# Patient Record
Sex: FEMALE | Race: WHITE | NOT HISPANIC OR LATINO | Employment: OTHER | URBAN - METROPOLITAN AREA
[De-identification: names, ages, dates, MRNs, and addresses within clinical notes are randomized per-mention and may not be internally consistent; named-entity substitution may affect disease eponyms.]

---

## 2017-10-23 ENCOUNTER — OFFICE VISIT (OUTPATIENT)
Dept: INTERNAL MEDICINE | Facility: CLINIC | Age: 72
End: 2017-10-23

## 2017-10-23 ENCOUNTER — APPOINTMENT (OUTPATIENT)
Dept: CARDIOLOGY | Facility: HOSPITAL | Age: 72
End: 2017-10-23

## 2017-10-23 VITALS
BODY MASS INDEX: 29.66 KG/M2 | DIASTOLIC BLOOD PRESSURE: 72 MMHG | SYSTOLIC BLOOD PRESSURE: 118 MMHG | RESPIRATION RATE: 16 BRPM | TEMPERATURE: 97.8 F | WEIGHT: 178 LBS | OXYGEN SATURATION: 99 % | HEART RATE: 68 BPM | HEIGHT: 65 IN

## 2017-10-23 DIAGNOSIS — K21.9 GASTROESOPHAGEAL REFLUX DISEASE WITHOUT ESOPHAGITIS: ICD-10-CM

## 2017-10-23 DIAGNOSIS — E78.49 OTHER HYPERLIPIDEMIA: ICD-10-CM

## 2017-10-23 DIAGNOSIS — M15.9 PRIMARY OSTEOARTHRITIS INVOLVING MULTIPLE JOINTS: ICD-10-CM

## 2017-10-23 DIAGNOSIS — I38 HEART VALVE DISORDER: ICD-10-CM

## 2017-10-23 DIAGNOSIS — M79.89 LEFT LEG SWELLING: Primary | ICD-10-CM

## 2017-10-23 DIAGNOSIS — Z23 FLU VACCINE NEED: ICD-10-CM

## 2017-10-23 DIAGNOSIS — J30.9 ALLERGIC RHINITIS, UNSPECIFIED CHRONICITY, UNSPECIFIED SEASONALITY, UNSPECIFIED TRIGGER: ICD-10-CM

## 2017-10-23 PROBLEM — M15.0 PRIMARY OSTEOARTHRITIS INVOLVING MULTIPLE JOINTS: Status: ACTIVE | Noted: 2017-10-23

## 2017-10-23 PROCEDURE — 99203 OFFICE O/P NEW LOW 30 MIN: CPT | Performed by: NURSE PRACTITIONER

## 2017-10-23 PROCEDURE — G0008 ADMIN INFLUENZA VIRUS VAC: HCPCS | Performed by: NURSE PRACTITIONER

## 2017-10-23 PROCEDURE — 90662 IIV NO PRSV INCREASED AG IM: CPT | Performed by: NURSE PRACTITIONER

## 2017-10-23 RX ORDER — PRAVASTATIN SODIUM 40 MG
40 TABLET ORAL DAILY
COMMUNITY
Start: 2017-10-18 | End: 2018-04-05 | Stop reason: SDUPTHER

## 2017-10-23 RX ORDER — FLUTICASONE PROPIONATE 50 MCG
SPRAY, SUSPENSION (ML) NASAL
COMMUNITY
Start: 2017-10-11 | End: 2018-03-20

## 2017-10-23 RX ORDER — ESOMEPRAZOLE MAGNESIUM 40 MG/1
40 CAPSULE, DELAYED RELEASE ORAL AS NEEDED
COMMUNITY
End: 2018-01-29 | Stop reason: SDUPTHER

## 2017-10-23 RX ORDER — LOSARTAN POTASSIUM 50 MG/1
50 TABLET ORAL DAILY
COMMUNITY
Start: 2017-10-18 | End: 2018-01-29 | Stop reason: SDUPTHER

## 2017-10-23 NOTE — PROGRESS NOTES
Subjective   Joyce Jones is a 72 y.o. female    Chief Complaint   Patient presents with   • Establish Care   • swelling in left ankle     Had 2nd left knee replacement in Aug.      History of Present Illness     New pt here to establish care.    C/O LLE swelling.  Joyce had her 3rd surgery on her left knee in Aug 2017.  Prior to this, she had a scope, a TKR and this was a revision in August.  States that prior to the surgery she did have swelling.  Following the surgery, she has had continued swelling that does not seem to be improving/decreasing. She has a h/o DVT in her right leg in the early 2000's following a right TKR.  She has been on ASA since.  She was anticoagulated for 6 months to a year following that DVT.  She denies calf pain or redness.  Has been wearing a compression stocking without relief of sx's.  No CP or SOA.      HTN - chronic/well controlled; states that BP has always been fine, but her cardiologist prefers to keep her lower due to her MR, TR, and AVR.  She tolerates med w/o SE.    HL - chronic/stable on Pravastatin 40mg.  States that she had labs done recently at her previous PCP at [facility].    Allergic rhinnitis - currently using nasal sprays and has allergy testing scheduled with Dr. Pro on 11/3/2017    Heart Murmur - has been told that she has tricuspid, mitral and aortic valve regurgitation.  Last echo was > 1 years.  Followed by Cardiology - Dr. Toussaint.  Sees him Q6 months at [facility].     OA - chronic/stable.  Sees rheumatology at [facility] Q6 months.  Was originally referred to them for suspect of RA, but this was ruled out    Specialist:  GI - Dr. Mallory/Maya  Cardiologist - [facility]  ENT - [facility] - Dr. 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 T11-sacrum   • CERVICAL SPINE SURGERY  2000    cage   • CHOLECYSTECTOMY  2014   • COLONOSCOPY  2016   • FOOT SURGERY  2008   • HERNIA REPAIR Bilateral 1978    Inguinal hernia   • HYSTERECTOMY  1992?    1 ovary left   • KNEE SURGERY      2005 and x2 in 8/2017(left)   • NASAL SEPTUM SURGERY  1999     Allergies   Allergen Reactions   • Overton-2 Inhibitors Anaphylaxis   • Augmentin [Amoxicillin-Pot Clavulanate] Nausea Only     Family History   Problem Relation Age of Onset   • Colon cancer Mother    • Uterine cancer Mother    • Hypertension Mother    • Kidney disease Mother    • Tuberculosis Mother    • Cancer Father      Bladder   • Heart failure Father    • Stroke Father    • Hypertension Father    • Heart attack Father    • Hypertension Brother    • Kidney disease Brother    • Kidney disease Brother    • Hypertension Brother    • Other Daughter      CVID   • Diabetes Daughter      Social History     Social History   • Marital status:      Spouse name: N/A   • Number of children: N/A   • Years of education: N/A     Occupational History   • Not on file.     Social History Main Topics   • Smoking status: Never Smoker   • Smokeless tobacco: Never Used   • Alcohol use No   • Drug use: No   • Sexual activity: Not on file      Comment:      Other Topics Concern   • Not on file     Social History Narrative   • No narrative on file         The following portions of the patient's history were reviewed and updated as appropriate: allergies, current medications, past family history, past medical history, past social history, past surgical history and problem list.    Current  Outpatient Prescriptions:   •  esomeprazole (nexIUM) 40 MG capsule, Take 40 mg by mouth As Needed., Disp: , Rfl:   •  fluticasone (FLONASE) 50 MCG/ACT nasal spray, 2 sprays to each nostril daily, Disp: , Rfl:   •  losartan (COZAAR) 50 MG tablet, Take 50 mg by mouth Daily., Disp: , Rfl:   •  pravastatin (PRAVACHOL) 40 MG tablet, Take 40 mg by mouth Daily., Disp: , Rfl:   •  traMADol-acetaminophen (ULTRACET) 37.5-325 MG per tablet, Take 2 tablets by mouth Every 6 (Six) Hours As Needed., Disp: , Rfl:      Review of Systems   Constitutional: Negative for chills, fatigue and fever.   Respiratory: Negative for cough, chest tightness and shortness of breath.    Cardiovascular: Positive for leg swelling. Negative for chest pain.   Gastrointestinal: Negative for abdominal pain, diarrhea, nausea and vomiting.   Endocrine: Negative for cold intolerance and heat intolerance.   Musculoskeletal: Negative for arthralgias.   Neurological: Negative for dizziness.       Objective   Physical Exam   Constitutional: She is oriented to person, place, and time. She appears well-developed and well-nourished.   HENT:   Head: Normocephalic and atraumatic.   Eyes: Conjunctivae and EOM are normal. Pupils are equal, round, and reactive to light.   Neck: Normal range of motion.   Cardiovascular: Normal rate, regular rhythm and normal heart sounds.    Pulmonary/Chest: Effort normal and breath sounds normal.   Abdominal: Soft. Bowel sounds are normal.   Musculoskeletal: Normal range of motion. She exhibits edema (LLE, 2+ pitting; neg дмитрий's.  No redness).   Neurological: She is alert and oriented to person, place, and time. She has normal reflexes.   Skin: Skin is warm and dry.   Psychiatric: She has a normal mood and affect. Her behavior is normal. Judgment and thought content normal.     Vitals:    10/23/17 0958   BP: 118/72   Pulse: 68   Resp: 16   Temp: 97.8 °F (36.6 °C)   TempSrc: Temporal Artery    SpO2: 99%   Weight: 178 lb (80.7 kg)  "  Height: 64.5\" (163.8 cm)         Assessment/Plan   Joyce was seen today for establish care and swelling in left ankle.    Diagnoses and all orders for this visit:    Left leg swelling  -     Duplex Venous Lower Extremity - Left CAR; Future    Flu vaccine need  -     Flu Vaccine High Dose PF 65YR+ ([id number])    Primary osteoarthritis involving multiple joints    Other hyperlipidemia    Gastroesophageal reflux disease without esophagitis    Heart valve disorder    Allergic rhinitis, unspecified chronicity, unspecified seasonality, unspecified trigger      Sent for LLE venous duplex to r/o DVT  Wear compression stockings  She has f/u with Ortho on 11/3/17; if duplex is normal, she will discuss sx's with him  Flu shot updated  No refills needed today  Pt reports that MCW exam was done in March 2017  She will f/u with me in 6 months for MCW, or RTC sooner with any problems             "

## 2017-10-25 ENCOUNTER — TELEPHONE (OUTPATIENT)
Dept: INTERNAL MEDICINE | Facility: CLINIC | Age: 72
End: 2017-10-25

## 2017-10-25 NOTE — TELEPHONE ENCOUNTER
----- Message from LOVE Manzano sent at 10/23/2017 10:35 AM EDT -----  Can you request last labs/OV from her previous PCP at [facility]

## 2017-10-26 ENCOUNTER — HOSPITAL ENCOUNTER (OUTPATIENT)
Dept: CARDIOLOGY | Facility: HOSPITAL | Age: 72
Discharge: HOME | End: 2017-10-26
Admitting: NURSE PRACTITIONER

## 2017-10-26 DIAGNOSIS — M79.89 LEFT LEG SWELLING: ICD-10-CM

## 2017-10-26 LAB
BH CV LOWER VASCULAR LEFT COMMON FEMORAL AUGMENT: NORMAL
BH CV LOWER VASCULAR LEFT COMMON FEMORAL COMPRESS: NORMAL
BH CV LOWER VASCULAR LEFT COMMON FEMORAL PHASIC: NORMAL
BH CV LOWER VASCULAR LEFT COMMON FEMORAL SPONT: NORMAL
BH CV LOWER VASCULAR LEFT DISTAL FEMORAL COMPRESS: NORMAL
BH CV LOWER VASCULAR LEFT GASTRONEMIUS COMPRESS: NORMAL
BH CV LOWER VASCULAR LEFT GREATER SAPH AK COMPRESS: NORMAL
BH CV LOWER VASCULAR LEFT GREATER SAPH BK COMPRESS: NORMAL
BH CV LOWER VASCULAR LEFT LESSER SAPH COMPRESS: NORMAL
BH CV LOWER VASCULAR LEFT MID FEMORAL AUGMENT: NORMAL
BH CV LOWER VASCULAR LEFT MID FEMORAL COMPRESS: NORMAL
BH CV LOWER VASCULAR LEFT MID FEMORAL PHASIC: NORMAL
BH CV LOWER VASCULAR LEFT MID FEMORAL SPONT: NORMAL
BH CV LOWER VASCULAR LEFT PERONEAL COMPRESS: NORMAL
BH CV LOWER VASCULAR LEFT POPLITEAL AUGMENT: NORMAL
BH CV LOWER VASCULAR LEFT POPLITEAL COMPRESS: NORMAL
BH CV LOWER VASCULAR LEFT POPLITEAL PHASIC: NORMAL
BH CV LOWER VASCULAR LEFT POPLITEAL SPONT: NORMAL
BH CV LOWER VASCULAR LEFT POSTERIOR TIBIAL COMPRESS: NORMAL
BH CV LOWER VASCULAR LEFT PROXIMAL FEMORAL COMPRESS: NORMAL
BH CV LOWER VASCULAR LEFT SAPHENOFEMORAL JUNCTION AUGMENT: NORMAL
BH CV LOWER VASCULAR LEFT SAPHENOFEMORAL JUNCTION COMPRESS: NORMAL
BH CV LOWER VASCULAR LEFT SAPHENOFEMORAL JUNCTION PHASIC: NORMAL
BH CV LOWER VASCULAR LEFT SAPHENOFEMORAL JUNCTION SPONT: NORMAL
BH CV LOWER VASCULAR RIGHT COMMON FEMORAL AUGMENT: NORMAL
BH CV LOWER VASCULAR RIGHT COMMON FEMORAL COMPRESS: NORMAL
BH CV LOWER VASCULAR RIGHT COMMON FEMORAL PHASIC: NORMAL
BH CV LOWER VASCULAR RIGHT COMMON FEMORAL SPONT: NORMAL

## 2017-10-26 PROCEDURE — 93971 EXTREMITY STUDY: CPT

## 2017-10-26 PROCEDURE — 93971 EXTREMITY STUDY: CPT | Performed by: INTERNAL MEDICINE

## 2017-10-30 ENCOUNTER — TELEPHONE (OUTPATIENT)
Dept: INTERNAL MEDICINE | Facility: CLINIC | Age: 72
End: 2017-10-30

## 2017-10-30 NOTE — TELEPHONE ENCOUNTER
----- Message from LOVE Manzano sent at 10/30/2017 11:28 AM EDT -----  Venous duplex of the lower extremities was normal

## 2017-11-01 ENCOUNTER — APPOINTMENT (OUTPATIENT)
Dept: CARDIOLOGY | Facility: HOSPITAL | Age: 72
End: 2017-11-01

## 2017-11-14 ENCOUNTER — OFFICE VISIT (OUTPATIENT)
Dept: INTERNAL MEDICINE | Facility: CLINIC | Age: 72
End: 2017-11-14

## 2017-11-14 VITALS
HEART RATE: 77 BPM | RESPIRATION RATE: 16 BRPM | BODY MASS INDEX: 30.09 KG/M2 | WEIGHT: 180.6 LBS | HEIGHT: 65 IN | OXYGEN SATURATION: 98 % | TEMPERATURE: 98.3 F | SYSTOLIC BLOOD PRESSURE: 102 MMHG | DIASTOLIC BLOOD PRESSURE: 72 MMHG

## 2017-11-14 DIAGNOSIS — E78.49 OTHER HYPERLIPIDEMIA: Primary | ICD-10-CM

## 2017-11-14 DIAGNOSIS — J30.9 ALLERGIC RHINITIS, UNSPECIFIED CHRONICITY, UNSPECIFIED SEASONALITY, UNSPECIFIED TRIGGER: ICD-10-CM

## 2017-11-14 DIAGNOSIS — I38 HEART VALVE DISORDER: ICD-10-CM

## 2017-11-14 DIAGNOSIS — Z23 NEED FOR PNEUMOCOCCAL VACCINATION: ICD-10-CM

## 2017-11-14 PROCEDURE — 90670 PCV13 VACCINE IM: CPT | Performed by: NURSE PRACTITIONER

## 2017-11-14 PROCEDURE — 99214 OFFICE O/P EST MOD 30 MIN: CPT | Performed by: NURSE PRACTITIONER

## 2017-11-14 PROCEDURE — G0009 ADMIN PNEUMOCOCCAL VACCINE: HCPCS | Performed by: NURSE PRACTITIONER

## 2017-11-14 RX ORDER — FUROSEMIDE 20 MG/1
TABLET ORAL
COMMUNITY
Start: 2017-10-27 | End: 2018-07-26

## 2017-11-14 NOTE — PROGRESS NOTES
Subjective   Joyce Jones is a 72 y.o. female    Chief Complaint   Patient presents with   • Dizziness   • Wants the Hep C lab done   • Pneumonia vaccine     History of Present Illness     Joyce presents today with c/o dizziness.  States that she recently had allergy testing and will start allergy injections tomorrow.  She has been off of all allergy meds for several weeks now.  Ears feel full.  She has a PND and scratchy throat.  No fever or chills.  Denies any other associated sx's or complaints.      Requests labs for Hep C screening - she is considered low risk, but has never been screened.     Also requests Prevnar 13 vaccine    The following portions of the patient's history were reviewed and updated as appropriate: allergies, current medications, past family history, past medical history, past social history, past surgical history and problem list.    Current Outpatient Prescriptions:   •  esomeprazole (nexIUM) 40 MG capsule, Take 40 mg by mouth As Needed., Disp: , Rfl:   •  fluticasone (FLONASE) 50 MCG/ACT nasal spray, 2 sprays to each nostril daily, Disp: , Rfl:   •  furosemide (LASIX) 20 MG tablet, prn, Disp: , Rfl:   •  losartan (COZAAR) 50 MG tablet, Take 50 mg by mouth Daily., Disp: , Rfl:   •  pravastatin (PRAVACHOL) 40 MG tablet, Take 40 mg by mouth Daily., Disp: , Rfl:   •  traMADol-acetaminophen (ULTRACET) 37.5-325 MG per tablet, Take 2 tablets by mouth Every 6 (Six) Hours As Needed., Disp: , Rfl:      Review of Systems   Constitutional: Negative for chills, fatigue and fever.   HENT: Positive for congestion, postnasal drip and rhinorrhea.    Respiratory: Negative for cough, chest tightness and shortness of breath.    Cardiovascular: Negative for chest pain.   Gastrointestinal: Negative for abdominal pain, diarrhea, nausea and vomiting.   Endocrine: Negative for cold intolerance and heat intolerance.   Musculoskeletal: Negative for arthralgias.   Neurological: Positive for dizziness.  "      Objective   Physical Exam   Constitutional: She is oriented to person, place, and time. She appears well-developed and well-nourished.   HENT:   Head: Normocephalic and atraumatic.   Right Ear: A middle ear effusion is present.   Left Ear: A middle ear effusion is present.   Eyes: Conjunctivae and EOM are normal. Pupils are equal, round, and reactive to light.   Neck: Normal range of motion.   Cardiovascular: Normal rate, regular rhythm and normal heart sounds.    Pulmonary/Chest: Effort normal and breath sounds normal.   Abdominal: Soft. Bowel sounds are normal.   Musculoskeletal: Normal range of motion.   Neurological: She is alert and oriented to person, place, and time. She has normal strength and normal reflexes. No cranial nerve deficit or sensory deficit. GCS eye subscore is 4. GCS verbal subscore is 5. GCS motor subscore is 6.   Skin: Skin is warm and dry.   Psychiatric: She has a normal mood and affect. Her behavior is normal. Judgment and thought content normal.     Vitals:    11/14/17 1407   BP: 102/72   Pulse: 77   Resp: 16   Temp: 98.3 °F (36.8 °C)   TempSrc: Temporal Artery    SpO2: 98%   Weight: 180 lb 9.6 oz (81.9 kg)   Height: 64.5\" (163.8 cm)         Assessment/Plan   Joyce was seen today for dizziness, wants the hep c lab done and pneumonia vaccine.    Diagnoses and all orders for this visit:    Other hyperlipidemia  -     CBC & Differential; Future  -     Comprehensive Metabolic Panel; Future  -     Lipid Panel; Future  -     TSH; Future  -     Hepatitis C Antibody; Future    Heart valve disorder  -     CBC & Differential; Future  -     Comprehensive Metabolic Panel; Future  -     Lipid Panel; Future  -     TSH; Future  -     Hepatitis C Antibody; Future    Allergic rhinitis, unspecified chronicity, unspecified seasonality, unspecified trigger  -     CBC & Differential; Future  -     Comprehensive Metabolic Panel; Future  -     Lipid Panel; Future  -     TSH; Future  -     Hepatitis C " Antibody; Future    Need for pneumococcal vaccination  -     Pneumococcal Conjugate Vaccine 13-Valent All    Labs sent   Prevnar updated  Has nasal steroid at home that she will start using being as though this will not interfere with her allergy injections.  RTC in March for MCW exam, or sooner with any problems

## 2017-11-20 ENCOUNTER — LAB (OUTPATIENT)
Dept: INTERNAL MEDICINE | Facility: CLINIC | Age: 72
End: 2017-11-20

## 2017-11-20 DIAGNOSIS — I38 HEART VALVE DISORDER: ICD-10-CM

## 2017-11-20 DIAGNOSIS — E78.49 OTHER HYPERLIPIDEMIA: ICD-10-CM

## 2017-11-20 DIAGNOSIS — J30.9 ALLERGIC RHINITIS, UNSPECIFIED CHRONICITY, UNSPECIFIED SEASONALITY, UNSPECIFIED TRIGGER: ICD-10-CM

## 2017-12-11 ENCOUNTER — TELEPHONE (OUTPATIENT)
Dept: INTERNAL MEDICINE | Facility: CLINIC | Age: 72
End: 2017-12-11

## 2018-01-02 LAB
ALBUMIN SERPL-MCNC: 4.5 G/DL (ref 3.2–4.8)
ALBUMIN/GLOB SERPL: 1.9 G/DL (ref 1.5–2.5)
ALP SERPL-CCNC: 101 U/L (ref 25–100)
ALT SERPL W P-5'-P-CCNC: 29 U/L (ref 7–40)
ANION GAP SERPL CALCULATED.3IONS-SCNC: 11 MMOL/L (ref 3–11)
ARTICHOKE IGE QN: 108 MG/DL (ref 0–130)
AST SERPL-CCNC: 27 U/L (ref 0–33)
BASOPHILS # BLD AUTO: 0.04 10*3/MM3 (ref 0–0.2)
BASOPHILS NFR BLD AUTO: 0.6 % (ref 0–1)
BILIRUB SERPL-MCNC: 0.4 MG/DL (ref 0.3–1.2)
BUN BLD-MCNC: 24 MG/DL (ref 9–23)
BUN/CREAT SERPL: 24 (ref 7–25)
CALCIUM SPEC-SCNC: 9.8 MG/DL (ref 8.7–10.4)
CHLORIDE SERPL-SCNC: 104 MMOL/L (ref 99–109)
CHOLEST SERPL-MCNC: 188 MG/DL (ref 0–200)
CO2 SERPL-SCNC: 26 MMOL/L (ref 20–31)
CREAT BLD-MCNC: 1 MG/DL (ref 0.6–1.3)
DEPRECATED RDW RBC AUTO: 55.3 FL (ref 37–54)
EOSINOPHIL # BLD AUTO: 0.08 10*3/MM3 (ref 0–0.3)
EOSINOPHIL NFR BLD AUTO: 1.3 % (ref 0–3)
ERYTHROCYTE [DISTWIDTH] IN BLOOD BY AUTOMATED COUNT: 14.9 % (ref 11.3–14.5)
GFR SERPL CREATININE-BSD FRML MDRD: 55 ML/MIN/1.73
GLOBULIN UR ELPH-MCNC: 2.4 GM/DL
GLUCOSE BLD-MCNC: 80 MG/DL (ref 70–100)
HCT VFR BLD AUTO: 40.8 % (ref 34.5–44)
HCV AB SER DONR QL: NORMAL
HDLC SERPL-MCNC: 61 MG/DL (ref 40–60)
HGB BLD-MCNC: 12.9 G/DL (ref 11.5–15.5)
IMM GRANULOCYTES # BLD: 0.02 10*3/MM3 (ref 0–0.03)
IMM GRANULOCYTES NFR BLD: 0.3 % (ref 0–0.6)
LYMPHOCYTES # BLD AUTO: 1.19 10*3/MM3 (ref 0.6–4.8)
LYMPHOCYTES NFR BLD AUTO: 18.9 % (ref 24–44)
MCH RBC QN AUTO: 31.9 PG (ref 27–31)
MCHC RBC AUTO-ENTMCNC: 31.6 G/DL (ref 32–36)
MCV RBC AUTO: 101 FL (ref 80–99)
MONOCYTES # BLD AUTO: 0.57 10*3/MM3 (ref 0–1)
MONOCYTES NFR BLD AUTO: 9.1 % (ref 0–12)
NEUTROPHILS # BLD AUTO: 4.38 10*3/MM3 (ref 1.5–8.3)
NEUTROPHILS NFR BLD AUTO: 69.8 % (ref 41–71)
PLATELET # BLD AUTO: 318 10*3/MM3 (ref 150–450)
PMV BLD AUTO: 9.6 FL (ref 6–12)
POTASSIUM BLD-SCNC: 4.6 MMOL/L (ref 3.5–5.5)
PROT SERPL-MCNC: 6.9 G/DL (ref 5.7–8.2)
RBC # BLD AUTO: 4.04 10*6/MM3 (ref 3.89–5.14)
SODIUM BLD-SCNC: 141 MMOL/L (ref 132–146)
TRIGL SERPL-MCNC: 207 MG/DL (ref 0–150)
TSH SERPL DL<=0.05 MIU/L-ACNC: 1.51 MIU/ML (ref 0.35–5.35)
WBC NRBC COR # BLD: 6.28 10*3/MM3 (ref 3.5–10.8)

## 2018-01-02 PROCEDURE — 80053 COMPREHEN METABOLIC PANEL: CPT | Performed by: NURSE PRACTITIONER

## 2018-01-02 PROCEDURE — 84443 ASSAY THYROID STIM HORMONE: CPT | Performed by: NURSE PRACTITIONER

## 2018-01-02 PROCEDURE — 85025 COMPLETE CBC W/AUTO DIFF WBC: CPT | Performed by: NURSE PRACTITIONER

## 2018-01-02 PROCEDURE — 80061 LIPID PANEL: CPT | Performed by: NURSE PRACTITIONER

## 2018-01-02 PROCEDURE — 86803 HEPATITIS C AB TEST: CPT | Performed by: NURSE PRACTITIONER

## 2018-01-29 ENCOUNTER — OFFICE VISIT (OUTPATIENT)
Dept: INTERNAL MEDICINE | Facility: CLINIC | Age: 73
End: 2018-01-29

## 2018-01-29 VITALS
HEIGHT: 64 IN | SYSTOLIC BLOOD PRESSURE: 98 MMHG | HEART RATE: 87 BPM | RESPIRATION RATE: 16 BRPM | TEMPERATURE: 97.6 F | DIASTOLIC BLOOD PRESSURE: 62 MMHG | WEIGHT: 177.2 LBS | BODY MASS INDEX: 30.25 KG/M2 | OXYGEN SATURATION: 98 %

## 2018-01-29 DIAGNOSIS — Z78.0 POST-MENOPAUSAL: ICD-10-CM

## 2018-01-29 DIAGNOSIS — K21.9 GASTROESOPHAGEAL REFLUX DISEASE WITHOUT ESOPHAGITIS: ICD-10-CM

## 2018-01-29 DIAGNOSIS — E78.49 OTHER HYPERLIPIDEMIA: ICD-10-CM

## 2018-01-29 DIAGNOSIS — J30.9 ALLERGIC RHINITIS, UNSPECIFIED CHRONICITY, UNSPECIFIED SEASONALITY, UNSPECIFIED TRIGGER: ICD-10-CM

## 2018-01-29 DIAGNOSIS — M15.9 PRIMARY OSTEOARTHRITIS INVOLVING MULTIPLE JOINTS: ICD-10-CM

## 2018-01-29 DIAGNOSIS — M25.512 LEFT SHOULDER PAIN, UNSPECIFIED CHRONICITY: Primary | ICD-10-CM

## 2018-01-29 DIAGNOSIS — I38 HEART VALVE DISORDER: ICD-10-CM

## 2018-01-29 DIAGNOSIS — M54.2 NECK PAIN: ICD-10-CM

## 2018-01-29 PROCEDURE — 99214 OFFICE O/P EST MOD 30 MIN: CPT | Performed by: NURSE PRACTITIONER

## 2018-01-29 RX ORDER — METHYLPREDNISOLONE 4 MG/1
TABLET ORAL
Qty: 1 EACH | Refills: 0 | Status: SHIPPED | OUTPATIENT
Start: 2018-01-29 | End: 2018-01-30 | Stop reason: SDUPTHER

## 2018-01-29 RX ORDER — ESOMEPRAZOLE MAGNESIUM 40 MG/1
40 CAPSULE, DELAYED RELEASE ORAL
Qty: 90 CAPSULE | Refills: 1 | Status: SHIPPED | OUTPATIENT
Start: 2018-01-29 | End: 2019-07-02

## 2018-01-29 RX ORDER — LOSARTAN POTASSIUM 50 MG/1
50 TABLET ORAL DAILY
Qty: 90 TABLET | Refills: 1 | Status: SHIPPED | OUTPATIENT
Start: 2018-01-29 | End: 2022-12-05 | Stop reason: SDUPTHER

## 2018-01-29 NOTE — PROGRESS NOTES
Subjective   Joyce Jones is a 72 y.o. female    Chief Complaint   Patient presents with   • Follow-up     Labs done prior to appt.    • Left shoulder pain     Hurt this shoulder early in 2017 and it has been a chronic problem. It seems to have gotten worse.      History of Present Illness     Here for f/u and to go over labs    C/O left shoulder pain.  States that in early 2017, she was lifting a very heavy window and injured this shoulder.  She had to go to the ER.  Had Xrays of the neck and it showed what sounds like disc space narrowing.  Was told to see a neurologist, and she did so.  They did some more imaging and it appeared that everything was coming from her neck.  She was not seen after this.  She did not do any PT.  Was given a muscle relaxer, which she has taken a few times without much relief of sx's.  States that the pain reappeared again about 3 months ago.  Sx's seem to be getting worse.  Pain is intermittent, but has increased in frequency.  Left arm is weak, feels like this arm is weaker as she is dropping things more often.      HTN - chronic/well controlled; states that BP has always been fine, but her cardiologist prefers to keep her lower due to her MR, TR, and AVR.  She tolerates med w/o SE.      HL - chronic/stable on Pravastatin 40mg.  LDL is 108 and HDL is 61!  TG's are up, but she feels this is r/t her diet over the holidays     Allergic rhinnitis - was doing allergy injections, but they made her feel terrible.  She has changed out the carpet in her house and got an air purifier and all of this has really helped     Heart Murmur - has been told that she has tricuspid, mitral and aortic valve regurgitation.  Last echo was > 1 years.  Followed by Cardiology - Dr. Toussaint.  Sees him Q6 months at [facility].       OA - chronic/stable.  Sees rheumatology at [facility] Q6 months.  Was originally referred to them for suspect of RA, but this was ruled out    Specialist:  GI -   Mohamud/Maya  [facility]  [place] - Dr. SPANN  Nephrologist - [facility] (yearly due to decreased renal function)  Rheumatologist - [facility] (writes for tramadol)  NS - none    Tdap ?  Flu shot - 11/2017  Pneumovax - 11/14/2017  Prevnar - ?  Zostavax - 2015  Colon - 1/1/2016 (due in 2019)  Mamm - 3/2017  Pap - s/p hysterectomy  DEXA - due    The following portions of the patient's history were reviewed and updated as appropriate: allergies, current medications, past family history, past medical history, past social history, past surgical history and problem list.    Current Outpatient Prescriptions:   •  esomeprazole (nexIUM) 40 MG capsule, Take 1 capsule by mouth Every Morning Before Breakfast., Disp: 90 capsule, Rfl: 1  •  fluticasone (FLONASE) 50 MCG/ACT nasal spray, 2 sprays to each nostril daily, Disp: , Rfl:   •  furosemide (LASIX) 20 MG tablet, prn, Disp: , Rfl:   •  losartan (COZAAR) 50 MG tablet, Take 1 tablet by mouth Daily., Disp: 90 tablet, Rfl: 1  •  MethylPREDNISolone (MEDROL, JOVON,) 4 MG tablet, Take as directed on package instructions., Disp: 1 each, Rfl: 0  •  pravastatin (PRAVACHOL) 40 MG tablet, Take 40 mg by mouth Daily., Disp: , Rfl:   •  traMADol-acetaminophen (ULTRACET) 37.5-325 MG per tablet, Take 2 tablets by mouth Every 6 (Six) Hours As Needed., Disp: , Rfl:      Review of Systems   Constitutional: Negative for chills, fatigue and fever.   Respiratory: Negative for cough, chest tightness and shortness of breath.    Cardiovascular: Negative for chest pain.   Gastrointestinal: Negative for abdominal pain, diarrhea, nausea and vomiting.   Endocrine: Negative for cold intolerance and heat intolerance.   Musculoskeletal: Positive for arthralgias.        Left shoulder pain   Neurological: Negative for dizziness.       Objective   Physical Exam   Constitutional: She is oriented to person, place, and time. She appears well-developed and well-nourished.   HENT:   Head:  "Normocephalic and atraumatic.   Eyes: Conjunctivae and EOM are normal. Pupils are equal, round, and reactive to light.   Neck: Normal range of motion.   Cardiovascular: Normal rate, regular rhythm and normal heart sounds.    Pulmonary/Chest: Effort normal and breath sounds normal.   Abdominal: Soft. Bowel sounds are normal.   Musculoskeletal:        Left shoulder: She exhibits decreased range of motion, tenderness (along the deltoid), pain and decreased strength (positive supraspinatous test). She exhibits no bony tenderness, no swelling, no effusion, no crepitus, no deformity, no laceration, no spasm and normal pulse.   Neurological: She is alert and oriented to person, place, and time. She has normal reflexes.   Skin: Skin is warm and dry.   Psychiatric: She has a normal mood and affect. Her behavior is normal. Judgment and thought content normal.     Vitals:    01/29/18 1058   BP: 98/62   Pulse: 87   Resp: 16   Temp: 97.6 °F (36.4 °C)   TempSrc: Temporal Artery    SpO2: 98%   Weight: 80.4 kg (177 lb 3.2 oz)   Height: 163.8 cm (64.49\")         Assessment/Plan   Joyce was seen today for follow-up and left shoulder pain.    Diagnoses and all orders for this visit:    Left shoulder pain, unspecified chronicity  -     MethylPREDNISolone (MEDROL, JOVON,) 4 MG tablet; Take as directed on package instructions.  -     XR Shoulder 2+ View Left; Future  -     XR Spine Cervical Complete 4 or 5 View; Future    Neck pain  -     XR Spine Cervical Complete 4 or 5 View; Future    Other hyperlipidemia    Heart valve disorder  -     losartan (COZAAR) 50 MG tablet; Take 1 tablet by mouth Daily.    Allergic rhinitis, unspecified chronicity, unspecified seasonality, unspecified trigger    Gastroesophageal reflux disease without esophagitis  -     esomeprazole (nexIUM) 40 MG capsule; Take 1 capsule by mouth Every Morning Before Breakfast.    Primary osteoarthritis involving multiple joints    Post-menopausal  -     DEXA Bone Density " Axial    Labs reviewed  Sent for xray of the c-spine and left shoulder  Medrol as directed  Has muscle relaxer at home that she may also use PRN  States that her niece is a PT and will skype with her to do PT exercises  I will call with results of imaging  Meds refilled  DEXA ordered  F/U in 4 weeks on left shoulder, RTC sooner with any problems

## 2018-01-30 DIAGNOSIS — M25.512 LEFT SHOULDER PAIN, UNSPECIFIED CHRONICITY: ICD-10-CM

## 2018-01-30 RX ORDER — METHYLPREDNISOLONE 4 MG/1
TABLET ORAL
Qty: 1 EACH | Refills: 0 | Status: SHIPPED | OUTPATIENT
Start: 2018-01-30 | End: 2018-03-08

## 2018-02-01 ENCOUNTER — HOSPITAL ENCOUNTER (OUTPATIENT)
Dept: GENERAL RADIOLOGY | Facility: HOSPITAL | Age: 73
Discharge: HOME | End: 2018-02-01
Admitting: NURSE PRACTITIONER

## 2018-02-01 DIAGNOSIS — M54.2 NECK PAIN: ICD-10-CM

## 2018-02-01 DIAGNOSIS — M25.512 LEFT SHOULDER PAIN, UNSPECIFIED CHRONICITY: ICD-10-CM

## 2018-02-01 PROCEDURE — 73030 X-RAY EXAM OF SHOULDER: CPT

## 2018-02-01 PROCEDURE — 72050 X-RAY EXAM NECK SPINE 4/5VWS: CPT

## 2018-02-07 ENCOUNTER — TELEPHONE (OUTPATIENT)
Dept: INTERNAL MEDICINE | Facility: CLINIC | Age: 73
End: 2018-02-07

## 2018-02-07 NOTE — TELEPHONE ENCOUNTER
----- Message from LOVE Manzano sent at 2/4/2018 11:18 AM EST -----  Shoulder xray is normal.  C-spine xray shows DDD/arthritis.  I would recommend an MRI to further evaluate her sx's.

## 2018-02-08 ENCOUNTER — TELEPHONE (OUTPATIENT)
Dept: INTERNAL MEDICINE | Facility: CLINIC | Age: 73
End: 2018-02-08

## 2018-02-08 DIAGNOSIS — M54.12 CERVICAL RADICULOPATHY: Primary | ICD-10-CM

## 2018-02-14 ENCOUNTER — APPOINTMENT (OUTPATIENT)
Dept: BONE DENSITY | Facility: HOSPITAL | Age: 73
End: 2018-02-14

## 2018-02-14 ENCOUNTER — HOSPITAL ENCOUNTER (OUTPATIENT)
Dept: MRI IMAGING | Facility: HOSPITAL | Age: 73
End: 2018-02-14

## 2018-02-19 ENCOUNTER — HOSPITAL ENCOUNTER (OUTPATIENT)
Dept: MRI IMAGING | Facility: HOSPITAL | Age: 73
Discharge: HOME | End: 2018-02-19
Admitting: NURSE PRACTITIONER

## 2018-02-19 DIAGNOSIS — M54.12 CERVICAL RADICULOPATHY: ICD-10-CM

## 2018-02-19 PROCEDURE — 72141 MRI NECK SPINE W/O DYE: CPT

## 2018-02-27 ENCOUNTER — TELEPHONE (OUTPATIENT)
Dept: INTERNAL MEDICINE | Facility: CLINIC | Age: 73
End: 2018-02-27

## 2018-02-27 NOTE — TELEPHONE ENCOUNTER
Patient notified of results. She said she was still going to keep her appt on Thursday 3/1/2018 for a Follow up on results and then have a referral placed then.

## 2018-02-27 NOTE — TELEPHONE ENCOUNTER
----- Message from LOVE Manzano sent at 2/25/2018  8:31 PM EST -----  MRI of the Cspine shows mild bulging disc just above and below the site of her previous fusion.  I would recommend PT, and seeing NS if not improvement.

## 2018-03-02 ENCOUNTER — TELEPHONE (OUTPATIENT)
Dept: INTERNAL MEDICINE | Facility: CLINIC | Age: 73
End: 2018-03-02

## 2018-03-05 DIAGNOSIS — R93.7 ABNORMAL MRI, CERVICAL SPINE: ICD-10-CM

## 2018-03-05 DIAGNOSIS — M54.12 CERVICAL RADICULOPATHY: Primary | ICD-10-CM

## 2018-03-08 ENCOUNTER — OFFICE VISIT (OUTPATIENT)
Dept: INTERNAL MEDICINE | Facility: CLINIC | Age: 73
End: 2018-03-08

## 2018-03-08 VITALS
WEIGHT: 176.4 LBS | SYSTOLIC BLOOD PRESSURE: 112 MMHG | BODY MASS INDEX: 30.11 KG/M2 | HEART RATE: 67 BPM | DIASTOLIC BLOOD PRESSURE: 74 MMHG | OXYGEN SATURATION: 98 % | HEIGHT: 64 IN | RESPIRATION RATE: 16 BRPM | TEMPERATURE: 97.6 F

## 2018-03-08 DIAGNOSIS — M54.12 CERVICAL RADICULOPATHY: Primary | ICD-10-CM

## 2018-03-08 PROCEDURE — 99213 OFFICE O/P EST LOW 20 MIN: CPT | Performed by: NURSE PRACTITIONER

## 2018-03-08 RX ORDER — METHYLPREDNISOLONE 4 MG/1
TABLET ORAL
Qty: 1 EACH | Refills: 0 | Status: SHIPPED | OUTPATIENT
Start: 2018-03-08 | End: 2018-03-20

## 2018-03-08 RX ORDER — HYDROCODONE BITARTRATE AND ACETAMINOPHEN 5; 325 MG/1; MG/1
1 TABLET ORAL EVERY 8 HOURS PRN
Qty: 10 TABLET | Refills: 0 | Status: SHIPPED | OUTPATIENT
Start: 2018-03-08 | End: 2018-03-20

## 2018-03-08 NOTE — PROGRESS NOTES
Subjective   Joyce Jones is a 72 y.o. female    Chief Complaint   Patient presents with   • Still having the shoulder and arm pain     If she can't get in soon with Neuro then she is wanting pain medication. The Tramadol is not working. She is concerned with nerve damage.    • Needs a script for amoxicillin for dental procedure     History of Present Illness     Pt presents with c/o worsening neck and left arm pain.  Joyce was seen here on 1/29/18 with the same complaints.  States that in early 2017, she was lifting a very heavy window and injured this shoulder.  She had to go to the ER.  Had Xrays of the neck and it showed what sounds like disc space narrowing.  Was told to see a neurologist, and she did so.  They did some more imaging and it appeared that everything was coming from her neck.  She was not seen after this.  She did not do any PT.  Was given a muscle relaxer, which she has taken a few times without much relief of sx's.  States that the pain reappeared again about 3 months ago.  Sx's seem to be getting worse.  Pain is intermittent, but has increased in frequency.  Left arm is weak, feels like this arm is weaker as she is dropping things more often.      I sent her for xrays and then an MRI of the C-spine that showed:      1.  There is mild dural sac impingement and minimal cord effacement  without cord displacement, deformity or evidence of lateralizing  impingement at the C4-C5 level, the level immediately above the anterior  fusion.  2.  At the level below the anterior fusion, C7-T1 level, there is mild  soft disc protrusion and bossing on the dural sac without cord  impingement.  3.  Otherwise, the fusion from C5 through C7 is well aligned. There is  no intramedullary pathologic cord lesion, syrinx or high grade  lateralizing impingement. The C1-C2 alignment and the cervicomedullary  junction are within normal limits.    I have referred her to Dr. Colin RUTHERFORD per her request, but she does not  have an appt yet.  She called the office herself this AM and was told that his  is out of the office.      States that her pain is getting worse.  She has been taking Tramadol that is prescribed by another provider without any relief.  I did give her a steroid pack at her last visit and she did get relief from this for a short period of time.      The following portions of the patient's history were reviewed and updated as appropriate: allergies, current medications, past family history, past medical history, past social history, past surgical history and problem list.    Current Outpatient Prescriptions:   •  esomeprazole (nexIUM) 40 MG capsule, Take 1 capsule by mouth Every Morning Before Breakfast., Disp: 90 capsule, Rfl: 1  •  fluticasone (FLONASE) 50 MCG/ACT nasal spray, 2 sprays to each nostril daily, Disp: , Rfl:   •  furosemide (LASIX) 20 MG tablet, prn, Disp: , Rfl:   •  losartan (COZAAR) 50 MG tablet, Take 1 tablet by mouth Daily., Disp: 90 tablet, Rfl: 1  •  pravastatin (PRAVACHOL) 40 MG tablet, Take 40 mg by mouth Daily., Disp: , Rfl:   •  traMADol-acetaminophen (ULTRACET) 37.5-325 MG per tablet, Take 2 tablets by mouth Every 6 (Six) Hours As Needed., Disp: , Rfl:      Review of Systems   Constitutional: Negative for chills, fatigue and fever.   Respiratory: Negative for cough, chest tightness and shortness of breath.    Cardiovascular: Negative for chest pain.   Gastrointestinal: Negative for abdominal pain, diarrhea, nausea and vomiting.   Endocrine: Negative for cold intolerance and heat intolerance.   Musculoskeletal: Positive for arthralgias and neck pain.        Left arm pain, numbness, tingling and weakness   Neurological: Negative for dizziness.       Objective   Physical Exam   Constitutional: She is oriented to person, place, and time. She appears well-developed and well-nourished.   HENT:   Head: Normocephalic and atraumatic.   Eyes: Conjunctivae and EOM are normal. Pupils are equal,  "round, and reactive to light.   Neck: Normal range of motion.   Cardiovascular: Normal rate, regular rhythm and normal heart sounds.    Pulmonary/Chest: Effort normal and breath sounds normal.   Abdominal: Soft. Bowel sounds are normal.   Musculoskeletal:        Left shoulder: She exhibits decreased range of motion, pain and decreased strength. She exhibits no tenderness, no bony tenderness, no swelling, no effusion, no crepitus, no deformity, no laceration, no spasm and normal pulse.        Cervical back: She exhibits decreased range of motion, tenderness and pain. She exhibits no bony tenderness, no swelling, no edema, no deformity, no laceration, no spasm and normal pulse.   Neurological: She is alert and oriented to person, place, and time. She has normal reflexes.   Skin: Skin is warm and dry.   Psychiatric: She has a normal mood and affect. Her behavior is normal. Judgment and thought content normal.     Vitals:    03/08/18 1340   BP: 112/74   Pulse: 67   Resp: 16   Temp: 97.6 °F (36.4 °C)   TempSrc: Temporal Artery    SpO2: 98%   Weight: 80 kg (176 lb 6.4 oz)   Height: 163.8 cm (64.49\")         Assessment/Plan   Joyce was seen today for still having the shoulder and arm pain and needs a script for amoxicillin for dental procedure.    Diagnoses and all orders for this visit:    Cervical radiculopathy  -     MethylPREDNISolone (MEDROL, JOVON,) 4 MG tablet; Take as directed on package instructions.  -     HYDROcodone-acetaminophen (NORCO) 5-325 MG per tablet; Take 1 tablet by mouth Every 8 (Eight) Hours As Needed (pain).      Medrol dose pack as directed  Short course of Norco to take sparingly PRN until she sees KRISH Padilla compliant  Referral coordinator is working on referral  Please call the office if no appt scheduled by Monday, 3/12/18           "

## 2018-03-16 ENCOUNTER — TELEPHONE (OUTPATIENT)
Dept: INTERNAL MEDICINE | Facility: CLINIC | Age: 73
End: 2018-03-16

## 2018-03-16 NOTE — TELEPHONE ENCOUNTER
----- Message from LOVE Manzano sent at 3/12/2018 12:31 PM EDT -----  Please let pt know that I received and reviewed her last DEXA.  No need to repeat until 3/2022

## 2018-03-20 ENCOUNTER — OFFICE VISIT (OUTPATIENT)
Dept: NEUROSURGERY | Facility: CLINIC | Age: 73
End: 2018-03-20

## 2018-03-20 VITALS — DIASTOLIC BLOOD PRESSURE: 82 MMHG | SYSTOLIC BLOOD PRESSURE: 112 MMHG | TEMPERATURE: 97.7 F

## 2018-03-20 DIAGNOSIS — M48.02 SPINAL STENOSIS OF CERVICAL REGION: ICD-10-CM

## 2018-03-20 DIAGNOSIS — M50.30 BULGING OF CERVICAL INTERVERTEBRAL DISC: ICD-10-CM

## 2018-03-20 DIAGNOSIS — M50.30 DEGENERATIVE DISC DISEASE, CERVICAL: Primary | ICD-10-CM

## 2018-03-20 PROCEDURE — 99203 OFFICE O/P NEW LOW 30 MIN: CPT | Performed by: NEUROLOGICAL SURGERY

## 2018-03-20 RX ORDER — NABUMETONE 750 MG/1
750 TABLET, FILM COATED ORAL 2 TIMES DAILY
Qty: 60 TABLET | Refills: 0 | Status: SHIPPED | OUTPATIENT
Start: 2018-03-20 | End: 2018-04-03

## 2018-03-20 RX ORDER — METHOCARBAMOL 750 MG/1
750 TABLET, FILM COATED ORAL NIGHTLY
Qty: 30 TABLET | Refills: 0 | Status: SHIPPED | OUTPATIENT
Start: 2018-03-20 | End: 2018-04-19

## 2018-03-20 NOTE — PROGRESS NOTES
Joyce Jones  1945  [id number]      Chief Complaint   Patient presents with   • Neck Pain   • Arm Pain     bilateral        HISTORY OF PRESENT ILLNESS:  [This is a 72-year-old female who presents with a chief complaint of pain in the cervical area associated weakness in her upper extremities more so the left than the right for the past 8 months.  Any movement of her arms exacerbate her symptoms.  The pain that she has in her arms is somewhat ill-defined and not that usually associated with an isolated dermatomal pattern.  She has had previous anterior cervical decompression and fusion in the cervical area from C5 through C7.  She also has scoliosis which has been treated conservatively.  She has had suffered a recent retinal detachment on the right and is in the process of intervention at this time.  Cervical MRIs been performed and she is referred for surgical consultation. 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 T11-sacrum   • CERVICAL SPINE SURGERY  2000    cage   • CHOLECYSTECTOMY  2014   • COLONOSCOPY  2016   • FOOT SURGERY  2008   • HERNIA REPAIR Bilateral 1978    Inguinal hernia   • HYSTERECTOMY  1992?    1 ovary left   • KNEE SURGERY      2005 and x2 in 8/2017(left)   • NASAL SEPTUM SURGERY  1999       Family History   Problem Relation Age of Onset   • Colon cancer Mother    • Uterine cancer Mother    • Hypertension Mother    • Kidney disease Mother    • Tuberculosis Mother    • Cancer Father      Bladder   • Heart  failure Father    • Stroke Father    • Hypertension Father    • Heart attack Father    • Hypertension Brother    • Kidney disease Brother    • Kidney disease Brother    • Hypertension Brother    • Other Daughter      CVID   • Diabetes Daughter        Social History     Social History   • Marital status:      Spouse name: N/A   • Number of children: N/A   • Years of education: N/A     Occupational History   • Not on file.     Social History Main Topics   • Smoking status: Never Smoker   • Smokeless tobacco: Never Used   • Alcohol use No   • Drug use: No   • Sexual activity: Not on file      Comment:      Other Topics Concern   • Not on file     Social History Narrative   • No narrative on file       Allergies   Allergen Reactions   • Overton-2 Inhibitors Anaphylaxis   • Augmentin [Amoxicillin-Pot Clavulanate] Nausea Only         Current Outpatient Prescriptions:   •  esomeprazole (nexIUM) 40 MG capsule, Take 1 capsule by mouth Every Morning Before Breakfast., Disp: 90 capsule, Rfl: 1  •  furosemide (LASIX) 20 MG tablet, prn, Disp: , Rfl:   •  losartan (COZAAR) 50 MG tablet, Take 1 tablet by mouth Daily., Disp: 90 tablet, Rfl: 1  •  MethylPREDNISolone (MEDROL, JOVON,) 4 MG tablet, Take as directed on package instructions., Disp: 1 each, Rfl: 0  •  pravastatin (PRAVACHOL) 40 MG tablet, Take 40 mg by mouth Daily., Disp: , Rfl:   •  traMADol-acetaminophen (ULTRACET) 37.5-325 MG per tablet, Take 2 tablets by mouth Every 6 (Six) Hours As Needed., Disp: , Rfl:     Review of Systems   Constitutional: Positive for activity change. Negative for appetite change, chills, diaphoresis, fatigue, fever and unexpected weight change.   HENT: Negative for congestion, dental problem, drooling, ear discharge, ear pain, facial swelling, hearing loss, mouth sores, nosebleeds, postnasal drip, rhinorrhea, sinus pressure, sneezing, sore throat, tinnitus, trouble swallowing and voice change.    Eyes: Negative for photophobia, pain,  discharge, redness, itching and visual disturbance.   Respiratory: Negative for apnea, cough, choking, chest tightness, shortness of breath, wheezing and stridor.    Cardiovascular: Negative for chest pain, palpitations and leg swelling.   Gastrointestinal: Negative for abdominal distention, abdominal pain, anal bleeding, blood in stool, constipation, diarrhea, nausea, rectal pain and vomiting.   Endocrine: Negative for cold intolerance, heat intolerance, polydipsia, polyphagia and polyuria.   Genitourinary: Negative for decreased urine volume, difficulty urinating, dysuria, enuresis, flank pain, frequency, genital sores, hematuria and urgency.   Musculoskeletal: Positive for arthralgias, neck pain and neck stiffness. Negative for back pain, gait problem, joint swelling and myalgias.   Skin: Negative for color change, pallor, rash and wound.   Allergic/Immunologic: Negative for environmental allergies, food allergies and immunocompromised state.   Neurological: Positive for weakness (arms), numbness and headaches. Negative for dizziness, tremors, seizures, syncope, facial asymmetry, speech difficulty and light-headedness.   Hematological: Negative for adenopathy. Does not bruise/bleed easily.   Psychiatric/Behavioral: Negative for agitation, behavioral problems, confusion, decreased concentration, dysphoric mood, hallucinations, self-injury, sleep disturbance and suicidal ideas. The patient is not nervous/anxious and is not hyperactive.        Vitals:    03/20/18 1142   BP: 112/82   Temp: 97.7 °F (36.5 °C)       Neurological Examination:    Mental status/speech: The patient is alert and oriented.  Speech is clear without aphysia or dysarthria.  No overt cognitive deficits.    Cranial nerve examination:    Olfaction: Smell is intact.  Vision: Vision is intact without visual field abnormalities.  Funduscopic examination is normal.  No pupillary irregularity.  Ocular motor examination: The extraocular muscles are  intact.  There is no diplopia.  The pupil is round and reactive to both light and accommodation.  There is no nystagmus.  Facial movement/sensation: There is no facial weakness.  Sensation is intact in the first, second, and third divisions of the trigeminal nerve.  The corneal reflex is intact.  Auditory: Hearing is intact to finger rub bilaterally.  Cranial nerves IX, X, XI, XII: Phonation is normal.  No dysphagia.  Tongue is protruded in the midline without atrophy.  The gag reflex is intact.  Shoulder shrug is normal.    Musculoligamentous ligamentous examination: She has limited range of motion of her neck and shoulders.  However formal testing of the biceps, triceps, brachial radialis and  are normal.  The deep tendon reflexes are easily elicitable and are symmetrical.  Her gait is normal.    Medical Decision Making:     Diagnostic Data Set:  The cervical MRI data set shows the presence of fusion C5 through C7.  There is a bulge of the intervertebral disc at C7-T1.  There is also some some rotational scoliosis changes in the upper thoracic spine.      Assessment:  Degenerative osteoarthritis          Recommendations:  A conservative approach should be pursued prior to consideration of surgical intervention.  I have sent her to physical therapy, provided a prescription of Relafen 750 mg twice a day and Robaxin 750 at night; have scheduled a EMG/NCV and we'll see her subsequently.  Facet block or epidural steroid injection may well be helpful in the cervical spine.  She will see me subsequent to her diagnostic studies.  I will keep you informed.  She is been advised not to start her medications and therapy until she has a clearance from her ophthalmologist.        I greatly appreciate the opportunity to see and evaluate this individual.  If you have questions or concerns regarding issues that I may have overlooked please call me at any time: [phone].  Josiah Shaw M.D.  [facility]  [id number]  [facility]  [id number]    Scribed for Zeeshan Shaw MD by Mitra Grider CMA. 3/20/2018  12:02 PM    I have read and concur with the information provided by the scribe.  Zeeshan Shaw MD

## 2018-03-21 ENCOUNTER — TELEPHONE (OUTPATIENT)
Dept: PAIN MEDICINE | Facility: CLINIC | Age: 73
End: 2018-03-21

## 2018-03-22 ENCOUNTER — APPOINTMENT (OUTPATIENT)
Dept: MRI IMAGING | Facility: HOSPITAL | Age: 73
End: 2018-03-22

## 2018-03-22 ENCOUNTER — HOSPITAL ENCOUNTER (EMERGENCY)
Facility: HOSPITAL | Age: 73
Discharge: HOME | End: 2018-03-22
Attending: EMERGENCY MEDICINE | Admitting: EMERGENCY MEDICINE

## 2018-03-22 ENCOUNTER — APPOINTMENT (OUTPATIENT)
Dept: GENERAL RADIOLOGY | Facility: HOSPITAL | Age: 73
End: 2018-03-22

## 2018-03-22 VITALS
BODY MASS INDEX: 29.66 KG/M2 | HEART RATE: 68 BPM | TEMPERATURE: 97.9 F | DIASTOLIC BLOOD PRESSURE: 87 MMHG | OXYGEN SATURATION: 97 % | WEIGHT: 178 LBS | HEIGHT: 65 IN | SYSTOLIC BLOOD PRESSURE: 131 MMHG | RESPIRATION RATE: 14 BRPM

## 2018-03-22 DIAGNOSIS — R41.3 TRANSIENT AMNESIA: Primary | ICD-10-CM

## 2018-03-22 LAB
ALBUMIN SERPL-MCNC: 4.5 G/DL (ref 3.2–4.8)
ALBUMIN/GLOB SERPL: 1.9 G/DL (ref 1.5–2.5)
ALP SERPL-CCNC: 83 U/L (ref 25–100)
ALT SERPL W P-5'-P-CCNC: 18 U/L (ref 7–40)
ANION GAP SERPL CALCULATED.3IONS-SCNC: 10 MMOL/L (ref 3–11)
AST SERPL-CCNC: 18 U/L (ref 0–33)
BASOPHILS # BLD AUTO: 0.02 10*3/MM3 (ref 0–0.2)
BASOPHILS NFR BLD AUTO: 0.3 % (ref 0–1)
BILIRUB SERPL-MCNC: 0.4 MG/DL (ref 0.3–1.2)
BUN BLD-MCNC: 22 MG/DL (ref 9–23)
BUN/CREAT SERPL: 27.5 (ref 7–25)
CALCIUM SPEC-SCNC: 9.4 MG/DL (ref 8.7–10.4)
CHLORIDE SERPL-SCNC: 102 MMOL/L (ref 99–109)
CO2 SERPL-SCNC: 25 MMOL/L (ref 20–31)
CREAT BLD-MCNC: 0.8 MG/DL (ref 0.6–1.3)
DEPRECATED RDW RBC AUTO: 53.4 FL (ref 37–54)
EOSINOPHIL # BLD AUTO: 0.1 10*3/MM3 (ref 0–0.3)
EOSINOPHIL NFR BLD AUTO: 1.4 % (ref 0–3)
ERYTHROCYTE [DISTWIDTH] IN BLOOD BY AUTOMATED COUNT: 14.6 % (ref 11.3–14.5)
GFR SERPL CREATININE-BSD FRML MDRD: 71 ML/MIN/1.73
GLOBULIN UR ELPH-MCNC: 2.4 GM/DL
GLUCOSE BLD-MCNC: 98 MG/DL (ref 70–100)
GLUCOSE BLDC GLUCOMTR-MCNC: 99 MG/DL (ref 70–130)
HCT VFR BLD AUTO: 41.6 % (ref 34.5–44)
HGB BLD-MCNC: 13.5 G/DL (ref 11.5–15.5)
HOLD SPECIMEN: NORMAL
HOLD SPECIMEN: NORMAL
IMM GRANULOCYTES # BLD: 0.03 10*3/MM3 (ref 0–0.03)
IMM GRANULOCYTES NFR BLD: 0.4 % (ref 0–0.6)
LYMPHOCYTES # BLD AUTO: 1.91 10*3/MM3 (ref 0.6–4.8)
LYMPHOCYTES NFR BLD AUTO: 26.1 % (ref 24–44)
MAGNESIUM SERPL-MCNC: 1.9 MG/DL (ref 1.3–2.7)
MCH RBC QN AUTO: 32.5 PG (ref 27–31)
MCHC RBC AUTO-ENTMCNC: 32.5 G/DL (ref 32–36)
MCV RBC AUTO: 100 FL (ref 80–99)
MONOCYTES # BLD AUTO: 0.8 10*3/MM3 (ref 0–1)
MONOCYTES NFR BLD AUTO: 10.9 % (ref 0–12)
NEUTROPHILS # BLD AUTO: 4.46 10*3/MM3 (ref 1.5–8.3)
NEUTROPHILS NFR BLD AUTO: 60.9 % (ref 41–71)
PLATELET # BLD AUTO: 239 10*3/MM3 (ref 150–450)
PMV BLD AUTO: 9.2 FL (ref 6–12)
POTASSIUM BLD-SCNC: 3.4 MMOL/L (ref 3.5–5.5)
PROT SERPL-MCNC: 6.9 G/DL (ref 5.7–8.2)
RBC # BLD AUTO: 4.16 10*6/MM3 (ref 3.89–5.14)
SODIUM BLD-SCNC: 137 MMOL/L (ref 132–146)
TROPONIN I SERPL-MCNC: 0 NG/ML (ref 0–0.07)
WBC NRBC COR # BLD: 7.32 10*3/MM3 (ref 3.5–10.8)
WHOLE BLOOD HOLD SPECIMEN: NORMAL
WHOLE BLOOD HOLD SPECIMEN: NORMAL

## 2018-03-22 PROCEDURE — 70551 MRI BRAIN STEM W/O DYE: CPT

## 2018-03-22 PROCEDURE — 99284 EMERGENCY DEPT VISIT MOD MDM: CPT

## 2018-03-22 PROCEDURE — 83735 ASSAY OF MAGNESIUM: CPT | Performed by: EMERGENCY MEDICINE

## 2018-03-22 PROCEDURE — 93005 ELECTROCARDIOGRAM TRACING: CPT | Performed by: EMERGENCY MEDICINE

## 2018-03-22 PROCEDURE — 71045 X-RAY EXAM CHEST 1 VIEW: CPT

## 2018-03-22 PROCEDURE — 0 GADOBENATE DIMEGLUMINE 529 MG/ML SOLUTION: Performed by: EMERGENCY MEDICINE

## 2018-03-22 PROCEDURE — 80053 COMPREHEN METABOLIC PANEL: CPT | Performed by: EMERGENCY MEDICINE

## 2018-03-22 PROCEDURE — A9577 INJ MULTIHANCE: HCPCS | Performed by: EMERGENCY MEDICINE

## 2018-03-22 PROCEDURE — 70548 MR ANGIOGRAPHY NECK W/DYE: CPT

## 2018-03-22 PROCEDURE — 82962 GLUCOSE BLOOD TEST: CPT

## 2018-03-22 PROCEDURE — 84484 ASSAY OF TROPONIN QUANT: CPT

## 2018-03-22 PROCEDURE — 70200 X-RAY EXAM OF EYE SOCKETS: CPT

## 2018-03-22 PROCEDURE — 85025 COMPLETE CBC W/AUTO DIFF WBC: CPT | Performed by: EMERGENCY MEDICINE

## 2018-03-22 RX ORDER — SODIUM CHLORIDE 0.9 % (FLUSH) 0.9 %
10 SYRINGE (ML) INJECTION AS NEEDED
Status: DISCONTINUED | OUTPATIENT
Start: 2018-03-22 | End: 2018-03-22 | Stop reason: HOSPADM

## 2018-03-22 RX ADMIN — GADOBENATE DIMEGLUMINE 15 ML: 529 INJECTION, SOLUTION INTRAVENOUS at 18:26

## 2018-03-23 NOTE — ED PROVIDER NOTES
Subjective   Patient states she was upstairs washing her hair.  She had an episode where she has lost her recollection of this.  Patient reports she was in the shower she was tilting her head back such new symptoms wrong but couldn't pinpoint.  Probably got dressed came downstairs was sitting on the couch when her daughter found her that her  came in the door.  Does a focal neurological deficits this seems to have resolved at this point although she is somewhat anxious about this.  Patient did have a surgery about a week ago for retinal detachment otherwise unremarkable.  She has no prior history of CVA hypertension or coronary disease although she has some mild valvular disease is just being followed at this time.        History provided by:  Patient   used: No    Neurologic Problem   The patient's primary symptoms include memory loss. The patient's pertinent negatives include no altered mental status, focal sensory loss, focal weakness, slurred speech or weakness. This is a new problem. The current episode started today. The neurological problem developed suddenly. Last Known Well Instant: LASTED 30 MINUTES.  The problem has been resolved since onset. There was no focality noted. Pertinent negatives include no abdominal pain, aura, back pain, bladder incontinence, bowel incontinence, chest pain, confusion, diaphoresis, fatigue, fever, headaches, light-headedness, neck pain, palpitations, shortness of breath or vomiting. Past treatments include nothing. The treatment provided no relief. There is no history of a bleeding disorder, head trauma, liver disease or seizures.       Review of Systems   Constitutional: Negative for diaphoresis, fatigue and fever.   HENT: Negative for nosebleeds and rhinorrhea.    Respiratory: Negative for choking, chest tightness, shortness of breath and wheezing.    Cardiovascular: Negative for chest pain and palpitations.   Gastrointestinal: Negative for  abdominal pain, bowel incontinence and vomiting.   Genitourinary: Negative for bladder incontinence, dysuria, frequency and urgency.   Musculoskeletal: Negative for back pain and neck pain.   Skin: Negative for pallor and rash.   Neurological: Negative for focal weakness, weakness, light-headedness and headaches.   Psychiatric/Behavioral: Positive for memory loss. Negative for agitation, confusion, decreased concentration, hallucinations, self-injury, sleep disturbance and suicidal ideas. The patient is not nervous/anxious and is not hyperactive.    All other systems reviewed and are negative.      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 T11-sacrum   • CERVICAL SPINE SURGERY  2000    cage   • CHOLECYSTECTOMY  2014   • COLONOSCOPY  2016   • FOOT SURGERY  2008   • HERNIA REPAIR Bilateral 1978    Inguinal hernia   • HYSTERECTOMY  1992?    1 ovary left   • KNEE SURGERY      2005 and x2 in 8/2017(left)   • NASAL SEPTUM SURGERY  1999       Family History   Problem Relation Age of Onset   • Colon cancer Mother    • Uterine cancer Mother    • Hypertension Mother    • Kidney disease Mother    • Tuberculosis Mother    • Cancer Father      Bladder   • Heart failure Father    • Stroke Father    • Hypertension Father    • Heart attack Father    • Hypertension Brother    • Kidney disease  Brother    • Kidney disease Brother    • Hypertension Brother    • Other Daughter      CVID   • Diabetes Daughter        Social History     Social History   • Marital status:      Social History Main Topics   • Smoking status: Never Smoker   • Smokeless tobacco: Never Used   • Alcohol use No   • Drug use: No   • Sexual activity: Defer      Comment:      Other Topics Concern   • Not on file           Objective   Physical Exam   Constitutional: She is oriented to person, place, and time. She appears well-developed and well-nourished.   HENT:   Head: Normocephalic and atraumatic.   Right Ear: External ear normal.   Left Ear: External ear normal.   Nose: Nose normal.   Mouth/Throat: Oropharynx is clear and moist.   Eyes: No scleral icterus.   Right eye patch from previous surgery.   Neck: Normal range of motion. No thyromegaly present.   Cardiovascular: Normal rate, regular rhythm and normal heart sounds.    Pulmonary/Chest: Effort normal and breath sounds normal. No respiratory distress. She has no wheezes. She has no rales. She exhibits no tenderness.   Abdominal: Soft. Bowel sounds are normal. She exhibits no distension. There is no tenderness.   Musculoskeletal: Normal range of motion.   Lymphadenopathy:     She has no cervical adenopathy.   Neurological: She is alert and oriented to person, place, and time. She has normal reflexes. She displays normal reflexes. No cranial nerve deficit. Coordination normal.   Skin: Skin is warm and dry.   Psychiatric: She has a normal mood and affect. Her behavior is normal. Judgment and thought content normal.   Nursing note and vitals reviewed.      Procedures         ED Course  ED Course   Comment By Time   Discussed the findings with the patient and her family.  Also discussed the patient with Dr. Rosa the neurologist on call.  She feels like with a normal MRI and MRA of the neck.  Her symptoms of resolved she should be admitted for home and follow-up with  her PMD as well as herself. MARIE Monte 03/22 2029        No results found for this or any previous visit (from the past 24 hour(s)).  Note: In addition to lab results from this visit, the labs listed above may include labs taken at another facility or during a different encounter within the last 24 hours. Please correlate lab times with ED admission and discharge times for further clarification of the services performed during this visit.    XR Chest 1 View   Final Result   No evidence of active chest disease.       D:  03/22/2018   E:  03/22/2018       This report was finalized on 3/22/2018 9:38 PM by DR. Jarrell Martin MD.          MRI Angiogram Neck With Contrast   Final Result      Limited without a dissection protocol including fat saturated T1 images.    Otherwise grossly unremarkable examination      THIS DOCUMENT HAS BEEN ELECTRONICALLY SIGNED BY ARIAS MIDDLETON MD      MRI Brain Without Contrast   Final Result      Unremarkable study      THIS DOCUMENT HAS BEEN ELECTRONICALLY SIGNED BY ARIAS MIDDLETON MD      XR Orbits 4+ View   Final Result   Negative facial and orbital series for radiopaque foreign   body or metal. Negative exam for active sinusitis or bone erosion. No   acute findings are noted.       DICTATED:     03/22/2018   EDITED/ls :     03/22/2018        This report was finalized on 3/22/2018 6:50 PM by Dr. Carlos Manuel Tamayo MD.            Vitals:    03/22/18 1702 03/22/18 1900 03/22/18 1930 03/22/18 2038   BP:  129/87 125/79 131/87   BP Location:    Left arm   Patient Position:    Sitting   Pulse: 83   68   Resp:    14   Temp:    97.9 °F (36.6 °C)   TempSrc:    Oral   SpO2: 98% 95% 97% 97%   Weight:       Height:         Medications   gadobenate dimeglumine (MULTIHANCE) injection 15 mL (15 mL Intravenous Given 3/22/18 1826)     ECG/EMG Results (last 24 hours)     Procedure Component Value Units Date/Time    ECG 12 Lead [[id number]] Collected:  03/22/18 1613     Updated:  03/22/18 1700     Narrative:       Test Reason : Weak/Dizzy/AMS protocol  Blood Pressure : **/** mmHG  Vent. Rate : 075 BPM     Atrial Rate : 075 BPM     P-R Int : 180 ms          QRS Dur : 086 ms      QT Int : 388 ms       P-R-T Axes : 038 026 038 degrees     QTc Int : 433 ms    Normal sinus rhythm  No previous ECGs available  Confirmed by KOSTAS MARTINEZ MD ([id number]) on 3/22/2018 5:00:10 PM    Referred By:  ERMD           Confirmed By:KOSTAS MRATINEZ MD                  MDM  Number of Diagnoses or Management Options  Transient amnesia: new and requires workup     Amount and/or Complexity of Data Reviewed  Clinical lab tests: reviewed and ordered  Tests in the radiology section of CPT®: ordered and reviewed  Tests in the medicine section of CPT®: reviewed and ordered  Discuss the patient with other providers: yes    Patient Progress  Patient progress: stable      Final diagnoses:   Transient amnesia            MARIE Monte  03/27/18 1300

## 2018-04-02 ENCOUNTER — OFFICE VISIT (OUTPATIENT)
Dept: NEUROSURGERY | Facility: CLINIC | Age: 73
End: 2018-04-02

## 2018-04-02 VITALS
TEMPERATURE: 97.4 F | WEIGHT: 182 LBS | HEIGHT: 65 IN | BODY MASS INDEX: 30.32 KG/M2 | DIASTOLIC BLOOD PRESSURE: 80 MMHG | SYSTOLIC BLOOD PRESSURE: 152 MMHG

## 2018-04-02 DIAGNOSIS — M50.30 DEGENERATIVE DISC DISEASE, CERVICAL: ICD-10-CM

## 2018-04-02 DIAGNOSIS — M50.30 BULGING OF CERVICAL INTERVERTEBRAL DISC: Primary | ICD-10-CM

## 2018-04-02 PROCEDURE — 99213 OFFICE O/P EST LOW 20 MIN: CPT | Performed by: NEUROLOGICAL SURGERY

## 2018-04-02 NOTE — PROGRESS NOTES
"Chief Complaint: \"Pain in my neck.\"      History of Present Illness:   Patient: Ms. Joyce Jones, 72 y.o. female   Referring physician: Dr. Zeeshan Shaw   Reason for referral: Consultation for intractable chronic neck pain.   Pain history: Patient reports a 8-month history of pain, which began without incident. Pain has progressed in intensity over the past 8 months.   Pain description: constant pain with intermittent exacerbation, described as a dull sensation.   Radiation of pain: The neck pain radiates into the posterior aspect of the left shoulder, posterior aspect of the arm, forearm and into the index finger  Pain intensity today: 4/10  Average pain intensity last week: 7/10  Pain intensity ranges from: 4/10 to 8/10  Aggravating factors: Pain increases with lifting and movement of arms.   Alleviating factors: Pain decreases with heat, analgesics and not moving.   Associated symptoms:   Patient reports pain and weakness in the left upper extremity.   Patient denies any new bladder or bowel problems.   Patient reports difficulties with her balance.   Patient reports bilateral occipital headaches lasting several hours.     Review of previous therapies and additional medical records:  Joyce Jones has already failed the following measures, including:   Conservative measures: oral analgesics, physical therapy, heat and supervised exercise program   Interventional measures: None  Surgical measures: 20-25 years ago: anterior cervical decompression and fusion in the cervical area from C5 through C7. [id number]: thoracolumbar scoliosis surgery  Joyce Jones underwent neurosurgical consultation with Dr. Zeeshan Shaw on 03/20/2018, and was found not to be a surgical candidate.  Joyce Jones presents with significant comorbidities including history of DVT, heart murmur, diverticulosis, arthritis, anemia engaged in treatment.  In terms of current analgesics, Joyce Jones takes: tramadol, acetaminophen, " Robaxin  I have reviewed [facility] Report #[id number] consistent to medication reconciliation.     Global Pain Scale Score 4-3-18   Pain  13   Feelings  13   Clinical outcomes  19   Activities  23   GPS Total:  68      Review of Diagnostic Studies:    EMG/MCV bilateral upper extremities March 30, 2018 Dr. Jose Corado revealed mild chronic left C8 T1 radiculopathy.  Normal right arm  MRI Cervical Spine, 02/19/2018: 1. Previous anterior fusion has been performed C5-C7. This appears to be well aligned.   C1-C2 alignment is normal. The foramen magnum is clear. Mild hypertrophy of the transcervical ligament. The marrow signal above and below the anterior fusion appears to be appropriate and within normal limits without marrow edema, fracture or subluxation. There is a trace retrolisthesis of C4 from C3.  The cervical cord is within normal limits. Cord signal is appropriate. There is no evidence of myelomalacia, spondylotic myelopathy or syrinx. Intramedullary cord lesion is not identified. The transaxial data sets demonstrate minimal arthropathic bossing on the dural sac centrally and leftward at C3-C4 produced by the mild retrolisthesis of C4 from C3. This is not high grade. At the level above the anterior fusion, the C4-C5 level, there is a mild soft disc protrusion effacing the dural sac and minimally effacing the cord without cord displacement and without lateralization.   At the level below the fusion, C7-T1, there is a mild retrolisthesis of T1 from C7 and there is mild bossing along the dural sac without evidence of significant cord impingement. There is magnetization of the CSF along the leftward aspect of the cord in the upper thoracic region which is an artifact.  X-ray Cervical Spine, 02/01/2018: AP, lateral, both oblique, and odontoid views of the cervical spine reveal fusion identified anteriorly of the C5, C6, and C7 levels. There are degenerative changes and anterior bridging osteophyte formation seen at the  C3-C4 and C4-C5 level. There is no prevertebral soft tissue swelling. Facets are well aligned. Degenerative change is seen within the C1 and C2 level. The odontoid is intact. No prevertebral soft tissue swelling.  X-ray left shoulder, 02/01/2018: 2 views of the left shoulder reveal no evidence of fracture or dislocation. The cortex is intact. Joint spaces are preserved. No joint effusion is identified.      Review of Systems   Constitutional: Positive for activity change and fatigue.   HENT: Positive for congestion and postnasal drip.    Eyes: Positive for photophobia, pain, itching and visual disturbance.   Cardiovascular: Positive for palpitations.   Gastrointestinal: Positive for diarrhea.   Musculoskeletal: Positive for arthralgias, back pain, myalgias, neck pain and neck stiffness.   Allergic/Immunologic: Positive for environmental allergies.   Neurological: Positive for weakness, numbness and headaches.   All other systems reviewed and are negative.        Patient Active Problem List   Diagnosis   • Other hyperlipidemia   • Primary osteoarthritis involving multiple joints   • Gastroesophageal reflux disease without esophagitis   • Heart valve disorder   • Allergic rhinitis   • Degenerative disc disease, cervical   • Bulging of cervical intervertebral disc   • History of fusion of cervical spine   • Tendonitis of left rotator cuff   • Subacromial bursitis of left shoulder joint       Past Medical History:   Diagnosis Date   • Allergic rhinitis    • Anemia    • Arthritis    • Diverticulosis    • Heart murmur    • History of bone density study 03/02/2017    [facility]   • History of mammography, screening 2016   • Hyperlipidemia    • Hypertension    • Inguinal hernia 1978    repair   • Personal history of DVT (deep vein thrombosis) 2005    right leg- due to surgery   • 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 T11-sacrum   • CERVICAL SPINE SURGERY  2000    cage   • CHOLECYSTECTOMY  2014   • COLONOSCOPY  2016   • FOOT SURGERY  2008   • HERNIA REPAIR Bilateral 1978    Inguinal hernia   • HYSTERECTOMY  1992?    1 ovary left   • KNEE SURGERY      2005 and x2 in 8/2017(left)   • NASAL SEPTUM SURGERY  1999         Family History   Problem Relation Age of Onset   • Colon cancer Mother    • Uterine cancer Mother    • Hypertension Mother    • Kidney disease Mother    • Tuberculosis Mother    • Cancer Father      Bladder   • Heart failure Father    • Stroke Father    • Hypertension Father    • Heart attack Father    • Hypertension Brother    • Kidney disease Brother    • Kidney disease Brother    • Hypertension Brother    • Other Daughter      CVID   • Diabetes Daughter          Social History     Social History   • Marital status:      Social History Main Topics   • Smoking status: Never Smoker   • Smokeless tobacco: Never Used   • Alcohol use No   • Drug use: No   • Sexual activity: Defer      Comment:      Other Topics Concern   • Not on file        Current Outpatient Prescriptions:   •  aspirin 81 MG chewable tablet, Chew 81 mg Daily., Disp: , Rfl:   •  esomeprazole (nexIUM) 40 MG capsule, Take 1 capsule by mouth Every Morning Before Breakfast., Disp: 90 capsule, Rfl: 1  •  furosemide (LASIX) 20 MG tablet, prn, Disp: , Rfl:   •  losartan (COZAAR) 50 MG tablet, Take 1 tablet by mouth Daily., Disp: 90 tablet, Rfl: 1  •  methocarbamol (ROBAXIN) 750 MG tablet, Take 1 tablet by mouth Every Night for 30 days., Disp: 30 tablet, Rfl: 0  •  Omega-3 Fatty Acids (FISH OIL) 1000 MG capsule capsule, Take  by mouth Daily With Breakfast., Disp: , Rfl:   •  pravastatin (PRAVACHOL) 40 MG tablet, Take 40 mg by mouth Daily., Disp: , Rfl:   •  Probiotic Product (PROBIOTIC-10 PO), Take  by mouth., Disp: , Rfl:   •  traMADol-acetaminophen (ULTRACET) 37.5-325 MG per tablet, Take 2 tablets by mouth Every 6  "(Six) Hours As Needed., Disp: , Rfl:       Allergies   Allergen Reactions   • Overton-2 Inhibitors Anaphylaxis   • Nsaids GI Intolerance   • Augmentin [Amoxicillin-Pot Clavulanate] Nausea Only         /78 (BP Location: Right arm)   Pulse 69   Temp 98.2 °F (36.8 °C) (Temporal Artery )   Resp 18   Ht 165.1 cm (65\")   Wt 82.2 kg (181 lb 3.2 oz)   SpO2 97%   BMI 30.15 kg/m²       Physical Exam:  Constitutional: Patient is oriented to person, place, and time. Vital signs are normal. Patient appears well-developed and well-nourished.   HEENT: Head: Normocephalic and atraumatic. Eyes: Conjunctivae and lids are normal. Pupils: Equal, round, reactive to light.   Neck: Trachea normal. Neck supple. No JVD present.   Lymphatic: No cervical adenopathy  Pulmonary Respiratory effort: No increased work of breathing or signs of respiratory distress. Auscultation of lungs: Clear to auscultation.   Cardiovascular Auscultation of heart: Normal rate and rhythm, normal S1 and S2, no murmurs.   Musculoskeletal   Gait and station: Gait evaluation demonstrated a normal gait   Cervical spine: Passive and active range of motion is limited secondary to stiffness and mild pain. Cervical facet joint loading maneuvers are positive.  Muscles: Presence of active trigger points at the left levator scapulae, left trapezius   Shoulders:   Right: The range of motion of the glenohumeral joints is full and without pain. Rotator cuff strength is 5/5.   Left: The range of motion of the glenohumeral joints is limited at 60 degrees of abduction, 100 degrees of flexion, pain with internal and external rotation. Rotator cuff strength is 3/5 for the subscapularis, 5/5 for the other tendons. Palpation of the left subacromial bursa reproduces pain.   Neurological: Patient is alert and oriented to person, place, and time. Speech: speech is normal. Cortical function: Normal mental status.   Cranial nerves: Cranial nerves 2-12 intact.   Reflex " Scores:  Right brachioradialis: 2+  Left brachioradialis: 2+  Right biceps: 2+  Left biceps: 2+  Right triceps: 2+  Left triceps: 2+  Motor strength: 5/5, except left shoulder  Motor Tone: normal tone.   Involuntary movements: none.   Superficial/Primitive Reflexes: primitive reflexes were absent.   Right Zavala: absent  Left Zavala: absent  Right ankle clonus: absent  Left ankle clonus: absent   Spurling sign is negative. Lhermitte sign is negative. Negative long tract signs. Straight leg raising test is negative. Femoral stretch sign is negative.   Sensation: No sensory loss. Sensory exam: intact to light touch, intact pain and temperature sensation, intact vibration sensation and normal proprioception.   Coordination: Normal finger to nose and heel to shin. Normal balance and negative. Romberg's sign negative.   Skin and subcutaneous tissue: Skin is warm and intact. No rash noted. No cyanosis.   Psychiatric: Judgment and insight: Normal. Orientation to person, place and time: Normal. Recent and remote memory: Intact. Mood and affect: Normal.     ASSESSMENT:   1. Degenerative disc disease, cervical    2. Bulging of cervical intervertebral disc    3. History of fusion of cervical spine    4. Tendonitis of left rotator cuff    5. Subacromial bursitis of left shoulder joint        PLAN/MEDICAL DECISION MAKING:  I had a lengthy conversation with Ms. Joyce Jones regarding her chronic pain condition and potential therapeutic options including risks, benefits, alternative therapies, to name a few. Patient has failed to obtain pain relief with conservative measures, as referenced above. Patient has a history of previous anterior cervical decompression and fusion in the cervical area from C5 through C7. Cervical MRI reveals the presence of fusion C5 through C7. The transaxial data sets demonstrate minimal arthropathic bossing on the dural sac centrally and leftward at C3-C4 produced by the mild retrolisthesis of C4  from C3. This is not high grade. At the level above the anterior fusion, the C4-C5 level, there is a mild soft disc protrusion effacing the dural sac and minimally effacing the cord without cord displacement and without lateralization. There is a bulge of the intervertebral disc at C7-T1. There is also some some rotational scoliosis changes in the upper thoracic spine. EMG/MCV bilateral upper extremities March 30, 2018 Dr. Jose Corado revealed mild chronic left C8-T1 radiculopathy.  Normal right arm. X-rays of the shoulder have been unrevealing. Upon physical examination and as per patient's history, patient's main sources of pain appears to be originating from her cervical spine as well as from intrinsic left shoulder derangement. I have reviewed all available patient's medical records as well as previous therapies as referenced above.  Therefore, I have proposed the following plan:  1. Diagnostic studies: MRI of the left shoulder without contrast to clarify origin of rotator cuff weakness and unrelenting left shoulder pain and decreased ROM  2. Pharmacological measures: Reviewed. Discussed.   A. Patient takes tramadol, acetaminophen, Robaxin  B. Trial with prilocaine 2%, lidocaine 10%, imipramine 3%, capsaicin 0.001% and mannitol 20%  cream, apply 1 to 2 grams of cream to the affected areas every 4 to 6 hours as needed  3. Interventional pain management measures:  Patient will be scheduled for cervical epidural steroid injection by interlaminar approach. We may repeat another epidural depending on patient's outcome. Patient will follow-up with Dr. Shaw thereafter. Dr. Shaw has recommended a conservative approach prior to consideration of surgical intervention.  I have also discussed with the patient the possibility of diagnostic and therapeutic left glenohumeral joint injection with local anesthetic and steroids combined with left subacromial bursa injection  4. Long-term rehabilitation efforts:  A. The  patient does not have a history of falls. I did complete a risk assessment for falls.   B. Patient will start a comprehensive physical therapy program for water therapy, therapeutic exercise, upper body strengthening/posture correction, neurodynamics, myofascial release, cupping and dry needling once pain is under control  C. Contrast therapy: Apply ice-packs for 15-20 minutes, followed by heating pads for 15-20 minutes to affected area   5. The patient and her family have been instructed to contact my office with any questions or difficulties. The patient understands the plan and agrees to proceed accordingly.       Patient Care Team:  LOVE Manzano as PCP - General (Family Medicine)  Zeeshan Shaw MD as Consulting Physician (Neurosurgery)  Hu Dewey MD as Consulting Physician (Pain Medicine)     New Medications Ordered This Visit   Medications   • aspirin 81 MG chewable tablet     Sig: Chew 81 mg Daily.   • Probiotic Product (PROBIOTIC-10 PO)     Sig: Take  by mouth.   • Omega-3 Fatty Acids (FISH OIL) 1000 MG capsule capsule     Sig: Take  by mouth Daily With Breakfast.         No future appointments.      Hu Dewey MD     EMR Dragon/Transcription disclaimer:  Much of this encounter note is an electronic transcription of spoken language to printed text. Electronic transcription of spoken language may permit erroneous, or at times, nonsensical words or phrases to be inadvertently transcribed. Although I have reviewed the note for such errors, some may still exist.

## 2018-04-02 NOTE — PATIENT INSTRUCTIONS
call Dr. Shaw on a Monday or Tuesday with an update.   Ask for Nasreen,  and leave a message for  Dr. Shaw.  He will call you back at the end of the day as soon as he can.     [phone]    **Please call Dr. Shaw when you are released from Li Mattson MD.**

## 2018-04-02 NOTE — PROGRESS NOTES
Joyce Jones  1945  [id number]                       CURRENT WORKING DIAGNOSIS:   Cervical spondylosis         MEDICAL HISTORY SINCE LAST ENCOUNTER:  This 72-year-old female reports for follow-up visit subsequent to initial encounter.  Since that time she was admitted to the emergency department have having an episode of transient cerebral ischemia unassociated with any prolonged deficit.  MRI of the brain was normal; MRA of the extracranial vasculature was normal as well.  She reports today for follow-up her encounter referable to cervical osteoarthritis.  She has pain in her neck which radiates to both upper extremities associated with numbness in both arms.            Past Medical History:   Diagnosis Date   • Allergic rhinitis    • Anemia    • Arthritis    • Diverticulosis    • Heart murmur    • History of bone density study 03/02/2017    [facility]   • History of mammography, screening 2016   • Hyperlipidemia    • Hypertension    • Inguinal hernia 1978    repair   • Personal history of DVT (deep vein thrombosis) 2005    right leg- due to surgery   • Positive TB test     Always comes back + but negative further testing              Past Surgical History:   Procedure Laterality Date   • BACK SURGERY  2002    Scoliosis bar. T11-sacrum   • CERVICAL SPINE SURGERY  2000    cage   • CHOLECYSTECTOMY  2014   • COLONOSCOPY  2016   • FOOT SURGERY  2008   • HERNIA REPAIR Bilateral 1978    Inguinal hernia   • HYSTERECTOMY  1992?    1 ovary left   • KNEE SURGERY      2005 and x2 in 8/2017(left)   • NASAL SEPTUM SURGERY  1999              Family History   Problem Relation Age of Onset   • Colon cancer Mother    • Uterine cancer Mother    • Hypertension Mother    • Kidney disease Mother    • Tuberculosis Mother    • Cancer Father      Bladder   • Heart failure Father    • Stroke Father    • Hypertension Father    • Heart attack Father    • Hypertension Brother    • Kidney disease Brother    • Kidney disease  Brother    • Hypertension Brother    • Other Daughter      CVID   • Diabetes Daughter               Social History     Social History   • Marital status:      Spouse name: N/A   • Number of children: N/A   • Years of education: N/A     Occupational History   • Not on file.     Social History Main Topics   • Smoking status: Never Smoker   • Smokeless tobacco: Never Used   • Alcohol use No   • Drug use: No   • Sexual activity: Defer      Comment:      Other Topics Concern   • Not on file     Social History Narrative   • No narrative on file              Allergies   Allergen Reactions   • Overton-2 Inhibitors Anaphylaxis   • Augmentin [Amoxicillin-Pot Clavulanate] Nausea Only              Current Outpatient Prescriptions:   •  esomeprazole (nexIUM) 40 MG capsule, Take 1 capsule by mouth Every Morning Before Breakfast., Disp: 90 capsule, Rfl: 1  •  furosemide (LASIX) 20 MG tablet, prn, Disp: , Rfl:   •  losartan (COZAAR) 50 MG tablet, Take 1 tablet by mouth Daily., Disp: 90 tablet, Rfl: 1  •  methocarbamol (ROBAXIN) 750 MG tablet, Take 1 tablet by mouth Every Night for 30 days., Disp: 30 tablet, Rfl: 0  •  nabumetone (RELAFEN) 750 MG tablet, Take 1 tablet by mouth 2 (Two) Times a Day., Disp: 60 tablet, Rfl: 0  •  pravastatin (PRAVACHOL) 40 MG tablet, Take 40 mg by mouth Daily., Disp: , Rfl:   •  traMADol-acetaminophen (ULTRACET) 37.5-325 MG per tablet, Take 2 tablets by mouth Every 6 (Six) Hours As Needed., Disp: , Rfl:          Review of Systems   Constitutional: Positive for activity change and fatigue. Negative for appetite change, chills, diaphoresis, fever and unexpected weight change.   HENT: Negative for congestion, dental problem, drooling, ear discharge, ear pain, facial swelling, hearing loss, mouth sores, nosebleeds, postnasal drip, rhinorrhea, sinus pressure, sneezing, sore throat, tinnitus, trouble swallowing and voice change.    Eyes: Positive for pain. Negative for photophobia, discharge,  "redness, itching and visual disturbance.   Respiratory: Negative for apnea, cough, choking, chest tightness, shortness of breath, wheezing and stridor.    Cardiovascular: Negative for chest pain, palpitations and leg swelling.   Gastrointestinal: Negative for abdominal distention, abdominal pain, anal bleeding, blood in stool, constipation, diarrhea, nausea, rectal pain and vomiting.   Endocrine: Negative for cold intolerance, heat intolerance, polydipsia, polyphagia and polyuria.   Genitourinary: Negative for decreased urine volume, difficulty urinating, dysuria, enuresis, flank pain, frequency, genital sores, hematuria and urgency.   Musculoskeletal: Positive for arthralgias, back pain, myalgias, neck pain and neck stiffness. Negative for gait problem and joint swelling.   Skin: Negative for color change, pallor, rash and wound.   Allergic/Immunologic: Negative for environmental allergies, food allergies and immunocompromised state.   Neurological: Negative for dizziness, tremors, seizures, syncope, facial asymmetry, speech difficulty, weakness, light-headedness, numbness and headaches.   Hematological: Negative for adenopathy. Does not bruise/bleed easily.   Psychiatric/Behavioral: Negative for agitation, behavioral problems, confusion, decreased concentration, dysphoric mood, hallucinations, self-injury, sleep disturbance and suicidal ideas. The patient is not nervous/anxious and is not hyperactive.                Vitals:    04/02/18 1454   Height: 165.1 cm (65\")               EXAMINATION: She had decreased range of motion of the cervical spine otherwise I'm unable to find weakness sensory loss or reflex asymmetry.  No Babinski Delfina or clonus            MEDICAL DECISION MAKING: EMG and NCV shows no evidence of her radiculopathy in the right upper extremity.  She has a very mild chronic left C8-T1 radiculopathy.  I reviewed her diagnostic studies which show she has degenerative disc disease throughout however " there is not a focal abnormality that would necessitate surgical intervention.  I believe this can be treated conservatively.           ASSESSMENT/DISPOSITION: [Once that she has her retinal detachment well-healed we can begin physical therapy and NSAIDs.  Surgery is not a consideration. ]              I APPRECIATE THE OPPORTUNITY OF THIS REFERRAL. PLEASE CALL IF ANY       QUESTIONS [phone]    Scribed for Zeeshan Shaw MD by Ivania Medina CMA. 4/2/2018  3:20 PM    I have read and concur with the information provided by the scribe.  Zeeshan Shaw MD

## 2018-04-03 ENCOUNTER — OFFICE VISIT (OUTPATIENT)
Dept: PAIN MEDICINE | Facility: CLINIC | Age: 73
End: 2018-04-03

## 2018-04-03 VITALS
HEART RATE: 69 BPM | HEIGHT: 65 IN | BODY MASS INDEX: 30.19 KG/M2 | OXYGEN SATURATION: 97 % | WEIGHT: 181.2 LBS | SYSTOLIC BLOOD PRESSURE: 120 MMHG | TEMPERATURE: 98.2 F | DIASTOLIC BLOOD PRESSURE: 78 MMHG | RESPIRATION RATE: 18 BRPM

## 2018-04-03 DIAGNOSIS — M75.52 SUBACROMIAL BURSITIS OF LEFT SHOULDER JOINT: ICD-10-CM

## 2018-04-03 DIAGNOSIS — M50.30 BULGING OF CERVICAL INTERVERTEBRAL DISC: ICD-10-CM

## 2018-04-03 DIAGNOSIS — Z98.1 HISTORY OF FUSION OF CERVICAL SPINE: ICD-10-CM

## 2018-04-03 DIAGNOSIS — M50.30 DEGENERATIVE DISC DISEASE, CERVICAL: ICD-10-CM

## 2018-04-03 DIAGNOSIS — M75.82 TENDONITIS OF LEFT ROTATOR CUFF: ICD-10-CM

## 2018-04-03 DIAGNOSIS — M75.82 TENDONITIS OF LEFT ROTATOR CUFF: Primary | ICD-10-CM

## 2018-04-03 PROCEDURE — 99204 OFFICE O/P NEW MOD 45 MIN: CPT | Performed by: ANESTHESIOLOGY

## 2018-04-03 RX ORDER — CHLORAL HYDRATE 500 MG
CAPSULE ORAL
Status: ON HOLD | COMMUNITY
End: 2019-12-17

## 2018-04-03 RX ORDER — ASPIRIN 81 MG/1
81 TABLET, CHEWABLE ORAL DAILY
COMMUNITY
End: 2020-09-17

## 2018-04-04 ENCOUNTER — OUTSIDE FACILITY SERVICE (OUTPATIENT)
Dept: PAIN MEDICINE | Facility: CLINIC | Age: 73
End: 2018-04-04

## 2018-04-04 PROCEDURE — 62321 NJX INTERLAMINAR CRV/THRC: CPT | Performed by: ANESTHESIOLOGY

## 2018-04-04 PROCEDURE — 99152 MOD SED SAME PHYS/QHP 5/>YRS: CPT | Performed by: ANESTHESIOLOGY

## 2018-04-05 ENCOUNTER — TELEPHONE (OUTPATIENT)
Dept: INTERNAL MEDICINE | Facility: CLINIC | Age: 73
End: 2018-04-05

## 2018-04-05 RX ORDER — PRAVASTATIN SODIUM 40 MG
40 TABLET ORAL DAILY
Qty: 30 TABLET | Refills: 2 | Status: SHIPPED | OUTPATIENT
Start: 2018-04-05 | End: 2018-08-22 | Stop reason: SDUPTHER

## 2018-04-06 ENCOUNTER — HOSPITAL ENCOUNTER (OUTPATIENT)
Dept: MRI IMAGING | Facility: HOSPITAL | Age: 73
Discharge: HOME | End: 2018-04-06
Attending: ANESTHESIOLOGY | Admitting: ANESTHESIOLOGY

## 2018-04-06 PROCEDURE — 73221 MRI JOINT UPR EXTREM W/O DYE: CPT

## 2018-04-23 ENCOUNTER — TELEPHONE (OUTPATIENT)
Dept: INTERNAL MEDICINE | Facility: CLINIC | Age: 73
End: 2018-04-23

## 2018-04-24 NOTE — TELEPHONE ENCOUNTER
Spoke with [place] and she has been notified that [facility]so will not take over this medication or manage her chronic pain. She verbally understood.

## 2018-04-24 NOTE — TELEPHONE ENCOUNTER
I do not see that I have written for this in the past for her.  She has been seeing [facility] and it looks like she was also referred to [facility].  She will need to contact them for this med.

## 2018-05-08 ENCOUNTER — APPOINTMENT (OUTPATIENT)
Dept: GENERAL RADIOLOGY | Facility: HOSPITAL | Age: 73
End: 2018-05-08

## 2018-05-08 ENCOUNTER — HOSPITAL ENCOUNTER (EMERGENCY)
Facility: HOSPITAL | Age: 73
Discharge: HOME | End: 2018-05-09
Attending: EMERGENCY MEDICINE | Admitting: EMERGENCY MEDICINE
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
## 2018-05-08 DIAGNOSIS — S01.81XA FOREHEAD LACERATION, INITIAL ENCOUNTER: ICD-10-CM

## 2018-05-08 DIAGNOSIS — S43.035A CLOSED INFERIOR DISLOCATION OF LEFT SHOULDER, INITIAL ENCOUNTER: Primary | ICD-10-CM

## 2018-05-08 PROCEDURE — 96375 TX/PRO/DX INJ NEW DRUG ADDON: CPT

## 2018-05-08 PROCEDURE — 96376 TX/PRO/DX INJ SAME DRUG ADON: CPT

## 2018-05-08 PROCEDURE — 96374 THER/PROPH/DIAG INJ IV PUSH: CPT

## 2018-05-08 PROCEDURE — 99285 EMERGENCY DEPT VISIT HI MDM: CPT

## 2018-05-08 PROCEDURE — 25010000002 FENTANYL CITRATE (PF) 100 MCG/2ML SOLUTION: Performed by: EMERGENCY MEDICINE

## 2018-05-08 PROCEDURE — 99152 MOD SED SAME PHYS/QHP 5/>YRS: CPT

## 2018-05-08 RX ORDER — FENTANYL CITRATE 50 UG/ML
75 INJECTION, SOLUTION INTRAMUSCULAR; INTRAVENOUS ONCE
Status: COMPLETED | OUTPATIENT
Start: 2018-05-08 | End: 2018-05-08

## 2018-05-08 RX ORDER — FENTANYL CITRATE 50 UG/ML
INJECTION, SOLUTION INTRAMUSCULAR; INTRAVENOUS
Status: COMPLETED
Start: 2018-05-08 | End: 2018-05-09

## 2018-05-08 RX ORDER — FENTANYL CITRATE 50 UG/ML
50 INJECTION, SOLUTION INTRAMUSCULAR; INTRAVENOUS ONCE
Status: DISCONTINUED | OUTPATIENT
Start: 2018-05-08 | End: 2018-05-08

## 2018-05-08 RX ORDER — FENTANYL CITRATE 50 UG/ML
100 INJECTION, SOLUTION INTRAMUSCULAR; INTRAVENOUS ONCE
Status: COMPLETED | OUTPATIENT
Start: 2018-05-08 | End: 2018-05-08

## 2018-05-08 RX ORDER — FENTANYL CITRATE 50 UG/ML
75 INJECTION, SOLUTION INTRAMUSCULAR; INTRAVENOUS
Status: DISCONTINUED | OUTPATIENT
Start: 2018-05-08 | End: 2018-05-09 | Stop reason: HOSPADM

## 2018-05-08 RX ADMIN — FENTANYL CITRATE 75 MCG: 50 INJECTION INTRAMUSCULAR; INTRAVENOUS at 23:25

## 2018-05-08 RX ADMIN — FENTANYL CITRATE 100 MCG: 50 INJECTION, SOLUTION INTRAMUSCULAR; INTRAVENOUS at 23:58

## 2018-05-09 ENCOUNTER — APPOINTMENT (OUTPATIENT)
Dept: GENERAL RADIOLOGY | Facility: HOSPITAL | Age: 73
End: 2018-05-09

## 2018-05-09 VITALS
HEART RATE: 89 BPM | WEIGHT: 175 LBS | BODY MASS INDEX: 29.16 KG/M2 | DIASTOLIC BLOOD PRESSURE: 65 MMHG | HEIGHT: 65 IN | SYSTOLIC BLOOD PRESSURE: 147 MMHG | RESPIRATION RATE: 20 BRPM | TEMPERATURE: 97.7 F | OXYGEN SATURATION: 96 %

## 2018-05-09 LAB
BUN BLDA-MCNC: 23 MG/DL (ref 8–26)
CA-I BLDA-SCNC: 1.25 MMOL/L (ref 1.2–1.32)
CHLORIDE BLDA-SCNC: 102 MMOL/L (ref 98–109)
CO2 BLDA-SCNC: 26 MMOL/L (ref 24–29)
CREAT BLDA-MCNC: 0.8 MG/DL (ref 0.6–1.3)
GLUCOSE BLDC GLUCOMTR-MCNC: 128 MG/DL (ref 70–130)
HCT VFR BLDA CALC: 33 % (ref 38–51)
HGB BLDA-MCNC: 11.2 G/DL (ref 12–17)
POTASSIUM BLDA-SCNC: 3.4 MMOL/L (ref 3.5–4.9)
SODIUM BLDA-SCNC: 142 MMOL/L (ref 138–146)

## 2018-05-09 PROCEDURE — 73030 X-RAY EXAM OF SHOULDER: CPT

## 2018-05-09 PROCEDURE — 85014 HEMATOCRIT: CPT

## 2018-05-09 PROCEDURE — 25010000002 FENTANYL CITRATE (PF) 100 MCG/2ML SOLUTION

## 2018-05-09 PROCEDURE — 80047 BASIC METABLC PNL IONIZED CA: CPT

## 2018-05-09 PROCEDURE — 25010000002 ONDANSETRON PER 1 MG: Performed by: EMERGENCY MEDICINE

## 2018-05-09 PROCEDURE — 25010000002 PROPOFOL 10 MG/ML EMULSION: Performed by: EMERGENCY MEDICINE

## 2018-05-09 PROCEDURE — 25010000002 FENTANYL CITRATE (PF) 100 MCG/2ML SOLUTION: Performed by: EMERGENCY MEDICINE

## 2018-05-09 PROCEDURE — 96375 TX/PRO/DX INJ NEW DRUG ADDON: CPT

## 2018-05-09 PROCEDURE — 99152 MOD SED SAME PHYS/QHP 5/>YRS: CPT

## 2018-05-09 PROCEDURE — 73060 X-RAY EXAM OF HUMERUS: CPT

## 2018-05-09 RX ORDER — LIDOCAINE HYDROCHLORIDE 10 MG/ML
INJECTION, SOLUTION EPIDURAL; INFILTRATION; INTRACAUDAL; PERINEURAL
Status: DISCONTINUED
Start: 2018-05-09 | End: 2018-05-09 | Stop reason: WASHOUT

## 2018-05-09 RX ORDER — LIDOCAINE HYDROCHLORIDE AND EPINEPHRINE 10; 10 MG/ML; UG/ML
INJECTION, SOLUTION INFILTRATION; PERINEURAL
Status: COMPLETED
Start: 2018-05-09 | End: 2018-05-09

## 2018-05-09 RX ORDER — BACITRACIN, NEOMYCIN, POLYMYXIN B 400; 3.5; 5 [USP'U]/G; MG/G; [USP'U]/G
1 OINTMENT TOPICAL ONCE
Status: COMPLETED | OUTPATIENT
Start: 2018-05-09 | End: 2018-05-09

## 2018-05-09 RX ORDER — PROPOFOL 10 MG/ML
100 VIAL (ML) INTRAVENOUS ONCE
Status: COMPLETED | OUTPATIENT
Start: 2018-05-09 | End: 2018-05-09

## 2018-05-09 RX ORDER — HYDROCODONE BITARTRATE AND ACETAMINOPHEN 5; 325 MG/1; MG/1
1-2 TABLET ORAL EVERY 6 HOURS PRN
Qty: 15 TABLET | Refills: 0 | Status: SHIPPED | OUTPATIENT
Start: 2018-05-09 | End: 2018-07-05

## 2018-05-09 RX ORDER — ONDANSETRON 2 MG/ML
4 INJECTION INTRAMUSCULAR; INTRAVENOUS ONCE
Status: COMPLETED | OUTPATIENT
Start: 2018-05-09 | End: 2018-05-09

## 2018-05-09 RX ORDER — CYCLOBENZAPRINE HCL 10 MG
10 TABLET ORAL 3 TIMES DAILY PRN
Qty: 12 TABLET | Refills: 0 | Status: SHIPPED | OUTPATIENT
Start: 2018-05-09 | End: 2018-07-05

## 2018-05-09 RX ADMIN — FENTANYL CITRATE 75 MCG: 50 INJECTION INTRAMUSCULAR; INTRAVENOUS at 00:05

## 2018-05-09 RX ADMIN — LIDOCAINE HYDROCHLORIDE,EPINEPHRINE BITARTRATE: 10; .01 INJECTION, SOLUTION INFILTRATION; PERINEURAL at 01:43

## 2018-05-09 RX ADMIN — PROPOFOL 100 MG: 10 INJECTION, EMULSION INTRAVENOUS at 01:15

## 2018-05-09 RX ADMIN — FENTANYL CITRATE 75 MCG: 50 INJECTION, SOLUTION INTRAMUSCULAR; INTRAVENOUS at 00:05

## 2018-05-09 RX ADMIN — BACITRACIN, NEOMYCIN, POLYMYXIN B 1 APPLICATION: 400; 3.5; 5 OINTMENT TOPICAL at 02:21

## 2018-05-09 RX ADMIN — ONDANSETRON 4 MG: 2 INJECTION INTRAMUSCULAR; INTRAVENOUS at 00:55

## 2018-05-09 RX ADMIN — FENTANYL CITRATE 75 MCG: 50 INJECTION, SOLUTION INTRAMUSCULAR; INTRAVENOUS at 00:20

## 2018-05-09 NOTE — ED PROVIDER NOTES
Subjective   Ms. Joyce Jones is a 72 year old female who presents to the ED after a fall. EMS personnel report that the patient was on her motorized stair lift this evening when she accidently fell out of the chair and fell to the ground, striking her left arm and head. She rolled down about 2 stairs onto her left side to the hard ground below. Presents to the ED with c/o severe pain and deformity in her left upper arm and a small hematoma w/ laceration over her left eyebrow. No associated hip pain, back pain, chest pain, or abd pain. No LOC. No n/v, dizziness, or headaches. No anticoagulation therapy. No other acute sx at this time.        History provided by:  Patient, EMS personnel and significant other  Fall   Mechanism of injury: fall    Injury location:  Face and shoulder/arm  Facial injury location:  L eyebrow  Shoulder/arm injury location:  L upper arm  Incident location:  Home  Arrived directly from scene: yes    Fall:     Fall occurred:  Down stairs (from a chair)    Impact surface:  Hard floor  Protective equipment: none    Suspicion of alcohol use: no    Suspicion of drug use: no    Prior to arrival data:     Bystander interventions:  Splinting    Patient ambulatory at scene: no      Blood loss:  Minimal    Responsiveness at scene:  Alert    Loss of consciousness: no      Amnesic to event: no      Airway interventions:  None    Breathing interventions:  None    IV access status:  None    IO access:  None    Fluids administered:  None    Cardiac interventions:  None    Medications administered:  None    Immobilization:  None    Airway condition since incident:  Stable    Breathing condition since incident:  Stable    Circulation condition since incident:  Stable    Mental status condition since incident:  Stable    Disability condition since incident:  Stable  Associated symptoms: no back pain, no headaches, no loss of consciousness, no nausea, no neck pain and no vomiting    Risk factors: no  anticoagulation therapy        Review of Systems   Gastrointestinal: Negative for nausea and vomiting.   Musculoskeletal: Positive for myalgias (L arm pain). Negative for back pain and neck pain.   Skin: Positive for wound.   Neurological: Negative for dizziness, loss of consciousness, light-headedness and headaches.   Psychiatric/Behavioral: Negative for confusion.   All other systems reviewed and are negative.      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 T11-sacrum   • CERVICAL SPINE SURGERY  2000    cage   • CHOLECYSTECTOMY  2014   • COLONOSCOPY  2016   • FOOT SURGERY  2008   • HERNIA REPAIR Bilateral 1978    Inguinal hernia   • HYSTERECTOMY  1992?    1 ovary left   • KNEE SURGERY      2005 and x2 in 8/2017(left)   • NASAL SEPTUM SURGERY  1999       Family History   Problem Relation Age of Onset   • Colon cancer Mother    • Uterine cancer Mother    • Hypertension Mother    • Kidney disease Mother    • Tuberculosis Mother    • Cancer Father      Bladder   • Heart failure Father    • Stroke Father    • Hypertension Father    • Heart attack Father    • Hypertension Brother    • Kidney disease Brother    • Kidney disease Brother    • Hypertension Brother    • Other Daughter      CVID   • Diabetes Daughter        Social History     Social History   •  Marital status:      Social History Main Topics   • Smoking status: Never Smoker   • Smokeless tobacco: Never Used   • Alcohol use No   • Drug use: No   • Sexual activity: Defer      Comment:      Other Topics Concern   • Not on file         Objective   Physical Exam   Constitutional: She is oriented to person, place, and time. She appears well-developed and well-nourished. No distress.   HENT:   Head: Normocephalic. Head is with laceration.       2 cm laceration over left eye.   Eyes: Conjunctivae are normal. No scleral icterus.   Neck: Normal range of motion. Neck supple.   Cardiovascular: Normal rate, regular rhythm and normal heart sounds.  Exam reveals no gallop and no friction rub.    No murmur heard.  Pulmonary/Chest: Effort normal and breath sounds normal. No respiratory distress. She has no wheezes. She has no rales.   Abdominal: Soft. Bowel sounds are normal. There is no tenderness. There is no guarding.   Musculoskeletal:        Left shoulder: She exhibits tenderness.        Left upper arm: She exhibits tenderness and deformity.   Tenderness over the left mid humerus to the left shoulder, clavicle, and trapezius musculature. Deformity of left mid humerus. Any movement of LUE exacerbates pain.   Neurological: She is alert and oriented to person, place, and time.   Neurovascularly intact.   Skin: Skin is warm and dry. She is not diaphoretic.   Psychiatric: She has a normal mood and affect. Her behavior is normal.   Nursing note and vitals reviewed.      Laceration Repair  Date/Time: 5/10/2018 1:43 AM  Performed by: CALVIN DAHL  Authorized by: CALVIN DAHL     Consent:     Consent obtained:  Verbal    Consent given by:  Patient    Risks discussed:  Infection, pain, poor cosmetic result, poor wound healing, need for additional repair and vascular damage    Alternatives discussed:  No treatment  Anesthesia (see MAR for exact dosages):     Anesthesia method:  Local infiltration    Local anesthetic:  " Lidocaine 1% WITH epi  Laceration details:     Location:  Face    Face location:  Forehead    Length (cm):  1.5    Depth (mm):  1  Repair type:     Repair type:  Simple  Pre-procedure details:     Preparation:  Patient was prepped and draped in usual sterile fashion  Exploration:     Hemostasis achieved with:  Direct pressure and epinephrine    Wound exploration: wound explored through full range of motion and entire depth of wound probed and visualized      Contaminated: no    Treatment:     Area cleansed with:  Betadine    Amount of cleaning:  Standard    Irrigation solution:  Sterile saline    Irrigation method:  Syringe    Visualized foreign bodies/material removed: no    Skin repair:     Repair method:  Sutures    Suture size:  6-0    Suture material:  Nylon    Number of sutures:  5  Approximation:     Approximation:  Close    Vermilion border: well-aligned    Post-procedure details:     Dressing:  Antibiotic ointment (due to hematoma, pressure dressing placed following laceration repair.)    Patient tolerance of procedure:  Tolerated well, no immediate complications    Lower Extremity Dislocation  Date/Time: 5/14/2018 5:06 PM  Performed by: CALVIN DAHL  Authorized by: CALVIN DAHL   Consent: The procedure was performed in an emergent situation. Verbal consent obtained. Written consent obtained.  Risks and benefits: risks, benefits and alternatives were discussed  Consent given by: patient  Patient understanding: patient states understanding of the procedure being performed  Patient consent: the patient's understanding of the procedure matches consent given  Test results: test results available and properly labeled  Imaging studies: imaging studies available  Required items: required blood products, implants, devices, and special equipment available  Patient identity confirmed: verbally with patient and arm band  Time out: Immediately prior to procedure a \"time out\" was called to verify the correct patient, " procedure, equipment, support staff and site/side marked as required.  Injury location: shoulder  Location details: left shoulder  Injury type: dislocation  Dislocation type: inferior  Chronicity: new  Pre-procedure neurovascular assessment: neurovascularly intact  Pre-procedure distal perfusion: normal  Pre-procedure neurological function comment: Pt notes mildly decreased sensation over the left hand.  Seems to primary be over the ulnar nerve distribution.    Anesthesia:  Local anesthesia used: no    Sedation:  Patient sedated: yes  Manipulation performed: yes  Reduction method: traction and counter traction  Reduction successful: yes  X-ray confirmed reduction: yes  Immobilization: sling  Post-procedure neurovascular assessment: post-procedure neurovascularly intact  Post-procedure neurological function: normal  Post-procedure range of motion comment: Pt place in sling.  Full shoulder ROM not tested.  Patient tolerance: Patient tolerated the procedure well with no immediate complications    Procedural Sedation  Date/Time: 2018 5:10 PM  Performed by: CALVIN DAHL  Authorized by: CALVIN DAHL     Indications:     Procedure performed:  Fracture reduction    Procedure necessitating sedation performed by:  Physician performing sedation    Intended level of sedation:  Moderate (conscious sedation)  Pre-sedation assessment:     Time since last food or drink:  Approx 6 hours    NPO status caution: urgency dictates proceeding with non-ideal NPO status      ASA classification: class 1 - normal, healthy patient      Neck mobility: normal      Mouth openin finger widths    Thyromental distance:  3 finger widths    Mallampati score:  II - soft palate, uvula, fauces visible    Pre-sedation assessments completed and reviewed: airway patency, cardiovascular function, hydration status, mental status, nausea/vomiting, pain level, respiratory function and temperature      History of difficult intubation: no    Immediate  pre-procedure details:     Reassessment: Patient reassessed immediately prior to procedure      Reviewed: vital signs, relevant labs/tests and NPO status      Verified: bag valve mask available, emergency equipment available, intubation equipment available, IV patency confirmed, oxygen available and suction available    Procedure details (see MAR for exact dosages):     Preoxygenation:  Nonrebreather mask    Sedation:  Propofol    Intra-procedure monitoring:  Blood pressure monitoring, cardiac monitor, continuous capnometry, continuous pulse oximetry, frequent LOC assessments and frequent vital sign checks    Intra-procedure events: none      Total sedation time (minutes):  12  Post-procedure details:     Attendance: Constant attendance by certified staff until patient recovered      Recovery: Patient returned to pre-procedure baseline      Estimated blood loss (see I/O flowsheets): no      Complications:  None    Post-sedation assessments completed and reviewed: airway patency, cardiovascular function, hydration status, mental status, nausea/vomiting, pain level and respiratory function      Specimens recovered:  None    Patient is stable for discharge or admission: yes      Patient tolerance:  Tolerated well, no immediate complications             ED Course  ED Course       No results found for this or any previous visit (from the past 24 hour(s)).  Note: In addition to lab results from this visit, the labs listed above may include labs taken at another facility or during a different encounter within the last 24 hours. Please correlate lab times with ED admission and discharge times for further clarification of the services performed during this visit.    XR Shoulder 2+ View Left   Final Result      1.  No acute fracture.      2.  Reduced glenohumeral dislocation.            THIS DOCUMENT HAS BEEN ELECTRONICALLY SIGNED BY ZAINA STEVENS MD      XR Humerus Left   Final Result      1.  No acute fracture.      2.   "Anterior glenohumeral dislocation.               THIS DOCUMENT HAS BEEN ELECTRONICALLY SIGNED BY ZAINA STEVENS MD      XR Shoulder 2+ View Left   Final Result      Anterior glenohumeral dislocation, without evidence of acute fracture.            THIS DOCUMENT HAS BEEN ELECTRONICALLY SIGNED BY ZAINA STEVENS MD        Vitals:    05/08/18 2314   BP: 146/81   BP Location: Right arm   Patient Position: Lying   Pulse: 74   Resp: 20   Temp: 97.7 °F (36.5 °C)   TempSrc: Oral   SpO2: 100%   Weight: 79.4 kg (175 lb)   Height: 165.1 cm (65\")     Medications   fentaNYL citrate (PF) (SUBLIMAZE) injection 75 mcg (75 mcg Intravenous Given 5/8/18 6795)     ECG/EMG Results (last 24 hours)     ** No results found for the last 24 hours. **                      [facility]    Final diagnoses:   Closed inferior dislocation of left shoulder, initial encounter   Fall, initial encounter   Forehead laceration, initial encounter       Documentation assistance provided by keri Roberts.  Information recorded by the scribe was done at my direction and has been verified and validated by me.     Dhruv Roberts  05/08/18 8986       Demar Josue DO  05/10/18 0146       Demar Josue, DO  05/14/18 1713    "

## 2018-05-11 ENCOUNTER — HOSPITAL ENCOUNTER (EMERGENCY)
Facility: HOSPITAL | Age: 73
Discharge: HOME | End: 2018-05-11
Attending: EMERGENCY MEDICINE | Admitting: EMERGENCY MEDICINE

## 2018-05-11 ENCOUNTER — APPOINTMENT (OUTPATIENT)
Dept: CT IMAGING | Facility: HOSPITAL | Age: 73
End: 2018-05-11

## 2018-05-11 VITALS
HEART RATE: 87 BPM | RESPIRATION RATE: 16 BRPM | SYSTOLIC BLOOD PRESSURE: 130 MMHG | BODY MASS INDEX: 28.28 KG/M2 | OXYGEN SATURATION: 95 % | WEIGHT: 176 LBS | HEIGHT: 66 IN | DIASTOLIC BLOOD PRESSURE: 74 MMHG | TEMPERATURE: 98 F

## 2018-05-11 DIAGNOSIS — F07.81 POST CONCUSSION SYNDROME: Primary | ICD-10-CM

## 2018-05-11 PROCEDURE — 99283 EMERGENCY DEPT VISIT LOW MDM: CPT

## 2018-05-11 PROCEDURE — 70450 CT HEAD/BRAIN W/O DYE: CPT

## 2018-05-11 NOTE — ED PROVIDER NOTES
Subjective   Joyce Jones is a 72 y.o.female who presents to the ED after a fall. Three days ago the patient fell, hitting the left side of her face and left shoulder. She immediately presented to the ED for evaluation. Her left shoulder was displaced, and it was reduced by Dr. Josue. She also had a laceration above her left eyebrow which was closed with several sutures. Since that time she has continued to have intermittent nausea and a left sided headache. After continued symptoms she returns to the ED for evaluation. Ms. Jones denies vomiting, loss of consciousness, confusion, abnormal coordination, any other injury or any other acute symptoms.    She is not on anticoagulation therapy.        History provided by:  Patient  Fall   Mechanism of injury: fall    Injury location:  Head/neck and shoulder/arm  Head/neck injury location:  Head  Shoulder/arm injury location:  L shoulder  Incident location:  Home  Time since incident:  3 days  Arrived directly from scene: no    Fall:     Fall occurred:  Standing    Impact surface:  Hard floor    Point of impact:  Head (left shoulder)    Entrapped after fall: no    Protective equipment: none    Suspicion of alcohol use: no    Suspicion of drug use: no    Prior to arrival data:     Patient ambulatory at scene: yes      Loss of consciousness: no      Amnesic to event: no    Associated symptoms: headaches and nausea    Associated symptoms: no abdominal pain, no back pain, no chest pain, no loss of consciousness, no neck pain and no vomiting        Review of Systems   Constitutional: Negative for fever.   Respiratory: Negative for shortness of breath.    Cardiovascular: Negative for chest pain.   Gastrointestinal: Positive for nausea. Negative for abdominal pain and vomiting.   Musculoskeletal: Positive for arthralgias (left shoulder). Negative for back pain and neck pain.   Skin: Positive for wound (laceration above the left eyebrow, several sutures placed).   Neurological:  Positive for headaches. Negative for loss of consciousness, speech difficulty and weakness.   Psychiatric/Behavioral: Negative for confusion.   All other systems reviewed and are negative.      Past Medical History:   Diagnosis Date   • Allergic rhinitis    • Anemia    • Arthritis    • Diverticulosis    • Heart murmur    • History of [facility] density [facility] 03/02/2017    [facility]   • History of mammography, screening 2016   • Hyperlipidemia    • Hypertension    • Inguinal hernia 1978    repair   • Personal history of DVT (deep vein thrombosis) 2005    right leg- due to surgery   • Positive TB test     Always comes back + but negative further testing       Allergies   Allergen Reactions   • Overton-2 Inhibitors Anaphylaxis   • Nsaids GI Intolerance   • Augmentin [Amoxicillin-Pot Clavulanate] Nausea Only       Past Surgical History:   Procedure Laterality Date   • BACK SURGERY  2002    Scoliosis bar. T11-sacrum   • CERVICAL SPINE SURGERY  2000    cage   • CHOLECYSTECTOMY  2014   • COLONOSCOPY  2016   • FOOT SURGERY  2008   • HERNIA REPAIR Bilateral 1978    Inguinal hernia   • HYSTERECTOMY  1992?    1 ovary left   • KNEE SURGERY      2005 and x2 in 8/2017(left)   • NASAL SEPTUM SURGERY  1999       Family History   Problem Relation Age of Onset   • Colon cancer Mother    • Uterine cancer Mother    • Hypertension Mother    • Kidney disease Mother    • Tuberculosis Mother    • Cancer Father      Bladder   • Heart failure Father    • Stroke Father    • Hypertension Father    • Heart attack Father    • Hypertension Brother    • Kidney disease Brother    • Kidney disease Brother    • Hypertension Brother    • Other Daughter      CVID   • Diabetes Daughter        Social History     Social History   • Marital status:      Social History Main Topics   • Smoking status: Never Smoker   • Smokeless tobacco: Never Used   • Alcohol use No   • Drug use: No   • Sexual activity: Defer      Comment:      Other Topics  Concern   • Not on file         Objective   Physical Exam   Constitutional: She is oriented to person, place, and time. She appears well-developed and well-nourished. No distress.   HENT:   Head: Normocephalic.   Mouth/Throat: No oropharyngeal exudate.   Old bruising and healing laceration to the left side of the forehead with focal TTP. No deformity.   Eyes: Conjunctivae are normal. No scleral icterus.   Neck: Normal range of motion. Neck supple. No JVD present.   Cardiovascular: Normal rate, regular rhythm and normal heart sounds.  Exam reveals no gallop and no friction rub.    No murmur heard.  Pulmonary/Chest: Effort normal and breath sounds normal. No respiratory distress. She has no wheezes. She has no rales.   Abdominal: Soft. Bowel sounds are normal. She exhibits no distension. There is no tenderness. There is no rebound and no guarding.   Musculoskeletal: Normal range of motion. She exhibits no edema.   Left arm is in a sling.   Neurological: She is alert and oriented to person, place, and time. No cranial nerve deficit. She exhibits normal muscle tone. Coordination normal.   Skin: Skin is warm and dry. She is not diaphoretic.   Left arm with old appearing bruising in the upper arm.   Psychiatric: She has a normal mood and affect. Her behavior is normal.   Nursing note and vitals reviewed.      Procedures         ED Course  ED Course     CT negative.  Patient stable on serial rechecks.  Discussed findings, concerns, plan of care, expected course, reasons to return and followup.    Reassurance provided.                  MDM  Number of Diagnoses or Management Options  Post concussion syndrome:      Amount and/or Complexity of Data Reviewed  Tests in the radiology section of CPT®: reviewed and ordered  Independent visualization of images, tracings, or specimens: yes        Final diagnoses:   Post concussion syndrome       Documentation assistance provided by keri Sims.  Information recorded by  the scribe was done at my direction and has been verified and validated by me.     Nils Sims  05/11/18 125       Giovany Lassiter MD  05/11/18 2292

## 2018-05-24 ENCOUNTER — OFFICE VISIT (OUTPATIENT)
Dept: INTERNAL MEDICINE | Facility: CLINIC | Age: 73
End: 2018-05-24

## 2018-05-24 VITALS
DIASTOLIC BLOOD PRESSURE: 78 MMHG | BODY MASS INDEX: 29.19 KG/M2 | HEART RATE: 78 BPM | RESPIRATION RATE: 16 BRPM | OXYGEN SATURATION: 100 % | SYSTOLIC BLOOD PRESSURE: 136 MMHG | TEMPERATURE: 97.2 F | HEIGHT: 65 IN | WEIGHT: 175.2 LBS

## 2018-05-24 DIAGNOSIS — R79.9 ABNORMAL FINDING OF BLOOD CHEMISTRY: ICD-10-CM

## 2018-05-24 DIAGNOSIS — R42 DIZZINESS: ICD-10-CM

## 2018-05-24 DIAGNOSIS — R45.0 JITTERY: ICD-10-CM

## 2018-05-24 DIAGNOSIS — R00.2 PALPITATIONS: Primary | ICD-10-CM

## 2018-05-24 LAB
ALBUMIN SERPL-MCNC: 4.8 G/DL (ref 3.2–4.8)
ALBUMIN/GLOB SERPL: 1.8 G/DL (ref 1.5–2.5)
ALP SERPL-CCNC: 95 U/L (ref 25–100)
ALT SERPL W P-5'-P-CCNC: 22 U/L (ref 7–40)
ANION GAP SERPL CALCULATED.3IONS-SCNC: 11 MMOL/L (ref 3–11)
AST SERPL-CCNC: 22 U/L (ref 0–33)
BASOPHILS # BLD AUTO: 0.04 10*3/MM3 (ref 0–0.2)
BASOPHILS NFR BLD AUTO: 0.5 % (ref 0–1)
BILIRUB SERPL-MCNC: 0.5 MG/DL (ref 0.3–1.2)
BUN BLD-MCNC: 25 MG/DL (ref 9–23)
BUN/CREAT SERPL: 25 (ref 7–25)
CALCIUM SPEC-SCNC: 10.3 MG/DL (ref 8.7–10.4)
CHLORIDE SERPL-SCNC: 103 MMOL/L (ref 99–109)
CO2 SERPL-SCNC: 26 MMOL/L (ref 20–31)
CREAT BLD-MCNC: 1 MG/DL (ref 0.6–1.3)
DEPRECATED RDW RBC AUTO: 58.1 FL (ref 37–54)
EOSINOPHIL # BLD AUTO: 0.07 10*3/MM3 (ref 0–0.3)
EOSINOPHIL NFR BLD AUTO: 0.8 % (ref 0–3)
ERYTHROCYTE [DISTWIDTH] IN BLOOD BY AUTOMATED COUNT: 15.3 % (ref 11.3–14.5)
GFR SERPL CREATININE-BSD FRML MDRD: 55 ML/MIN/1.73
GLOBULIN UR ELPH-MCNC: 2.6 GM/DL
GLUCOSE BLD-MCNC: 90 MG/DL (ref 70–100)
HBA1C MFR BLD: 4.9 % (ref 4.8–5.6)
HCT VFR BLD AUTO: 40.3 % (ref 34.5–44)
HGB BLD-MCNC: 12.8 G/DL (ref 11.5–15.5)
IMM GRANULOCYTES # BLD: 0.02 10*3/MM3 (ref 0–0.03)
IMM GRANULOCYTES NFR BLD: 0.2 % (ref 0–0.6)
LYMPHOCYTES # BLD AUTO: 1.44 10*3/MM3 (ref 0.6–4.8)
LYMPHOCYTES NFR BLD AUTO: 16.9 % (ref 24–44)
MCH RBC QN AUTO: 33.2 PG (ref 27–31)
MCHC RBC AUTO-ENTMCNC: 31.8 G/DL (ref 32–36)
MCV RBC AUTO: 104.4 FL (ref 80–99)
MONOCYTES # BLD AUTO: 1.09 10*3/MM3 (ref 0–1)
MONOCYTES NFR BLD AUTO: 12.8 % (ref 0–12)
NEUTROPHILS # BLD AUTO: 5.87 10*3/MM3 (ref 1.5–8.3)
NEUTROPHILS NFR BLD AUTO: 69 % (ref 41–71)
PLATELET # BLD AUTO: 353 10*3/MM3 (ref 150–450)
PMV BLD AUTO: 9.9 FL (ref 6–12)
POTASSIUM BLD-SCNC: 4.5 MMOL/L (ref 3.5–5.5)
PROT SERPL-MCNC: 7.4 G/DL (ref 5.7–8.2)
RBC # BLD AUTO: 3.86 10*6/MM3 (ref 3.89–5.14)
SODIUM BLD-SCNC: 140 MMOL/L (ref 132–146)
TSH SERPL DL<=0.05 MIU/L-ACNC: 1.26 MIU/ML (ref 0.35–5.35)
WBC NRBC COR # BLD: 8.51 10*3/MM3 (ref 3.5–10.8)

## 2018-05-24 PROCEDURE — 99214 OFFICE O/P EST MOD 30 MIN: CPT | Performed by: NURSE PRACTITIONER

## 2018-05-24 PROCEDURE — 93000 ELECTROCARDIOGRAM COMPLETE: CPT | Performed by: NURSE PRACTITIONER

## 2018-05-24 PROCEDURE — 84443 ASSAY THYROID STIM HORMONE: CPT | Performed by: NURSE PRACTITIONER

## 2018-05-24 PROCEDURE — 80053 COMPREHEN METABOLIC PANEL: CPT | Performed by: NURSE PRACTITIONER

## 2018-05-24 PROCEDURE — 85025 COMPLETE CBC W/AUTO DIFF WBC: CPT | Performed by: NURSE PRACTITIONER

## 2018-05-24 PROCEDURE — 83036 HEMOGLOBIN GLYCOSYLATED A1C: CPT | Performed by: NURSE PRACTITIONER

## 2018-05-24 NOTE — PROGRESS NOTES
Subjective   Joyce Jones is a 72 y.o. female    Chief Complaint   Patient presents with   • ER follow up from a recent Fall     Went to [facility] ER on 5/8 and 5/11.Still having left shoulder pain. Has rotator cuff tear, also tear in tendons and bursitis.    • Wants to worked up for possible Hypoglycemia     Every time after she eats, she gets shakey, tachycardia, some dizziness.    • Extremely fatigue at times.     History of Present Illness     ER f/u - Joyce fell down the steps in her home on 5/8/18.  She tore her left rotator cuff and had to have stitches above her left eye.  She went back to the ER on 5/11/18 due to dizziness and HA.  CT of the head was done.  Was dx'd with concussion.  States that concussion sx's have resolved.  Ortho is following her shoulder.      Joyce states that since her steroid injection in the neck around the first April, every time she eats she has palpitations, she is very shakey and dizzy.  Sx's occur about 45 minutes after eating.  Sx's last for approx 45 minutes to an hour.      The following portions of the patient's history were reviewed and updated as appropriate: allergies, current medications, past family history, past medical history, past social history, past surgical history and problem list.    Current Outpatient Prescriptions:   •  aspirin 81 MG chewable tablet, Chew 81 mg Daily., Disp: , Rfl:   •  cyclobenzaprine (FLEXERIL) 10 MG tablet, Take 1 tablet by mouth 3 (Three) Times a Day As Needed for Muscle Spasms., Disp: 12 tablet, Rfl: 0  •  esomeprazole (nexIUM) 40 MG capsule, Take 1 capsule by mouth Every Morning Before Breakfast., Disp: 90 capsule, Rfl: 1  •  furosemide (LASIX) 20 MG tablet, prn, Disp: , Rfl:   •  HYDROcodone-acetaminophen (NORCO) 5-325 MG per tablet, Take 1-2 tablets by mouth Every 6 (Six) Hours As Needed for Severe Pain ., Disp: 15 tablet, Rfl: 0  •  losartan (COZAAR) 50 MG tablet, Take 1 tablet by mouth Daily., Disp: 90 tablet, Rfl: 1  •   "Omega-3 Fatty Acids (FISH OIL) 1000 MG capsule capsule, Take  by mouth Daily With Breakfast., Disp: , Rfl:   •  pravastatin (PRAVACHOL) 40 MG tablet, Take 1 tablet by mouth Daily., Disp: 30 tablet, Rfl: 2  •  Probiotic Product (PROBIOTIC-10 PO), Take  by mouth., Disp: , Rfl:   •  traMADol-acetaminophen (ULTRACET) 37.5-325 MG per tablet, Take 2 tablets by mouth Every 6 (Six) Hours As Needed., Disp: , Rfl:      Review of Systems   Constitutional: Positive for fatigue. Negative for chills and fever.   Respiratory: Negative for cough, chest tightness and shortness of breath.    Cardiovascular: Positive for palpitations. Negative for chest pain.   Gastrointestinal: Negative for abdominal pain, diarrhea, nausea and vomiting.   Endocrine: Negative for cold intolerance and heat intolerance.   Musculoskeletal: Positive for arthralgias.   Neurological: Positive for dizziness.       Objective     Physical Exam   Constitutional: She is oriented to person, place, and time. She appears well-developed and well-nourished.   HENT:   Head: Normocephalic and atraumatic.   Eyes: Conjunctivae and EOM are normal. Pupils are equal, round, and reactive to light.   Neck: Normal range of motion.   Cardiovascular: Normal rate, regular rhythm and normal heart sounds.    Pulmonary/Chest: Effort normal and breath sounds normal.   Abdominal: Soft. Bowel sounds are normal.   Musculoskeletal: Normal range of motion.   Neurological: She is alert and oriented to person, place, and time. She has normal reflexes.   Skin: Skin is warm and dry.   Psychiatric: She has a normal mood and affect. Her behavior is normal. Judgment and thought content normal.     Vitals:    05/24/18 1007   BP: 136/78   Pulse: 78   Resp: 16   Temp: 97.2 °F (36.2 °C)   TempSrc: Temporal Artery    SpO2: 100%   Weight: 79.5 kg (175 lb 3.2 oz)   Height: 165.1 cm (65\")       ECG 12 Lead  Date/Time: 5/24/2018 10:30 AM  Performed by: JUDY PATRICK  Authorized by: CELINA, " JUDY ARRIAGA   Comparison: not compared with previous ECG   Rhythm: sinus rhythm  Rate: normal  Conduction: conduction normal  ST Segments: ST segments normal  T Waves: T waves normal  QRS axis: normal  Other: no other findings  Clinical impression: normal ECG            Assessment/Plan   Joyce was seen today for er follow up from a recent fall, wants to worked up for possible hypoglycemia and extremely fatigue at times..    Diagnoses and all orders for this visit:    Palpitations  -     ECG 12 Lead; Future  -     CBC & Differential  -     Comprehensive Metabolic Panel  -     TSH  -     Hemoglobin A1c  -     CBC Auto Differential    Dizziness  -     ECG 12 Lead; Future  -     CBC & Differential  -     Comprehensive Metabolic Panel  -     TSH  -     Hemoglobin A1c  -     CBC Auto Differential    Jittery  -     CBC & Differential  -     Comprehensive Metabolic Panel  -     TSH  -     Hemoglobin A1c  -     CBC Auto Differential    Abnormal finding of blood chemistry   -     Hemoglobin A1c      EKG done in office - NSR  We will start with labs  May consider  Oral GTT  F/U will be based on results/findings

## 2018-05-25 ENCOUNTER — TELEPHONE (OUTPATIENT)
Dept: INTERNAL MEDICINE | Facility: CLINIC | Age: 73
End: 2018-05-25

## 2018-05-25 NOTE — TELEPHONE ENCOUNTER
Labs are all within acceptable limits.  We can proceed with the Oral Glucose tolerance test if she would like.

## 2018-05-29 ENCOUNTER — TELEPHONE (OUTPATIENT)
Dept: INTERNAL MEDICINE | Facility: CLINIC | Age: 73
End: 2018-05-29

## 2018-05-29 NOTE — TELEPHONE ENCOUNTER
PT CALLED WANTS TO COME IN THE MORNING FOR GLUCOSE TEST SHE WANTS TO KNOW IF SHE NEEDS TO FAST PLEASE CALL HER HOME LINE

## 2018-06-01 ENCOUNTER — LAB (OUTPATIENT)
Dept: LAB | Facility: HOSPITAL | Age: 73
End: 2018-06-01

## 2018-06-01 DIAGNOSIS — Z13.1 ENCOUNTER FOR SCREENING FOR DIABETES MELLITUS: ICD-10-CM

## 2018-06-01 DIAGNOSIS — E16.2 HYPOGLYCEMIA: Primary | ICD-10-CM

## 2018-06-01 LAB
GLUCOSE 1H P 100 G GLC PO SERPL-MCNC: 181 MG/DL (ref 65–199)
GLUCOSE 2H P 100 G GLC PO SERPL-MCNC: 141 MG/DL (ref 65–139)
GLUCOSE P FAST SERPL-MCNC: 103 MG/DL (ref 65–99)

## 2018-06-01 PROCEDURE — 36415 COLL VENOUS BLD VENIPUNCTURE: CPT

## 2018-06-01 PROCEDURE — 82962 GLUCOSE BLOOD TEST: CPT

## 2018-06-01 PROCEDURE — 82951 GLUCOSE TOLERANCE TEST (GTT): CPT

## 2018-06-05 ENCOUNTER — TELEPHONE (OUTPATIENT)
Dept: INTERNAL MEDICINE | Facility: CLINIC | Age: 73
End: 2018-06-05

## 2018-06-05 NOTE — TELEPHONE ENCOUNTER
----- Message from LOVE Manzano sent at 6/1/2018  1:58 PM EDT -----  GTT is within acceptable limits.  Her fasting BG was mildly elevated at 103, normal is 99, but I am not concerned with a normal A1C.  There was no evidence of hypoglycemia.

## 2018-06-19 ENCOUNTER — TELEPHONE (OUTPATIENT)
Dept: INTERNAL MEDICINE | Facility: CLINIC | Age: 73
End: 2018-06-19

## 2018-06-19 RX ORDER — AMOXICILLIN 500 MG/1
2000 CAPSULE ORAL ONCE
Qty: 4 CAPSULE | Refills: 0 | Status: SHIPPED | OUTPATIENT
Start: 2018-06-19 | End: 2018-06-19

## 2018-06-19 NOTE — TELEPHONE ENCOUNTER
MS. BURNETT SAYS SHE IS GOING TO HAVE HER TEETH CLEANED SHE IS NEEDING AN ANTIBIOTC (AMOXICILLAN) 4 TAB OF 500MG, SAYS SHE HAS IMPLANTS AND CAN NOT HAVE DENTAL WORK WITHOUT ANTIBIOTIC.

## 2018-07-05 ENCOUNTER — OFFICE VISIT (OUTPATIENT)
Dept: INTERNAL MEDICINE | Facility: CLINIC | Age: 73
End: 2018-07-05

## 2018-07-05 VITALS
BODY MASS INDEX: 28.12 KG/M2 | RESPIRATION RATE: 16 BRPM | WEIGHT: 168.8 LBS | HEIGHT: 65 IN | SYSTOLIC BLOOD PRESSURE: 140 MMHG | OXYGEN SATURATION: 97 % | TEMPERATURE: 98.1 F | HEART RATE: 73 BPM | DIASTOLIC BLOOD PRESSURE: 80 MMHG

## 2018-07-05 DIAGNOSIS — M25.531 RIGHT WRIST PAIN: Primary | ICD-10-CM

## 2018-07-05 PROCEDURE — 99213 OFFICE O/P EST LOW 20 MIN: CPT | Performed by: NURSE PRACTITIONER

## 2018-07-05 NOTE — PROGRESS NOTES
Subjective   Joyce Jones is a 72 y.o. female    Chief Complaint   Patient presents with   • left wrist pain     Has been hurting since her fall on 5/8/18. Seems to be getting worse. Has been taking Advil, about 1-2 a day.     Wrist Pain    Pain location: right wrist. This is a new problem. The current episode started more than 1 month ago. There has been a history of trauma. The problem occurs intermittently. The problem has been gradually worsening. The quality of the pain is described as aching and sharp. The pain is moderate. Associated symptoms include a limited range of motion and stiffness. Pertinent negatives include no fever, inability to bear weight, itching, joint locking, joint swelling, numbness or tingling. The symptoms are aggravated by activity (lifting, pulling, tugging). She has tried NSAIDS for the symptoms. The treatment provided no relief. Family history does not include gout or rheumatoid arthritis. There is no history of diabetes, gout, osteoarthritis or rheumatoid arthritis.        The following portions of the patient's history were reviewed and updated as appropriate: allergies, current medications, past family history, past medical history, past social history, past surgical history and problem list.    Current Outpatient Prescriptions:   •  aspirin 81 MG chewable tablet, Chew 81 mg Daily., Disp: , Rfl:   •  esomeprazole (nexIUM) 40 MG capsule, Take 1 capsule by mouth Every Morning Before Breakfast., Disp: 90 capsule, Rfl: 1  •  furosemide (LASIX) 20 MG tablet, prn, Disp: , Rfl:   •  losartan (COZAAR) 50 MG tablet, Take 1 tablet by mouth Daily., Disp: 90 tablet, Rfl: 1  •  Omega-3 Fatty Acids (FISH OIL) 1000 MG capsule capsule, Take  by mouth Daily With Breakfast., Disp: , Rfl:   •  pravastatin (PRAVACHOL) 40 MG tablet, Take 1 tablet by mouth Daily., Disp: 30 tablet, Rfl: 2  •  Probiotic Product (PROBIOTIC-10 PO), Take  by mouth., Disp: , Rfl:   •  traMADol-acetaminophen (ULTRACET)  "37.5-325 MG per tablet, Take 2 tablets by mouth Every 6 (Six) Hours As Needed., Disp: , Rfl:      Review of Systems   Constitutional: Negative for chills, fatigue and fever.   Respiratory: Negative for cough, chest tightness and shortness of breath.    Cardiovascular: Negative for chest pain.   Gastrointestinal: Negative for abdominal pain, diarrhea, nausea and vomiting.   Endocrine: Negative for cold intolerance and heat intolerance.   Musculoskeletal: Positive for stiffness. Negative for arthralgias and gout.   Skin: Negative for itching.   Neurological: Negative for dizziness, tingling and numbness.       Objective   Physical Exam   Constitutional: She is oriented to person, place, and time. She appears well-developed and well-nourished.   HENT:   Head: Normocephalic and atraumatic.   Eyes: Conjunctivae and EOM are normal. Pupils are equal, round, and reactive to light.   Neck: Normal range of motion.   Cardiovascular: Normal rate, regular rhythm and normal heart sounds.    Pulmonary/Chest: Effort normal and breath sounds normal.   Abdominal: Soft. Bowel sounds are normal.   Musculoskeletal:        Right wrist: She exhibits decreased range of motion, tenderness and bony tenderness (distal ulnar). She exhibits no swelling, no effusion, no crepitus, no deformity and no laceration.   Neurological: She is alert and oriented to person, place, and time. She has normal reflexes.   Skin: Skin is warm and dry.   Psychiatric: She has a normal mood and affect. Her behavior is normal. Judgment and thought content normal.     Vitals:    07/05/18 1255   BP: 140/80   Pulse: 73   Resp: 16   Temp: 98.1 °F (36.7 °C)   TempSrc: Temporal Artery    SpO2: 97%   Weight: 76.6 kg (168 lb 12.8 oz)   Height: 165.1 cm (65\")         Assessment/Plan   Joyce was seen today for left wrist pain.    Diagnoses and all orders for this visit:    Right wrist pain  -     XR wrist 2 vw right; Future      Sent for xray  Will call with results   May " need referral to hand specialist

## 2018-07-09 ENCOUNTER — TELEPHONE (OUTPATIENT)
Dept: INTERNAL MEDICINE | Facility: CLINIC | Age: 73
End: 2018-07-09

## 2018-07-09 NOTE — TELEPHONE ENCOUNTER
Pt called has appt with orthopedic and needs results of xray she had on wrist [id number] please give her a call PT CALLED AGAIN HER APPT IS AT 11:30 AM

## 2018-07-09 NOTE — TELEPHONE ENCOUNTER
Called to see which Ortho to send results to.  Would like it sent to  at  Ortho.  Phone # [phone].  Fax # [phone].  Faxed results

## 2018-07-09 NOTE — TELEPHONE ENCOUNTER
----- Message from LOVE Manzano sent at 7/9/2018  3:54 PM EDT -----  Xray shows moderate arthritis, but no fx or break.  I would recommend following up with ortho.

## 2018-07-18 DIAGNOSIS — M79.641 RIGHT HAND PAIN: Primary | ICD-10-CM

## 2018-07-26 ENCOUNTER — OFFICE VISIT (OUTPATIENT)
Dept: ORTHOPEDIC SURGERY | Facility: CLINIC | Age: 73
End: 2018-07-26

## 2018-07-26 VITALS — BODY MASS INDEX: 28.1 KG/M2 | HEIGHT: 65 IN | OXYGEN SATURATION: 97 % | WEIGHT: 168.65 LBS | HEART RATE: 90 BPM

## 2018-07-26 DIAGNOSIS — M65.4 DE QUERVAIN'S DISEASE (RADIAL STYLOID TENOSYNOVITIS): ICD-10-CM

## 2018-07-26 DIAGNOSIS — S49.92XA INJURY OF LEFT SHOULDER, INITIAL ENCOUNTER: ICD-10-CM

## 2018-07-26 DIAGNOSIS — M25.532 LEFT WRIST PAIN: Primary | ICD-10-CM

## 2018-07-26 PROCEDURE — 99203 OFFICE O/P NEW LOW 30 MIN: CPT | Performed by: ORTHOPAEDIC SURGERY

## 2018-07-26 RX ORDER — IBUPROFEN 200 MG
200 TABLET ORAL EVERY 6 HOURS PRN
COMMUNITY
End: 2020-09-17

## 2018-07-26 NOTE — PROGRESS NOTES
[facility] [place]    Subjective     Pain of the Left Wrist (Patient fell and dislocated shoulder in May and fell on her arm as well. She is complaining of left wrist pain, pain today is not bad just a steady dull pain, she gets a sharp pain (7) sometimes. Patient had a brace but is not wearing it now./)      ANNIE    Joyce Jones is a 72 y.o. female.  Patient is here today for left radial sided wrist pain.  She has had difficulty ever since she fell and dislocated her shoulder in May.  She is concerned about a possible fracture.  She occasionally gets sharp pain that radiates up the volar side of the wrist.  Occasionally this pain is on the radial side of the wrist.  She has tried a brace.     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 T11-sacrum   • CERVICAL SPINE SURGERY  2000    cage   • CHOLECYSTECTOMY  2014   • COLONOSCOPY  2016   • FOOT SURGERY  2008   • HERNIA REPAIR Bilateral 1978    Inguinal hernia   • HYSTERECTOMY  1992?    1 ovary left   • KNEE SURGERY      2005 and x2 in 8/2017(left)   • NASAL SEPTUM SURGERY  1999      Family History   Problem Relation Age of Onset   • Colon cancer Mother    • Uterine cancer Mother    • Hypertension Mother    • Kidney disease Mother    • Tuberculosis Mother    • Cancer Father         Bladder   • Heart failure Father    • Stroke Father    • Hypertension Father    • Heart attack Father    • Hypertension Brother    • Kidney disease Brother     • Kidney disease Brother    • Hypertension Brother    • Other Daughter         CVID   • Diabetes Daughter      Social History     Social History   • Marital status:      Spouse name: N/A   • Number of children: N/A   • Years of education: N/A     Occupational History   • Not on file.     Social History Main Topics   • Smoking status: Never Smoker   • Smokeless tobacco: Never Used   • Alcohol use No   • Drug use: No   • Sexual activity: Defer      Comment:      Other Topics Concern   • Not on file     Social History Narrative   • No narrative on file      Current Outpatient Prescriptions on File Prior to Visit   Medication Sig Dispense Refill   • aspirin 81 MG chewable tablet Chew 81 mg Daily.     • esomeprazole (nexIUM) 40 MG capsule Take 1 capsule by mouth Every Morning Before Breakfast. 90 capsule 1   • losartan (COZAAR) 50 MG tablet Take 1 tablet by mouth Daily. 90 tablet 1   • Omega-3 Fatty Acids (FISH OIL) 1000 MG capsule capsule Take  by mouth Daily With Breakfast.     • pravastatin (PRAVACHOL) 40 MG tablet Take 1 tablet by mouth Daily. 30 tablet 2   • traMADol-acetaminophen (ULTRACET) 37.5-325 MG per tablet Take 2 tablets by mouth Every 6 (Six) Hours As Needed.     • [DISCONTINUED] furosemide (LASIX) 20 MG tablet prn     • [DISCONTINUED] Probiotic Product (PROBIOTIC-10 PO) Take  by mouth.       No current facility-administered medications on file prior to visit.       Allergies   Allergen Reactions   • Overton-2 Inhibitors Anaphylaxis   • Nsaids GI Intolerance   • Augmentin [Amoxicillin-Pot Clavulanate] Nausea Only        The following portions of the patient's history were reviewed and updated as appropriate: allergies, current medications, past family history, past medical history, past social history, past surgical history and problem list.    Review of Systems   Constitutional: Negative.    HENT: Negative.    Eyes: Negative.    Respiratory: Negative.    Cardiovascular: Negative.   "  Gastrointestinal: Negative.    Endocrine: Negative.    Genitourinary: Negative.    Musculoskeletal: Positive for arthralgias (wrist pain).   Skin: Negative.    Allergic/Immunologic: Negative.    Neurological: Negative.    Hematological: Negative.    Psychiatric/Behavioral: Negative.         Objective      Physical Exam  Pulse 90   Ht 165.1 cm (65\")   Wt 76.5 kg (168 lb 10.4 oz)   SpO2 97%   BMI 28.07 kg/m²     Body mass index is 28.07 kg/m².    General  Mental Status - alert  General Appearance - cooperative, well groomed, not in acute distress  Orientation - Oriented X3  Build & Nutrition - well developed and well nourished  Posture - normal posture  Gait - normal gait     Integumentary  Global Assessment  Examination of related systems reveals - no lymphadenopathy  General Characteristics  Overall examination of the patient's skin reveals - no rashes, no evidence of scars, no suspicious lesions and no bruises.  Color - normal coloration of skin.    Ortho Exam  Peripheral Vascular   Bilateral Upper Extremity    No cyanotic nail beds    Pink nail beds and rapid capillary refill   Palpation    Radial Pulse - Bilaterally normal    Neurologic   Sensory: Light touch intact- Right and left hand    Left Upper Extremity    Left wrist extensors: 5/5    Left wrist flexors: 5/5    Left intrinsics: 5/5   Right Upper Extremity    Right wrist extensors: 5/5    Right wrist flexors: 5/5    Right intrinsics: 5/5    Musculoskeletal     Inspection and Palpation   Left Wrist      Tenderness -none    Swelling - none    Crepitus - none    Muscle tone - no atrophy     Functional Testing:         Left Wrist and Thumb       Finklestein's:  Positive    CMC shuck:  Negative    CMC grind:  Negative    CMC tenderness: Negative    A1 pulley:  No nodule, no locking       Strength and Tone    Right  strength: good    Left  strength: good         Imaging/Studies  Imaging Results (last 24 hours)     Procedure Component Value Units " Date/Time    XR Wrist 3+ View Left [[id number]] Resulted:  07/26/18 1506     Updated:  07/26/18 1508    Narrative:       Left Wrist X-Ray    Indication: Pain    Views:  AP, Lateral, and Oblique     Comparison: none    Findings:  No fracture  No bony lesion  Normal soft tissues  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              Assessment:  1. Left wrist pain    2. Injury of left shoulder, initial encounter    3. De Quervain's disease (radial styloid tenosynovitis)        Plan:  1. Continue over-the-counter medication as needed for discomfort  2. Without a long discussion with the patient about treatment options and alternatives.  She has a bit of a mixed picture between FCR and first dorsal compartment tendinopathy.  Plan will be to observe this for the next several weeks.  If her pain persists, we can consider diagnostic modalities  3. Patient will continue using her brace  4. The feeling is that her wrist pain is likely compensatory for her shoulder issues.      Medical Decision Making  Management Options : over-the-counter medicine  Data/Risk: radiology tests and independent visualization of imaging, lab tests, or EMG/NCV    Jonathan Mera MD  07/26/18  8:27 PM

## 2018-08-03 ENCOUNTER — HOSPITAL ENCOUNTER (EMERGENCY)
Facility: HOSPITAL | Age: 73
Discharge: HOME | End: 2018-08-03
Attending: EMERGENCY MEDICINE | Admitting: EMERGENCY MEDICINE

## 2018-08-03 ENCOUNTER — APPOINTMENT (OUTPATIENT)
Dept: GENERAL RADIOLOGY | Facility: HOSPITAL | Age: 73
End: 2018-08-03

## 2018-08-03 ENCOUNTER — APPOINTMENT (OUTPATIENT)
Dept: MRI IMAGING | Facility: HOSPITAL | Age: 73
End: 2018-08-03

## 2018-08-03 VITALS
HEIGHT: 64 IN | TEMPERATURE: 98.6 F | DIASTOLIC BLOOD PRESSURE: 78 MMHG | WEIGHT: 172 LBS | RESPIRATION RATE: 18 BRPM | BODY MASS INDEX: 29.37 KG/M2 | OXYGEN SATURATION: 99 % | SYSTOLIC BLOOD PRESSURE: 133 MMHG | HEART RATE: 70 BPM

## 2018-08-03 DIAGNOSIS — R06.02 SHORTNESS OF BREATH: ICD-10-CM

## 2018-08-03 DIAGNOSIS — R42 DIZZINESS: Primary | ICD-10-CM

## 2018-08-03 LAB
ALBUMIN SERPL-MCNC: 4.54 G/DL (ref 3.2–4.8)
ALBUMIN/GLOB SERPL: 2 G/DL (ref 1.5–2.5)
ALP SERPL-CCNC: 84 U/L (ref 25–100)
ALT SERPL W P-5'-P-CCNC: 33 U/L (ref 7–40)
ANION GAP SERPL CALCULATED.3IONS-SCNC: 15 MMOL/L (ref 3–11)
AST SERPL-CCNC: 34 U/L (ref 0–33)
BACTERIA UR QL AUTO: ABNORMAL /HPF
BASOPHILS # BLD AUTO: 0.02 10*3/MM3 (ref 0–0.2)
BASOPHILS NFR BLD AUTO: 0.3 % (ref 0–1)
BILIRUB SERPL-MCNC: 0.6 MG/DL (ref 0.3–1.2)
BILIRUB UR QL STRIP: NEGATIVE
BUN BLD-MCNC: 23 MG/DL (ref 9–23)
BUN/CREAT SERPL: 23.7 (ref 7–25)
CALCIUM SPEC-SCNC: 9.7 MG/DL (ref 8.7–10.4)
CHLORIDE SERPL-SCNC: 97 MMOL/L (ref 99–109)
CLARITY UR: CLEAR
CO2 SERPL-SCNC: 24 MMOL/L (ref 20–31)
COLOR UR: YELLOW
CREAT BLD-MCNC: 0.97 MG/DL (ref 0.6–1.3)
DEPRECATED RDW RBC AUTO: 52.9 FL (ref 37–54)
EOSINOPHIL # BLD AUTO: 0.06 10*3/MM3 (ref 0–0.3)
EOSINOPHIL NFR BLD AUTO: 0.8 % (ref 0–3)
ERYTHROCYTE [DISTWIDTH] IN BLOOD BY AUTOMATED COUNT: 14.2 % (ref 11.3–14.5)
GFR SERPL CREATININE-BSD FRML MDRD: 57 ML/MIN/1.73
GLOBULIN UR ELPH-MCNC: 2.3 GM/DL
GLUCOSE BLD-MCNC: 114 MG/DL (ref 70–100)
GLUCOSE UR STRIP-MCNC: NEGATIVE MG/DL
HCT VFR BLD AUTO: 38.8 % (ref 34.5–44)
HGB BLD-MCNC: 12.8 G/DL (ref 11.5–15.5)
HGB UR QL STRIP.AUTO: NEGATIVE
HOLD SPECIMEN: NORMAL
HOLD SPECIMEN: NORMAL
HYALINE CASTS UR QL AUTO: ABNORMAL /LPF
IMM GRANULOCYTES # BLD: 0.02 10*3/MM3 (ref 0–0.03)
IMM GRANULOCYTES NFR BLD: 0.3 % (ref 0–0.6)
KETONES UR QL STRIP: NEGATIVE
LEUKOCYTE ESTERASE UR QL STRIP.AUTO: ABNORMAL
LYMPHOCYTES # BLD AUTO: 2.84 10*3/MM3 (ref 0.6–4.8)
LYMPHOCYTES NFR BLD AUTO: 38.5 % (ref 24–44)
MAGNESIUM SERPL-MCNC: 1.9 MG/DL (ref 1.3–2.7)
MCH RBC QN AUTO: 33.6 PG (ref 27–31)
MCHC RBC AUTO-ENTMCNC: 33 G/DL (ref 32–36)
MCV RBC AUTO: 101.8 FL (ref 80–99)
MONOCYTES # BLD AUTO: 0.6 10*3/MM3 (ref 0–1)
MONOCYTES NFR BLD AUTO: 8.1 % (ref 0–12)
NEUTROPHILS # BLD AUTO: 3.86 10*3/MM3 (ref 1.5–8.3)
NEUTROPHILS NFR BLD AUTO: 52.3 % (ref 41–71)
NITRITE UR QL STRIP: NEGATIVE
PH UR STRIP.AUTO: 6.5 [PH] (ref 5–8)
PLATELET # BLD AUTO: 279 10*3/MM3 (ref 150–450)
PMV BLD AUTO: 10 FL (ref 6–12)
POTASSIUM BLD-SCNC: 3.8 MMOL/L (ref 3.5–5.5)
PROT SERPL-MCNC: 6.8 G/DL (ref 5.7–8.2)
PROT UR QL STRIP: NEGATIVE
RBC # BLD AUTO: 3.81 10*6/MM3 (ref 3.89–5.14)
RBC # UR: ABNORMAL /HPF
REF LAB TEST METHOD: ABNORMAL
SODIUM BLD-SCNC: 136 MMOL/L (ref 132–146)
SP GR UR STRIP: 1.01 (ref 1–1.03)
SQUAMOUS #/AREA URNS HPF: ABNORMAL /HPF
TROPONIN I SERPL-MCNC: 0 NG/ML (ref 0–0.07)
TROPONIN I SERPL-MCNC: 0 NG/ML (ref 0–0.07)
UROBILINOGEN UR QL STRIP: ABNORMAL
WBC NRBC COR # BLD: 7.38 10*3/MM3 (ref 3.5–10.8)
WBC UR QL AUTO: ABNORMAL /HPF
WHOLE BLOOD HOLD SPECIMEN: NORMAL
WHOLE BLOOD HOLD SPECIMEN: NORMAL

## 2018-08-03 PROCEDURE — 93005 ELECTROCARDIOGRAM TRACING: CPT | Performed by: EMERGENCY MEDICINE

## 2018-08-03 PROCEDURE — 85025 COMPLETE CBC W/AUTO DIFF WBC: CPT | Performed by: EMERGENCY MEDICINE

## 2018-08-03 PROCEDURE — 81001 URINALYSIS AUTO W/SCOPE: CPT | Performed by: EMERGENCY MEDICINE

## 2018-08-03 PROCEDURE — 70551 MRI BRAIN STEM W/O DYE: CPT

## 2018-08-03 PROCEDURE — 71045 X-RAY EXAM CHEST 1 VIEW: CPT

## 2018-08-03 PROCEDURE — 99284 EMERGENCY DEPT VISIT MOD MDM: CPT

## 2018-08-03 PROCEDURE — 80053 COMPREHEN METABOLIC PANEL: CPT | Performed by: EMERGENCY MEDICINE

## 2018-08-03 PROCEDURE — 84484 ASSAY OF TROPONIN QUANT: CPT

## 2018-08-03 PROCEDURE — 83735 ASSAY OF MAGNESIUM: CPT | Performed by: EMERGENCY MEDICINE

## 2018-08-03 RX ORDER — SODIUM CHLORIDE 0.9 % (FLUSH) 0.9 %
10 SYRINGE (ML) INJECTION AS NEEDED
Status: DISCONTINUED | OUTPATIENT
Start: 2018-08-03 | End: 2018-08-03 | Stop reason: HOSPADM

## 2018-08-03 NOTE — DISCHARGE INSTRUCTIONS
Follow up with your primary care physician at the next available appointment.    Push fluids.    Lie down and elevate your legs if you develop lightheadedness.    Immediately return to the emergency department for any new or worsening symptoms.     Please review the medications you are supposed to be taking according to prior physician recommendations. I have not changed your home medications during this visit. If your discharge instructions indicate that I have changed your home medications, this is not the case, and you should disregard. If you have any questions about the medication you should be taking at home, please call your physician.

## 2018-08-03 NOTE — ED PROVIDER NOTES
Subjective   Joyce Jones is a 72 y.o.female who presents to the ED with c/o dizziness with onset just PTA. She reports that she was walking in her kitchen when she suddenly developed dizziness described as a lightheadedness and loss of balance sensation. Soon thereafter, she sat down and took some deep breaths which partially resolved her sx. She notes that her sx lasted for approx. 25 minutes in duration. She notes that she also developed diaphoresis, generalized weakness, and SoA secondary to her dizziness. Upon arrival to the ED, family states that the pt is pale in coloration. The pt denies any recent decrease in appetite. She denies any headache, urinary sx, or any other complaints at this time. The pt has no hx of angina or MI. The pt had a normal heart cath 15-16 years ago. The pt's last stress test was 6-7 years ago. The pt has a hx of a DVT after a knee surgery.        History provided by:  Patient  Dizziness   Quality:  Lightheadedness and imbalance  Severity:  Moderate  Onset quality:  Sudden  Timing:  Constant  Progression:  Worsening  Chronicity:  New  Relieved by:  Nothing  Worsened by:  Sitting upright  Ineffective treatments:  None tried  Associated symptoms: shortness of breath and weakness (Generalized)    Associated symptoms: no headaches        Review of Systems   Constitutional: Positive for diaphoresis.   Respiratory: Positive for shortness of breath.    Genitourinary: Negative for decreased urine volume, difficulty urinating, dysuria, frequency, hematuria and urgency.   Skin: Positive for pallor.   Neurological: Positive for dizziness, weakness (Generalized) and light-headedness. Negative for headaches.   Psychiatric/Behavioral: The patient is nervous/anxious.    All other systems reviewed and are negative.      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 T11-sacrum   • CERVICAL SPINE SURGERY  2000    cage   • CHOLECYSTECTOMY  2014   • COLONOSCOPY  2016   • FOOT SURGERY  2008   • HERNIA REPAIR Bilateral 1978    Inguinal hernia   • HYSTERECTOMY  1992?    1 ovary left   • KNEE SURGERY      2005 and x2 in 8/2017(left)   • NASAL SEPTUM SURGERY  1999       Family History   Problem Relation Age of Onset   • Colon cancer Mother    • Uterine cancer Mother    • Hypertension Mother    • Kidney disease Mother    • Tuberculosis Mother    • Cancer Father         Bladder   • Heart failure Father    • Stroke Father    • Hypertension Father    • Heart attack Father    • Hypertension Brother    • Kidney disease Brother    • Kidney disease Brother    • Hypertension Brother    • Other Daughter         CVID   • Diabetes Daughter        Social History     Social History   • Marital status:      Social History Main Topics   • Smoking status: Never Smoker   • Smokeless tobacco: Never Used   • Alcohol use No   • Drug use: No   • Sexual activity: Defer      Comment:      Other Topics Concern   • Not on file         Objective   Physical Exam   Constitutional: She is oriented to person, place, and time. She appears well-developed and well-nourished. No distress.   Mildly anxious and slightly pale.    HENT:   Head: Normocephalic and atraumatic.   Nose: Nose normal.   Mouth/Throat: Mucous membranes are dry (Mildly).   Eyes: Conjunctivae are normal.  No scleral icterus.   Neck: Normal range of motion. Neck supple.   Cardiovascular: Normal rate, regular rhythm and normal heart sounds.    No murmur heard.  Pulmonary/Chest: Effort normal and breath sounds normal. No respiratory distress.   Abdominal: Soft. Bowel sounds are normal. There is no tenderness.   Neurological: She is alert and oriented to person, place, and time.   Skin: Skin is warm and dry. There is pallor.   Psychiatric: Her behavior is normal. Her mood appears anxious.   Nursing note and vitals reviewed.      Procedures         ED Course  ED Course as of Aug 03 1736   Fri Aug 03, 2018   1503 Discussed results and findings with the pt and family. Discussed DC instructions. All are agreeable with the plan.  [SC]      ED Course User Index  [SC] Papito Delaney     Recent Results (from the past 24 hour(s))   Comprehensive Metabolic Panel    Collection Time: 08/03/18 11:18 AM   Result Value Ref Range    Glucose 114 (H) 70 - 100 mg/dL    BUN 23 9 - 23 mg/dL    Creatinine 0.97 0.60 - 1.30 mg/dL    Sodium 136 132 - 146 mmol/L    Potassium 3.8 3.5 - 5.5 mmol/L    Chloride 97 (L) 99 - 109 mmol/L    CO2 24.0 20.0 - 31.0 mmol/L    Calcium 9.7 8.7 - 10.4 mg/dL    Total Protein 6.8 5.7 - 8.2 g/dL    Albumin 4.54 3.20 - 4.80 g/dL    ALT (SGPT) 33 7 - 40 U/L    AST (SGOT) 34 (H) 0 - 33 U/L    Alkaline Phosphatase 84 25 - 100 U/L    Total Bilirubin 0.6 0.3 - 1.2 mg/dL    eGFR Non African Amer 57 (L) >60 mL/min/1.73    Globulin 2.3 gm/dL    A/G Ratio 2.0 1.5 - 2.5 g/dL    BUN/Creatinine Ratio 23.7 7.0 - 25.0    Anion Gap 15.0 (H) 3.0 - 11.0 mmol/L   Magnesium    Collection Time: 08/03/18 11:18 AM   Result Value Ref Range    Magnesium 1.9 1.3 - 2.7 mg/dL   Light Blue Top    Collection Time: 08/03/18 11:18 AM   Result Value Ref Range    Extra Tube hold for add-on    Green Top (Gel)    Collection Time: 08/03/18 11:18 AM   Result Value Ref Range    Extra Tube Hold for add-ons.    Gold Top - SST    Collection Time:  08/03/18 11:18 AM   Result Value Ref Range    Extra Tube Hold for add-ons.    Lavender Top    Collection Time: 08/03/18 11:19 AM   Result Value Ref Range    Extra Tube hold for add-on    CBC Auto Differential    Collection Time: 08/03/18 11:19 AM   Result Value Ref Range    WBC 7.38 3.50 - 10.80 10*3/mm3    RBC 3.81 (L) 3.89 - 5.14 10*6/mm3    Hemoglobin 12.8 11.5 - 15.5 g/dL    Hematocrit 38.8 34.5 - 44.0 %    .8 (H) 80.0 - 99.0 fL    MCH 33.6 (H) 27.0 - 31.0 pg    MCHC 33.0 32.0 - 36.0 g/dL    RDW 14.2 11.3 - 14.5 %    RDW-SD 52.9 37.0 - 54.0 fl    MPV 10.0 6.0 - 12.0 fL    Platelets 279 150 - 450 10*3/mm3    Neutrophil % 52.3 41.0 - 71.0 %    Lymphocyte % 38.5 24.0 - 44.0 %    Monocyte % 8.1 0.0 - 12.0 %    Eosinophil % 0.8 0.0 - 3.0 %    Basophil % 0.3 0.0 - 1.0 %    Immature Grans % 0.3 0.0 - 0.6 %    Neutrophils, Absolute 3.86 1.50 - 8.30 10*3/mm3    Lymphocytes, Absolute 2.84 0.60 - 4.80 10*3/mm3    Monocytes, Absolute 0.60 0.00 - 1.00 10*3/mm3    Eosinophils, Absolute 0.06 0.00 - 0.30 10*3/mm3    Basophils, Absolute 0.02 0.00 - 0.20 10*3/mm3    Immature Grans, Absolute 0.02 0.00 - 0.03 10*3/mm3   POC Troponin, Rapid    Collection Time: 08/03/18 11:21 AM   Result Value Ref Range    Troponin I 0.00 0.00 - 0.07 ng/mL   Urinalysis With Microscopic If Indicated (No Culture) - Urine, Clean Catch    Collection Time: 08/03/18  1:22 PM   Result Value Ref Range    Color, UA Yellow Yellow, Straw    Appearance, UA Clear Clear    pH, UA 6.5 5.0 - 8.0    Specific Gravity, UA 1.009 1.001 - 1.030    Glucose, UA Negative Negative    Ketones, UA Negative Negative    Bilirubin, UA Negative Negative    Blood, UA Negative Negative    Protein, UA Negative Negative    Leuk Esterase, UA Small (1+) (A) Negative    Nitrite, UA Negative Negative    Urobilinogen, UA 0.2 E.U./dL 0.2 - 1.0 E.U./dL   Urinalysis, Microscopic Only - Urine, Clean Catch    Collection Time: 08/03/18  1:22 PM   Result Value Ref Range    RBC, UA 0-2 None  Seen, 0-2 /HPF    WBC, UA 3-5 (A) None Seen, 0-2 /HPF    Bacteria, UA None Seen None Seen, Trace /HPF    Squamous Epithelial Cells, UA 0-2 None Seen, 0-2 /HPF    Hyaline Casts, UA 0-6 0 - 6 /LPF    Methodology Automated Microscopy    POC Troponin, Rapid    Collection Time: 08/03/18  1:52 PM   Result Value Ref Range    Troponin I 0.00 0.00 - 0.07 ng/mL     Note: In addition to lab results from this visit, the labs listed above may include labs taken at another facility or during a different encounter within the last 24 hours. Please correlate lab times with ED admission and discharge times for further clarification of the services performed during this visit.    MRI Brain Without Contrast   Preliminary Result   Brain appears within normal limits, with age appropriate   atrophy. No evidence of acute infarct or other acute intracranial   disease.       D:  08/03/2018   E:  08/03/2018               XR Chest 1 View    (Results Pending)     Vitals:    08/03/18 1119 08/03/18 1120 08/03/18 1200 08/03/18 1223   BP: 133/86 133/86 122/68    BP Location:       Patient Position:  Standing     Pulse: 80 74 69 70   Resp:       Temp:       TempSrc:       SpO2: 99%  95% 96%   Weight:       Height:         Medications   sodium chloride 0.9 % flush 10 mL (not administered)     ECG/EMG Results (last 24 hours)     Procedure Component Value Units Date/Time    ECG 12 Lead [[id number]] Collected:  08/03/18 1114     Updated:  08/03/18 1114          ECG 12 Lead   Final Result   Test Reason : 2nd set   Blood Pressure : **/** mmHG   Vent. Rate : 075 BPM     Atrial Rate : 075 BPM      P-R Int : 178 ms          QRS Dur : 082 ms       QT Int : 402 ms       P-R-T Axes : 018 020 038 degrees      QTc Int : 448 ms      Normal sinus rhythm   When compared with ECG of 03-AUG-2018 11:14,   No significant change was found   Confirmed by NIR ARGUELLO MD ([id number]) on 8/3/2018 5:02:27 PM      Referred By:  JOS           Confirmed By:NIR ARGUELLO MD       ECG 12 Lead   Final Result   Test Reason : DIZZINESS   Blood Pressure : **/** mmHG   Vent. Rate : 074 BPM     Atrial Rate : 074 BPM      P-R Int : 186 ms          QRS Dur : 084 ms       QT Int : 386 ms       P-R-T Axes : 035 043 038 degrees      QTc Int : 428 ms      Normal sinus rhythm   When compared with ECG of 22-MAR-2018 16:13,   No significant change was found   Confirmed by NIR ARGUELLO MD ([id number]) on 8/3/2018 11:43:46 AM      Referred By:  JOS           Confirmed By:NIR ARGUELLO MD                      [facility]    Final diagnoses:   Dizziness   Shortness of breath       Documentation assistance provided by keri Delaney.  Information recorded by the scribe was done at my direction and has been verified and validated by me.     Papito Delaney  08/03/18 9755       Papito Delaney  08/03/18 5111       Nir Arguello MD  08/03/18 2494

## 2018-08-22 ENCOUNTER — TELEPHONE (OUTPATIENT)
Dept: INTERNAL MEDICINE | Facility: CLINIC | Age: 73
End: 2018-08-22

## 2018-08-22 RX ORDER — PRAVASTATIN SODIUM 40 MG
40 TABLET ORAL DAILY
Qty: 30 TABLET | Refills: 0 | Status: ON HOLD | OUTPATIENT
Start: 2018-08-22 | End: 2019-12-17

## 2018-08-22 RX ORDER — PRAVASTATIN SODIUM 40 MG
40 TABLET ORAL DAILY
Qty: 90 TABLET | Refills: 0 | OUTPATIENT
Start: 2018-08-22

## 2018-08-24 ENCOUNTER — OFFICE VISIT (OUTPATIENT)
Dept: INTERNAL MEDICINE | Facility: CLINIC | Age: 73
End: 2018-08-24

## 2018-08-24 VITALS
WEIGHT: 168 LBS | RESPIRATION RATE: 16 BRPM | DIASTOLIC BLOOD PRESSURE: 78 MMHG | HEIGHT: 64 IN | TEMPERATURE: 97.9 F | HEART RATE: 79 BPM | BODY MASS INDEX: 28.68 KG/M2 | SYSTOLIC BLOOD PRESSURE: 118 MMHG | OXYGEN SATURATION: 99 %

## 2018-08-24 DIAGNOSIS — R53.83 FATIGUE, UNSPECIFIED TYPE: ICD-10-CM

## 2018-08-24 DIAGNOSIS — Z20.828 EXPOSURE TO MONONUCLEOSIS SYNDROME: ICD-10-CM

## 2018-08-24 DIAGNOSIS — E55.9 VITAMIN D DEFICIENCY: ICD-10-CM

## 2018-08-24 DIAGNOSIS — R53.1 WEAKNESS: Primary | ICD-10-CM

## 2018-08-24 LAB
25(OH)D3 SERPL-MCNC: 37.7 NG/ML
ALBUMIN SERPL-MCNC: 4.72 G/DL (ref 3.2–4.8)
ALBUMIN/GLOB SERPL: 2.1 G/DL (ref 1.5–2.5)
ALP SERPL-CCNC: 86 U/L (ref 25–100)
ALT SERPL W P-5'-P-CCNC: 31 U/L (ref 7–40)
ANION GAP SERPL CALCULATED.3IONS-SCNC: 13 MMOL/L (ref 3–11)
AST SERPL-CCNC: 28 U/L (ref 0–33)
BASOPHILS # BLD AUTO: 0.02 10*3/MM3 (ref 0–0.2)
BASOPHILS NFR BLD AUTO: 0.3 % (ref 0–1)
BILIRUB SERPL-MCNC: 0.5 MG/DL (ref 0.3–1.2)
BUN BLD-MCNC: 21 MG/DL (ref 9–23)
BUN/CREAT SERPL: 25.3 (ref 7–25)
CALCIUM SPEC-SCNC: 10.2 MG/DL (ref 8.7–10.4)
CHLORIDE SERPL-SCNC: 99 MMOL/L (ref 99–109)
CO2 SERPL-SCNC: 25 MMOL/L (ref 20–31)
CREAT BLD-MCNC: 0.83 MG/DL (ref 0.6–1.3)
DEPRECATED RDW RBC AUTO: 53.8 FL (ref 37–54)
EOSINOPHIL # BLD AUTO: 0.04 10*3/MM3 (ref 0–0.3)
EOSINOPHIL NFR BLD AUTO: 0.5 % (ref 0–3)
ERYTHROCYTE [DISTWIDTH] IN BLOOD BY AUTOMATED COUNT: 14.2 % (ref 11.3–14.5)
GFR SERPL CREATININE-BSD FRML MDRD: 68 ML/MIN/1.73
GLOBULIN UR ELPH-MCNC: 2.3 GM/DL
GLUCOSE BLD-MCNC: 93 MG/DL (ref 70–100)
HCT VFR BLD AUTO: 41.8 % (ref 34.5–44)
HGB BLD-MCNC: 13.5 G/DL (ref 11.5–15.5)
IMM GRANULOCYTES # BLD: 0.01 10*3/MM3 (ref 0–0.03)
IMM GRANULOCYTES NFR BLD: 0.1 % (ref 0–0.6)
LYMPHOCYTES # BLD AUTO: 1.42 10*3/MM3 (ref 0.6–4.8)
LYMPHOCYTES NFR BLD AUTO: 19.1 % (ref 24–44)
MCH RBC QN AUTO: 33.3 PG (ref 27–31)
MCHC RBC AUTO-ENTMCNC: 32.3 G/DL (ref 32–36)
MCV RBC AUTO: 103.2 FL (ref 80–99)
MONOCYTES # BLD AUTO: 0.68 10*3/MM3 (ref 0–1)
MONOCYTES NFR BLD AUTO: 9.2 % (ref 0–12)
NEUTROPHILS # BLD AUTO: 5.27 10*3/MM3 (ref 1.5–8.3)
NEUTROPHILS NFR BLD AUTO: 70.9 % (ref 41–71)
PLATELET # BLD AUTO: 290 10*3/MM3 (ref 150–450)
PMV BLD AUTO: 10.2 FL (ref 6–12)
POTASSIUM BLD-SCNC: 4.3 MMOL/L (ref 3.5–5.5)
PROT SERPL-MCNC: 7 G/DL (ref 5.7–8.2)
RBC # BLD AUTO: 4.05 10*6/MM3 (ref 3.89–5.14)
SODIUM BLD-SCNC: 137 MMOL/L (ref 132–146)
T4 FREE SERPL-MCNC: 1.24 NG/DL (ref 0.89–1.76)
TSH SERPL DL<=0.05 MIU/L-ACNC: 0.5 MIU/ML (ref 0.35–5.35)
VIT B12 BLD-MCNC: 415 PG/ML (ref 211–911)
WBC NRBC COR # BLD: 7.43 10*3/MM3 (ref 3.5–10.8)

## 2018-08-24 PROCEDURE — 86665 EPSTEIN-BARR CAPSID VCA: CPT | Performed by: NURSE PRACTITIONER

## 2018-08-24 PROCEDURE — 82306 VITAMIN D 25 HYDROXY: CPT | Performed by: NURSE PRACTITIONER

## 2018-08-24 PROCEDURE — 84443 ASSAY THYROID STIM HORMONE: CPT | Performed by: NURSE PRACTITIONER

## 2018-08-24 PROCEDURE — 80053 COMPREHEN METABOLIC PANEL: CPT | Performed by: NURSE PRACTITIONER

## 2018-08-24 PROCEDURE — 86664 EPSTEIN-BARR NUCLEAR ANTIGEN: CPT | Performed by: NURSE PRACTITIONER

## 2018-08-24 PROCEDURE — 85025 COMPLETE CBC W/AUTO DIFF WBC: CPT | Performed by: NURSE PRACTITIONER

## 2018-08-24 PROCEDURE — 86663 EPSTEIN-BARR ANTIBODY: CPT | Performed by: NURSE PRACTITIONER

## 2018-08-24 PROCEDURE — 99214 OFFICE O/P EST MOD 30 MIN: CPT | Performed by: NURSE PRACTITIONER

## 2018-08-24 PROCEDURE — 82607 VITAMIN B-12: CPT | Performed by: NURSE PRACTITIONER

## 2018-08-24 PROCEDURE — 84439 ASSAY OF FREE THYROXINE: CPT | Performed by: NURSE PRACTITIONER

## 2018-08-24 NOTE — PROGRESS NOTES
Subjective   Joyce Jones is a 72 y.o. female    Chief Complaint   Patient presents with   • Weakness and hot flashes     Seems to happen more in the morning.      History of Present Illness     Pt presents with c/o weakness that comes up through her shoulders and heat that comes up into her face that is associated with extreme weakness.  These sx's are now happening daily and are progressively in intensity and frequency.  Denies CP or SOA, but does have associated palpitations.  She has called and made an appt with her cardiologist and will be seeing them in 3 days.  She has been caring for her daughter recently, who has mono.       The following portions of the patient's history were reviewed and updated as appropriate: allergies, current medications, past family history, past medical history, past social history, past surgical history and problem list.    Current Outpatient Prescriptions:   •  aspirin 81 MG chewable tablet, Chew 81 mg Daily., Disp: , Rfl:   •  esomeprazole (nexIUM) 40 MG capsule, Take 1 capsule by mouth Every Morning Before Breakfast., Disp: 90 capsule, Rfl: 1  •  ibuprofen (ADVIL,MOTRIN) 200 MG tablet, Take 200 mg by mouth Every 6 (Six) Hours As Needed for Mild Pain ., Disp: , Rfl:   •  losartan (COZAAR) 50 MG tablet, Take 1 tablet by mouth Daily., Disp: 90 tablet, Rfl: 1  •  Omega-3 Fatty Acids (FISH OIL) 1000 MG capsule capsule, Take  by mouth Daily With Breakfast., Disp: , Rfl:   •  pravastatin (PRAVACHOL) 40 MG tablet, Take 1 tablet by mouth Daily., Disp: 30 tablet, Rfl: 0  •  traMADol-acetaminophen (ULTRACET) 37.5-325 MG per tablet, Take 2 tablets by mouth Every 6 (Six) Hours As Needed., Disp: , Rfl:      Review of Systems   Constitutional: Positive for fatigue. Negative for chills and fever.   Respiratory: Negative for cough, chest tightness and shortness of breath.    Cardiovascular: Positive for palpitations. Negative for chest pain.   Gastrointestinal: Negative for abdominal pain,  "diarrhea, nausea and vomiting.   Endocrine: Negative for cold intolerance and heat intolerance.   Musculoskeletal: Negative for arthralgias.   Neurological: Positive for weakness. Negative for dizziness.       Objective   Physical Exam   Constitutional: She is oriented to person, place, and time. She appears well-developed and well-nourished.   HENT:   Head: Normocephalic and atraumatic.   Eyes: Pupils are equal, round, and reactive to light. Conjunctivae and EOM are normal.   Neck: Normal range of motion.   Cardiovascular: Normal rate, regular rhythm and normal heart sounds.    Pulmonary/Chest: Effort normal and breath sounds normal.   Abdominal: Soft. Bowel sounds are normal.   Musculoskeletal: Normal range of motion.   Neurological: She is alert and oriented to person, place, and time. She has normal reflexes.   Skin: Skin is warm and dry.   Psychiatric: She has a normal mood and affect. Her behavior is normal. Judgment and thought content normal.     Vitals:    08/24/18 1144   BP: 118/78   Pulse: 79   Resp: 16   Temp: 97.9 °F (36.6 °C)   TempSrc: Temporal Artery    SpO2: 99%   Weight: 76.2 kg (168 lb)   Height: 162.6 cm (64.02\")         Assessment/Plan   Joyce was seen today for weakness and hot flashes.    Diagnoses and all orders for this visit:    Weakness  -     CBC & Differential  -     Comprehensive Metabolic Panel  -     TSH  -     T4, Free  -     Vitamin B12  -     Vitamin D 25 Hydroxy  -     Mac-Barr Virus VCA Antibody Panel  -     CBC Auto Differential    Fatigue, unspecified type  -     CBC & Differential  -     Comprehensive Metabolic Panel  -     TSH  -     T4, Free  -     Vitamin B12  -     Vitamin D 25 Hydroxy  -     Mac-Barr Virus VCA Antibody Panel  -     CBC Auto Differential    Exposure to mononucleosis syndrome  -     CBC & Differential  -     Comprehensive Metabolic Panel  -     TSH  -     T4, Free  -     Vitamin B12  -     Vitamin D 25 Hydroxy  -     Mac-Barr Virus VCA " Antibody Panel  -     CBC Auto Differential    Vitamin D deficiency   -     Vitamin D 25 Hydroxy      We will start with labs  Joyce has an upcoming appt with her Cardiologist  We will call with results  May consider neurology f/u as well

## 2018-08-25 LAB
EBV EA IGG SER-ACNC: <9 U/ML (ref 0–8.9)
EBV NA IGG SER IA-ACNC: >600 U/ML (ref 0–17.9)
EBV VCA IGG SER-ACNC: >600 U/ML (ref 0–17.9)
EBV VCA IGM SER-ACNC: <36 U/ML (ref 0–35.9)
INTERPRETATION: ABNORMAL

## 2018-08-28 ENCOUNTER — TELEPHONE (OUTPATIENT)
Dept: INTERNAL MEDICINE | Facility: CLINIC | Age: 73
End: 2018-08-28

## 2018-08-28 NOTE — TELEPHONE ENCOUNTER
----- Message from LOVE Manzano sent at 8/28/2018  1:22 PM EDT -----  Labs show a h/o a previous mono infection, but nothing acute.      B12 is slightly low.  I recommend OTC B12, 1000 mcg daily.      All other labs are within acceptable limits.

## 2019-04-16 ENCOUNTER — TELEPHONE (OUTPATIENT)
Dept: INTERNAL MEDICINE | Facility: CLINIC | Age: 74
End: 2019-04-16

## 2019-04-16 NOTE — TELEPHONE ENCOUNTER
Called to attempt to set up AWV. Williams states not in and requested I call back tomorrow as she will be gone most of day.

## 2019-07-01 ENCOUNTER — HOSPITAL ENCOUNTER (EMERGENCY)
Facility: HOSPITAL | Age: 74
Discharge: HOME | End: 2019-07-02
Attending: EMERGENCY MEDICINE | Admitting: EMERGENCY MEDICINE

## 2019-07-01 DIAGNOSIS — K57.92 DIVERTICULITIS: Primary | ICD-10-CM

## 2019-07-01 LAB
ALBUMIN SERPL-MCNC: 4.1 G/DL (ref 3.5–5.2)
ALBUMIN/GLOB SERPL: 1.3 G/DL
ALP SERPL-CCNC: 87 U/L (ref 39–117)
ALT SERPL W P-5'-P-CCNC: 12 U/L (ref 1–33)
ANION GAP SERPL CALCULATED.3IONS-SCNC: 16 MMOL/L (ref 5–15)
AST SERPL-CCNC: 16 U/L (ref 1–32)
BASOPHILS # BLD AUTO: 0.05 10*3/MM3 (ref 0–0.2)
BASOPHILS NFR BLD AUTO: 0.5 % (ref 0–1.5)
BILIRUB SERPL-MCNC: 0.4 MG/DL (ref 0.2–1.2)
BUN BLD-MCNC: 11 MG/DL (ref 8–23)
BUN/CREAT SERPL: 12.8 (ref 7–25)
CALCIUM SPEC-SCNC: 10 MG/DL (ref 8.6–10.5)
CHLORIDE SERPL-SCNC: 98 MMOL/L (ref 98–107)
CO2 SERPL-SCNC: 23 MMOL/L (ref 22–29)
CREAT BLD-MCNC: 0.86 MG/DL (ref 0.57–1)
DEPRECATED RDW RBC AUTO: 50 FL (ref 37–54)
EOSINOPHIL # BLD AUTO: 0.09 10*3/MM3 (ref 0–0.4)
EOSINOPHIL NFR BLD AUTO: 0.9 % (ref 0.3–6.2)
ERYTHROCYTE [DISTWIDTH] IN BLOOD BY AUTOMATED COUNT: 13.8 % (ref 12.3–15.4)
GFR SERPL CREATININE-BSD FRML MDRD: 65 ML/MIN/1.73
GLOBULIN UR ELPH-MCNC: 3.2 GM/DL
GLUCOSE BLD-MCNC: 126 MG/DL (ref 65–99)
HCT VFR BLD AUTO: 35.8 % (ref 34–46.6)
HGB BLD-MCNC: 11.3 G/DL (ref 12–15.9)
HOLD SPECIMEN: NORMAL
HOLD SPECIMEN: NORMAL
IMM GRANULOCYTES # BLD AUTO: 0.03 10*3/MM3 (ref 0–0.05)
IMM GRANULOCYTES NFR BLD AUTO: 0.3 % (ref 0–0.5)
LIPASE SERPL-CCNC: 33 U/L (ref 13–60)
LYMPHOCYTES # BLD AUTO: 1.74 10*3/MM3 (ref 0.7–3.1)
LYMPHOCYTES NFR BLD AUTO: 16.5 % (ref 19.6–45.3)
MCH RBC QN AUTO: 31.5 PG (ref 26.6–33)
MCHC RBC AUTO-ENTMCNC: 31.6 G/DL (ref 31.5–35.7)
MCV RBC AUTO: 99.7 FL (ref 79–97)
MONOCYTES # BLD AUTO: 1.06 10*3/MM3 (ref 0.1–0.9)
MONOCYTES NFR BLD AUTO: 10 % (ref 5–12)
NEUTROPHILS # BLD AUTO: 7.58 10*3/MM3 (ref 1.7–7)
NEUTROPHILS NFR BLD AUTO: 71.8 % (ref 42.7–76)
NRBC BLD AUTO-RTO: 0 /100 WBC (ref 0–0.2)
PLATELET # BLD AUTO: 322 10*3/MM3 (ref 140–450)
PMV BLD AUTO: 9.7 FL (ref 6–12)
POTASSIUM BLD-SCNC: 3.9 MMOL/L (ref 3.5–5.2)
PROT SERPL-MCNC: 7.3 G/DL (ref 6–8.5)
RBC # BLD AUTO: 3.59 10*6/MM3 (ref 3.77–5.28)
SODIUM BLD-SCNC: 137 MMOL/L (ref 136–145)
WBC NRBC COR # BLD: 10.55 10*3/MM3 (ref 3.4–10.8)
WHOLE BLOOD HOLD SPECIMEN: NORMAL
WHOLE BLOOD HOLD SPECIMEN: NORMAL

## 2019-07-01 PROCEDURE — 81003 URINALYSIS AUTO W/O SCOPE: CPT | Performed by: EMERGENCY MEDICINE

## 2019-07-01 PROCEDURE — 99284 EMERGENCY DEPT VISIT MOD MDM: CPT

## 2019-07-01 PROCEDURE — 83690 ASSAY OF LIPASE: CPT

## 2019-07-01 PROCEDURE — 85025 COMPLETE CBC W/AUTO DIFF WBC: CPT

## 2019-07-01 PROCEDURE — 80053 COMPREHEN METABOLIC PANEL: CPT

## 2019-07-01 RX ORDER — MORPHINE SULFATE 4 MG/ML
4 INJECTION, SOLUTION INTRAMUSCULAR; INTRAVENOUS ONCE
Status: COMPLETED | OUTPATIENT
Start: 2019-07-01 | End: 2019-07-02

## 2019-07-01 RX ORDER — ONDANSETRON 2 MG/ML
4 INJECTION INTRAMUSCULAR; INTRAVENOUS ONCE
Status: DISCONTINUED | OUTPATIENT
Start: 2019-07-01 | End: 2019-07-02 | Stop reason: HOSPADM

## 2019-07-01 RX ORDER — SODIUM CHLORIDE 0.9 % (FLUSH) 0.9 %
10 SYRINGE (ML) INJECTION AS NEEDED
Status: DISCONTINUED | OUTPATIENT
Start: 2019-07-01 | End: 2019-07-02 | Stop reason: HOSPADM

## 2019-07-02 ENCOUNTER — APPOINTMENT (OUTPATIENT)
Dept: CT IMAGING | Facility: HOSPITAL | Age: 74
End: 2019-07-02

## 2019-07-02 VITALS
DIASTOLIC BLOOD PRESSURE: 68 MMHG | BODY MASS INDEX: 28.32 KG/M2 | HEIGHT: 65 IN | SYSTOLIC BLOOD PRESSURE: 130 MMHG | OXYGEN SATURATION: 98 % | WEIGHT: 170 LBS | HEART RATE: 73 BPM | TEMPERATURE: 98.5 F | RESPIRATION RATE: 18 BRPM

## 2019-07-02 LAB
BILIRUB UR QL STRIP: NEGATIVE
CLARITY UR: CLEAR
COLOR UR: YELLOW
GLUCOSE UR STRIP-MCNC: NEGATIVE MG/DL
HGB UR QL STRIP.AUTO: NEGATIVE
KETONES UR QL STRIP: NEGATIVE
LEUKOCYTE ESTERASE UR QL STRIP.AUTO: NEGATIVE
NITRITE UR QL STRIP: NEGATIVE
PH UR STRIP.AUTO: 6.5 [PH] (ref 5–8)
PROT UR QL STRIP: NEGATIVE
SP GR UR STRIP: 1.01 (ref 1–1.03)
UROBILINOGEN UR QL STRIP: NORMAL

## 2019-07-02 PROCEDURE — 96374 THER/PROPH/DIAG INJ IV PUSH: CPT

## 2019-07-02 PROCEDURE — 25010000002 IOPAMIDOL 61 % SOLUTION: Performed by: EMERGENCY MEDICINE

## 2019-07-02 PROCEDURE — 25010000002 MORPHINE PER 10 MG: Performed by: EMERGENCY MEDICINE

## 2019-07-02 PROCEDURE — 74177 CT ABD & PELVIS W/CONTRAST: CPT

## 2019-07-02 RX ORDER — LEVOFLOXACIN 750 MG/1
750 TABLET ORAL ONCE
Status: COMPLETED | OUTPATIENT
Start: 2019-07-02 | End: 2019-07-02

## 2019-07-02 RX ORDER — METRONIDAZOLE 500 MG/1
500 TABLET ORAL ONCE
Status: COMPLETED | OUTPATIENT
Start: 2019-07-02 | End: 2019-07-02

## 2019-07-02 RX ORDER — CIPROFLOXACIN 500 MG/1
500 TABLET, FILM COATED ORAL EVERY 12 HOURS
Qty: 20 TABLET | Refills: 0 | Status: SHIPPED | OUTPATIENT
Start: 2019-07-02 | End: 2019-09-12 | Stop reason: SDUPTHER

## 2019-07-02 RX ORDER — METRONIDAZOLE 500 MG/1
500 TABLET ORAL 3 TIMES DAILY
Qty: 30 TABLET | Refills: 0 | Status: SHIPPED | OUTPATIENT
Start: 2019-07-02 | End: 2019-09-12 | Stop reason: SDUPTHER

## 2019-07-02 RX ADMIN — METRONIDAZOLE 500 MG: 500 TABLET ORAL at 02:11

## 2019-07-02 RX ADMIN — IOPAMIDOL 100 ML: 612 INJECTION, SOLUTION INTRAVENOUS at 00:32

## 2019-07-02 RX ADMIN — LEVOFLOXACIN 750 MG: 750 TABLET, FILM COATED ORAL at 02:09

## 2019-07-02 RX ADMIN — MORPHINE SULFATE 4 MG: 4 INJECTION INTRAVENOUS at 00:08

## 2019-07-02 RX ADMIN — SODIUM CHLORIDE 1000 ML: 9 INJECTION, SOLUTION INTRAVENOUS at 00:08

## 2019-07-02 NOTE — ED PROVIDER NOTES
Subjective   Ms. Joyce Jones is a 73 y.o. female who presents to the ED with complaints of abdominal pain. She reports that for the past few weeks she has been having nausea and left lower abdomina pain, which prompted presentation to the ED. She describes the pain as an achy sharp sensation that is currently a 6/10. She also complains of a fever today but she denies any vomiting, diarrhea, constipation, dysuria, frequency, and urgency. She has a history of HTN, HLD, DVT, diverticulitis, hernia repair, cholecystectomy, and RA. No other acute complaints at this time.         History provided by:  Patient  Abdominal Pain   Pain location:  LLQ  Pain quality: aching and sharp    Pain severity:  Moderate  Chronicity:  New  Associated symptoms: fever and nausea    Associated symptoms: no constipation, no diarrhea, no dysuria and no vomiting        Review of Systems   Constitutional: Positive for fever.   Gastrointestinal: Positive for abdominal pain and nausea. Negative for constipation, diarrhea and vomiting.   Genitourinary: Negative for dysuria, frequency and urgency.   All other systems reviewed and are negative.      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 T11-sacrum   • CERVICAL SPINE SURGERY  2000    cage   • CHOLECYSTECTOMY  2014   • COLONOSCOPY   2016   • FOOT SURGERY  2008   • HERNIA REPAIR Bilateral 1978    Inguinal hernia   • HYSTERECTOMY  1992?    1 ovary left   • KNEE SURGERY      2005 and x2 in 8/2017(left)   • NASAL SEPTUM SURGERY  1999       Family History   Problem Relation Age of Onset   • Colon cancer Mother    • Uterine cancer Mother    • Hypertension Mother    • Kidney disease Mother    • Tuberculosis Mother    • Cancer Father         Bladder   • Heart failure Father    • Stroke Father    • Hypertension Father    • Heart attack Father    • Hypertension Brother    • Kidney disease Brother    • Kidney disease Brother    • Hypertension Brother    • Other Daughter         CVID   • Diabetes Daughter        Social History     Socioeconomic History   • Marital status:      Spouse name: Not on file   • Number of children: Not on file   • Years of education: Not on file   • Highest education level: Not on file   Tobacco Use   • Smoking status: Never Smoker   • Smokeless tobacco: Never Used   Substance and Sexual Activity   • Alcohol use: No   • Drug use: No   • Sexual activity: Defer     Comment:          Objective   Physical Exam   Constitutional: She is oriented to person, place, and time. She appears well-developed and well-nourished. She is cooperative.  Non-toxic appearance. She does not appear ill.   HENT:   Head: Normocephalic and atraumatic.   Mouth/Throat: Oropharynx is clear and moist.   Eyes: Conjunctivae, EOM and lids are normal. Pupils are equal, round, and reactive to light.   Neck: Trachea normal, normal range of motion and full passive range of motion without pain.   Cardiovascular: Regular rhythm, normal heart sounds, intact distal pulses and normal pulses.   Pulmonary/Chest: Effort normal and breath sounds normal. No respiratory distress. She has no decreased breath sounds. She has no wheezes. She has no rhonchi. She has no rales.   Abdominal: Soft. Normal appearance and bowel sounds are normal. There is tenderness in the  left upper quadrant and left lower quadrant.   Musculoskeletal: Normal range of motion.   Neurological: She is alert and oriented to person, place, and time. She has normal strength. No cranial nerve deficit.   Skin: Skin is warm, dry and intact. No rash noted.   Psychiatric: She has a normal mood and affect. Her speech is normal and behavior is normal.   Nursing note and vitals reviewed.      Procedures         ED Course  ED Course as of Jul 02 0529   Tue Jul 02, 2019   0139 Patient is advised the results at this time.  She will be discharged home.  Patient to follow-up with her PCP.  Patient to take meds as ordered.  Patient agrees and verbalized understanding.  [KG]      ED Course User Index  [KG] Kelsy Reyes APRN       Recent Results (from the past 24 hour(s))   Comprehensive Metabolic Panel    Collection Time: 07/01/19  8:32 PM   Result Value Ref Range    Glucose 126 (H) 65 - 99 mg/dL    BUN 11 8 - 23 mg/dL    Creatinine 0.86 0.57 - 1.00 mg/dL    Sodium 137 136 - 145 mmol/L    Potassium 3.9 3.5 - 5.2 mmol/L    Chloride 98 98 - 107 mmol/L    CO2 23.0 22.0 - 29.0 mmol/L    Calcium 10.0 8.6 - 10.5 mg/dL    Total Protein 7.3 6.0 - 8.5 g/dL    Albumin 4.10 3.50 - 5.20 g/dL    ALT (SGPT) 12 1 - 33 U/L    AST (SGOT) 16 1 - 32 U/L    Alkaline Phosphatase 87 39 - 117 U/L    Total Bilirubin 0.4 0.2 - 1.2 mg/dL    eGFR Non African Amer 65 >60 mL/min/1.73    Globulin 3.2 gm/dL    A/G Ratio 1.3 g/dL    BUN/Creatinine Ratio 12.8 7.0 - 25.0    Anion Gap 16.0 (H) 5.0 - 15.0 mmol/L   Lipase    Collection Time: 07/01/19  8:32 PM   Result Value Ref Range    Lipase 33 13 - 60 U/L   Light Blue Top    Collection Time: 07/01/19  8:32 PM   Result Value Ref Range    Extra Tube hold for add-on    Green Top (Gel)    Collection Time: 07/01/19  8:32 PM   Result Value Ref Range    Extra Tube Hold for add-ons.    Lavender Top    Collection Time: 07/01/19  8:32 PM   Result Value Ref Range    Extra Tube hold for add-on    Gold Top -  SST    Collection Time: 07/01/19  8:32 PM   Result Value Ref Range    Extra Tube Hold for add-ons.    CBC Auto Differential    Collection Time: 07/01/19  8:32 PM   Result Value Ref Range    WBC 10.55 3.40 - 10.80 10*3/mm3    RBC 3.59 (L) 3.77 - 5.28 10*6/mm3    Hemoglobin 11.3 (L) 12.0 - 15.9 g/dL    Hematocrit 35.8 34.0 - 46.6 %    MCV 99.7 (H) 79.0 - 97.0 fL    MCH 31.5 26.6 - 33.0 pg    MCHC 31.6 31.5 - 35.7 g/dL    RDW 13.8 12.3 - 15.4 %    RDW-SD 50.0 37.0 - 54.0 fl    MPV 9.7 6.0 - 12.0 fL    Platelets 322 140 - 450 10*3/mm3    Neutrophil % 71.8 42.7 - 76.0 %    Lymphocyte % 16.5 (L) 19.6 - 45.3 %    Monocyte % 10.0 5.0 - 12.0 %    Eosinophil % 0.9 0.3 - 6.2 %    Basophil % 0.5 0.0 - 1.5 %    Immature Grans % 0.3 0.0 - 0.5 %    Neutrophils, Absolute 7.58 (H) 1.70 - 7.00 10*3/mm3    Lymphocytes, Absolute 1.74 0.70 - 3.10 10*3/mm3    Monocytes, Absolute 1.06 (H) 0.10 - 0.90 10*3/mm3    Eosinophils, Absolute 0.09 0.00 - 0.40 10*3/mm3    Basophils, Absolute 0.05 0.00 - 0.20 10*3/mm3    Immature Grans, Absolute 0.03 0.00 - 0.05 10*3/mm3    nRBC 0.0 0.0 - 0.2 /100 WBC   Urinalysis With Microscopic If Indicated (No Culture) - Urine, Clean Catch    Collection Time: 07/01/19 11:52 PM   Result Value Ref Range    Color, UA Yellow Yellow, Straw    Appearance, UA Clear Clear    pH, UA 6.5 5.0 - 8.0    Specific Gravity, UA 1.007 1.001 - 1.030    Glucose, UA Negative Negative    Ketones, UA Negative Negative    Bilirubin, UA Negative Negative    Blood, UA Negative Negative    Protein, UA Negative Negative    Leuk Esterase, UA Negative Negative    Nitrite, UA Negative Negative    Urobilinogen, UA 0.2 E.U./dL 0.2 - 1.0 E.U./dL     Note: In addition to lab results from this visit, the labs listed above may include labs taken at another facility or during a different encounter within the last 24 hours. Please correlate lab times with ED admission and discharge times for further clarification of the services performed during this  visit.    CT Abdomen Pelvis With Contrast   Final Result   Extensive colonic diverticulosis with focal inflammatory changes involving the inferior aspect of the left descending colon over about a 6 cm length with findings compatible acute diverticulitis. No perforation or abscess.               Signer Name: BART Cooper MD    Signed: 7/2/2019 12:58 AM    Workstation Name: [id number]            Vitals:    07/02/19 0100 07/02/19 0128 07/02/19 0130 07/02/19 0217   BP: 125/68  130/68    BP Location:       Patient Position:       Pulse:       Resp:       Temp:       TempSrc:       SpO2: (!) 72% 96% 97% 98%   Weight:       Height:         Medications   sodium chloride 0.9 % bolus 1,000 mL (0 mL Intravenous Stopped 7/2/19 0100)   Morphine sulfate (PF) injection 4 mg (4 mg Intravenous Given 7/2/19 0008)   iopamidol (ISOVUE-300) 61 % injection 100 mL (100 mL Intravenous Given 7/2/19 0032)   levoFLOXacin (LEVAQUIN) tablet 750 mg (750 mg Oral Given 7/2/19 0209)   metroNIDAZOLE (FLAGYL) tablet 500 mg (500 mg Oral Given 7/2/19 0211)     ECG/EMG Results (last 24 hours)     ** No results found for the last 24 hours. **        No orders to display                     MDM    Final diagnoses:   Diverticulitis       Documentation assistance provided by keri Garcia.  Information recorded by the keri was done at my direction and has been verified and validated by me.     Li Garcia  07/01/19 3118       Kelsy Reyes APRN  07/02/19 1778

## 2019-07-10 ENCOUNTER — OFFICE VISIT (OUTPATIENT)
Dept: GASTROENTEROLOGY | Facility: CLINIC | Age: 74
End: 2019-07-10

## 2019-07-10 VITALS
HEIGHT: 65 IN | HEART RATE: 80 BPM | WEIGHT: 169.2 LBS | BODY MASS INDEX: 28.19 KG/M2 | SYSTOLIC BLOOD PRESSURE: 159 MMHG | DIASTOLIC BLOOD PRESSURE: 79 MMHG

## 2019-07-10 DIAGNOSIS — K57.92 DIVERTICULITIS: Primary | ICD-10-CM

## 2019-07-10 PROCEDURE — 99214 OFFICE O/P EST MOD 30 MIN: CPT | Performed by: NURSE PRACTITIONER

## 2019-07-10 RX ORDER — PROMETHAZINE HYDROCHLORIDE 25 MG/1
25 TABLET ORAL EVERY 6 HOURS PRN
Qty: 12 TABLET | Refills: 0 | Status: SHIPPED | OUTPATIENT
Start: 2019-07-10 | End: 2019-09-12 | Stop reason: SDUPTHER

## 2019-07-10 NOTE — PROGRESS NOTES
GASTROENTEROLOGY OFFICE NOTE  Joyce Jones  [id number]  1945    CARE TEAM  Patient Care Team:  Idalia Bone MD as PCP - General (Internal Medicine)  Zeeshan Shaw MD as Consulting Physician (Neurosurgery)  Hu Dewey MD as Consulting Physician (Pain Medicine)    Referring Provider: Idalia Bone MD    Chief Complaint   Patient presents with   • Follow-up     seen in er for diverticulitis         HISTORY OF PRESENT ILLNESS:  Ms. Jones is a 73-year-old female who presents today with diverticulitis, diagnosed on 7/2 at the emergency department.  She is being treated by a 10-day regimen of Cipro+ Flagyl.  She has been having some trouble with the Flagyl giving her nausea.  She had some Phenergan at home from a previous illness that she has taken from time to time with the Flagyl however, there have been a few occasions that she has vomited her dosage.  She is feeling well today, denies fevers, abdominal pain, change in bowel habits.    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 T11-sacrum   • CERVICAL SPINE SURGERY  2000    cage   • CHOLECYSTECTOMY  2014   • COLONOSCOPY  2016   • FOOT SURGERY  2008   • HERNIA REPAIR Bilateral 1978    Inguinal hernia   • HYSTERECTOMY  1992?    1 ovary left   • KNEE SURGERY      2005 and x2 in 8/2017(left)   • NASAL SEPTUM SURGERY  1999        MEDICATIONS:    Current Outpatient Medications:   •   aspirin 81 MG chewable tablet, Chew 81 mg Daily., Disp: , Rfl:   •  ciprofloxacin (CIPRO) 500 MG tablet, Take 1 tablet by mouth Every 12 (Twelve) Hours., Disp: 20 tablet, Rfl: 0  •  ibuprofen (ADVIL,MOTRIN) 200 MG tablet, Take 200 mg by mouth Every 6 (Six) Hours As Needed for Mild Pain ., Disp: , Rfl:   •  losartan (COZAAR) 50 MG tablet, Take 1 tablet by mouth Daily., Disp: 90 tablet, Rfl: 1  •  metroNIDAZOLE (FLAGYL) 500 MG tablet, Take 1 tablet by mouth 3 (Three) Times a Day., Disp: 30 tablet, Rfl: 0  •  Omega-3 Fatty Acids (FISH OIL) 1000 MG capsule capsule, Take  by mouth Daily With Breakfast., Disp: , Rfl:   •  pravastatin (PRAVACHOL) 40 MG tablet, Take 1 tablet by mouth Daily., Disp: 30 tablet, Rfl: 0  •  promethazine (PHENERGAN) 25 MG tablet, Take 1 tablet by mouth Every 6 (Six) Hours As Needed for Nausea or Vomiting., Disp: 12 tablet, Rfl: 0  •  traMADol-acetaminophen (ULTRACET) 37.5-325 MG per tablet, Take 2 tablets by mouth Every 6 (Six) Hours As Needed., Disp: , Rfl:     ALLERGIES  Allergies   Allergen Reactions   • Overton-2 Inhibitors Anaphylaxis   • Nsaids GI Intolerance   • Augmentin [Amoxicillin-Pot Clavulanate] Nausea Only       FAMILY HISTORY:  Family History   Problem Relation Age of Onset   • Colon cancer Mother    • Uterine cancer Mother    • Hypertension Mother    • Kidney disease Mother    • Tuberculosis Mother    • Cancer Father         Bladder   • Heart failure Father    • Stroke Father    • Hypertension Father    • Heart attack Father    • Hypertension Brother    • Kidney disease Brother    • Kidney disease Brother    • Hypertension Brother    • Other Daughter         CVID   • Diabetes Daughter        SOCIAL HISTORY  Social History     Socioeconomic History   • Marital status:      Spouse name: Not on file   • Number of children: Not on file   • Years of education: Not on file   • Highest education level: Not on file   Tobacco Use   • Smoking status: Never Smoker   • Smokeless tobacco:  "Never Used   Substance and Sexual Activity   • Alcohol use: No   • Drug use: No   • Sexual activity: Defer     Comment:        REVIEW OF SYSTEMS  Review of Systems   Constitutional: Negative.    HENT: Negative.    Eyes: Negative.    Respiratory: Negative.    Cardiovascular: Negative.    Gastrointestinal: Negative.    Genitourinary: Negative.    Musculoskeletal: Negative.    Skin: Negative.    Neurological: Negative.    Psychiatric/Behavioral: Negative.          PHYSICAL EXAM   /79   Pulse 80   Ht 165.1 cm (65\")   Wt 76.7 kg (169 lb 3.2 oz)   BMI 28.16 kg/m²   Physical Exam   Constitutional: She is oriented to person, place, and time. She appears well-developed and well-nourished.   HENT:   Head: Normocephalic.   Mouth/Throat: Oropharynx is clear and moist.   Eyes: EOM are normal. Pupils are equal, round, and reactive to light.   Neck: Normal range of motion. Neck supple.   Cardiovascular: Normal rate and regular rhythm.   Pulmonary/Chest: Effort normal and breath sounds normal. She has no wheezes. She has no rales.   Abdominal: Soft. Bowel sounds are normal. She exhibits no mass. There is no tenderness. There is no rebound and no guarding. No hernia.   Musculoskeletal: Normal range of motion.   Neurological: She is alert and oriented to person, place, and time. No cranial nerve deficit.   Skin: Skin is warm and dry.   Psychiatric: She has a normal mood and affect. Her behavior is normal. Judgment normal.   Nursing note and vitals reviewed.    Results Review:  [facility] records reviewed and discussed with patient.     ASSESSMENT / PLAN  1.  Diverticulitis  - Continue Cipro+ Flagyl to completion as prescribed by the ED  - Phenergan as needed  - Colonoscopy in 8 to 10 weeks    Return if symptoms worsen or fail to improve.    I discussed the patients findings and my recommendations with patient    LOVE [place]                    "

## 2019-09-10 ENCOUNTER — TELEPHONE (OUTPATIENT)
Dept: GASTROENTEROLOGY | Facility: CLINIC | Age: 74
End: 2019-09-10

## 2019-09-10 DIAGNOSIS — K57.92 DIVERTICULITIS: Primary | ICD-10-CM

## 2019-09-10 NOTE — TELEPHONE ENCOUNTER
Patient will have imaging done prior to 9/12/19 if scheduling can accommodate appt needs.  Will place orders for Ct abd/pelvics W/ Oral contrast for patient.

## 2019-09-10 NOTE — TELEPHONE ENCOUNTER
Pt called due to pain from diverticulitis left side and wants to know if she could go ahead and start an antibiotic prior to scheduled appt on 9/12/19. She does not want to wait a couple days to start a medication if possible?

## 2019-09-10 NOTE — TELEPHONE ENCOUNTER
She needs a CT abd/pelvis to determine if it is diverticulitis before I will prescribe antibiotics.

## 2019-09-11 ENCOUNTER — HOSPITAL ENCOUNTER (OUTPATIENT)
Dept: CT IMAGING | Facility: HOSPITAL | Age: 74
Discharge: HOME | End: 2019-09-11
Admitting: NURSE PRACTITIONER

## 2019-09-11 DIAGNOSIS — K57.92 DIVERTICULITIS: ICD-10-CM

## 2019-09-11 PROCEDURE — 74176 CT ABD & PELVIS W/O CONTRAST: CPT

## 2019-09-11 PROCEDURE — 0 DIATRIZOATE MEGLUMINE & SODIUM PER 1 ML: Performed by: NURSE PRACTITIONER

## 2019-09-11 RX ADMIN — DIATRIZOATE MEGLUMINE AND DIATRIZOATE SODIUM 15 ML: 660; 100 LIQUID ORAL; RECTAL at 12:00

## 2019-09-12 ENCOUNTER — OFFICE VISIT (OUTPATIENT)
Dept: GASTROENTEROLOGY | Facility: CLINIC | Age: 74
End: 2019-09-12

## 2019-09-12 VITALS
DIASTOLIC BLOOD PRESSURE: 77 MMHG | WEIGHT: 162.8 LBS | HEART RATE: 73 BPM | BODY MASS INDEX: 27.12 KG/M2 | HEIGHT: 65 IN | SYSTOLIC BLOOD PRESSURE: 143 MMHG

## 2019-09-12 DIAGNOSIS — K57.92 DIVERTICULITIS: Primary | ICD-10-CM

## 2019-09-12 PROCEDURE — 99214 OFFICE O/P EST MOD 30 MIN: CPT | Performed by: NURSE PRACTITIONER

## 2019-09-12 RX ORDER — METRONIDAZOLE 500 MG/1
500 TABLET ORAL 3 TIMES DAILY
Qty: 30 TABLET | Refills: 0 | Status: ON HOLD | OUTPATIENT
Start: 2019-09-12 | End: 2019-12-17

## 2019-09-12 RX ORDER — PROMETHAZINE HYDROCHLORIDE 25 MG/1
25 TABLET ORAL EVERY 6 HOURS PRN
Qty: 30 TABLET | Refills: 0 | Status: SHIPPED | OUTPATIENT
Start: 2019-09-12 | End: 2019-12-17 | Stop reason: HOSPADM

## 2019-09-12 RX ORDER — CETIRIZINE HYDROCHLORIDE 10 MG/1
1 TABLET ORAL DAILY
Refills: 5 | COMMUNITY
Start: 2019-08-01 | End: 2022-07-27

## 2019-09-12 RX ORDER — METRONIDAZOLE 500 MG/1
500 TABLET ORAL 3 TIMES DAILY
Qty: 30 TABLET | Refills: 0 | Status: SHIPPED | OUTPATIENT
Start: 2019-09-12 | End: 2019-09-12

## 2019-09-12 RX ORDER — CIPROFLOXACIN 500 MG/1
500 TABLET, FILM COATED ORAL EVERY 12 HOURS
Qty: 20 TABLET | Refills: 0 | Status: ON HOLD | OUTPATIENT
Start: 2019-09-12 | End: 2019-12-17

## 2019-09-12 RX ORDER — PRAVASTATIN SODIUM 40 MG
40 TABLET ORAL AS NEEDED
COMMUNITY
End: 2021-10-18

## 2019-09-12 RX ORDER — PROMETHAZINE HYDROCHLORIDE 25 MG/1
25 TABLET ORAL EVERY 6 HOURS PRN
Qty: 30 TABLET | Refills: 0 | Status: SHIPPED | OUTPATIENT
Start: 2019-09-12 | End: 2019-09-12

## 2019-09-12 RX ORDER — CIPROFLOXACIN 500 MG/1
500 TABLET, FILM COATED ORAL EVERY 12 HOURS
Qty: 20 TABLET | Refills: 0 | Status: SHIPPED | OUTPATIENT
Start: 2019-09-12 | End: 2019-09-12

## 2019-09-12 NOTE — PROGRESS NOTES
GASTROENTEROLOGY OFFICE NOTE  Joyce Jones  [id number]  1945    CARE TEAM  Patient Care Team:  Idalia Bone MD as PCP - General (Internal Medicine)  Zeeshan Shaw MD as Consulting Physician (Neurosurgery)  Hu Dewey MD as Consulting Physician (Pain Medicine)    Referring Provider: Idaila Bone MD    Chief Complaint   Patient presents with   • Abdominal Pain     LLQ H/O DIVERTICULITIS        HISTORY OF PRESENT ILLNESS:  Ms. Jones returns today with complaints of left lower quadrant abdominal pain.  She was treated in July for diverticulitis and reports that that pain had resolved completely.  She called our office again 2 days ago reporting that her pain had returned although in somewhat of a different area.  CT abdomen and pelvis was ordered which shows new moderate diverticulitis of the proximal sigmoid colon, distal to the previous inflammatory site that was noted on 7/2/2019.  There is no evidence of abscess or free air.    PAST MEDICAL HISTORY  Past Medical History:   Diagnosis Date   • Allergic rhinitis    • Anemia    • Arthritis    • Diverticulosis    • Heart murmur    • History of bone density study 03/02/2017    [facility]   • History of mammography, screening 2016   • Hyperlipidemia    • Hypertension    • Inguinal hernia 1978    repair   • Personal history of DVT (deep vein thrombosis) 2005    right leg- due to surgery   • Positive TB test     Always comes back + but negative further testing        PAST SURGICAL HISTORY  Past Surgical History:   Procedure Laterality Date   • BACK SURGERY  2002    Scoliosis bar. T11-sacrum   • CERVICAL SPINE SURGERY  2000    cage   • CHOLECYSTECTOMY  2014   • COLONOSCOPY  2016   • FOOT SURGERY  2008   • HERNIA REPAIR Bilateral 1978    Inguinal hernia   • HYSTERECTOMY  1992?    1 ovary left   • KNEE SURGERY      2005 and x2 in 8/2017(left)   • NASAL SEPTUM SURGERY  1999        MEDICATIONS:    Current Outpatient Medications:   •   aspirin 81 MG chewable tablet, Chew 81 mg Daily., Disp: , Rfl:   •  cetirizine (zyrTEC) 10 MG tablet, Take 1 mg by mouth Daily., Disp: , Rfl: 5  •  Cyanocobalamin (B-12 PO), Take 1 mg by mouth Daily., Disp: , Rfl:   •  ibuprofen (ADVIL,MOTRIN) 200 MG tablet, Take 200 mg by mouth Every 6 (Six) Hours As Needed for Mild Pain ., Disp: , Rfl:   •  losartan (COZAAR) 50 MG tablet, Take 1 tablet by mouth Daily., Disp: 90 tablet, Rfl: 1  •  pravastatin (PRAVACHOL) 40 MG tablet, Take 1 mg by mouth Daily., Disp: , Rfl:   •  traMADol-acetaminophen (ULTRACET) 37.5-325 MG per tablet, Take 2 tablets by mouth Every 6 (Six) Hours As Needed., Disp: , Rfl:   •  ciprofloxacin (CIPRO) 500 MG tablet, Take 1 tablet by mouth Every 12 (Twelve) Hours., Disp: 20 tablet, Rfl: 0  •  metroNIDAZOLE (FLAGYL) 500 MG tablet, Take 1 tablet by mouth 3 (Three) Times a Day., Disp: 30 tablet, Rfl: 0  •  Omega-3 Fatty Acids (FISH OIL) 1000 MG capsule capsule, Take  by mouth Daily With Breakfast., Disp: , Rfl:   •  pravastatin (PRAVACHOL) 40 MG tablet, Take 1 tablet by mouth Daily., Disp: 30 tablet, Rfl: 0  •  promethazine (PHENERGAN) 25 MG tablet, Take 1 tablet by mouth Every 6 (Six) Hours As Needed for Nausea or Vomiting., Disp: 30 tablet, Rfl: 0    ALLERGIES  Allergies   Allergen Reactions   • Overton-2 Inhibitors Anaphylaxis   • Nsaids GI Intolerance   • Augmentin [Amoxicillin-Pot Clavulanate] Nausea Only   • Celecoxib Hives       FAMILY HISTORY:  Family History   Problem Relation Age of Onset   • Colon cancer Mother    • Uterine cancer Mother    • Hypertension Mother    • Kidney disease Mother    • Tuberculosis Mother    • Cancer Father         Bladder   • Heart failure Father    • Stroke Father    • Hypertension Father    • Heart attack Father    • Hypertension Brother    • Kidney disease Brother    • Kidney disease Brother    • Hypertension Brother    • Other Daughter         CVID   • Diabetes Daughter    • Colon polyps Neg Hx        SOCIAL  "HISTORY  Social History     Socioeconomic History   • Marital status:      Spouse name: Not on file   • Number of children: Not on file   • Years of education: Not on file   • Highest education level: Not on file   Tobacco Use   • Smoking status: Never Smoker   • Smokeless tobacco: Never Used   Substance and Sexual Activity   • Alcohol use: No   • Drug use: No   • Sexual activity: Defer     Comment:        REVIEW OF SYSTEMS  Review of Systems   Constitutional: Negative for activity change, appetite change, chills, diaphoresis, fatigue, fever, unexpected weight gain and unexpected weight loss.   HENT: Negative for trouble swallowing and voice change.    Gastrointestinal: Negative for abdominal distention, abdominal pain, anal bleeding, blood in stool, constipation, diarrhea, nausea, rectal pain, vomiting, GERD and indigestion.     PHYSICAL EXAM   /77 (BP Location: Right arm, Patient Position: Sitting, Cuff Size: Large Adult)   Pulse 73   Ht 165.1 cm (65\")   Wt 73.8 kg (162 lb 12.8 oz)   BMI 27.09 kg/m²   Physical Exam   Constitutional: She is oriented to person, place, and time. She appears well-developed and well-nourished.   HENT:   Head: Normocephalic.   Mouth/Throat: Oropharynx is clear and moist.   Eyes: EOM are normal. Pupils are equal, round, and reactive to light.   Neck: Normal range of motion. Neck supple.   Cardiovascular: Normal rate and regular rhythm.   Pulmonary/Chest: Effort normal and breath sounds normal. She has no wheezes. She has no rales.   Abdominal: Soft. Bowel sounds are normal. She exhibits no mass. There is tenderness in the left lower quadrant. There is no rebound and no guarding. No hernia.   Musculoskeletal: Normal range of motion.   Neurological: She is alert and oriented to person, place, and time. No cranial nerve deficit.   Skin: Skin is warm and dry.   Psychiatric: She has a normal mood and affect. Her behavior is normal. Judgment normal.   Nursing note and " vitals reviewed.    Results Review:  I have reviewed the patient's new clinical results.  And have called to discuss CT results personally with radiologist.    ASSESSMENT / PLAN  1.  Diverticulitis  -Begin Cipro 500 mg twice daily + Flagyl 500 mg 3 times daily for 10 days.  -Patient will need colonoscopy about 8 weeks after the resolution of this diverticulitis.  She has a colonoscopy scheduled at this time for September 23 based off her last episode of diverticulitis we will need to push this out further.    Return in about 4 weeks (around 10/10/2019).    I discussed the patients findings and my recommendations with patient    LOVE Madrid

## 2019-12-03 ENCOUNTER — OUTSIDE FACILITY SERVICE (OUTPATIENT)
Dept: GASTROENTEROLOGY | Facility: CLINIC | Age: 74
End: 2019-12-03

## 2019-12-03 PROCEDURE — 45378 DIAGNOSTIC COLONOSCOPY: CPT | Performed by: INTERNAL MEDICINE

## 2019-12-16 ENCOUNTER — HOSPITAL ENCOUNTER (OUTPATIENT)
Facility: HOSPITAL | Age: 74
Setting detail: OBSERVATION
Discharge: HOME | End: 2019-12-17
Attending: EMERGENCY MEDICINE | Admitting: HOSPITALIST

## 2019-12-16 ENCOUNTER — APPOINTMENT (OUTPATIENT)
Dept: GENERAL RADIOLOGY | Facility: HOSPITAL | Age: 74
End: 2019-12-16

## 2019-12-16 ENCOUNTER — APPOINTMENT (OUTPATIENT)
Dept: CT IMAGING | Facility: HOSPITAL | Age: 74
End: 2019-12-16

## 2019-12-16 DIAGNOSIS — R00.2 PALPITATIONS: ICD-10-CM

## 2019-12-16 DIAGNOSIS — R07.9 CHEST PAIN, UNSPECIFIED TYPE: Primary | ICD-10-CM

## 2019-12-16 PROBLEM — E78.5 HYPERLIPIDEMIA: Status: ACTIVE | Noted: 2017-10-23

## 2019-12-16 PROBLEM — R79.89 ELEVATED D-DIMER: Status: ACTIVE | Noted: 2019-12-16

## 2019-12-16 PROBLEM — I10 ESSENTIAL HYPERTENSION: Status: ACTIVE | Noted: 2019-12-16

## 2019-12-16 LAB
ALBUMIN SERPL-MCNC: 4.3 G/DL (ref 3.5–5.2)
ALBUMIN/GLOB SERPL: 1.4 G/DL
ALP SERPL-CCNC: 136 U/L (ref 39–117)
ALT SERPL W P-5'-P-CCNC: 17 U/L (ref 1–33)
ANION GAP SERPL CALCULATED.3IONS-SCNC: 11 MMOL/L (ref 5–15)
AST SERPL-CCNC: 17 U/L (ref 1–32)
BASOPHILS # BLD AUTO: 0.05 10*3/MM3 (ref 0–0.2)
BASOPHILS NFR BLD AUTO: 1 % (ref 0–1.5)
BILIRUB SERPL-MCNC: 0.3 MG/DL (ref 0.2–1.2)
BUN BLD-MCNC: 14 MG/DL (ref 8–23)
BUN/CREAT SERPL: 15.7 (ref 7–25)
CALCIUM SPEC-SCNC: 10.2 MG/DL (ref 8.6–10.5)
CHLORIDE SERPL-SCNC: 102 MMOL/L (ref 98–107)
CO2 SERPL-SCNC: 27 MMOL/L (ref 22–29)
CREAT BLD-MCNC: 0.89 MG/DL (ref 0.57–1)
D DIMER PPP FEU-MCNC: 1.01 MCGFEU/ML (ref 0–0.56)
DEPRECATED RDW RBC AUTO: 49.8 FL (ref 37–54)
EOSINOPHIL # BLD AUTO: 0.11 10*3/MM3 (ref 0–0.4)
EOSINOPHIL NFR BLD AUTO: 2.2 % (ref 0.3–6.2)
ERYTHROCYTE [DISTWIDTH] IN BLOOD BY AUTOMATED COUNT: 13.2 % (ref 12.3–15.4)
GFR SERPL CREATININE-BSD FRML MDRD: 62 ML/MIN/1.73
GLOBULIN UR ELPH-MCNC: 3 GM/DL
GLUCOSE BLD-MCNC: 99 MG/DL (ref 65–99)
HCT VFR BLD AUTO: 35.1 % (ref 34–46.6)
HGB BLD-MCNC: 11.3 G/DL (ref 12–15.9)
HOLD SPECIMEN: NORMAL
HOLD SPECIMEN: NORMAL
IMM GRANULOCYTES # BLD AUTO: 0.05 10*3/MM3 (ref 0–0.05)
IMM GRANULOCYTES NFR BLD AUTO: 1 % (ref 0–0.5)
LIPASE SERPL-CCNC: 50 U/L (ref 13–60)
LYMPHOCYTES # BLD AUTO: 1.69 10*3/MM3 (ref 0.7–3.1)
LYMPHOCYTES NFR BLD AUTO: 33.1 % (ref 19.6–45.3)
MCH RBC QN AUTO: 33 PG (ref 26.6–33)
MCHC RBC AUTO-ENTMCNC: 32.2 G/DL (ref 31.5–35.7)
MCV RBC AUTO: 102.6 FL (ref 79–97)
MONOCYTES # BLD AUTO: 0.61 10*3/MM3 (ref 0.1–0.9)
MONOCYTES NFR BLD AUTO: 11.9 % (ref 5–12)
NEUTROPHILS # BLD AUTO: 2.6 10*3/MM3 (ref 1.7–7)
NEUTROPHILS NFR BLD AUTO: 50.8 % (ref 42.7–76)
NRBC BLD AUTO-RTO: 0 /100 WBC (ref 0–0.2)
NT-PROBNP SERPL-MCNC: 190.6 PG/ML (ref 5–900)
PLATELET # BLD AUTO: 318 10*3/MM3 (ref 140–450)
PMV BLD AUTO: 9.4 FL (ref 6–12)
POTASSIUM BLD-SCNC: 3.5 MMOL/L (ref 3.5–5.2)
PROT SERPL-MCNC: 7.3 G/DL (ref 6–8.5)
RBC # BLD AUTO: 3.42 10*6/MM3 (ref 3.77–5.28)
SODIUM BLD-SCNC: 140 MMOL/L (ref 136–145)
TROPONIN T SERPL-MCNC: <0.01 NG/ML (ref 0–0.03)
TROPONIN T SERPL-MCNC: <0.01 NG/ML (ref 0–0.03)
WBC NRBC COR # BLD: 5.11 10*3/MM3 (ref 3.4–10.8)
WHOLE BLOOD HOLD SPECIMEN: NORMAL
WHOLE BLOOD HOLD SPECIMEN: NORMAL

## 2019-12-16 PROCEDURE — 85025 COMPLETE CBC W/AUTO DIFF WBC: CPT

## 2019-12-16 PROCEDURE — 83690 ASSAY OF LIPASE: CPT

## 2019-12-16 PROCEDURE — 84484 ASSAY OF TROPONIN QUANT: CPT | Performed by: EMERGENCY MEDICINE

## 2019-12-16 PROCEDURE — 80053 COMPREHEN METABOLIC PANEL: CPT

## 2019-12-16 PROCEDURE — G0378 HOSPITAL OBSERVATION PER HR: HCPCS

## 2019-12-16 PROCEDURE — 99284 EMERGENCY DEPT VISIT MOD MDM: CPT

## 2019-12-16 PROCEDURE — 71275 CT ANGIOGRAPHY CHEST: CPT

## 2019-12-16 PROCEDURE — 0 IOPAMIDOL PER 1 ML: Performed by: EMERGENCY MEDICINE

## 2019-12-16 PROCEDURE — 85379 FIBRIN DEGRADATION QUANT: CPT | Performed by: EMERGENCY MEDICINE

## 2019-12-16 PROCEDURE — 93005 ELECTROCARDIOGRAM TRACING: CPT

## 2019-12-16 PROCEDURE — 99220 PR INITIAL OBSERVATION CARE/DAY 70 MINUTES: CPT | Performed by: INTERNAL MEDICINE

## 2019-12-16 PROCEDURE — 93005 ELECTROCARDIOGRAM TRACING: CPT | Performed by: EMERGENCY MEDICINE

## 2019-12-16 PROCEDURE — 83735 ASSAY OF MAGNESIUM: CPT | Performed by: NURSE PRACTITIONER

## 2019-12-16 PROCEDURE — 83880 ASSAY OF NATRIURETIC PEPTIDE: CPT

## 2019-12-16 PROCEDURE — 84484 ASSAY OF TROPONIN QUANT: CPT

## 2019-12-16 PROCEDURE — 71045 X-RAY EXAM CHEST 1 VIEW: CPT

## 2019-12-16 RX ORDER — ASPIRIN 81 MG/1
324 TABLET, CHEWABLE ORAL ONCE
Status: COMPLETED | OUTPATIENT
Start: 2019-12-16 | End: 2019-12-16

## 2019-12-16 RX ORDER — SODIUM CHLORIDE 0.9 % (FLUSH) 0.9 %
10 SYRINGE (ML) INJECTION AS NEEDED
Status: DISCONTINUED | OUTPATIENT
Start: 2019-12-16 | End: 2019-12-17 | Stop reason: HOSPADM

## 2019-12-16 RX ADMIN — IOPAMIDOL 53 ML: 755 INJECTION, SOLUTION INTRAVENOUS at 23:42

## 2019-12-16 RX ADMIN — ASPIRIN 81 MG CHEWABLE TABLET 324 MG: 81 TABLET CHEWABLE at 21:00

## 2019-12-17 ENCOUNTER — APPOINTMENT (OUTPATIENT)
Dept: CARDIOLOGY | Facility: HOSPITAL | Age: 74
End: 2019-12-17

## 2019-12-17 VITALS
DIASTOLIC BLOOD PRESSURE: 74 MMHG | OXYGEN SATURATION: 98 % | SYSTOLIC BLOOD PRESSURE: 114 MMHG | HEIGHT: 65 IN | HEART RATE: 71 BPM | RESPIRATION RATE: 20 BRPM | BODY MASS INDEX: 25.83 KG/M2 | TEMPERATURE: 98.3 F | WEIGHT: 155 LBS

## 2019-12-17 PROBLEM — R07.9 CHEST PAIN: Status: RESOLVED | Noted: 2019-12-16 | Resolved: 2019-12-17

## 2019-12-17 LAB
ANION GAP SERPL CALCULATED.3IONS-SCNC: 10 MMOL/L (ref 5–15)
BASOPHILS # BLD AUTO: 0.03 10*3/MM3 (ref 0–0.2)
BASOPHILS NFR BLD AUTO: 0.6 % (ref 0–1.5)
BH CV ECHO MEAS - AO ROOT AREA (BSA CORRECTED): 1.6
BH CV ECHO MEAS - AO ROOT AREA: 6.6 CM^2
BH CV ECHO MEAS - AO ROOT DIAM: 2.9 CM
BH CV ECHO MEAS - BSA(HAYCOCK): 1.8 M^2
BH CV ECHO MEAS - BSA: 1.8 M^2
BH CV ECHO MEAS - BZI_BMI: 25.8 KILOGRAMS/M^2
BH CV ECHO MEAS - BZI_METRIC_HEIGHT: 165.1 CM
BH CV ECHO MEAS - BZI_METRIC_WEIGHT: 70.3 KG
BH CV ECHO MEAS - EDV(CUBED): 54.9 ML
BH CV ECHO MEAS - EDV(TEICH): 62 ML
BH CV ECHO MEAS - EF(CUBED): 80.6 %
BH CV ECHO MEAS - EF(MOD-BP): 60 %
BH CV ECHO MEAS - EF(TEICH): 73.8 %
BH CV ECHO MEAS - ESV(CUBED): 10.6 ML
BH CV ECHO MEAS - ESV(TEICH): 16.2 ML
BH CV ECHO MEAS - FS: 42.1 %
BH CV ECHO MEAS - IVS/LVPW: 1
BH CV ECHO MEAS - IVSD: 1 CM
BH CV ECHO MEAS - LA DIMENSION: 3.1 CM
BH CV ECHO MEAS - LA/AO: 1.1
BH CV ECHO MEAS - LAD MAJOR: 4.8 CM
BH CV ECHO MEAS - LAT PEAK E' VEL: 9.6 CM/SEC
BH CV ECHO MEAS - LATERAL E/E' RATIO: 7.2
BH CV ECHO MEAS - LV MASS(C)D: 117.3 GRAMS
BH CV ECHO MEAS - LV MASS(C)DI: 66.1 GRAMS/M^2
BH CV ECHO MEAS - LVIDD: 3.8 CM
BH CV ECHO MEAS - LVIDS: 2.5 CM
BH CV ECHO MEAS - LVPWD: 1 CM
BH CV ECHO MEAS - MED PEAK E' VEL: 8.7 CM/SEC
BH CV ECHO MEAS - MEDIAL E/E' RATIO: 7.9
BH CV ECHO MEAS - MV A MAX VEL: 98.1 CM/SEC
BH CV ECHO MEAS - MV DEC SLOPE: 396.5 CM/SEC^2
BH CV ECHO MEAS - MV DEC TIME: 0.2 SEC
BH CV ECHO MEAS - MV E MAX VEL: 68.6 CM/SEC
BH CV ECHO MEAS - MV E/A: 0.7
BH CV ECHO MEAS - MV P1/2T MAX VEL: 84.2 CM/SEC
BH CV ECHO MEAS - MV P1/2T: 62.2 MSEC
BH CV ECHO MEAS - MVA P1/2T LCG: 2.6 CM^2
BH CV ECHO MEAS - MVA(P1/2T): 3.5 CM^2
BH CV ECHO MEAS - PA ACC TIME: 0.12 SEC
BH CV ECHO MEAS - PA PR(ACCEL): 25.5 MMHG
BH CV ECHO MEAS - RAP SYSTOLE: 3 MMHG
BH CV ECHO MEAS - RV MAX PG: 2.7 MMHG
BH CV ECHO MEAS - RV V1 MAX: 81.4 CM/SEC
BH CV ECHO MEAS - RVSP: 33 MMHG
BH CV ECHO MEAS - SI(CUBED): 24.9 ML/M^2
BH CV ECHO MEAS - SI(TEICH): 25.8 ML/M^2
BH CV ECHO MEAS - SV(CUBED): 44.2 ML
BH CV ECHO MEAS - SV(TEICH): 45.7 ML
BH CV ECHO MEAS - TAPSE (>1.6): 1.6 CM2
BH CV ECHO MEAS - TR MAX PG: 30 MMHG
BH CV ECHO MEAS - TR MAX VEL: 272.7 CM/SEC
BH CV ECHO MEASUREMENTS AVERAGE E/E' RATIO: 7.5
BH CV NUCLEAR PRIOR STUDY: 3
BH CV STRESS BP STAGE 1: NORMAL
BH CV STRESS BP STAGE 3: NORMAL
BH CV STRESS COMMENTS STAGE 1: NORMAL
BH CV STRESS DOSE REGADENOSON STAGE 1: 0.4
BH CV STRESS DURATION MIN STAGE 1: 1
BH CV STRESS DURATION MIN STAGE 2: 1
BH CV STRESS DURATION MIN STAGE 3: 1
BH CV STRESS DURATION MIN STAGE 4: 1
BH CV STRESS DURATION SEC STAGE 2: 0
BH CV STRESS HR STAGE 1: 95
BH CV STRESS HR STAGE 2: 105
BH CV STRESS HR STAGE 3: 107
BH CV STRESS HR STAGE 4: 100
BH CV STRESS O2 STAGE 1: 100
BH CV STRESS O2 STAGE 2: 100
BH CV STRESS PROTOCOL 1: NORMAL
BH CV STRESS RECOVERY BP: NORMAL MMHG
BH CV STRESS RECOVERY HR: 100 BPM
BH CV STRESS STAGE 1: 1
BH CV STRESS STAGE 2: 2
BH CV STRESS STAGE 3: 3
BH CV STRESS STAGE 4: 4
BH CV XLRA - RV BASE: 3.1 CM
BH CV XLRA - RV LENGTH: 5.6 CM
BH CV XLRA - RV MID: 3 CM
BH CV XLRA - TDI S': 16.3 CM/SEC
BUN BLD-MCNC: 13 MG/DL (ref 8–23)
BUN/CREAT SERPL: 16.3 (ref 7–25)
CALCIUM SPEC-SCNC: 9.9 MG/DL (ref 8.6–10.5)
CHLORIDE SERPL-SCNC: 105 MMOL/L (ref 98–107)
CHOLEST SERPL-MCNC: 157 MG/DL (ref 0–200)
CO2 SERPL-SCNC: 26 MMOL/L (ref 22–29)
CREAT BLD-MCNC: 0.8 MG/DL (ref 0.57–1)
DEPRECATED RDW RBC AUTO: 49.1 FL (ref 37–54)
EOSINOPHIL # BLD AUTO: 0.12 10*3/MM3 (ref 0–0.4)
EOSINOPHIL NFR BLD AUTO: 2.5 % (ref 0.3–6.2)
ERYTHROCYTE [DISTWIDTH] IN BLOOD BY AUTOMATED COUNT: 13.2 % (ref 12.3–15.4)
GFR SERPL CREATININE-BSD FRML MDRD: 70 ML/MIN/1.73
GLUCOSE BLD-MCNC: 95 MG/DL (ref 65–99)
HBA1C MFR BLD: 4.9 % (ref 4.8–5.6)
HCT VFR BLD AUTO: 32.3 % (ref 34–46.6)
HDLC SERPL-MCNC: 47 MG/DL (ref 40–60)
HGB BLD-MCNC: 10.5 G/DL (ref 12–15.9)
IMM GRANULOCYTES # BLD AUTO: 0.02 10*3/MM3 (ref 0–0.05)
IMM GRANULOCYTES NFR BLD AUTO: 0.4 % (ref 0–0.5)
LDLC SERPL CALC-MCNC: 79 MG/DL (ref 0–100)
LDLC/HDLC SERPL: 1.69 {RATIO}
LEFT ATRIUM VOLUME INDEX: 14.6 ML/M^2
LEFT ATRIUM VOLUME: 26 ML
LV EF 2D ECHO EST: 65 %
LV EF NUC BP: 53 %
LYMPHOCYTES # BLD AUTO: 1.29 10*3/MM3 (ref 0.7–3.1)
LYMPHOCYTES NFR BLD AUTO: 26.5 % (ref 19.6–45.3)
MAGNESIUM SERPL-MCNC: 1.9 MG/DL (ref 1.6–2.4)
MAXIMAL PREDICTED HEART RATE: 147 BPM
MCH RBC QN AUTO: 33 PG (ref 26.6–33)
MCHC RBC AUTO-ENTMCNC: 32.5 G/DL (ref 31.5–35.7)
MCV RBC AUTO: 101.6 FL (ref 79–97)
MONOCYTES # BLD AUTO: 0.54 10*3/MM3 (ref 0.1–0.9)
MONOCYTES NFR BLD AUTO: 11.1 % (ref 5–12)
NEUTROPHILS # BLD AUTO: 2.86 10*3/MM3 (ref 1.7–7)
NEUTROPHILS NFR BLD AUTO: 58.9 % (ref 42.7–76)
NRBC BLD AUTO-RTO: 0 /100 WBC (ref 0–0.2)
PERCENT MAX PREDICTED HR: 72.79 %
PLATELET # BLD AUTO: 274 10*3/MM3 (ref 140–450)
PMV BLD AUTO: 9.4 FL (ref 6–12)
POTASSIUM BLD-SCNC: 3.4 MMOL/L (ref 3.5–5.2)
RBC # BLD AUTO: 3.18 10*6/MM3 (ref 3.77–5.28)
SODIUM BLD-SCNC: 141 MMOL/L (ref 136–145)
STRESS BASELINE BP: NORMAL MMHG
STRESS BASELINE HR: 73 BPM
STRESS O2 SAT REST: 98 %
STRESS PERCENT HR: 86 %
STRESS POST PEAK BP: NORMAL MMHG
STRESS POST PEAK HR: 107 BPM
STRESS TARGET HR: 125 BPM
TRIGL SERPL-MCNC: 153 MG/DL (ref 0–150)
TROPONIN T SERPL-MCNC: <0.01 NG/ML (ref 0–0.03)
TSH SERPL DL<=0.05 MIU/L-ACNC: 1.32 UIU/ML (ref 0.27–4.2)
VLDLC SERPL-MCNC: 30.6 MG/DL
WBC NRBC COR # BLD: 4.86 10*3/MM3 (ref 3.4–10.8)

## 2019-12-17 PROCEDURE — 93018 CV STRESS TEST I&R ONLY: CPT | Performed by: INTERNAL MEDICINE

## 2019-12-17 PROCEDURE — 84443 ASSAY THYROID STIM HORMONE: CPT | Performed by: NURSE PRACTITIONER

## 2019-12-17 PROCEDURE — 93306 TTE W/DOPPLER COMPLETE: CPT | Performed by: INTERNAL MEDICINE

## 2019-12-17 PROCEDURE — 80061 LIPID PANEL: CPT | Performed by: NURSE PRACTITIONER

## 2019-12-17 PROCEDURE — G0378 HOSPITAL OBSERVATION PER HR: HCPCS

## 2019-12-17 PROCEDURE — 80048 BASIC METABOLIC PNL TOTAL CA: CPT | Performed by: NURSE PRACTITIONER

## 2019-12-17 PROCEDURE — 85025 COMPLETE CBC W/AUTO DIFF WBC: CPT | Performed by: NURSE PRACTITIONER

## 2019-12-17 PROCEDURE — 83036 HEMOGLOBIN GLYCOSYLATED A1C: CPT | Performed by: NURSE PRACTITIONER

## 2019-12-17 PROCEDURE — 25010000002 REGADENOSON 0.4 MG/5ML SOLUTION: Performed by: NURSE PRACTITIONER

## 2019-12-17 PROCEDURE — 25010000002 HEPARIN (PORCINE) PER 1000 UNITS: Performed by: NURSE PRACTITIONER

## 2019-12-17 PROCEDURE — 84484 ASSAY OF TROPONIN QUANT: CPT | Performed by: NURSE PRACTITIONER

## 2019-12-17 PROCEDURE — 78492 MYOCRD IMG PET MLT RST&STRS: CPT | Performed by: INTERNAL MEDICINE

## 2019-12-17 PROCEDURE — 78492 MYOCRD IMG PET MLT RST&STRS: CPT

## 2019-12-17 PROCEDURE — 93306 TTE W/DOPPLER COMPLETE: CPT

## 2019-12-17 PROCEDURE — 93017 CV STRESS TEST TRACING ONLY: CPT

## 2019-12-17 PROCEDURE — 0 RUBIDIUM CHLORIDE: Performed by: NURSE PRACTITIONER

## 2019-12-17 PROCEDURE — A9555 RB82 RUBIDIUM: HCPCS | Performed by: NURSE PRACTITIONER

## 2019-12-17 PROCEDURE — 99217 PR OBSERVATION CARE DISCHARGE MANAGEMENT: CPT | Performed by: NURSE PRACTITIONER

## 2019-12-17 RX ORDER — ASPIRIN 81 MG/1
81 TABLET, CHEWABLE ORAL DAILY
Status: DISCONTINUED | OUTPATIENT
Start: 2019-12-17 | End: 2019-12-17 | Stop reason: HOSPADM

## 2019-12-17 RX ORDER — LOSARTAN POTASSIUM 50 MG/1
50 TABLET ORAL DAILY
Status: DISCONTINUED | OUTPATIENT
Start: 2019-12-17 | End: 2019-12-17 | Stop reason: HOSPADM

## 2019-12-17 RX ORDER — SODIUM CHLORIDE 0.9 % (FLUSH) 0.9 %
10 SYRINGE (ML) INJECTION AS NEEDED
Status: DISCONTINUED | OUTPATIENT
Start: 2019-12-17 | End: 2019-12-17 | Stop reason: HOSPADM

## 2019-12-17 RX ORDER — CAFFEINE CITRATE 20 MG/ML
60 SOLUTION INTRAVENOUS ONCE
Status: COMPLETED | OUTPATIENT
Start: 2019-12-17 | End: 2019-12-17

## 2019-12-17 RX ORDER — FAMOTIDINE 20 MG/1
20 TABLET, FILM COATED ORAL DAILY
Status: DISCONTINUED | OUTPATIENT
Start: 2019-12-17 | End: 2019-12-17 | Stop reason: HOSPADM

## 2019-12-17 RX ORDER — HEPARIN SODIUM 5000 [USP'U]/ML
5000 INJECTION, SOLUTION INTRAVENOUS; SUBCUTANEOUS EVERY 8 HOURS SCHEDULED
Status: DISCONTINUED | OUTPATIENT
Start: 2019-12-17 | End: 2019-12-17 | Stop reason: HOSPADM

## 2019-12-17 RX ORDER — CETIRIZINE HYDROCHLORIDE 10 MG/1
10 TABLET ORAL DAILY
Status: DISCONTINUED | OUTPATIENT
Start: 2019-12-17 | End: 2019-12-17 | Stop reason: HOSPADM

## 2019-12-17 RX ORDER — POTASSIUM CHLORIDE 750 MG/1
40 CAPSULE, EXTENDED RELEASE ORAL AS NEEDED
Status: DISCONTINUED | OUTPATIENT
Start: 2019-12-17 | End: 2019-12-17 | Stop reason: HOSPADM

## 2019-12-17 RX ORDER — ACETAMINOPHEN 325 MG/1
650 TABLET ORAL EVERY 6 HOURS PRN
Status: DISCONTINUED | OUTPATIENT
Start: 2019-12-17 | End: 2019-12-17 | Stop reason: HOSPADM

## 2019-12-17 RX ORDER — ATORVASTATIN CALCIUM 10 MG/1
10 TABLET, FILM COATED ORAL NIGHTLY
Status: DISCONTINUED | OUTPATIENT
Start: 2019-12-17 | End: 2019-12-17 | Stop reason: HOSPADM

## 2019-12-17 RX ORDER — POTASSIUM CHLORIDE 1.5 G/1.77G
40 POWDER, FOR SOLUTION ORAL AS NEEDED
Status: DISCONTINUED | OUTPATIENT
Start: 2019-12-17 | End: 2019-12-17 | Stop reason: HOSPADM

## 2019-12-17 RX ORDER — SODIUM CHLORIDE 0.9 % (FLUSH) 0.9 %
10 SYRINGE (ML) INJECTION EVERY 12 HOURS SCHEDULED
Status: DISCONTINUED | OUTPATIENT
Start: 2019-12-17 | End: 2019-12-17 | Stop reason: HOSPADM

## 2019-12-17 RX ADMIN — REGADENOSON 0.4 MG: 0.08 INJECTION, SOLUTION INTRAVENOUS at 09:42

## 2019-12-17 RX ADMIN — POTASSIUM CHLORIDE 40 MEQ: 750 CAPSULE, EXTENDED RELEASE ORAL at 05:44

## 2019-12-17 RX ADMIN — LOSARTAN POTASSIUM 50 MG: 50 TABLET, FILM COATED ORAL at 08:08

## 2019-12-17 RX ADMIN — HEPARIN SODIUM 5000 UNITS: 5000 INJECTION INTRAVENOUS; SUBCUTANEOUS at 05:17

## 2019-12-17 RX ADMIN — FAMOTIDINE 20 MG: 20 TABLET ORAL at 08:08

## 2019-12-17 RX ADMIN — SODIUM CHLORIDE, PRESERVATIVE FREE 10 ML: 5 INJECTION INTRAVENOUS at 05:17

## 2019-12-17 RX ADMIN — RUBIDIUM CHLORIDE RB-82 1 DOSE: 150 INJECTION, SOLUTION INTRAVENOUS at 09:45

## 2019-12-17 RX ADMIN — ASPIRIN 81 MG 81 MG: 81 TABLET ORAL at 08:08

## 2019-12-17 RX ADMIN — RUBIDIUM CHLORIDE RB-82 1 DOSE: 150 INJECTION, SOLUTION INTRAVENOUS at 09:35

## 2019-12-17 RX ADMIN — CAFFEINE CITRATE 60 MG: 20 INJECTION, SOLUTION INTRAVENOUS at 09:59

## 2019-12-17 RX ADMIN — POTASSIUM CHLORIDE 40 MEQ: 750 CAPSULE, EXTENDED RELEASE ORAL at 10:48

## 2019-12-17 RX ADMIN — CETIRIZINE HYDROCHLORIDE 10 MG: 10 TABLET, FILM COATED ORAL at 10:48

## 2019-12-17 NOTE — PROGRESS NOTES
Discharge Planning Assessment  [facility]     Patient Name: Joyce Jones  MRN: [id number]  Today's Date: 12/17/2019    Admit Date: 12/16/2019    Discharge Needs Assessment     Row Name 12/17/19 0903       Living Environment    Lives With  spouse    Current Living Arrangements  home/apartment/condo    Primary Care Provided by  self    Provides Primary Care For  no one    Able to Return to Prior Arrangements  yes       Transition Planning    Patient/Family Anticipates Transition to  home    Transportation Anticipated  family or friend will provide       Discharge Needs Assessment    Equipment Currently Used at Home  walker, rolling;other (see comments) stair chair lift        Discharge Plan     Row Name 12/17/19 0904       Plan    Plan  Home    Patient/Family in Agreement with Plan  yes    Plan Comments  Spoke w/ patient at bedside. She lives with her  in a two story duplex, their daughter lives in the adjoining side, in [place]. They do not have a bed and bath on the first floor but they do have a stair chair lift. PTA patient was independent with ADL's and mobility. She has a RW for a previous surgery that she does not use currently. No needs noted. CM following.     Final Discharge Disposition Code  01 - home or self-care        Destination      Coordination has not been started for this encounter.      Durable Medical Equipment      Coordination has not been started for this encounter.      Dialysis/Infusion      Coordination has not been started for this encounter.      Home Medical Care      Coordination has not been started for this encounter.      Therapy      Coordination has not been started for this encounter.      Community Resources      Coordination has not been started for this encounter.        Expected Discharge Date and Time     Expected Discharge Date Expected Discharge Time    Dec 18, 2019         Demographic Summary     Row Name 12/17/19 0903       General Information    Arrived  From  home    Reason for Consult  discharge planning        Functional Status     Row Name 12/17/19 0903       Functional Status    Usual Activity Tolerance  good    Current Activity Tolerance  good        Psychosocial    No documentation.       Abuse/Neglect    No documentation.       Legal    No documentation.       Substance Abuse    No documentation.       Patient Forms    No documentation.           Michela Collins RN

## 2019-12-17 NOTE — PLAN OF CARE
Pt resting comfortably. NS on the monitor. RA. BP and O2 stable. No c/o chest pain. Awaiting ECHO and stress test. Will continue to monitor.

## 2019-12-17 NOTE — ED PROVIDER NOTES
Subjective   Joyce Jones is a 74 y.o. female who presents to the ED with c/o chest pain. The patient reports having a history of arrhythmia where she has had episodes of palpitations lasting a few seconds with some chest pain around 2/10 in severity. She states that she has had multiple episodes of left sided chest pressure intermittently throughout the day that have been lasting a few seconds in duration with a severity of 6/10. Her first episode occurred around 0930 this morning but the episodes are occurring more constantly as the day has progressed, prompting them to present to [facility] for evaluation but she was advised to come to the ED for further evaluation. The patient complains of dizziness upon standing up, lightheadedness, palpitations, nausea for which she took a dosage of Phenergan for, acid reflux, and diarrhea since her colonoscopy on 12/05/2019 but denies shortness of breath, fever, vomiting, leg swelling, and constipation. She has a past medical history of heart murmur, anemia, a personal DVT for the past 20 years, hyperlipidemia, and hypertension. The patient has a family history of cardiac disease but no past diagnosis of diabetes mellitus. She reports having a leaky valve. The only medications she is currently taking are 81 mg of Aspirin daily and 50 mg of Losartan approximately 3-4 days per week. There are no other acute complaints at this time.      History provided by:  Patient  Chest Pain   Pain location:  L chest  Pain quality: pressure    Pain radiates to:  Does not radiate  Pain severity:  Moderate  Onset quality:  Sudden  Duration:  12 hours  Timing:  Intermittent  Progression:  Worsening  Chronicity:  Recurrent  Context: at rest    Relieved by:  None tried  Worsened by:  Nothing  Ineffective treatments:  None tried  Associated symptoms: dizziness, nausea and palpitations    Associated symptoms: no back pain, no fever, no lower extremity edema, no near-syncope, no shortness  of breath, no syncope, no vomiting and no weakness    Risk factors: high cholesterol, hypertension and prior DVT/PE    Risk factors: no coronary artery disease, no diabetes mellitus, not male, not obese, not pregnant and no smoking        Review of Systems   Constitutional: Negative for fever.   Respiratory: Negative for shortness of breath.    Cardiovascular: Positive for chest pain and palpitations. Negative for leg swelling, syncope and near-syncope.   Gastrointestinal: Positive for diarrhea and nausea. Negative for constipation and vomiting.        Patient complains of some acid reflux.   Musculoskeletal: Negative for back pain.   Neurological: Positive for dizziness and light-headedness. Negative for syncope and weakness.   All other systems reviewed and are negative.      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 T11-sacrum   • CERVICAL SPINE SURGERY  2000    cage   • CHOLECYSTECTOMY  2014   • COLONOSCOPY  2016   • FOOT SURGERY  2008   • HERNIA REPAIR Bilateral 1978    Inguinal hernia   • HYSTERECTOMY  1992?    1 ovary left   • KNEE SURGERY      2005 and x2 in 8/2017(left)   • NASAL SEPTUM SURGERY  1999       Family History   Problem Relation Age of Onset   • Colon cancer Mother    • Uterine cancer Mother    • Hypertension  Mother    • Kidney disease Mother    • Tuberculosis Mother    • Cancer Father         Bladder   • Heart failure Father    • Stroke Father    • Hypertension Father    • Heart attack Father    • Hypertension Brother    • Kidney disease Brother    • Kidney disease Brother    • Hypertension Brother    • Other Daughter         CVID   • Diabetes Daughter    • Colon polyps Neg Hx        Social History     Socioeconomic History   • Marital status:      Spouse name: Not on file   • Number of children: Not on file   • Years of education: Not on file   • Highest education level: Not on file   Tobacco Use   • Smoking status: Never Smoker   • Smokeless tobacco: Never Used   Substance and Sexual Activity   • Alcohol use: No   • Drug use: No   • Sexual activity: Defer     Comment:          Objective   Physical Exam   Constitutional: She is oriented to person, place, and time. She appears well-developed and well-nourished. No distress.   HENT:   Head: Normocephalic and atraumatic.   Eyes: Conjunctivae are normal. No scleral icterus.   Neck: Normal range of motion. Neck supple.   Cardiovascular: Normal rate, regular rhythm and normal heart sounds.   No murmur heard.  Pulmonary/Chest: Effort normal and breath sounds normal. No respiratory distress.   The patient's pain is unable to be reproduced with palpation.   Abdominal: Soft. There is no tenderness.   Musculoskeletal: Normal range of motion. She exhibits no edema.   Neurological: She is alert and oriented to person, place, and time.   Skin: Skin is warm and dry.   Psychiatric: She has a normal mood and affect. Her behavior is normal.   Nursing note and vitals reviewed.      Procedures         ED Course  ED Course as of Dec 17 2127   Mon Dec 16, 2019   2126 Dr. Josue has paged the hospitalist.    [BS]      ED Course User Index  [BS] Kevin Ricardo       Recent Results (from the past 24 hour(s))   Troponin    Collection Time: 12/16/19  9:58 PM   Result Value Ref Range     Troponin T <0.010 0.000 - 0.030 ng/mL   Magnesium    Collection Time: 12/16/19  9:58 PM   Result Value Ref Range    Magnesium 1.9 1.6 - 2.4 mg/dL   Troponin    Collection Time: 12/17/19  3:08 AM   Result Value Ref Range    Troponin T <0.010 0.000 - 0.030 ng/mL   Basic Metabolic Panel    Collection Time: 12/17/19  3:08 AM   Result Value Ref Range    Glucose 95 65 - 99 mg/dL    BUN 13 8 - 23 mg/dL    Creatinine 0.80 0.57 - 1.00 mg/dL    Sodium 141 136 - 145 mmol/L    Potassium 3.4 (L) 3.5 - 5.2 mmol/L    Chloride 105 98 - 107 mmol/L    CO2 26.0 22.0 - 29.0 mmol/L    Calcium 9.9 8.6 - 10.5 mg/dL    eGFR Non African Amer 70 >60 mL/min/1.73    BUN/Creatinine Ratio 16.3 7.0 - 25.0    Anion Gap 10.0 5.0 - 15.0 mmol/L   TSH    Collection Time: 12/17/19  3:08 AM   Result Value Ref Range    TSH 1.320 0.270 - 4.200 uIU/mL   Lipid Panel    Collection Time: 12/17/19  3:08 AM   Result Value Ref Range    Total Cholesterol 157 0 - 200 mg/dL    Triglycerides 153 (H) 0 - 150 mg/dL    HDL Cholesterol 47 40 - 60 mg/dL    LDL Cholesterol  79 0 - 100 mg/dL    VLDL Cholesterol 30.6 mg/dL    LDL/HDL Ratio 1.69    Hemoglobin A1c    Collection Time: 12/17/19  3:08 AM   Result Value Ref Range    Hemoglobin A1C 4.90 4.80 - 5.60 %   CBC Auto Differential    Collection Time: 12/17/19  3:08 AM   Result Value Ref Range    WBC 4.86 3.40 - 10.80 10*3/mm3    RBC 3.18 (L) 3.77 - 5.28 10*6/mm3    Hemoglobin 10.5 (L) 12.0 - 15.9 g/dL    Hematocrit 32.3 (L) 34.0 - 46.6 %    .6 (H) 79.0 - 97.0 fL    MCH 33.0 26.6 - 33.0 pg    MCHC 32.5 31.5 - 35.7 g/dL    RDW 13.2 12.3 - 15.4 %    RDW-SD 49.1 37.0 - 54.0 fl    MPV 9.4 6.0 - 12.0 fL    Platelets 274 140 - 450 10*3/mm3    Neutrophil % 58.9 42.7 - 76.0 %    Lymphocyte % 26.5 19.6 - 45.3 %    Monocyte % 11.1 5.0 - 12.0 %    Eosinophil % 2.5 0.3 - 6.2 %    Basophil % 0.6 0.0 - 1.5 %    Immature Grans % 0.4 0.0 - 0.5 %    Neutrophils, Absolute 2.86 1.70 - 7.00 10*3/mm3    Lymphocytes, Absolute 1.29 0.70  - 3.10 10*3/mm3    Monocytes, Absolute 0.54 0.10 - 0.90 10*3/mm3    Eosinophils, Absolute 0.12 0.00 - 0.40 10*3/mm3    Basophils, Absolute 0.03 0.00 - 0.20 10*3/mm3    Immature Grans, Absolute 0.02 0.00 - 0.05 10*3/mm3    nRBC 0.0 0.0 - 0.2 /100 WBC   Stress Test With Pet Myocardial Perfusion    Collection Time: 12/17/19  9:31 AM   Result Value Ref Range    BH CV STRESS PROTOCOL 1 Pharmacologic     Stage 1 1     Duration Min Stage 1 1     Stress Dose Regadenoson Stage 1 0.4     Stress Comments Stage 1 10 sec bolus injection     Stage 2 2     Duration Min Stage 2 1     Duration Sec Stage 2 0     Stage 3 3     Duration Min Stage 3 1     Stage 4 4     Duration Min Stage 4 1     Baseline HR 73 bpm    Baseline /72 mmHg    Target HR (85%) 125 bpm    Max. Pred. HR (100%) 147 bpm    O2 sat rest 98 %    HR Stage 1 95     BP Stage 1 123/71     O2 Stage 1 100     HR Stage 2 105     O2 Stage 2 100     HR Stage 3 107     BP Stage 3 129/67     HR Stage 4 100     Peak  bpm    Percent Max Pred HR 72.79 %    Percent Target HR 86 %    Peak /67 mmHg    Recovery  bpm    Recovery /78 mmHg    Nuc Stress EF 53 %    Nuclear Prior Study 3    Adult Transthoracic Echo Complete W/ Cont if Necessary Per Protocol    Collection Time: 12/17/19 10:42 AM   Result Value Ref Range    BSA 1.8 m^2    IVSd 1.0 cm    LVIDd 3.8 cm    LVIDs 2.5 cm    LVPWd 1.0 cm    IVS/LVPW 1.0     FS 42.1 %    EDV(Teich) 62.0 ml    ESV(Teich) 16.2 ml    EF(Teich) 73.8 %    EDV(cubed) 54.9 ml    ESV(cubed) 10.6 ml    EF(cubed) 80.6 %    LV mass(C)d 117.3 grams    LV mass(C)dI 66.1 grams/m^2    SV(Teich) 45.7 ml    SI(Teich) 25.8 ml/m^2    SV(cubed) 44.2 ml    SI(cubed) 24.9 ml/m^2    Ao root diam 2.9 cm    Ao root area 6.6 cm^2    LA dimension 3.1 cm    LA/Ao 1.1     LAd major 4.8 cm    LA volume 26.0 ml    Ao root area (BSA corrected) 1.6     LA Volume Index 14.6 ml/m^2    MV E max ginna 68.6 cm/sec    MV A max ginna 98.1 cm/sec    MV E/A 0.7      MV P1/2t max gonzalo 84.2 cm/sec    MV P1/2t 62.2 msec    MVA(P1/2t) 3.5 cm^2    MV dec slope 396.5 cm/sec^2    MV dec time 0.2 sec    PA acc time 0.12 sec    RV V1 max PG 2.7 mmHg    RV V1 max 81.4 cm/sec    TR max gonzalo 272.7 cm/sec     CV ECHO AARON - TR MAX PG 30.0 mmHg    RVSP(TR) 33.0 mmHg    RAP systole 3.0 mmHg    PA pr(Accel) 25.5 mmHg    MVA P1/2T LCG 2.6 cm^2    Lat E/e'  7.2     Med E/e' 7.9     Lat Peak E' Gonzalo 9.6 cm/sec    Med Peak E' Gonzalo 8.7 cm/sec     CV ECHO AARON - BZI_BMI 25.8 kilograms/m^2     CV ECHO AARON - BSA(HAYCOCK) 1.8 m^2     CV ECHO AARON - BZI_METRIC_WEIGHT 70.3 kg     CV ECHO AARON - BZI_METRIC_HEIGHT 165.1 cm    Avg E/e' ratio 7.50     TDI S' 16.30 cm/sec    RV Base 3.10 cm    RV Length 5.60 cm    RV Mid 3.00 cm    TAPSE (>1.6) 1.60 cm2    Echo EF Estimated 65 %    EF(MOD-bp) 60 %     Note: In addition to lab results from this visit, the labs listed above may include labs taken at another facility or during a different encounter within the last 24 hours. Please correlate lab times with ED admission and discharge times for further clarification of the services performed during this visit.    CT Angiogram Chest With & Without Contrast   Final Result   Negative chest CT examination using pulmonary embolism protocol.      Signer Name: Williams Arriola MD    Signed: 12/17/2019 12:08 AM    Workstation Name: [email or web address]     [facility]      XR Chest 1 View   Final Result   No acute cardiopulmonary findings.      Signer Name: Abisai Delgado MD    Signed: 12/16/2019 8:42 PM    Workstation Name: [email or web address]     [facility]        Vitals:    12/17/19 0337 12/17/19 0705 12/17/19 0930 12/17/19 1139   BP: 123/69 124/75 123/72 114/74   BP Location: Left arm Left arm  Left arm   Patient Position: Lying Lying  Lying   Pulse: 65  71    Resp: 18 20  20   Temp: 98.1 °F (36.7 °C) 97.9 °F (36.6 °C)  98.3 °F (36.8 °C)   TempSrc: Oral Oral  Oral   SpO2: 98%  "     Weight:   70.3 kg (155 lb)    Height:   165.1 cm (65\")      Medications   aspirin chewable tablet 324 mg (324 mg Oral Given 12/16/19 2100)   iopamidol (ISOVUE-370) 76 % injection 100 mL (53 mL Intravenous Given 12/16/19 2342)   regadenoson (LEXISCAN) injection 0.4 mg (0.4 mg Intravenous Given 12/17/19 0942)   caffeine citrated (CAFCIT) injection 60 mg (60 mg Intravenous Given 12/17/19 0959)   rubidium chloride (CARDIOGEN) injection 1 dose (1 dose Intravenous Given 12/17/19 0945)   rubidium chloride (CARDIOGEN) injection 1 dose (1 dose Intravenous Given 12/17/19 0935)     ECG/EMG Results (last 24 hours)     Procedure Component Value Units Date/Time    ECG 12 Lead [[id number]] Collected:  12/16/19 1947     Updated:  12/16/19 1948        ECG 12 Lead         ECG 12 Lead                     HEART Score (for prediction of 6-week risk of major adverse cardiac event) reviewed and/or performed as part of the patient evaluation and treatment planning process.  The result associated with this review/performance is: 6           MDM    Final diagnoses:   Chest pain, unspecified type   Palpitations       Documentation assistance provided by keri Ricardo.  Information recorded by the keri was done at my direction and has been verified and validated by me.     Kevin Ricardo  12/16/19 2130       Demar Josue DO  12/17/19 2129    "

## 2019-12-17 NOTE — H&P
"    [facility] Medicine Services  HISTORY AND PHYSICAL    Patient Name: Joyce Jones  : 1945  MRN: [id number]  Primary Care Physician: Idalia Bone MD  Date of admission: 2019      Subjective   Subjective     Chief Complaint:  Shortness of breath, heart palpitations, chest fullness     HPI:  Joyce Jones is a 74 y.o. female with PMH significant for palpitations, HTN, HLD, arthritis who presents to the ED with complaint of heart palpitations and chest fullness.   She states that over the past 24 hours she has had 6 episodes of feeling \"full\" in her chest under her left breast. The episodes last only a few seconds and she notes that she can't describe them as pain only fullness. She also notes increased heart palpitations. She states that she always has them, but they have been more frequent over the past 24 hours. The two are not associated with each other and occur  at different times. She does note dizziness, and shortness of breath as well as 2 episodes of blurry vision. Of note, she admits to having diarrhea for the past week and a half since having bowel prep for colonoscopy. She also has had nausea without vomiting.   Upon arrival to the ED, cardiac enzymes are negative x 2 and EKG is negative for acute ischemia. She is found to have concern of elevated d-dimer and has history of DVT post knee surgery 20+ years ago.   She will be admitted to Hospital Medicine for further evaluation.     Review of Systems   Constitutional: Positive for activity change, appetite change, chills (notes that she often chills ) and fever (last week ). Negative for diaphoresis and fatigue.   HENT: Negative.    Eyes: Positive for visual disturbance (blurred vision ).   Respiratory: Positive for shortness of breath. Negative for cough, chest tightness and wheezing.    Cardiovascular: Positive for chest pain and palpitations. Negative for leg swelling.        \"fullness\" in left chest "   Gastrointestinal: Positive for abdominal pain (cramping associated with diarrhea ), diarrhea and nausea. Negative for abdominal distention, blood in stool, constipation and vomiting.   Genitourinary: Negative for difficulty urinating, dysuria, frequency and urgency.   Musculoskeletal: Positive for arthralgias. Negative for gait problem and myalgias.   Skin: Negative for color change, pallor and rash.   Neurological: Positive for dizziness and light-headedness. Negative for weakness and headaches.   Psychiatric/Behavioral: Negative for confusion. The patient is not nervous/anxious.         All other systems reviewed and are negative.     Personal History     Past Medical History:   Diagnosis Date   • Allergic rhinitis    • Anemia    • Arthritis    • Diverticulosis    • Heart murmur    • History of bone density [facility] 03/02/2017    [facility]   • History of mammography, screening 2016   • Hyperlipidemia    • Hypertension    • Inguinal hernia 1978    repair   • Personal history of DVT (deep vein thrombosis) 2005    right leg- due to surgery   • Positive TB test     Always comes back + but negative further testing       Past Surgical History:   Procedure Laterality Date   • BACK SURGERY  2002    Scoliosis bar. T11-sacrum   • CERVICAL SPINE SURGERY  2000    cage   • CHOLECYSTECTOMY  2014   • COLONOSCOPY  2016   • FOOT SURGERY  2008   • HERNIA REPAIR Bilateral 1978    Inguinal hernia   • HYSTERECTOMY  1992?    1 ovary left   • KNEE SURGERY      2005 and x2 in 8/2017(left)   • NASAL SEPTUM SURGERY  1999       Family History: family history includes Cancer in her father; Colon cancer in her mother; Diabetes in her daughter; Heart attack in her father; Heart failure in her father; Hypertension in her brother, brother, father, and mother; Kidney disease in her brother, brother, and mother; Other in her daughter; Stroke in her father; Tuberculosis in her mother; Uterine cancer in her mother. Otherwise pertinent FHx was  reviewed and unremarkable.     Social History:  reports that she has never smoked. She has never used smokeless tobacco. She reports that she does not drink alcohol or use drugs.  Social History     Social History Narrative   • Not on file       Medications:  Available home medication information reviewed.    (Not in a hospital admission)    Allergies   Allergen Reactions   • Overton-2 Inhibitors Anaphylaxis   • Nsaids GI Intolerance   • Augmentin [Amoxicillin-Pot Clavulanate] Nausea Only   • Celecoxib Hives       Objective   Objective     Vital Signs:   Temp:  [98.6 °F (37 °C)] 98.6 °F (37 °C)  Heart Rate:  [67-89] 72  Resp:  [12-16] 12  BP: (126-164)/(72-99) 126/72        Physical Exam   Constitutional: She is oriented to person, place, and time. She appears well-developed and well-nourished. No distress.   HENT:   Head: Normocephalic and atraumatic.   Eyes: Pupils are equal, round, and reactive to light.   Neck: Normal range of motion. Neck supple. No JVD present.   Cardiovascular: Normal rate, regular rhythm, normal heart sounds and intact distal pulses. Exam reveals no gallop and no friction rub.   No murmur heard.  Pulmonary/Chest: Effort normal and breath sounds normal. No respiratory distress. She has no wheezes. She has no rales.   Abdominal: Soft. Bowel sounds are normal. She exhibits no distension and no mass. There is no tenderness. There is no guarding.   Musculoskeletal: Normal range of motion. She exhibits no edema or tenderness.   Neurological: She is alert and oriented to person, place, and time.   Skin: Skin is warm and dry. Capillary refill takes less than 2 seconds. No erythema. No pallor.   Psychiatric: She has a normal mood and affect. Her behavior is normal. Thought content normal.   Vitals reviewed.       Results Reviewed:  I have personally reviewed current lab and radiology data.    Results from last 7 days   Lab Units 12/16/19 2003   WBC 10*3/mm3 5.11   HEMOGLOBIN g/dL 11.3*   HEMATOCRIT %  "35.1   PLATELETS 10*3/mm3 318     Results from last 7 days   Lab Units 12/16/19  2158 12/16/19 2003   SODIUM mmol/L  --  140   POTASSIUM mmol/L  --  3.5   CHLORIDE mmol/L  --  102   CO2 mmol/L  --  27.0   BUN mg/dL  --  14   CREATININE mg/dL  --  0.89   GLUCOSE mg/dL  --  99   CALCIUM mg/dL  --  10.2   ALT (SGPT) U/L  --  17   AST (SGOT) U/L  --  17   TROPONIN T ng/mL <0.010 <0.010   PROBNP pg/mL  --  190.6     Estimated Creatinine Clearance: 55.4 mL/min (by C-G formula based on SCr of 0.89 mg/dL).  Brief Urine Lab Results  (Last result in the past 365 days)      Color   Clarity   Blood   Leuk Est   Nitrite   Protein   CREAT   Urine HCG        07/01/19 2352 Yellow Clear Negative Negative Negative Negative             Imaging Results (Last 24 Hours)     Procedure Component Value Units Date/Time    XR Chest 1 View [[id number]] Collected:  12/16/19 2042     Updated:  12/16/19 2044    Narrative:       CR Chest 1 Vw    INDICATION:   74-year-old female with chest pain today.     COMPARISON:    Chest 8/3/2018    FINDINGS:  Single portable AP view(s) of the chest.  The heart and mediastinal contours are normal. The lungs are clear. No pneumothorax or pleural effusion. Thoracolumbar scoliosis with partially imaged spinal fusion rods.      Impression:       No acute cardiopulmonary findings.    Signer Name: Abisai Delgado MD   Signed: 12/16/2019 8:42 PM   Workstation Name: [id number]    [facility]             Assessment/Plan   Assessment / Plan     Active Hospital Problems    Diagnosis POA   • **Chest pain [R07.9] Yes   • Essential hypertension [I10] Yes   • Elevated d-dimer [R79.89] Yes   • Gastroesophageal reflux disease without esophagitis [K21.9] Yes   • Hyperlipidemia [E78.5] Yes     74 year old female presenting to the ED with complaint of acute onset of intermittent chest \"fullness\" increased heart palpitations who has concern for chest pain rule out and elevated ddimer    Chest " pain  -troponin negative x 2, will continue to trend  -EKG negative x 2, will continue to trend  -ASA 324mg given in ed, will continue daily dose   -CBC, BMP, HgA1c, lipid panel, TSH in am   -mg+ pending   -ECHO in am  -Stress test in am  -Consider cardiology consult in am pending stress test     Elevated d-dimer  -history of post surgical DVT  -CTA chest pending     Hypertension   -continue losartan     Hyperlipidemia  -continue pravastatin     GERD  -Pepcid daily      DVT prophylaxis:  Heparin     CODE STATUS:    Code Status and Medical Interventions:   Ordered at: 12/16/19 4371     Code Status:    CPR     Medical Interventions (Level of Support Prior to Arrest):    Full       Admission Status:  I believe this patient meets OBSERVATION status, however if further evaluation or treatment plans warrant, status may change.  Based upon current information, I predict patient's care encounter to be less than or equal to 2 midnights.      Electronically signed by LOVE Mo, 12/16/19, 11:18 PM.  Attending Cosignature     I supervised care of the patient on day of service with direct care provided by the advanced care provider (APC).    Electronically signed by Ken Winn DO, 12/17/19, 4:12 AM.

## 2019-12-17 NOTE — DISCHARGE SUMMARY
"    [facility] Medicine Services  Clinical Decision Unit  DISCHARGE SUMMARY    Patient Name: Joyce Jones  : 1945  MRN: [id number]    Admission Date and Time: 2019  9:01 PM  Discharge Date and Time:  19    Primary Care Physician: Idalia Bone MD    Hospital Course     Active Hospital Problems    Diagnosis  POA   • Essential hypertension [I10]  Yes   • Elevated d-dimer [R79.89]  Yes   • Gastroesophageal reflux disease without esophagitis [K21.9]  Yes   • Hyperlipidemia [E78.5]  Yes      Resolved Hospital Problems    Diagnosis Date Resolved POA   • **Chest pain [R07.9] 2019 Yes          Brief CDU Course:  Joyce Jones is a 74 y.o. female presenting to the ED with complaint of acute onset of intermittent chest \"fullness\" increased heart palpitations who was admitted for chest pain rule out and elevated ddimer.  Vital signs stable, patient is chest pain free.  Stress test negative.  Patient has been cleared for discharge home today.     Chest pain  -troponin negative x 3, will continue to trend  -EKG negative x 2  -will continue home daily aspirin  -ECHO--mild mitral valve regurgitation; estimated EF 65%; left ventricular diastolic dysfunction is abnormal consistent with age; aortic valve exhibits mild sclerosis; mild pulmonary hypertension present; no significant structural valvular abnormality demonstrated  -Stress test--normal myocardial perfusion study with no evidence of ischemia, low risk study  -Follow-up with PCP in 1 week      Elevated d-dimer  -history of post surgical DVT  -CTA chest negative for PE     Hypertension   -continue losartan      Hyperlipidemia  -continue pravastatin      GERD  -Pepcid daily         Key Discharge Recommendations:  -Follow up with PCP in 1 week    Discharge Evaluation     Vital Signs:   Temp:  [97.9 °F (36.6 °C)-98.6 °F (37 °C)] 98.3 °F (36.8 °C)  Heart Rate:  [65-89] 71  Resp:  [12-20] 20  BP: (114-164)/(69-99) 114/74 "     Physical Exam:  Constitutional: Awake, alert, resting in bed  Eyes: PERRLA, sclerae anicteric, no conjunctival injection  HENT: NCAT, mucous membranes moist  Neck: Supple, no thyromegaly, no lymphadenopathy, trachea midline  Respiratory: Clear to auscultation bilaterally, nonlabored respirations   Cardiovascular: RRR, no murmurs, rubs, or gallops, palpable pedal pulses bilaterally  Gastrointestinal: Positive bowel sounds, soft, nontender, nondistended  Musculoskeletal: No bilateral ankle edema, no clubbing or cyanosis to extremities  Psychiatric: Appropriate affect, cooperative  Neurologic: Oriented x 3, strength symmetric in all extremities, Cranial Nerves grossly intact to confrontation, speech clear  Skin: No rashes     Discharge Medications and Follow-Up        Discharge Medications      Changes to Medications      Instructions Start Date   losartan 50 MG tablet  Commonly known as:  COZAAR  What changed:    · when to take this  · reasons to take this   50 mg, Oral, Daily         Continue These Medications      Instructions Start Date   aspirin 81 MG chewable tablet   81 mg, Oral, Daily      cetirizine 10 MG tablet  Commonly known as:  zyrTEC   1 mg, Oral, Daily      ibuprofen 200 MG tablet  Commonly known as:  ADVIL,MOTRIN   200 mg, Oral, Every 6 Hours PRN      pravastatin 40 MG tablet  Commonly known as:  PRAVACHOL   40 mg, Oral, As Needed         Stop These Medications    promethazine 25 MG tablet  Commonly known as:  PHENERGAN     traMADol-acetaminophen 37.5-325 MG per tablet  Commonly known as:  ULTRACET              No future appointments.        Time Spent on Discharge:  45 minutes    Electronically signed by LOVE Maldonado, 12/17/19, 10:54 AM.

## 2020-03-14 ENCOUNTER — TELEPHONE (OUTPATIENT)
Dept: GASTROENTEROLOGY | Facility: CLINIC | Age: 75
End: 2020-03-14

## 2020-03-14 DIAGNOSIS — K57.92 DIVERTICULITIS: Primary | ICD-10-CM

## 2020-03-14 RX ORDER — SULFAMETHOXAZOLE AND TRIMETHOPRIM 800; 160 MG/1; MG/1
1 TABLET ORAL 2 TIMES DAILY
Qty: 20 TABLET | Refills: 0 | Status: SHIPPED | OUTPATIENT
Start: 2020-03-14 | End: 2020-03-24

## 2020-03-14 RX ORDER — METRONIDAZOLE 250 MG/1
250 TABLET ORAL 3 TIMES DAILY
Qty: 30 TABLET | Refills: 0 | Status: SHIPPED | OUTPATIENT
Start: 2020-03-14 | End: 2020-03-24

## 2020-03-16 ENCOUNTER — TELEPHONE (OUTPATIENT)
Dept: GASTROENTEROLOGY | Facility: CLINIC | Age: 75
End: 2020-03-16

## 2020-03-16 DIAGNOSIS — R10.9 LEFT SIDED ABDOMINAL PAIN: ICD-10-CM

## 2020-03-16 DIAGNOSIS — R10.2 PELVIC PAIN: ICD-10-CM

## 2020-03-16 DIAGNOSIS — K57.92 DIVERTICULITIS: Primary | ICD-10-CM

## 2020-03-16 DIAGNOSIS — R10.9 ABDOMINAL PAIN, UNSPECIFIED ABDOMINAL LOCATION: ICD-10-CM

## 2020-03-16 DIAGNOSIS — R19.7 DIARRHEA, UNSPECIFIED TYPE: ICD-10-CM

## 2020-03-16 NOTE — TELEPHONE ENCOUNTER
Called Patient as requested by  to schedule the patient for a two week follow up. Patient is requesting a scan of her abdomen because she thinks something else is going on besides just the diverticulitis specifically a hernia. Patients reasoning behind this is she's having sever left sided sharp pains which she says she has not had this in the past with diverticulitis.  Per patient she does not want to come in for a appointment at this time because of COVID-19 .    Confirmed I understood and will send  and LOVE Gil a message to see what their recommendations are at this time. Patient confirmed she understood and had no additional questions.   
Nicholas Napier of please take note of this patient.  Should be seen sometime in the next few weeks.  For the above-noted reasons.  Okay to follow-up with Adrien hernandez  
Patient complains of LLQ pain, consistent with prior diverticulitis. Symptoms started yesterday. No fever.    >> Rx Bactrim and Flagyl  >> Recommend f/u with Dr. Cantu within 2 weeks  
No

## 2020-03-16 NOTE — TELEPHONE ENCOUNTER
"Ok to schedule her for CT    ----- Message from Quyen Moreno MA sent at 3/16/2020  9:59 AM EDT -----  \"Called Patient as requested by  to schedule the patient for a two week follow up. Patient is requesting a scan of her abdomen because she thinks something else is going on besides just the diverticulitis specifically a hernia. Patients reasoning behind this is she's having sever left sided sharp pains which she says she has not had this in the past with diverticulitis.  Per patient she does not want to come in for a appointment at this time because of COVID-19 .    Confirmed I understood and will send  and LOVE Gil a message to see what their recommendations are at this time. Patient confirmed she understood and had no additional questions. \"    Phone call from patient noted above. What are your recommendations at this time?     "

## 2020-03-16 NOTE — TELEPHONE ENCOUNTER
Called and notified patient . Confirmed her symptoms are left side abdominal and pelvic pain with diarrhea. Per LOVE Gil ordered Ct abdomen and pelvis with IV Contrast

## 2020-03-16 NOTE — TELEPHONE ENCOUNTER
Mrs. Jones left me a voicemail this morning that she really wanted to talk to Adrien but she does not want to come into the office. Will you please call her and see what kind of scan she was referring to needing. Thank you!

## 2020-03-20 ENCOUNTER — HOSPITAL ENCOUNTER (OUTPATIENT)
Dept: CT IMAGING | Facility: HOSPITAL | Age: 75
Discharge: HOME | End: 2020-03-20
Admitting: NURSE PRACTITIONER

## 2020-03-20 DIAGNOSIS — R10.9 LEFT SIDED ABDOMINAL PAIN: ICD-10-CM

## 2020-03-20 DIAGNOSIS — R10.9 ABDOMINAL PAIN, UNSPECIFIED ABDOMINAL LOCATION: ICD-10-CM

## 2020-03-20 DIAGNOSIS — R19.7 DIARRHEA, UNSPECIFIED TYPE: ICD-10-CM

## 2020-03-20 DIAGNOSIS — R10.2 PELVIC PAIN: ICD-10-CM

## 2020-03-20 LAB — CREAT BLDA-MCNC: 1.4 MG/DL (ref 0.6–1.3)

## 2020-03-20 PROCEDURE — 82565 ASSAY OF CREATININE: CPT

## 2020-03-20 PROCEDURE — 74177 CT ABD & PELVIS W/CONTRAST: CPT

## 2020-03-20 PROCEDURE — 25010000002 IOPAMIDOL 61 % SOLUTION: Performed by: NURSE PRACTITIONER

## 2020-03-20 RX ADMIN — IOPAMIDOL 85 ML: 612 INJECTION, SOLUTION INTRAVENOUS at 11:00

## 2020-04-09 ENCOUNTER — DOCUMENTATION (OUTPATIENT)
Dept: GASTROENTEROLOGY | Facility: CLINIC | Age: 75
End: 2020-04-09

## 2020-04-09 RX ORDER — METRONIDAZOLE 500 MG/1
500 TABLET ORAL 3 TIMES DAILY
Qty: 30 TABLET | Refills: 0 | Status: SHIPPED | OUTPATIENT
Start: 2020-04-09 | End: 2020-04-19

## 2020-04-09 RX ORDER — CIPROFLOXACIN 500 MG/1
500 TABLET, FILM COATED ORAL 2 TIMES DAILY
Qty: 20 TABLET | Refills: 0 | Status: SHIPPED | OUTPATIENT
Start: 2020-04-09 | End: 2020-04-19

## 2020-04-09 NOTE — PROGRESS NOTES
Patient called with c/o LLQ abdominal pain with hx of recurrent diverticulitis. No fevers. Pain started 3 days ago and is worsening. Last treated March 16 after CT confirmed diverticulitis. Reports pain completely resolved and this feels like a re-occurrence.    Plan:  Cipro 500 mg BID + Flagyl 500 mg q8 hours for 10 days

## 2020-05-07 ENCOUNTER — OFFICE VISIT (OUTPATIENT)
Dept: GASTROENTEROLOGY | Facility: CLINIC | Age: 75
End: 2020-05-07

## 2020-05-07 DIAGNOSIS — K57.92 DIVERTICULITIS: Primary | ICD-10-CM

## 2020-05-07 PROCEDURE — 99213 OFFICE O/P EST LOW 20 MIN: CPT | Performed by: NURSE PRACTITIONER

## 2020-05-07 RX ORDER — CLOTRIMAZOLE AND BETAMETHASONE DIPROPIONATE 10; .64 MG/G; MG/G
CREAM TOPICAL
COMMUNITY
Start: 2020-03-04 | End: 2021-10-18

## 2020-05-07 RX ORDER — METRONIDAZOLE 500 MG/1
500 TABLET ORAL 3 TIMES DAILY
Qty: 30 TABLET | Refills: 0 | Status: SHIPPED | OUTPATIENT
Start: 2020-05-07 | End: 2020-05-17

## 2020-05-07 RX ORDER — CIPROFLOXACIN 500 MG/1
500 TABLET, FILM COATED ORAL 2 TIMES DAILY
Qty: 20 TABLET | Refills: 0 | Status: SHIPPED | OUTPATIENT
Start: 2020-05-07 | End: 2020-05-17

## 2020-05-07 RX ORDER — AZELASTINE HYDROCHLORIDE, FLUTICASONE PROPIONATE 137; 50 UG/1; UG/1
SPRAY, METERED NASAL
COMMUNITY
End: 2020-08-27

## 2020-05-07 RX ORDER — CIPROFLOXACIN AND DEXAMETHASONE 3; 1 MG/ML; MG/ML
SUSPENSION/ DROPS AURICULAR (OTIC)
COMMUNITY
End: 2020-10-20

## 2020-05-07 NOTE — PROGRESS NOTES
GASTROENTEROLOGY OFFICE NOTE  Joyce Jones  [id number]  1945    CARE TEAM  Patient Care Team:  Idalia Bone MD as PCP - General (Internal Medicine)  Zeeshan Shaw MD as Consulting Physician (Neurosurgery)  Hu Dewey MD as Consulting Physician (Pain Medicine)    Referring Provider: Idalia Bone MD    Chief Complaint   Patient presents with   • Follow-up   • Diverticulitis   • Abdominal Pain        HISTORY OF PRESENT ILLNESS:  Mrs. Jones returns today with a history of recurrent uncomplicated diverticulitis, first diagnosed in July 2019 (CT confirmed) and responded well with complete resolution of her symptoms with antibiotic treatment.  Since that time she has had 2 additional episodes of CT confirmed diverticulitis once in September 2019 and again in March 2020.  On April 9, 2020 she called with complaints of left lower quadrant pain and was treated empirically for diverticulitis.  With each episode, she limits her diet to liquids only and within 3 to 4 days of beginning antibiotics and diet change her pain resolves completely.  Today, she reports that she began having left lower quadrant pain again 2 days ago with no associated fevers, nausea and changes in her bowel habits.    Her last colonoscopy was in December 2019 which showed internal hemorrhoids, diverticulosis of the sigmoid and descending colon and diverticulosis in the transverse colon.    PAST MEDICAL HISTORY  Past Medical History:   Diagnosis Date   • Allergic rhinitis    • Anemia    • Arthritis    • Diverticulosis    • Heart murmur    • History of bone density study 03/02/2017    [facility]   • History of mammography, screening 2016   • Hyperlipidemia    • Hypertension    • Inguinal hernia 1978    repair   • Personal history of DVT (deep vein thrombosis) 2005    right leg- due to surgery   • Positive TB test     Always comes back + but negative further testing        PAST SURGICAL HISTORY  Past Surgical History:    Procedure Laterality Date   • BACK SURGERY  2002    Scoliosis bar. T11-sacrum   • CERVICAL SPINE SURGERY  2000    cage   • CHOLECYSTECTOMY  2014   • COLONOSCOPY  2016   • FOOT SURGERY  2008   • HERNIA REPAIR Bilateral 1978    Inguinal hernia   • HYSTERECTOMY  1992?    1 ovary left   • KNEE SURGERY      2005 and x2 in 8/2017(left)   • NASAL SEPTUM SURGERY  1999        MEDICATIONS:    Current Outpatient Medications:   •  aspirin 81 MG chewable tablet, Chew 81 mg Daily., Disp: , Rfl:   •  Azelastine-Fluticasone (Dymista) 137-50 MCG/ACT suspension, Dymista 137 mcg-50 mcg/spray nasal spray, Disp: , Rfl:   •  cetirizine (zyrTEC) 10 MG tablet, Take 1 mg by mouth Daily., Disp: , Rfl: 5  •  losartan (COZAAR) 50 MG tablet, Take 1 tablet by mouth Daily. (Patient taking differently: Take 50 mg by mouth As Needed.), Disp: 90 tablet, Rfl: 1  •  traMADol-acetaminophen (ULTRACET) 37.5-325 MG per tablet, Take 2 tablets by mouth 2 (Two) Times a Day., Disp: , Rfl:   •  ciprofloxacin (Cipro) 500 MG tablet, Take 1 tablet by mouth 2 (Two) Times a Day for 10 days., Disp: 20 tablet, Rfl: 0  •  ciprofloxacin-dexamethasone (Ciprodex) 0.3-0.1 % otic suspension, Ciprodex 0.3 %-0.1 % ear drops,suspension, Disp: , Rfl:   •  clotrimazole-betamethasone (LOTRISONE) 1-0.05 % cream, MARISOL EXT TO THE AFFECTED AND SURROUNDING AREAS BID IN THE MORNING AND IN THE MERCEDEZ FOR 2 WKS, Disp: , Rfl:   •  ibuprofen (ADVIL,MOTRIN) 200 MG tablet, Take 200 mg by mouth Every 6 (Six) Hours As Needed for Mild Pain ., Disp: , Rfl:   •  metroNIDAZOLE (Flagyl) 500 MG tablet, Take 1 tablet by mouth 3 (Three) Times a Day for 10 days., Disp: 30 tablet, Rfl: 0  •  pravastatin (PRAVACHOL) 40 MG tablet, Take 40 mg by mouth As Needed., Disp: , Rfl:     ALLERGIES  Allergies   Allergen Reactions   • Overton-2 Inhibitors Anaphylaxis   • Nsaids GI Intolerance   • Augmentin [Amoxicillin-Pot Clavulanate] Nausea Only   • Celecoxib Hives       FAMILY HISTORY:  Family History   Problem  Relation Age of Onset   • Colon cancer Mother    • Uterine cancer Mother    • Hypertension Mother    • Kidney disease Mother    • Tuberculosis Mother    • Cancer Father         Bladder   • Heart failure Father    • Stroke Father    • Hypertension Father    • Heart attack Father    • Colon polyps Father    • Hypertension Brother    • Kidney disease Brother    • Colon polyps Brother    • Kidney disease Brother    • Hypertension Brother    • Other Daughter         CVID   • Diabetes Daughter    • Colon cancer Maternal Uncle    • Colon cancer Maternal Uncle        SOCIAL HISTORY  Social History     Socioeconomic History   • Marital status:      Spouse name: Not on file   • Number of children: Not on file   • Years of education: Not on file   • Highest education level: Not on file   Tobacco Use   • Smoking status: Never Smoker   • Smokeless tobacco: Never Used   Substance and Sexual Activity   • Alcohol use: No   • Drug use: No   • Sexual activity: Defer     Comment:        REVIEW OF SYSTEMS  Review of Systems   Constitutional: Negative for activity change, appetite change, chills, diaphoresis, fatigue, fever, unexpected weight gain and unexpected weight loss.   HENT: Negative for trouble swallowing and voice change.    Gastrointestinal: Positive for abdominal pain. Negative for abdominal distention, anal bleeding, blood in stool, constipation, diarrhea, nausea, rectal pain, vomiting, GERD and indigestion.         PHYSICAL EXAM   There were no vitals taken for this visit.  Physical Exam   Constitutional: She is oriented to person, place, and time. She appears well-developed and well-nourished.   HENT:   Head: Normocephalic.   Mouth/Throat: Oropharynx is clear and moist.   Eyes: Pupils are equal, round, and reactive to light. EOM are normal.   Neck: Normal range of motion. Neck supple.   Cardiovascular: Normal rate and regular rhythm.   Pulmonary/Chest: Effort normal and breath sounds normal. She has no  wheezes. She has no rales.   Abdominal: Soft. Bowel sounds are normal. She exhibits no mass. There is tenderness in the left lower quadrant. There is no rebound and no guarding. No hernia.   Musculoskeletal: Normal range of motion.   Neurological: She is alert and oriented to person, place, and time. No cranial nerve deficit.   Skin: Skin is warm and dry.   Psychiatric: She has a normal mood and affect. Her behavior is normal. Judgment normal.   Nursing note and vitals reviewed.    Results Review:  [facility] records reviewed and discussed with patient.     ASSESSMENT / PLAN  1.  Recurrent diverticulitis  - Cipro 500 mg twice daily and Flagyl 500 mg 3 times daily for 10 days  - Refer to general surgery    Return if symptoms worsen or fail to improve.    I discussed the patients findings and my recommendations with patient    LOVE Madrid

## 2020-05-08 DIAGNOSIS — K57.92 DIVERTICULITIS: Primary | ICD-10-CM

## 2020-05-08 RX ORDER — PROMETHAZINE HYDROCHLORIDE 12.5 MG/1
TABLET ORAL
Qty: 20 TABLET | Refills: 0 | Status: SHIPPED | OUTPATIENT
Start: 2020-05-08 | End: 2020-08-21 | Stop reason: SDUPTHER

## 2020-05-14 ENCOUNTER — TELEPHONE (OUTPATIENT)
Dept: GASTROENTEROLOGY | Facility: CLINIC | Age: 75
End: 2020-05-14

## 2020-05-14 RX ORDER — SULFAMETHOXAZOLE AND TRIMETHOPRIM 800; 160 MG/1; MG/1
1 TABLET ORAL 2 TIMES DAILY
Qty: 10 TABLET | Refills: 0 | Status: SHIPPED | OUTPATIENT
Start: 2020-05-14 | End: 2020-05-19

## 2020-05-14 NOTE — TELEPHONE ENCOUNTER
Patient is having side effects from the Cipro.  She has shakiness and weakness which has been increasing in intensity.  She did speak to someone last night on call. She has 4 days left and is wondering if she needs to switch to a different medication.        --Have her stop the Cipro and flagyl. If still having pain will start her on Bactrim DS BID + Flagyl 500 mg TID for 5 days.    Also, I had her referred to general surgery, Dr. DUNCAN for recurrent diverticulitis. Does she have an appointment set up yet?

## 2020-05-14 NOTE — TELEPHONE ENCOUNTER
Patient is still having some pain and would like to go ahead and continue with the Bactrim.  She has 5 days of flagyl already so we will just send in the Bactrim for her.

## 2020-08-19 ENCOUNTER — TELEPHONE (OUTPATIENT)
Dept: GASTROENTEROLOGY | Facility: CLINIC | Age: 75
End: 2020-08-19

## 2020-08-19 RX ORDER — CIPROFLOXACIN 500 MG/1
500 TABLET, FILM COATED ORAL 2 TIMES DAILY
Qty: 20 TABLET | Refills: 0 | Status: SHIPPED | OUTPATIENT
Start: 2020-08-19 | End: 2020-08-29

## 2020-08-19 RX ORDER — METRONIDAZOLE 500 MG/1
500 TABLET ORAL 3 TIMES DAILY
Qty: 30 TABLET | Refills: 0 | Status: SHIPPED | OUTPATIENT
Start: 2020-08-19 | End: 2020-08-29

## 2020-08-19 NOTE — TELEPHONE ENCOUNTER
Patient called and states that she believes she is having a diverticulitis flare up again and would like to get the antibiotics for that if possible. You do have a cancellation for 9am 8/20/20 if she can make that appointment would you like me to schedule her. Patient also wanted to be referred to a general surgeon for a second opinion. She has appt with Dr. Mccarthy again on 8-25-20 for the diverticulitis. Is there another surgeon you would recommend at this time or is that okay?

## 2020-08-19 NOTE — TELEPHONE ENCOUNTER
Patient advised of medications at pharmacy, also scheduled for appt with Dr. Garza 8-26-20 @2:30pm

## 2020-08-19 NOTE — TELEPHONE ENCOUNTER
I have sent antibiotics to her pharmacy. If she wants a second surgery opinion ask her if she has a surgeon in mind, if not recommend Dr. Robert Garza at [facility] and call them to refer/make her an appointment. [phone]

## 2020-08-21 RX ORDER — PROMETHAZINE HYDROCHLORIDE 12.5 MG/1
TABLET ORAL
Qty: 20 TABLET | Refills: 0 | Status: SHIPPED | OUTPATIENT
Start: 2020-08-21 | End: 2020-09-17

## 2020-08-27 ENCOUNTER — LAB (OUTPATIENT)
Dept: LAB | Facility: HOSPITAL | Age: 75
End: 2020-08-27

## 2020-08-27 ENCOUNTER — OFFICE VISIT (OUTPATIENT)
Dept: INTERNAL MEDICINE | Facility: CLINIC | Age: 75
End: 2020-08-27

## 2020-08-27 VITALS
HEART RATE: 80 BPM | BODY MASS INDEX: 24.56 KG/M2 | WEIGHT: 147.6 LBS | TEMPERATURE: 97.8 F | DIASTOLIC BLOOD PRESSURE: 76 MMHG | OXYGEN SATURATION: 98 % | SYSTOLIC BLOOD PRESSURE: 142 MMHG

## 2020-08-27 DIAGNOSIS — E78.5 HYPERLIPIDEMIA, UNSPECIFIED HYPERLIPIDEMIA TYPE: ICD-10-CM

## 2020-08-27 DIAGNOSIS — I10 ESSENTIAL HYPERTENSION: ICD-10-CM

## 2020-08-27 DIAGNOSIS — D64.9 ANEMIA, UNSPECIFIED TYPE: ICD-10-CM

## 2020-08-27 DIAGNOSIS — Z76.89 ENCOUNTER TO ESTABLISH CARE: Primary | ICD-10-CM

## 2020-08-27 DIAGNOSIS — N76.0 ACUTE VAGINITIS: ICD-10-CM

## 2020-08-27 DIAGNOSIS — D80.2 IGA DEFICIENCY (HCC): ICD-10-CM

## 2020-08-27 DIAGNOSIS — Z12.31 ENCOUNTER FOR SCREENING MAMMOGRAM FOR BREAST CANCER: ICD-10-CM

## 2020-08-27 DIAGNOSIS — R01.1 CARDIAC MURMUR: ICD-10-CM

## 2020-08-27 DIAGNOSIS — M40.204 KYPHOSIS OF THORACIC REGION, UNSPECIFIED KYPHOSIS TYPE: ICD-10-CM

## 2020-08-27 DIAGNOSIS — M41.34 THORACOGENIC SCOLIOSIS OF THORACIC REGION: ICD-10-CM

## 2020-08-27 DIAGNOSIS — M81.0 AGE-RELATED OSTEOPOROSIS WITHOUT CURRENT PATHOLOGICAL FRACTURE: ICD-10-CM

## 2020-08-27 DIAGNOSIS — Z87.19 HISTORY OF DIVERTICULITIS: ICD-10-CM

## 2020-08-27 LAB
ALBUMIN SERPL-MCNC: 4.4 G/DL (ref 3.5–5.2)
ALBUMIN/GLOB SERPL: 1.6 G/DL
ALP SERPL-CCNC: 77 U/L (ref 39–117)
ALT SERPL W P-5'-P-CCNC: 25 U/L (ref 1–33)
ANION GAP SERPL CALCULATED.3IONS-SCNC: 11.9 MMOL/L (ref 5–15)
AST SERPL-CCNC: 34 U/L (ref 1–32)
BASOPHILS # BLD AUTO: 0.04 10*3/MM3 (ref 0–0.2)
BASOPHILS NFR BLD AUTO: 0.9 % (ref 0–1.5)
BILIRUB SERPL-MCNC: 0.3 MG/DL (ref 0–1.2)
BUN SERPL-MCNC: 12 MG/DL (ref 8–23)
BUN/CREAT SERPL: 13.2 (ref 7–25)
CALCIUM SPEC-SCNC: 9.9 MG/DL (ref 8.6–10.5)
CHLORIDE SERPL-SCNC: 105 MMOL/L (ref 98–107)
CO2 SERPL-SCNC: 23.1 MMOL/L (ref 22–29)
CREAT SERPL-MCNC: 0.91 MG/DL (ref 0.57–1)
DEPRECATED RDW RBC AUTO: 45.5 FL (ref 37–54)
EOSINOPHIL # BLD AUTO: 0.05 10*3/MM3 (ref 0–0.4)
EOSINOPHIL NFR BLD AUTO: 1.1 % (ref 0.3–6.2)
ERYTHROCYTE [DISTWIDTH] IN BLOOD BY AUTOMATED COUNT: 12.8 % (ref 12.3–15.4)
FOLATE SERPL-MCNC: 18.9 NG/ML (ref 4.78–24.2)
GFR SERPL CREATININE-BSD FRML MDRD: 61 ML/MIN/1.73
GLOBULIN UR ELPH-MCNC: 2.7 GM/DL
GLUCOSE SERPL-MCNC: 94 MG/DL (ref 65–99)
HCT VFR BLD AUTO: 35.8 % (ref 34–46.6)
HGB BLD-MCNC: 12.1 G/DL (ref 12–15.9)
IGA1 MFR SER: 171 MG/DL (ref 70–400)
IGG1 SER-MCNC: 740 MG/DL (ref 700–1600)
IMM GRANULOCYTES # BLD AUTO: 0.03 10*3/MM3 (ref 0–0.05)
IMM GRANULOCYTES NFR BLD AUTO: 0.7 % (ref 0–0.5)
LYMPHOCYTES # BLD AUTO: 0.8 10*3/MM3 (ref 0.7–3.1)
LYMPHOCYTES NFR BLD AUTO: 17.6 % (ref 19.6–45.3)
MCH RBC QN AUTO: 32.8 PG (ref 26.6–33)
MCHC RBC AUTO-ENTMCNC: 33.8 G/DL (ref 31.5–35.7)
MCV RBC AUTO: 97 FL (ref 79–97)
MONOCYTES # BLD AUTO: 0.49 10*3/MM3 (ref 0.1–0.9)
MONOCYTES NFR BLD AUTO: 10.8 % (ref 5–12)
NEUTROPHILS NFR BLD AUTO: 3.14 10*3/MM3 (ref 1.7–7)
NEUTROPHILS NFR BLD AUTO: 68.9 % (ref 42.7–76)
NRBC BLD AUTO-RTO: 0 /100 WBC (ref 0–0.2)
PLATELET # BLD AUTO: 265 10*3/MM3 (ref 140–450)
PMV BLD AUTO: 9.9 FL (ref 6–12)
POTASSIUM SERPL-SCNC: 3.9 MMOL/L (ref 3.5–5.2)
PROT SERPL-MCNC: 7.1 G/DL (ref 6–8.5)
RBC # BLD AUTO: 3.69 10*6/MM3 (ref 3.77–5.28)
SODIUM SERPL-SCNC: 140 MMOL/L (ref 136–145)
VIT B12 BLD-MCNC: 619 PG/ML (ref 211–946)
WBC # BLD AUTO: 4.55 10*3/MM3 (ref 3.4–10.8)

## 2020-08-27 PROCEDURE — 82784 ASSAY IGA/IGD/IGG/IGM EACH: CPT | Performed by: NURSE PRACTITIONER

## 2020-08-27 PROCEDURE — 85025 COMPLETE CBC W/AUTO DIFF WBC: CPT | Performed by: NURSE PRACTITIONER

## 2020-08-27 PROCEDURE — 82746 ASSAY OF FOLIC ACID SERUM: CPT | Performed by: NURSE PRACTITIONER

## 2020-08-27 PROCEDURE — 99214 OFFICE O/P EST MOD 30 MIN: CPT | Performed by: NURSE PRACTITIONER

## 2020-08-27 PROCEDURE — 80053 COMPREHEN METABOLIC PANEL: CPT | Performed by: NURSE PRACTITIONER

## 2020-08-27 PROCEDURE — 82607 VITAMIN B-12: CPT | Performed by: NURSE PRACTITIONER

## 2020-08-27 RX ORDER — FLUCONAZOLE 150 MG/1
150 TABLET ORAL ONCE
Qty: 1 TABLET | Refills: 0 | Status: SHIPPED | OUTPATIENT
Start: 2020-08-27 | End: 2020-08-27

## 2020-08-27 NOTE — PROGRESS NOTES
"Chief Complaint   Patient presents with   • Establish Care   • Hypertension   • Back Pain   • Diverticulitis       History of Present Illness  74 y.o.female presents for establish care, htn, back problems, recent diverticulitis.    Dx with diverticulitis over a year ago intermittent since then but has been more frequent with most recent time earlier in august. Taking cipro and flagyl. abd pain has improved. No fever or diarrhea.  Seen by Dr. Garza yesterday; added more fiber.   inflammaroty arthritis hx.   hld takes pravastatin without difficulties. No myalgias.  htn usually 128/68. Takes losartan prn.  \"Crystals\" in kidney bladder uti negative.  Has appt with dr john urology tomorrow. When urinate has pain open of bladder. Thinks maybe vaginal irritation after abx.  slilght osteoporosis dexa due.  Scoliosis T7-sacrum kyphosis feels like curve shift getting worse left rib cage almost touches hip. Need ortho back specialist  IGA deficiency  Autoimmune nonmarfan connective tissue syndrome  Want need new cardiologist with hx of murmur; has been years since had echo and would like to FU.    Review of Systems   Constitutional: Negative for chills, fatigue and fever.   Eyes: Negative for blurred vision and visual disturbance.   Respiratory: Negative for cough and shortness of breath.    Cardiovascular: Negative for chest pain, palpitations and leg swelling.   Gastrointestinal: Positive for abdominal pain. Negative for constipation, diarrhea, nausea and vomiting.   Genitourinary: Positive for dysuria. Negative for difficulty urinating, frequency, hematuria, vaginal bleeding and vaginal discharge.   Musculoskeletal: Positive for arthralgias and back pain. Negative for myalgias.   Neurological: Negative for dizziness, syncope, light-headedness and headache.         PMSFH  The following portions of the patient's history were reviewed and updated as appropriate: allergies, current medications, past family history, past " medical history, past social history, past surgical history and problem list.     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 T11-sacrum   • CERVICAL SPINE SURGERY  2000    cage   • CHOLECYSTECTOMY  2014   • COLONOSCOPY  2016   • FOOT SURGERY  2008   • HERNIA REPAIR Bilateral 1978    Inguinal hernia   • HYSTERECTOMY  1992?    1 ovary left   • KNEE SURGERY      2005 and x2 in 8/2017(left)   • NASAL SEPTUM SURGERY  1999      Allergies   Allergen Reactions   • Overton-2 Inhibitors Anaphylaxis   • Nsaids GI Intolerance   • Augmentin [Amoxicillin-Pot Clavulanate] Nausea Only   • Celecoxib Hives      Social History     Socioeconomic History   • Marital status:    Tobacco Use   • Smoking status: Never Smoker   • Smokeless tobacco: Never Used   Substance and Sexual Activity   • Alcohol use: No   • Drug use: No   • Sexual activity: Defer     Comment:      Family History   Problem Relation Age of Onset   • Colon cancer Mother    • Uterine cancer Mother    • Hypertension Mother    • Kidney disease Mother    • Tuberculosis Mother    • Cancer Father         Bladder   • Heart failure Father    • Stroke Father    • Hypertension Father    • Heart attack Father    • Colon polyps Father    • Hypertension Brother    • Kidney disease Brother    • Colon polyps Brother    • Kidney disease Brother    • Hypertension Brother    • Other Daughter         CVID   • Diabetes Daughter    • Colon cancer Maternal Uncle    •  Colon cancer Maternal Uncle             Current Outpatient Medications:   •  aspirin 81 MG chewable tablet, Chew 81 mg Daily., Disp: , Rfl:   •  cetirizine (zyrTEC) 10 MG tablet, Take 1 mg by mouth Daily., Disp: , Rfl: 5  •  ciprofloxacin (Cipro) 500 MG tablet, Take 1 tablet by mouth 2 (Two) Times a Day for 10 days., Disp: 20 tablet, Rfl: 0  •  ciprofloxacin-dexamethasone (Ciprodex) 0.3-0.1 % otic suspension, Ciprodex 0.3 %-0.1 % ear drops,suspension, Disp: , Rfl:   •  clotrimazole-betamethasone (LOTRISONE) 1-0.05 % cream, MARISOL EXT TO THE AFFECTED AND SURROUNDING AREAS BID IN THE MORNING AND IN THE MERCEDEZ FOR 2 WKS, Disp: , Rfl:   •  ibuprofen (ADVIL,MOTRIN) 200 MG tablet, Take 200 mg by mouth Every 6 (Six) Hours As Needed for Mild Pain ., Disp: , Rfl:   •  losartan (COZAAR) 50 MG tablet, Take 1 tablet by mouth Daily. (Patient taking differently: Take 50 mg by mouth As Needed.), Disp: 90 tablet, Rfl: 1  •  metroNIDAZOLE (Flagyl) 500 MG tablet, Take 1 tablet by mouth 3 (Three) Times a Day for 10 days., Disp: 30 tablet, Rfl: 0  •  pravastatin (PRAVACHOL) 40 MG tablet, Take 40 mg by mouth As Needed., Disp: , Rfl:   •  promethazine (PHENERGAN) 12.5 MG tablet, Take 1-2 tablets every 6 hours as needed for nausea, Disp: 20 tablet, Rfl: 0  •  traMADol-acetaminophen (ULTRACET) 37.5-325 MG per tablet, Take 2 tablets by mouth 2 (Two) Times a Day., Disp: , Rfl:     VITALS:  /76   Pulse 80   Wt 67 kg (147 lb 9.6 oz)   Breastfeeding No   BMI 24.56 kg/m²     Physical Exam   Constitutional: She is oriented to person, place, and time. She appears well-developed and well-nourished. No distress.   HENT:   Head: Normocephalic.   Right Ear: External ear normal.   Left Ear: External ear normal.   Nose: Nose normal.   Mouth/Throat: Oropharynx is clear and moist.   Eyes: Pupils are equal, round, and reactive to light. Conjunctivae and EOM are normal. Right eye exhibits no discharge. Left eye exhibits no discharge.   Neck: Normal  range of motion. Neck supple. No thyromegaly present.   Cardiovascular: Normal rate, regular rhythm, S1 normal, S2 normal and intact distal pulses.   Murmur heard.  No edema   Pulmonary/Chest: Effort normal and breath sounds normal. No respiratory distress.   Abdominal: Soft. Bowel sounds are normal. She exhibits no mass. There is no tenderness. There is no rebound and no guarding.   Musculoskeletal:   Thoracic scoliosis and kyphosis   Lymphadenopathy:     She has no cervical adenopathy.   Neurological: She is alert and oriented to person, place, and time.   Skin: Skin is warm and dry. Capillary refill takes less than 2 seconds. No rash noted.   Psychiatric: She has a normal mood and affect. Her behavior is normal.   Vitals reviewed.      LABS  pending      ASSESSMENT/PLAN  Joyce was seen today for establish care, hypertension, back pain and diverticulitis.    Diagnoses and all orders for this visit:    Encounter to establish care    Age-related osteoporosis without current pathological fracture  -     DEXA Bone Density Axial; Future    Encounter for screening mammogram for breast cancer  -     Mammo Screening Digital Tomosynthesis Bilateral With CAD; Future    IgA deficiency (CMS/HCC)  -     IgA; Future  -     IgG; Future  -     CBC & Differential; Future    Anemia, unspecified type  Comments:  with macrocytosis check B12 and folate  Orders:  -     CBC & Differential; Future  -     Vitamin B12; Future  -     Folate; Future    Cardiac murmur  -     Ambulatory Referral to Cardiology    Thoracogenic scoliosis of thoracic region  -     Ambulatory Referral to Orthopedic Surgery    Kyphosis of thoracic region, unspecified kyphosis type  -     Ambulatory Referral to Orthopedic Surgery    Hyperlipidemia, unspecified hyperlipidemia type  -     Comprehensive Metabolic Panel; Future    Acute vaginitis  -     fluconazole (DIFLUCAN) 150 MG tablet; Take 1 tablet by mouth 1 (One) Time for 1 dose.    History of  diverticulitis  Comments:  cont fiber and FU with GI; complete abx as directed. improving.    Essential hypertension  Comments:  goal BP <130/80; pt says is at goal at home and takes her bp med if needed.        I discussed the patients findings and my recommendations with patient.  Patient was encouraged to keep me informed of any acute changes, lack of improvement, or any new concerning symptoms.  Patient voiced understanding of all instructions and denied further questions.      FOLLOW-UP  Return in about 1 year (around 8/27/2021), or if symptoms worsen or fail to improve, for Medicare Wellness.    Electronically signed by:    LOVE Ray  08/27/2020    EMR Dragon/Transcription Disclaimer:  Much of this encounter note is an electronic transcription/translation of spoken language to printed text.  The electronic translation of spoken language may permit erroneous, or at times, nonsensical words or phrases to be inadvertently transcribed.  Although I have reviewed the note for such errors, some may still exist

## 2020-09-10 ENCOUNTER — TELEPHONE (OUTPATIENT)
Dept: INTERNAL MEDICINE | Facility: CLINIC | Age: 75
End: 2020-09-10

## 2020-09-10 NOTE — TELEPHONE ENCOUNTER
PATIENT CALLED REQUESTING A CALL BACK TO DISCUSS MEDICATION NOT WORKING ON HER MEDICAL CONDITION.    PATIENT WANTS TO DISCUSS BEING REFERRED TO A GYNECOLOGIST OR GETTING A CULTURE    PLEASE ADVISE    PATIENT PREFERS GYNECOLOGIST FROM [facility]    BEST CALL BACK NUMBER [phone]

## 2020-09-15 ENCOUNTER — TELEPHONE (OUTPATIENT)
Dept: INTERNAL MEDICINE | Facility: CLINIC | Age: 75
End: 2020-09-15

## 2020-09-15 NOTE — TELEPHONE ENCOUNTER
PATIENT CALLED AND STATED SHE RECEIVED A LETTER IN THE MAIL STATING THAT THE OFFICE COULD NOT REACH HER TO SCHEDULE TESTING.     THE TESTING IS A MAMMOGRAM AND A BONE DENSITY TEST.     PLEASE CALL AND ADVISE [phone]

## 2020-09-16 NOTE — TELEPHONE ENCOUNTER
Called and advised patient to call central scheduling at [phone] to schedule mammogram and bone density.

## 2020-09-17 ENCOUNTER — CONSULT (OUTPATIENT)
Dept: CARDIOLOGY | Facility: CLINIC | Age: 75
End: 2020-09-17

## 2020-09-17 VITALS
HEART RATE: 76 BPM | HEIGHT: 65 IN | DIASTOLIC BLOOD PRESSURE: 60 MMHG | WEIGHT: 145.6 LBS | SYSTOLIC BLOOD PRESSURE: 118 MMHG | TEMPERATURE: 97.8 F | OXYGEN SATURATION: 98 % | BODY MASS INDEX: 24.26 KG/M2

## 2020-09-17 DIAGNOSIS — I38 HEART VALVE DISORDER: Primary | ICD-10-CM

## 2020-09-17 DIAGNOSIS — E78.2 MIXED HYPERLIPIDEMIA: ICD-10-CM

## 2020-09-17 DIAGNOSIS — I10 ESSENTIAL HYPERTENSION: ICD-10-CM

## 2020-09-17 PROCEDURE — 93000 ELECTROCARDIOGRAM COMPLETE: CPT | Performed by: PHYSICIAN ASSISTANT

## 2020-09-17 PROCEDURE — 99204 OFFICE O/P NEW MOD 45 MIN: CPT | Performed by: PHYSICIAN ASSISTANT

## 2020-09-17 RX ORDER — ASPIRIN 81 MG/1
81 TABLET ORAL DAILY
COMMUNITY
End: 2022-06-29

## 2020-09-17 NOTE — PROGRESS NOTES
[facility] - Office Note  Joyce Jones         [address] [place] [address]  1945   [phone] (home)      LOCATION:  [place] office.  Visit Type: Consult.    PCP:  Pura Burk, APRN    09/17/20   Joyce Jones is a 74 y.o.  female  retired.      Chief Complaint: Cardiac Murmur    PROBLEM LIST/PMHx:  1.  Valvular Heart Disease   A.  Dx with Cardiac Murmur several years ago, in [place].   B.  Cardiac PET Nuclear 12/2019, Dr. Hart:  Acceptable negative IV Lexiscan hybrid PET cardiac CT stress scan suggestive of low probability for significant focal obstructive coronary artery disease with preserved systolic left ventricular function (LVEF 0.54).   C.  TTE 12/2019:  Mild MR, EF 65%, The aortic valve exhibits mild sclerosis, No significant structural valvular abnormality demonstrated  2.  Essential Hypertension  3.  Dyslipidemia  4.  Seasonal Allergies/Rhinitis  5.  Chronic Back Pain  6.  H/O Diverticulitis  7.  Scoliosis of the thoracic spine (T7 level)          Allergies   Allergen Reactions   • Overton-2 Inhibitors Anaphylaxis   • Nsaids GI Intolerance   • Augmentin [Amoxicillin-Pot Clavulanate] Nausea Only   • Celecoxib Hives         Current Outpatient Medications:   •  aspirin 81 MG EC tablet, Take 81 mg by mouth Daily., Disp: , Rfl:   •  cetirizine (zyrTEC) 10 MG tablet, Take 1 mg by mouth Daily., Disp: , Rfl: 5  •  ciprofloxacin-dexamethasone (Ciprodex) 0.3-0.1 % otic suspension, Ciprodex 0.3 %-0.1 % ear drops,suspension, Disp: , Rfl:   •  clotrimazole-betamethasone (LOTRISONE) 1-0.05 % cream, MARISOL EXT TO THE AFFECTED AND SURROUNDING AREAS BID IN THE MORNING AND IN THE MERCEDEZ FOR 2 WKS, Disp: , Rfl:   •  losartan (COZAAR) 50 MG tablet, Take 1 tablet by mouth Daily. (Patient taking differently: Take 50 mg by mouth As Needed.), Disp: 90 tablet, Rfl: 1  •  pravastatin (PRAVACHOL) 40 MG tablet, Take 40 mg by mouth As Needed., Disp: , Rfl:   •   traMADol-acetaminophen (ULTRACET) 37.5-325 MG per tablet, Take 2 tablets by mouth 2 (Two) Times a Day., Disp: , Rfl:     HPI  Joyce Jones is here today in consultation to establish care for evaluation of VHDz with reported cardiac murmur with history of controlled HTN, HLP.  She was diagnosed with a heart murmur several years ago in [place] and has not followed up with anyone regarding this.  She is referred by her PCP and daughter who is a patient in our practice.  She is doing quite well from a cardiac standpoint - blood pressure is well controlled and recent labs show normal renal function.  Dyslipidemia is controlled by statin and diet.  She has suffered from diverticulitis in the past year and has adjusted her diet accordingly.  In doing so, she has lost ~ 45#.  Her most recent lipid panel is normal.    She denies current chest pain or exertional discomfort.  She has some discomfort back 12/2019 and underwent stress test (report above).  She realized that it was really pulled muscles from doing exercises for her scoliosis.  She has no CHF type symptoms with rare days of trace pedal edema.  She denies shortness of air or exertional dyspnea, denies syncope, denies dizziness, denies palpitations or irregular heart beats.           The following portions of the patient's history were reviewed in the chart and updated as appropriate: allergies, current medications, past family history, past medical history, past social history, past surgical history and problem list.    Review of Systems   Constitution: Positive for weight loss. Negative for chills and malaise/fatigue.   Eyes: Negative for double vision and visual disturbance.   Cardiovascular: Positive for leg swelling. Negative for chest pain, dyspnea on exertion, irregular heartbeat, near-syncope, orthopnea, palpitations and syncope.   Respiratory: Negative for cough and shortness of breath.    Musculoskeletal: Positive for arthritis, back pain and stiffness.  "Negative for falls.   Gastrointestinal: Negative for heartburn and nausea.   Neurological: Negative for dizziness, headaches and light-headedness.   Allergic/Immunologic: Positive for environmental allergies.   All other systems reviewed and are negative.            height is 165.1 cm (65\") and weight is 66 kg (145 lb 9.6 oz). Her temperature is 97.8 °F (36.6 °C). Her blood pressure is 118/60 and her pulse is 76. Her oxygen saturation is 98%.   Constitutional:       Appearance: Normal appearance. Well-developed.   Eyes:      General: Lids are normal.      Conjunctiva/sclera: Conjunctivae normal.      Right eye: Right conjunctiva is not injected.      Left eye: Left conjunctiva is not injected.      Pupils: Pupils are equal, round, and reactive to light.   HENT:      Head: Normocephalic and atraumatic.      Right Ear: Hearing and external ear normal.      Left Ear: Hearing and external ear normal.      Nose: Nose normal. No septal deviation.   Neck:      Musculoskeletal: Normal range of motion. No edema or erythema.      Thyroid: No thyroid mass or thyromegaly.      Vascular: No carotid bruit or JVD.   Pulmonary:      Effort: Pulmonary effort is normal. No respiratory distress.      Breath sounds: Normal breath sounds. No decreased breath sounds. No wheezing. No rhonchi. No rales.   Cardiovascular:      Normal rate. Regular rhythm.   Abdominal:      General: Bowel sounds are normal. There is no abdominal bruit.      Palpations: Abdomen is soft. There is no abdominal mass.      Tenderness: There is no abdominal tenderness.   Musculoskeletal: Normal range of motion.      Extremities: No clubbing present.  Skin:     General: Skin is warm and dry. There is no cyanosis.   Neurological:      Mental Status: Alert.   Psychiatric:         Speech: Speech normal.             ECG 12 Lead    Date/Time: 9/17/2020 10:45 AM  Performed by: Pura Burt PA  Authorized by: Pura Burt PA   Comparison: not compared with " "previous ECG   Previous ECG: no previous ECG available  Rhythm: sinus rhythm  Rate: normal  QRS axis: normal    Clinical impression: normal ECG             Assessment/ Plan     Heart valve disorder:  This is a 73 yo CF diagnosed with a cardiac murmur several years ago with no recent work up.  She underwent stress test and echo back in December 2019.  She has normal EF, mild MR, mild aortic sclerosis without stenosis.  I reviewed findings with her in that she truly only has mild \"leakage\" of 1 of her valves and thickening of the aortic valve.  I did not appreciate a murmur upon physical exam today.  She is doing well and is very active with walking routinely.  At this time, I would continue to monitor on her current medical regimen.  She should return in 1 year with EKG and echo on same day.  She knows to contact us if anything changes.     Essential hypertension:  Well controlled. Continue current regimen.  Recent labs reviewed and renal function is WNL.    Mixed hyperlipidemia:  Recent lipid panel reviewed.  , , HDL 47, LDL 79.  Continue statin and appropriate diet.  Continue aerobic activity.  Get annual lipid panel and CMP with PCP.          Pura Burt PA-C functioning independently.  9/17/2020 10:39 EDT      "

## 2020-09-29 ENCOUNTER — OFFICE VISIT (OUTPATIENT)
Dept: OBSTETRICS AND GYNECOLOGY | Facility: CLINIC | Age: 75
End: 2020-09-29

## 2020-09-29 VITALS
SYSTOLIC BLOOD PRESSURE: 118 MMHG | BODY MASS INDEX: 23.99 KG/M2 | HEIGHT: 65 IN | WEIGHT: 144 LBS | DIASTOLIC BLOOD PRESSURE: 70 MMHG

## 2020-09-29 DIAGNOSIS — N76.3 SUBACUTE VULVITIS: Primary | ICD-10-CM

## 2020-09-29 PROBLEM — M81.0 OSTEOPOROSIS: Status: ACTIVE | Noted: 2020-09-29

## 2020-09-29 PROCEDURE — 99203 OFFICE O/P NEW LOW 30 MIN: CPT | Performed by: OBSTETRICS & GYNECOLOGY

## 2020-09-29 NOTE — PROGRESS NOTES
"Subjective   Chief Complaint   Patient presents with   • Gynecologic Exam     vag irritation for 1 1/2 months      Joyce Jones is a 74 y.o. year old .  No LMP recorded. Patient has had a hysterectomy.  She presents to be seen because of an external vaginal irritation for about 1-1/2 to 2 months.  She reports that this is actually really more annoying than anything else.  Is not clearing up so she is concerned that is been there so long.  She has used some OTC EMU aid which is an oil.  It helps temporarily.  She is also been given Diflucan x2 because of itching and burning and there is no change with this.  She has not noticed any lesion or abnormality.  Does have some burning with urination but a urinalysis was done and was normal.  She has a history of UTIs.  She reports she has a history of a \"autoimmune disorder that \"that affects mucous membranes.  Her ears nose UTIs.  Does not sound like she has been treated for or diagnosed with Sjogren's syndrome.  Does have have a history of diverticulosis.  Maybe there is been a flare recently history is not clear to me whether or not this was before after or during recent outbreak.  Not been sexually active about 6 months or so because her  sounds like he has some benign prostatic hypertrophy issues he is dealing with.          Social History     Tobacco Use   Smoking Status Never Smoker   Smokeless Tobacco Never Used     Social History     Substance and Sexual Activity   Alcohol Use Yes   • Alcohol/week: 1.0 standard drinks   • Types: 1 Glasses of wine per week    Comment: rarely at holidays                           The following portions of the patient's history were reviewed and updated as appropriate:She  has a past medical history of Allergic rhinitis, Anemia, Arthritis, Diverticulosis, Heart murmur, History of bone density study (2017), History of mammography, screening (), Hyperlipidemia, Hypertension, Inguinal hernia (), " Osteoarthritis (arthritis due to wear and tear of joints), Personal history of DVT (deep vein thrombosis) (2005), and Positive TB test.  She does not have any pertinent problems on file.  She  has a past surgical history that includes Nasal septum surgery (1999); Foot surgery (2008); Hernia repair (Bilateral, 1978); Knee surgery; Cholecystectomy (2014); Hysterectomy (1992?); Back surgery (2002); Colonoscopy (2016); and Cervical spine surgery (2000).  Her family history includes Cancer in her father; Colon cancer in her maternal uncle, maternal uncle, and mother; Colon polyps in her brother and father; Diabetes in her daughter; Heart attack in her father; Heart failure in her father; Hypertension in her brother, brother, father, and mother; Kidney disease in her brother, brother, and mother; Other in her daughter; Ovarian cancer in her mother; Stroke in her father; Tuberculosis in her mother; Uterine cancer in her mother.  She  reports that she has never smoked. She has never used smokeless tobacco. She reports current alcohol use of about 1.0 standard drinks of alcohol per week. She reports that she does not use drugs.  She is allergic to fr[place]-2 inhibitors; nsaids; augmentin [amoxicillin-pot clavulanate]; and celecoxib.    Current Outpatient Medications:   •  aspirin 81 MG EC tablet, Take 81 mg by mouth Daily., Disp: , Rfl:   •  cetirizine (zyrTEC) 10 MG tablet, Take 1 mg by mouth Daily., Disp: , Rfl: 5  •  ciprofloxacin-dexamethasone (Ciprodex) 0.3-0.1 % otic suspension, Ciprodex 0.3 %-0.1 % ear drops,suspension, Disp: , Rfl:   •  clotrimazole-betamethasone (LOTRISONE) 1-0.05 % cream, MARISOL EXT TO THE AFFECTED AND SURROUNDING AREAS BID IN THE MORNING AND IN THE MERCEDEZ FOR 2 WKS, Disp: , Rfl:   •  losartan (COZAAR) 50 MG tablet, Take 1 tablet by mouth Daily. (Patient taking differently: Take 50 mg by mouth As Needed.), Disp: 90 tablet, Rfl: 1  •  pravastatin (PRAVACHOL) 40 MG tablet, Take 40 mg by mouth As Needed., Disp:  ", Rfl:   •  traMADol-acetaminophen (ULTRACET) 37.5-325 MG per tablet, Take 2 tablets by mouth 2 (Two) Times a Day., Disp: , Rfl:     Review of Systems denies any fatigue cough constipation rashes headaches urinary leakage  Medical illness: Positive arthritis no diabetes seizure asthma hypertension high cholesterol depression or thyroid.  Possible osteoporosis?  GYN questionnaire positive for abnormal Pap smear.  No STD endometriosis cyst fibroids or abuse.  Social history she is  does not smoke drink or use illegal drugs.         Objective   /70   Ht 163.8 cm (64.5\")   Wt 65.3 kg (144 lb)   Breastfeeding No   BMI 24.34 kg/m²     General:  well developed; well nourished  no acute distress  appears stated age   Skin:  No suspicious lesions seen   Thyroid: not examined   Lungs:  breathing is unlabored   Heart:  Not performed.   Abdomen: soft, non-tender; no masses  no umbilical or inguinal hernias are present  no hepato-splenomegaly   Pelvis: Clinical staff was present for exam  External genitalia:  normal appearance of the external genitalia including Bartholin's and [place]'s glands.  :  urethral meatus normal;  Vaginal:  atrophic mucosal changes are present;  Uterus:  absent.  Adnexa:  non palpable bilaterally. The right sidewall is slightly tender  Rectal:  digital rectal exam not performed; anus visually normal appearing.  Cystocele GRADE 2  Enterocele GRADE 2   She was able to do a week to moderate Kegel upon request     Lab Review   CBC, CMP, TSH and UA    Imaging   CT of abdomen/pelvis report       Assessment   1. Vulvar irritation with no obvious lesions certainly nothing to suggest lichen planus or lichen sclerosis.  No erosions or obvious irritation.  She has a history of an autoimmune disorder with mucous membranes and may need to re-moisturize the vulvar skin due to this disorder .  I discussed using Desitin/zinc oxide and/or vitamin A and D ointment along with 0.5% over-the-counter " hydrocortisone may be Monday Wednesday and Friday evenings until this resolves  2. She has cystocele and enterocele discussed that she may eventually need to have surgery if this becomes symptomatic enough..  She tried a pessary before but it was sideways she thinks because she has scoliosis.  Currently I would suggest Kegel exercise for 5 minutes daily; she was able to perform a moderate or weak Kegel today     Plan   1.  Reassurance #1  2.    Hydrocortisone over-the-counter half percent 3 days a week and daytime Desitin or vitamin a and D ointment to protect and re-moisturized the vulvar skin.  3.  Follow-up as needed if cystocele enterocele develops any symptoms discussed we could perform either vaginal and/or laparoscopically assisted surgery.  4.  Try to reassure her somewhat that it sounds like her mother did not have primary ovarian cancer but rather endometrial cancer that was metastatic to the ovaries and colon and may be her bone/brain?      No orders of the defined types were placed in this encounter.    No orders of the defined types were placed in this encounter.             This note was electronically signed.    Dajuan Miller MD  September 29, 2020

## 2020-10-20 ENCOUNTER — OFFICE VISIT (OUTPATIENT)
Dept: INTERNAL MEDICINE | Facility: CLINIC | Age: 75
End: 2020-10-20

## 2020-10-20 VITALS
HEIGHT: 65 IN | OXYGEN SATURATION: 98 % | SYSTOLIC BLOOD PRESSURE: 118 MMHG | DIASTOLIC BLOOD PRESSURE: 76 MMHG | BODY MASS INDEX: 23.82 KG/M2 | HEART RATE: 74 BPM | TEMPERATURE: 97.7 F | WEIGHT: 143 LBS

## 2020-10-20 DIAGNOSIS — H60.313 ACUTE DIFFUSE OTITIS EXTERNA OF BOTH EARS: Primary | ICD-10-CM

## 2020-10-20 PROCEDURE — 99213 OFFICE O/P EST LOW 20 MIN: CPT | Performed by: NURSE PRACTITIONER

## 2020-10-20 NOTE — PROGRESS NOTES
Chief Complaint   Patient presents with   • Earache     Pt c/o pain, itching and burning on skin areas       History of Present Illness  75 y.o.female presents for earache.  Recurrent for years; this episode few days. water gets in ears with shampooing. Earache in canal and tissue all around left ear. Tried hydrogen peroxide and scratched it. Feels Irritated.  No hearing change no fevers.     Review of Systems   Constitutional: Negative for chills and fever.   HENT: Positive for ear pain. Negative for congestion, ear discharge, nosebleeds, postnasal drip, rhinorrhea, sinus pressure, sneezing and sore throat.    Skin: Positive for dry skin.       PMSFH  The following portions of the patient's history were reviewed and updated as appropriate: allergies, current medications, past family history, past medical history, past social history, past surgical history and problem list.     Past Medical History:   Diagnosis Date   • Allergic rhinitis    • Anemia    • Arthritis    • Diverticulosis    • Heart murmur    • History of bone density study 03/02/2017    [facility]   • History of mammography, screening 2016   • Hyperlipidemia    • Hypertension    • Inguinal hernia 1978    repair   • Osteoarthritis (arthritis due to wear and tear of joints)    • Personal history of DVT (deep vein thrombosis) 2005    right leg- due to surgery   • Positive TB test     Always comes back + but negative further testing      Past Surgical History:   Procedure Laterality Date   • BACK SURGERY  2002    Scoliosis bar. T11-sacrum   • CERVICAL SPINE SURGERY  2000    cage   • CHOLECYSTECTOMY  2014   • COLONOSCOPY  2016   • FOOT SURGERY  2008   • INGUINAL HERNIA REPAIR Bilateral 1978    2019 right side repair   • KNEE SURGERY Bilateral 2017    2005 and x2 in 8/2017(left)   • NASAL SEPTUM SURGERY  1999   • TOTAL ABDOMINAL HYSTERECTOMY WITH SALPINGO OOPHORECTOMY  1986    Unilateral salpingo-oophorectomy/cervical dysplasia/AUB   • URETHRAL DILATION       Multiple times      Allergies   Allergen Reactions   • Overton-2 Inhibitors Anaphylaxis   • Nsaids GI Intolerance   • Augmentin [Amoxicillin-Pot Clavulanate] Nausea Only   • Celecoxib Hives      Social History     Socioeconomic History   • Marital status:    Tobacco Use   • Smoking status: Never Smoker   • Smokeless tobacco: Never Used   Substance and Sexual Activity   • Alcohol use: Yes     Alcohol/week: 1.0 standard drinks     Types: 1 Glasses of wine per week     Comment: rarely at holidays   • Drug use: No   • Sexual activity: Not Currently     Comment:      Family History   Problem Relation Age of Onset   • Uterine cancer Mother         Metastatic to ovaries and colon; possibly bone   • Hypertension Mother    • Kidney disease Mother    • Tuberculosis Mother    • Cancer Father         Bladder   • Heart failure Father    • Stroke Father    • Hypertension Father    • Heart attack Father    • Colon polyps Father    • Hypertension Brother    • Kidney disease Brother    • Colon polyps Brother    • Kidney disease Brother    • Hypertension Brother    • Other Daughter         CVID   • Diabetes Daughter    • Colon cancer Maternal Uncle    • Colon cancer Maternal Uncle    • Breast cancer Neg Hx             Current Outpatient Medications:   •  aspirin 81 MG EC tablet, Take 81 mg by mouth Daily., Disp: , Rfl:   •  cetirizine (zyrTEC) 10 MG tablet, Take 1 mg by mouth Daily., Disp: , Rfl: 5  •  ciprofloxacin-dexamethasone (Ciprodex) 0.3-0.1 % otic suspension, Ciprodex 0.3 %-0.1 % ear drops,suspension, Disp: , Rfl:   •  clotrimazole-betamethasone (LOTRISONE) 1-0.05 % cream, MARISOL EXT TO THE AFFECTED AND SURROUNDING AREAS BID IN THE MORNING AND IN THE MERCEDEZ FOR 2 WKS, Disp: , Rfl:   •  losartan (COZAAR) 50 MG tablet, Take 1 tablet by mouth Daily. (Patient taking differently: Take 50 mg by mouth As Needed.), Disp: 90 tablet, Rfl: 1  •  pravastatin (PRAVACHOL) 40 MG tablet, Take 40 mg by mouth As Needed., Disp: , Rfl:  "  •  traMADol-acetaminophen (ULTRACET) 37.5-325 MG per tablet, Take 2 tablets by mouth 2 (Two) Times a Day., Disp: , Rfl:     VITALS:  /76   Pulse 74   Temp 97.7 °F (36.5 °C)   Ht 163.8 cm (64.5\")   Wt 64.9 kg (143 lb)   SpO2 98%   Breastfeeding No   BMI 24.17 kg/m²     Physical Exam  HENT:      Head: Normocephalic.      Right Ear: Tympanic membrane and external ear normal. Drainage present. No tenderness. No mastoid tenderness. Tympanic membrane is not injected, perforated or erythematous.      Left Ear: Tympanic membrane and external ear normal. Drainage, swelling and tenderness present.  No middle ear effusion. No foreign body. No mastoid tenderness. Tympanic membrane is not injected, perforated or erythematous.      Ears:      Comments: Bloody dried dc tristan ear canal w abrasion.     Nose: Nose normal.   Eyes:      Extraocular Movements: Extraocular movements intact.      Conjunctiva/sclera: Conjunctivae normal.      Pupils: Pupils are equal, round, and reactive to light.   Neurological:      Mental Status: She is alert.           ASSESSMENT/PLAN  Diagnoses and all orders for this visit:    1. Acute diffuse otitis externa of both ears (Primary)  -     neomycin-polymyxin-hydrocortisone (CORTISPORIN) 3.5-20966-1 otic solution; Administer 3 drops into both ears 4 (Four) Times a Day.  Dispense: 10 mL; Refill: 1    Try to keep ears dry.  Use earplugs if necessary.    I discussed the patients findings and my recommendations with patient.  Patient was encouraged to keep me informed of any acute changes, lack of improvement, or any new concerning symptoms.  Patient voiced understanding of all instructions and denied further questions.      FOLLOW-UP  Return if symptoms worsen or fail to improve.  Keep her regularly scheduled follow-up appointment in March.  Electronically signed by:    LOVE Ray  10/20/2020    EMR Dragon/Transcription Disclaimer:  Much of this encounter note is an electronic " transcription/translation of spoken language to printed text.  The electronic translation of spoken language may permit erroneous, or at times, nonsensical words or phrases to be inadvertently transcribed.  Although I have reviewed the note for such errors, some may still exist

## 2020-12-10 ENCOUNTER — APPOINTMENT (OUTPATIENT)
Dept: MAMMOGRAPHY | Facility: HOSPITAL | Age: 75
End: 2020-12-10

## 2020-12-10 ENCOUNTER — APPOINTMENT (OUTPATIENT)
Dept: BONE DENSITY | Facility: HOSPITAL | Age: 75
End: 2020-12-10

## 2021-03-23 ENCOUNTER — APPOINTMENT (OUTPATIENT)
Dept: MAMMOGRAPHY | Facility: HOSPITAL | Age: 76
End: 2021-03-23

## 2021-03-23 ENCOUNTER — APPOINTMENT (OUTPATIENT)
Dept: BONE DENSITY | Facility: HOSPITAL | Age: 76
End: 2021-03-23

## 2021-04-12 ENCOUNTER — OFFICE VISIT (OUTPATIENT)
Dept: INTERNAL MEDICINE | Facility: CLINIC | Age: 76
End: 2021-04-12

## 2021-04-12 DIAGNOSIS — R10.32 LEFT LOWER QUADRANT ABDOMINAL PAIN: Primary | ICD-10-CM

## 2021-04-12 PROCEDURE — 99442 PR PHYS/QHP TELEPHONE EVALUATION 11-20 MIN: CPT | Performed by: NURSE PRACTITIONER

## 2021-04-12 RX ORDER — CIPROFLOXACIN 500 MG/1
500 TABLET, FILM COATED ORAL 2 TIMES DAILY
Qty: 20 TABLET | Refills: 0 | Status: SHIPPED | OUTPATIENT
Start: 2021-04-12 | End: 2021-04-22

## 2021-04-12 RX ORDER — METRONIDAZOLE 500 MG/1
500 TABLET ORAL 3 TIMES DAILY
Qty: 30 TABLET | Refills: 0 | Status: SHIPPED | OUTPATIENT
Start: 2021-04-12 | End: 2021-04-22

## 2021-04-12 NOTE — PROGRESS NOTES
Chief Complaint   Patient presents with   • Diverticulitis     Flare up since yesterday.       You have chosen to receive care through a telephone visit. Do you consent to use a telephone visit for your medical care today? yes  History of Present Illness    75 y.o.female presents for diverticulitis.  night before woke up pain sore left lower quad. Has had diverticulitis in past and will get occasional flare; felt like that. Has had some diet changes which probably brought on. No fever. positive abd cramping loose stool. Had some Cipro and flagyl left from previous bought; has had 2 doses of both and feeling better. No n/v.    ROS: any positive referenced above.   Other ROS negative.      PMSFH  The following portions of the patient's history were reviewed and updated as appropriate: allergies, current medications, past family history, past medical history, past social history, past surgical history and problem list.     Past Medical History:   Diagnosis Date   • Allergic rhinitis    • Anemia    • Arthritis    • Diverticulosis    • Heart murmur    • History of bone density study 03/02/2017    [facility]   • History of mammography, screening 2016   • Hyperlipidemia    • Hypertension    • Inguinal hernia 1978    repair   • Osteoarthritis (arthritis due to wear and tear of joints)    • Personal history of DVT (deep vein thrombosis) 2005    right leg- due to surgery   • Positive TB test     Always comes back + but negative further testing      Allergies   Allergen Reactions   • Overton-2 Inhibitors Anaphylaxis   • Nsaids GI Intolerance   • Augmentin [Amoxicillin-Pot Clavulanate] Nausea Only   • Celecoxib Hives      Social History     Tobacco Use   • Smoking status: Never Smoker   • Smokeless tobacco: Never Used   Substance Use Topics   • Alcohol use: Yes     Alcohol/week: 1.0 standard drinks     Types: 1 Glasses of wine per week     Comment: rarely at holidays   • Drug use: No         Current Outpatient Medications:    •  aspirin 81 MG EC tablet, Take 81 mg by mouth Daily., Disp: , Rfl:   •  cetirizine (zyrTEC) 10 MG tablet, Take 1 mg by mouth Daily., Disp: , Rfl: 5  •  clotrimazole-betamethasone (LOTRISONE) 1-0.05 % cream, MARISOL EXT TO THE AFFECTED AND SURROUNDING AREAS BID IN THE MORNING AND IN THE MERCEDEZ FOR 2 WKS, Disp: , Rfl:   •  losartan (COZAAR) 50 MG tablet, Take 1 tablet by mouth Daily. (Patient taking differently: Take 50 mg by mouth As Needed.), Disp: 90 tablet, Rfl: 1  •  neomycin-polymyxin-hydrocortisone (CORTISPORIN) 3.5-89250-5 otic solution, Administer 3 drops into both ears 4 (Four) Times a Day., Disp: 10 mL, Rfl: 1  •  pravastatin (PRAVACHOL) 40 MG tablet, Take 40 mg by mouth As Needed., Disp: , Rfl:   •  traMADol-acetaminophen (ULTRACET) 37.5-325 MG per tablet, Take 2 tablets by mouth 2 (Two) Times a Day., Disp: , Rfl:       Physical Exam  telehealth    Assessment and Plan    Diagnoses and all orders for this visit:    1. Left lower quadrant abdominal pain (Primary)  Comments:  presumed diverticulitis flare; cont cipro flagyl  Orders:  -     ciprofloxacin (Cipro) 500 MG tablet; Take 1 tablet by mouth 2 (Two) Times a Day for 10 days.  Dispense: 20 tablet; Refill: 0  -     metroNIDAZOLE (Flagyl) 500 MG tablet; Take 1 tablet by mouth 3 (Three) Times a Day for 10 days.  Dispense: 30 tablet; Refill: 0    reviewed ss of worsening diverticulitis such as worsening abd pain, n/v/d, fever. If occurs need to let me know so we can get a CT scan or seek emergency care.  Pt aware no alcohol while taking flagyl.  Clear liquid diet for 24-48 hrs then advance to gi soft diet.  Maintain hydration.      Follow Up   Return if symptoms worsen or fail to improve.    This visit has been rescheduled as a phone visit to comply with patient safety concerns in accordance with CDC recommendations. Total time of discussion was 15 minutes.        I discussed the patients findings and my recommendations with patient.  Patient was encouraged to  keep me informed of any acute changes, lack of improvement, or any new concerning symptoms.  Patient voiced understanding of all instructions and denied further questions.    Electronically signed by:    LOVE Ray  04/12/2021    EMR Dragon/Transcription Disclaimer:  Much of this encounter note is an electronic transcription/translation of spoken language to printed text.  The electronic translation of spoken language may permit erroneous, or at times, nonsensical words or phrases to be inadvertently transcribed.  Although I have reviewed the note for such errors, some may still exist

## 2021-04-16 ENCOUNTER — OFFICE VISIT (OUTPATIENT)
Dept: INTERNAL MEDICINE | Facility: CLINIC | Age: 76
End: 2021-04-16

## 2021-04-16 VITALS
SYSTOLIC BLOOD PRESSURE: 112 MMHG | HEART RATE: 78 BPM | DIASTOLIC BLOOD PRESSURE: 70 MMHG | OXYGEN SATURATION: 98 % | BODY MASS INDEX: 22.95 KG/M2 | WEIGHT: 135.8 LBS

## 2021-04-16 DIAGNOSIS — Z00.00 MEDICARE ANNUAL WELLNESS VISIT, SUBSEQUENT: Primary | ICD-10-CM

## 2021-04-16 PROCEDURE — G0439 PPPS, SUBSEQ VISIT: HCPCS | Performed by: NURSE PRACTITIONER

## 2021-04-16 NOTE — PROGRESS NOTES
The ABCs of the Annual Wellness Visit  Subsequent Medicare Wellness Visit    Chief Complaint   Patient presents with   • Medicare Wellness-subsequent       Subjective   History of Present Illness:  Joyce Jones is a 75 y.o. female who presents for a Subsequent Medicare Wellness Visit.  Hypertension chronic onset several years ago.  Has noticed an improvement in her blood pressure over last several months and does not routinely take her losartan only if her blood pressure is running greater than 140 which is rarely.  Hyperlipidemia chronic onset years.  Takes statin without difficulties.  Recent flare diverticulitis currently taking Flagyl and Cipro.  Her left lower quadrant abdominal pain is improving.  Still has mild pain but nothing like early on.  She has been slow advancing her diet.  No fever or diarrhea.  Does still have some mild nausea but due to the Flagyl.  She has had some increased life stressors with her  recently diagnosed with cancer and underwent surgery.  She is also the primary caregiver for her daughters health concerns.  This along with her recent diverticulitis flare, patient sometimes feels foggy headed and not able to concentrate as usual.  She has also had recent death of 2 of her close friends and has had increased anxiety depression symptoms.  No thoughts of self-harm.    HEALTH RISK ASSESSMENT    Recent Hospitalizations:  No hospitalization(s) within the last year.    Current Medical Providers:  Patient Care Team:  Pura Burk APRN as PCP - General (Nurse Practitioner)  Zeeshan Shaw MD as Consulting Physician (Neurosurgery)  Hu Dewey MD as Consulting Physician (Pain Medicine)    Smoking Status:  Social History     Tobacco Use   Smoking Status Never Smoker   Smokeless Tobacco Never Used       Alcohol Consumption:  Social History     Substance and Sexual Activity   Alcohol Use Yes   • Alcohol/week: 1.0 standard drinks   • Types: 1 Glasses of wine per week     Comment: rarely at holidays       Depression Screen:   PHQ-2/PHQ-9 Depression Screening 4/16/2021   Little interest or pleasure in doing things 1   Feeling down, depressed, or hopeless 1   Trouble falling or staying asleep, or sleeping too much 0   Feeling tired or having little energy 1   Poor appetite or overeating 0   Feeling bad about yourself - or that you are a failure or have let yourself or your family down 0   Trouble concentrating on things, such as reading the newspaper or watching television 1   Moving or speaking so slowly that other people could have noticed. Or the opposite - being so fidgety or restless that you have been moving around a lot more than usual 0   Thoughts that you would be better off dead, or of hurting yourself in some way 0   Total Score 4   If you checked off any problems, how difficult have these problems made it for you to do your work, take care of things at home, or get along with other people? Not difficult at all       Fall Risk Screen:  TANNER Fall Risk Assessment was completed, and patient is at LOW risk for falls.Assessment completed on:4/16/2021    Health Habits and Functional and Cognitive Screening:  Functional & Cognitive Status 4/16/2021   Do you have difficulty preparing food and eating? No   Do you have difficulty bathing yourself, getting dressed or grooming yourself? No   Do you have difficulty using the toilet? No   Do you have difficulty moving around from place to place? No   Do you have trouble with steps or getting out of a bed or a chair? No   Current Diet Well Balanced Diet   Exercise (times per week) 7 times per week   Current Exercise Activities Include Walking   Do you need help using the phone?  No   Are you deaf or do you have serious difficulty hearing?  No   Do you need help with transportation? No   Do you need help shopping? No   Do you need help preparing meals?  No   Do you need help with housework?  No   Do you need help with laundry? No   Do you  need help taking your medications? No   Do you need help managing money? No   Do you ever drive or ride in a car without wearing a seat belt? No   Who do you live with? Spouse   If you need help, do you have trouble finding someone available to you? Yes   Have you been bothered in the last four weeks by sexual problems? Yes         Does the patient have evidence of cognitive impairment? Yes  Clock test abnormal and recall 2/3. Pt recognized clock was not labled correctly. Says has been under too much stress lately and does not want to continue to redo clock. Test. Denies any cognitive impairment issues at home.    Asprin use counseling:Taking ASA appropriately as indicated    Age-appropriate Screening Schedule:  Refer to the list below for future screening recommendations based on patient's age, sex and/or medical conditions. Orders for these recommended tests are listed in the plan section. The patient has been provided with a written plan.    Health Maintenance   Topic Date Due   • MAMMOGRAM  03/01/2019   • DXA SCAN  03/02/2019   • LIPID PANEL  12/17/2020   • INFLUENZA VACCINE  08/01/2021   • TDAP/TD VACCINES (2 - Td) 01/01/2022   • COLONOSCOPY  12/03/2022   • ZOSTER VACCINE  Discontinued          The following portions of the patient's history were reviewed and updated as appropriate: allergies, current medications, past family history, past medical history, past social history, past surgical history and problem list.    Outpatient Medications Prior to Visit   Medication Sig Dispense Refill   • aspirin 81 MG EC tablet Take 81 mg by mouth Daily.     • cetirizine (zyrTEC) 10 MG tablet Take 1 mg by mouth Daily.  5   • ciprofloxacin (Cipro) 500 MG tablet Take 1 tablet by mouth 2 (Two) Times a Day for 10 days. 20 tablet 0   • clotrimazole-betamethasone (LOTRISONE) 1-0.05 % cream MARISOL EXT TO THE AFFECTED AND SURROUNDING AREAS BID IN THE MORNING AND IN THE MERCEDEZ FOR 2 WKS     • losartan (COZAAR) 50 MG tablet Take 1 tablet  by mouth Daily. (Patient taking differently: Take 50 mg by mouth As Needed.) 90 tablet 1   • metroNIDAZOLE (Flagyl) 500 MG tablet Take 1 tablet by mouth 3 (Three) Times a Day for 10 days. 30 tablet 0   • pravastatin (PRAVACHOL) 40 MG tablet Take 40 mg by mouth As Needed.     • traMADol-acetaminophen (ULTRACET) 37.5-325 MG per tablet Take 2 tablets by mouth 2 (Two) Times a Day.     • neomycin-polymyxin-hydrocortisone (CORTISPORIN) 3.5-88817-8 otic solution Administer 3 drops into both ears 4 (Four) Times a Day. 10 mL 1     No facility-administered medications prior to visit.       Patient Active Problem List   Diagnosis   • Hyperlipidemia   • Primary osteoarthritis involving multiple joints   • Gastroesophageal reflux disease without esophagitis   • Heart valve disorder   • Allergic rhinitis   • Degenerative disc disease, cervical   • Bulging of cervical intervertebral disc   • History of fusion of cervical spine   • Tendonitis of left rotator cuff   • Subacromial bursitis of left shoulder joint   • Essential hypertension   • Elevated d-dimer   • Osteoporosis per patient       Advanced Care Planning:  ACP discussion was held with the patient during this visit. Patient has an advance directive (not in EMR), copy requested.    Review of Systems   Constitutional: Negative for appetite change, chills, diaphoresis and fever.   Eyes: Negative for discharge and visual disturbance.   Respiratory: Negative for shortness of breath.    Cardiovascular: Negative for chest pain, palpitations and leg swelling.   Gastrointestinal: Positive for abdominal pain and nausea. Negative for constipation, diarrhea and vomiting.   Genitourinary: Negative for difficulty urinating.   Musculoskeletal: Positive for arthralgias.   Neurological: Negative for dizziness, light-headedness and headaches.   Psychiatric/Behavioral: Positive for decreased concentration. Negative for self-injury and suicidal ideas. The patient is nervous/anxious.         Compared to one year ago, the patient feels her physical health is the same.  Compared to one year ago, the patient feels her mental health is worse.    Reviewed chart for potential of high risk medication in the elderly: yes, tramadol  Reviewed chart for potential of harmful drug interactions in the elderly:yes, none    Objective         Vitals:    04/16/21 0741   BP: 112/70   Pulse: 78   SpO2: 98%   Weight: 61.6 kg (135 lb 12.8 oz)   PainSc: 0-No pain       Body mass index is 22.95 kg/m².  Discussed the patient's BMI with her. The BMI is in the acceptable range.    Physical Exam  Vitals reviewed.   HENT:      Head: Normocephalic.      Right Ear: Tympanic membrane, ear canal and external ear normal.      Left Ear: Tympanic membrane, ear canal and external ear normal.      Nose: Nose normal.   Neck:      Thyroid: No thyromegaly.   Cardiovascular:      Rate and Rhythm: Normal rate and regular rhythm.      Heart sounds: Normal heart sounds.      Comments: No edema  Pulmonary:      Effort: Pulmonary effort is normal. No respiratory distress.      Breath sounds: Normal breath sounds.   Abdominal:      General: Bowel sounds are normal.      Palpations: Abdomen is soft.      Tenderness: There is abdominal tenderness. There is no guarding or rebound.      Comments: Mild LLQ tenderness   Musculoskeletal:      Cervical back: Normal range of motion and neck supple.   Lymphadenopathy:      Cervical: No cervical adenopathy.   Skin:     General: Skin is warm and dry.      Capillary Refill: Capillary refill takes less than 2 seconds.   Neurological:      General: No focal deficit present.      Mental Status: She is alert and oriented to person, place, and time.      Cranial Nerves: No cranial nerve deficit.      Motor: No weakness.      Coordination: Coordination normal.      Gait: Gait normal.   Psychiatric:         Mood and Affect: Mood normal.         Behavior: Behavior normal.               Assessment/Plan   Medicare  Risks and Personalized Health Plan  CMS Preventative Services Quick Reference  Advance Directive Discussion; held today  Breast Cancer/Mammogram Screening; scheduled  Cardiovascular risk; updated labs ordered. Last ekg 2020 current  Colon Cancer Screening; current due for 3 year FU in 2022  Diabetic Lab Screening; lab ordered  Inactivity/Sedentary; encouraged increased physical exercise  Osteoprorosis Risk; dexa scheduled. Recommend calcium vit D supplement    The above risks/problems have been discussed with the patient.  Pertinent information has been shared with the patient in the After Visit Summary.  Follow up plans and orders are seen below in the Assessment/Plan Section.    Diagnoses and all orders for this visit:    1. Medicare annual wellness visit, subsequent (Primary)  -     Lipid Panel; Future  -     Comprehensive Metabolic Panel; Future  -     CBC & Differential; Future      Follow Up:  Return in about 1 year (around 4/16/2022), or if symptoms worsen or fail to improve, for Medicare Wellness.     An After Visit Summary and PPPS were given to the patient.       Pura Degroot, APRN

## 2021-04-23 ENCOUNTER — LAB (OUTPATIENT)
Dept: LAB | Facility: HOSPITAL | Age: 76
End: 2021-04-23

## 2021-04-23 DIAGNOSIS — Z00.00 MEDICARE ANNUAL WELLNESS VISIT, SUBSEQUENT: ICD-10-CM

## 2021-04-23 LAB
ALBUMIN SERPL-MCNC: 4.1 G/DL (ref 3.5–5.2)
ALBUMIN/GLOB SERPL: 1.5 G/DL
ALP SERPL-CCNC: 76 U/L (ref 39–117)
ALT SERPL W P-5'-P-CCNC: 16 U/L (ref 1–33)
ANION GAP SERPL CALCULATED.3IONS-SCNC: 9.9 MMOL/L (ref 5–15)
AST SERPL-CCNC: 26 U/L (ref 1–32)
BASOPHILS # BLD AUTO: 0.05 10*3/MM3 (ref 0–0.2)
BASOPHILS NFR BLD AUTO: 1.2 % (ref 0–1.5)
BILIRUB SERPL-MCNC: 0.3 MG/DL (ref 0–1.2)
BUN SERPL-MCNC: 13 MG/DL (ref 8–23)
BUN/CREAT SERPL: 15.1 (ref 7–25)
CALCIUM SPEC-SCNC: 9.8 MG/DL (ref 8.6–10.5)
CHLORIDE SERPL-SCNC: 105 MMOL/L (ref 98–107)
CHOLEST SERPL-MCNC: 185 MG/DL (ref 0–200)
CO2 SERPL-SCNC: 28.1 MMOL/L (ref 22–29)
CREAT SERPL-MCNC: 0.86 MG/DL (ref 0.57–1)
DEPRECATED RDW RBC AUTO: 43.2 FL (ref 37–54)
EOSINOPHIL # BLD AUTO: 0.11 10*3/MM3 (ref 0–0.4)
EOSINOPHIL NFR BLD AUTO: 2.6 % (ref 0.3–6.2)
ERYTHROCYTE [DISTWIDTH] IN BLOOD BY AUTOMATED COUNT: 11.9 % (ref 12.3–15.4)
GFR SERPL CREATININE-BSD FRML MDRD: 65 ML/MIN/1.73
GLOBULIN UR ELPH-MCNC: 2.7 GM/DL
GLUCOSE SERPL-MCNC: 77 MG/DL (ref 65–99)
HCT VFR BLD AUTO: 38.1 % (ref 34–46.6)
HDLC SERPL-MCNC: 70 MG/DL (ref 40–60)
HGB BLD-MCNC: 12.9 G/DL (ref 12–15.9)
IMM GRANULOCYTES # BLD AUTO: 0.01 10*3/MM3 (ref 0–0.05)
IMM GRANULOCYTES NFR BLD AUTO: 0.2 % (ref 0–0.5)
LDLC SERPL CALC-MCNC: 93 MG/DL (ref 0–100)
LDLC/HDLC SERPL: 1.27 {RATIO}
LYMPHOCYTES # BLD AUTO: 1.71 10*3/MM3 (ref 0.7–3.1)
LYMPHOCYTES NFR BLD AUTO: 40.6 % (ref 19.6–45.3)
MCH RBC QN AUTO: 33.8 PG (ref 26.6–33)
MCHC RBC AUTO-ENTMCNC: 33.9 G/DL (ref 31.5–35.7)
MCV RBC AUTO: 99.7 FL (ref 79–97)
MONOCYTES # BLD AUTO: 0.41 10*3/MM3 (ref 0.1–0.9)
MONOCYTES NFR BLD AUTO: 9.7 % (ref 5–12)
NEUTROPHILS NFR BLD AUTO: 1.92 10*3/MM3 (ref 1.7–7)
NEUTROPHILS NFR BLD AUTO: 45.7 % (ref 42.7–76)
NRBC BLD AUTO-RTO: 0 /100 WBC (ref 0–0.2)
PLATELET # BLD AUTO: 262 10*3/MM3 (ref 140–450)
PMV BLD AUTO: 9.9 FL (ref 6–12)
POTASSIUM SERPL-SCNC: 4.4 MMOL/L (ref 3.5–5.2)
PROT SERPL-MCNC: 6.8 G/DL (ref 6–8.5)
RBC # BLD AUTO: 3.82 10*6/MM3 (ref 3.77–5.28)
SODIUM SERPL-SCNC: 143 MMOL/L (ref 136–145)
TRIGL SERPL-MCNC: 129 MG/DL (ref 0–150)
VLDLC SERPL-MCNC: 22 MG/DL (ref 5–40)
WBC # BLD AUTO: 4.21 10*3/MM3 (ref 3.4–10.8)

## 2021-04-23 PROCEDURE — 85025 COMPLETE CBC W/AUTO DIFF WBC: CPT

## 2021-04-23 PROCEDURE — 80061 LIPID PANEL: CPT

## 2021-04-23 PROCEDURE — 80053 COMPREHEN METABOLIC PANEL: CPT

## 2021-04-27 ENCOUNTER — TELEPHONE (OUTPATIENT)
Dept: INTERNAL MEDICINE | Facility: CLINIC | Age: 76
End: 2021-04-27

## 2021-04-27 NOTE — TELEPHONE ENCOUNTER
Caller: Joyce Jones    Relationship: Self    Best call back number:     Caller requesting test results: SELF    What test was performed: LABS    When was the test performed:LAST WEEK    Where was the test performed: [facility]    Additional notes: PATIENT WOULD LIKE THESE MAILED TO HER ; VERIFIED ADDRESS        [address] [place] [address]   [place] [address]

## 2021-04-27 NOTE — PROGRESS NOTES
Lab review: cholesterol panel looks good; keep up the good work. Electrolytes liver and kidney function good. No anemia.

## 2021-06-07 ENCOUNTER — OFFICE VISIT (OUTPATIENT)
Dept: INTERNAL MEDICINE | Facility: CLINIC | Age: 76
End: 2021-06-07

## 2021-06-07 VITALS
SYSTOLIC BLOOD PRESSURE: 106 MMHG | HEIGHT: 65 IN | OXYGEN SATURATION: 100 % | TEMPERATURE: 97.1 F | BODY MASS INDEX: 22.49 KG/M2 | HEART RATE: 78 BPM | DIASTOLIC BLOOD PRESSURE: 74 MMHG | WEIGHT: 135 LBS

## 2021-06-07 DIAGNOSIS — J30.2 SEASONAL ALLERGIES: ICD-10-CM

## 2021-06-07 DIAGNOSIS — R06.02 SHORTNESS OF BREATH: Primary | ICD-10-CM

## 2021-06-07 PROBLEM — Z87.19 HISTORY OF DIVERTICULITIS: Status: ACTIVE | Noted: 2019-07-13

## 2021-06-07 PROBLEM — T84.038A LOOSE TOTAL KNEE ARTHROPLASTY: Status: ACTIVE | Noted: 2017-06-22

## 2021-06-07 PROBLEM — K40.90 RIGHT INGUINAL HERNIA: Status: ACTIVE | Noted: 2019-04-14

## 2021-06-07 PROBLEM — M85.80 OSTEOPENIA: Status: ACTIVE | Noted: 2018-09-05

## 2021-06-07 PROBLEM — Z96.659 LOOSE TOTAL KNEE ARTHROPLASTY: Status: ACTIVE | Noted: 2017-06-22

## 2021-06-07 PROBLEM — Z86.718 HISTORY OF DEEP VEIN THROMBOSIS: Status: ACTIVE | Noted: 2018-09-06

## 2021-06-07 PROBLEM — E83.52 HYPERCALCEMIA: Status: ACTIVE | Noted: 2017-03-27

## 2021-06-07 PROBLEM — N18.9 CHRONIC KIDNEY DISEASE: Status: ACTIVE | Noted: 2017-03-27

## 2021-06-07 PROBLEM — I47.10 SUPRAVENTRICULAR TACHYCARDIA: Status: ACTIVE | Noted: 2018-09-05

## 2021-06-07 PROBLEM — R76.11 MANTOUX: POSITIVE: Status: ACTIVE | Noted: 2018-09-14

## 2021-06-07 PROBLEM — I47.1 SUPRAVENTRICULAR TACHYCARDIA (HCC): Status: ACTIVE | Noted: 2018-09-05

## 2021-06-07 PROCEDURE — 99214 OFFICE O/P EST MOD 30 MIN: CPT | Performed by: NURSE PRACTITIONER

## 2021-06-07 PROCEDURE — 93000 ELECTROCARDIOGRAM COMPLETE: CPT | Performed by: NURSE PRACTITIONER

## 2021-06-07 RX ORDER — ALBUTEROL SULFATE 90 UG/1
2 AEROSOL, METERED RESPIRATORY (INHALATION) EVERY 4 HOURS PRN
Qty: 18 G | Refills: 1 | Status: ON HOLD | OUTPATIENT
Start: 2021-06-07 | End: 2022-01-08

## 2021-06-07 NOTE — PROGRESS NOTES
Chief Complaint   Patient presents with   • Shortness of Breath       History of Present Illness    75 y.o.female presents for shortness of breath.    Has severe allergies; two days ago started having shortness of breath and feeling of chest tightness.  No wheezing.  Does have drainage in back of throat.  Some cough wet productive; not sure on color hadn't noticed. No fever or chills. positive diaphresis. Thought was bad allergies; took Zyrtec benadryl, Flonase;  45min after meds better.   This morning felt short of breath again. Took meds again, but not much better. Is anxious. Asking if maybe asthma.  No prior history of asthma. No chest pain, palpitations, or edema. Does have increased stress; daughter and  having serious diagnoses and she has been primary caregiver.  Pt thinks stress maybe contributing factor.  Non smoker.  No recent travel. No leg swelling or pain.      Review of Systems   Constitutional: Negative for chills and fever.   HENT: Positive for postnasal drip and rhinorrhea. Negative for ear discharge, ear pain, sinus pressure, sneezing, sore throat and swollen glands.    Eyes: Negative for blurred vision and visual disturbance.   Respiratory: Positive for cough, chest tightness and shortness of breath. Negative for wheezing.    Cardiovascular: Negative for chest pain, palpitations and leg swelling.   Gastrointestinal: Negative for blood in stool, nausea and vomiting.   Genitourinary: Negative for difficulty urinating.   Musculoskeletal: Negative for myalgias.   Neurological: Positive for light-headedness.   Psychiatric/Behavioral: Positive for stress. The patient is nervous/anxious.          [facility]  The following portions of the patient's history were reviewed and updated as appropriate: allergies, current medications, past family history, past medical history, past social history, past surgical history and problem list.     Past Medical History:   Diagnosis Date   • Allergic rhinitis    •  Anemia    • Arthritis    • Diverticulosis    • Heart murmur    • History of bone density study 03/02/2017    [facility]   • History of mammography, screening 2016   • Hyperlipidemia    • Hypertension    • Inguinal hernia 1978    repair   • Osteoarthritis (arthritis due to wear and tear of joints)    • Personal history of DVT (deep vein thrombosis) 2005    right leg- due to surgery   • Positive TB test     Always comes back + but negative further testing      Allergies   Allergen Reactions   • Overton-2 Inhibitors Anaphylaxis   • Nsaids GI Intolerance   • Augmentin [Amoxicillin-Pot Clavulanate] Nausea Only   • Celecoxib Hives      Social History     Tobacco Use   • Smoking status: Never Smoker   • Smokeless tobacco: Never Used   Substance Use Topics   • Alcohol use: Yes     Alcohol/week: 1.0 standard drinks     Types: 1 Glasses of wine per week     Comment: rarely at holidays   • Drug use: No     Past Surgical History:   Procedure Laterality Date   • BACK SURGERY  2002    Scoliosis bar. T11-sacrum   • CERVICAL SPINE SURGERY  2000    cage   • CHOLECYSTECTOMY  2014   • COLONOSCOPY  2016   • FOOT SURGERY  2008   • INGUINAL HERNIA REPAIR Bilateral 1978    2019 right side repair   • KNEE SURGERY Bilateral 2017 2005 and x2 in 8/2017(left)   • NASAL SEPTUM SURGERY  1999   • TOTAL ABDOMINAL HYSTERECTOMY WITH SALPINGO OOPHORECTOMY  1986    Unilateral salpingo-oophorectomy/cervical dysplasia/AUB   • URETHRAL DILATION      Multiple times      Family History   Problem Relation Age of Onset   • Uterine cancer Mother         Metastatic to ovaries and colon; possibly bone   • Hypertension Mother    • Kidney disease Mother    • Tuberculosis Mother    • Cancer Father         Bladder   • Heart failure Father    • Stroke Father    • Hypertension Father    • Heart attack Father    • Colon polyps Father    • Hypertension Brother    • Kidney disease Brother    • Colon polyps Brother    • Kidney disease Brother    • Hypertension  "Brother    • Other Daughter         CVID   • Diabetes Daughter    • Colon cancer Maternal Uncle    • Colon cancer Maternal Uncle    • Breast cancer Neg Hx            Current Outpatient Medications:   •  aspirin 81 MG EC tablet, Take 81 mg by mouth Daily., Disp: , Rfl:   •  cetirizine (zyrTEC) 10 MG tablet, Take 1 mg by mouth Daily., Disp: , Rfl: 5  •  clotrimazole-betamethasone (LOTRISONE) 1-0.05 % cream, MARISOL EXT TO THE AFFECTED AND SURROUNDING AREAS BID IN THE MORNING AND IN THE MERCEDEZ FOR 2 WKS, Disp: , Rfl:   •  losartan (COZAAR) 50 MG tablet, Take 1 tablet by mouth Daily. (Patient taking differently: Take 50 mg by mouth As Needed.), Disp: 90 tablet, Rfl: 1  •  pravastatin (PRAVACHOL) 40 MG tablet, Take 40 mg by mouth As Needed., Disp: , Rfl:   •  traMADol-acetaminophen (ULTRACET) 37.5-325 MG per tablet, Take 2 tablets by mouth 2 (Two) Times a Day., Disp: , Rfl:     VITALS:  /74   Pulse 78   Temp 97.1 °F (36.2 °C)   Ht 163.8 cm (64.5\")   Wt 61.2 kg (135 lb)   SpO2 100%   Breastfeeding No   BMI 22.81 kg/m²       Physical Exam  Vitals reviewed.   Constitutional:       General: She is not in acute distress.     Appearance: She is well-developed. She is not ill-appearing or diaphoretic.   HENT:      Head: Normocephalic.      Right Ear: Tympanic membrane, ear canal and external ear normal.      Left Ear: Tympanic membrane, ear canal and external ear normal.      Nose: Nose normal.      Mouth/Throat:      Mouth: Mucous membranes are moist.      Pharynx: Oropharynx is clear. No oropharyngeal exudate or posterior oropharyngeal erythema.   Eyes:      General: Lids are normal.         Right eye: No discharge.         Left eye: No discharge.      Extraocular Movements: Extraocular movements intact.      Conjunctiva/sclera: Conjunctivae normal.      Pupils: Pupils are equal, round, and reactive to light.   Neck:      Thyroid: No thyromegaly.      Vascular: No JVD.   Cardiovascular:      Rate and Rhythm: Normal rate " and regular rhythm.      Pulses: Normal pulses.      Heart sounds: Normal heart sounds.      Comments: No edema; legs nontender no erythema.  Pulmonary:      Effort: Pulmonary effort is normal. No respiratory distress.      Breath sounds: Normal breath sounds.   Abdominal:      General: There is no abdominal bruit.      Palpations: There is no hepatomegaly or splenomegaly.   Musculoskeletal:         General: Normal range of motion.      Cervical back: Normal range of motion and neck supple.      Comments: All major joints with normal ROM   Lymphadenopathy:      Head:      Right side of head: No submental, submandibular or tonsillar adenopathy.      Left side of head: No submental, submandibular or tonsillar adenopathy.      Cervical: No cervical adenopathy.   Skin:     General: Skin is warm and dry.      Capillary Refill: Capillary refill takes less than 2 seconds.      Findings: No rash.   Neurological:      General: No focal deficit present.      Mental Status: She is alert and oriented to person, place, and time.      Cranial Nerves: No cranial nerve deficit.      Coordination: Coordination normal.      Gait: Gait normal.   Psychiatric:         Mood and Affect: Mood normal.         Speech: Speech normal.         Behavior: Behavior normal.         ECG 12 Lead    Date/Time: 6/7/2021 8:30 AM  Performed by: Pura Burk APRN  Authorized by: Pura Burk APRN   Comparison: compared with previous ECG from 9/17/2020  Similar to previous ECG  Rhythm: sinus rhythm  Rate: normal  BPM: 68  Conduction: conduction normal  ST Segments: ST segments normal  T Waves: T waves normal  QRS axis: normal  Other: no other findings    Clinical impression: normal ECG            Result Review :            Assessment and Plan    Diagnoses and all orders for this visit:    1. Shortness of breath (Primary)  -     albuterol sulfate  (90 Base) MCG/ACT inhaler; Inhale 2 puffs Every 4 (Four) Hours As Needed for Wheezing or  Shortness of Air.  Dispense: 18 g; Refill: 1  -     ECG 12 Lead  -     XR Chest PA & Lateral; Future  -     CBC (No Diff); Future    2. Seasonal allergies  Comments:  cont with oral antihistamine and flonase.    walking sat never below 96%.   ekg normal. No wheezing on exam; has good breath sounds. Discussed differentials. Pt is feeling some better after eval. Again states really thinks stress is contributing factor, along with allergies.  Would like to try albuterol inhaler if no improvement she is to let me know and will have follow up with xray and labs.  Discussed steroids for shortness of breath, and pt prefers to not have steroids at this time. If worsening of symptoms seek emergency room care.    I discussed the patients findings and my recommendations with patient.  Patient was encouraged to keep me informed of any acute changes, lack of improvement, or any new concerning symptoms.  Patient voiced understanding of all instructions and denied further questions.    I spent 35 minutes caring for Joyce on this date of service. This time includes time spent by me in the following activities:preparing for the visit, reviewing tests, obtaining and/or reviewing a separately obtained history, performing a medically appropriate examination and/or evaluation , counseling and educating the patient/family/caregiver, ordering medications, tests, or procedures, documenting information in the medical record and independently interpreting results and communicating that information with the patient/family/caregiver  Follow Up   Return if symptoms worsen or fail to improve.      Electronically signed by:    LOVE Ray  06/07/2021

## 2021-06-09 ENCOUNTER — TELEPHONE (OUTPATIENT)
Dept: INTERNAL MEDICINE | Facility: CLINIC | Age: 76
End: 2021-06-09

## 2021-06-09 NOTE — TELEPHONE ENCOUNTER
Caller: Joyce Jones    Relationship: Self    Best call back number: [phone]    What was the call regarding: PATIENT STATED THAT SHE WAS SEEN IN THE OFFICE 06/07/2021 AND WAS INFORMED BY SHERLY ALEMAN TO CALL THE OFFICE AND INFORM HER OF HOW SHE WAS FEELING A COUPLE OF DAYS. PATIENT STATED THAT SHE IS FEELING FINE AND DOING MUCH BETTER.     Do you require a callback: YES

## 2021-06-10 ENCOUNTER — APPOINTMENT (OUTPATIENT)
Dept: BONE DENSITY | Facility: HOSPITAL | Age: 76
End: 2021-06-10

## 2021-06-10 ENCOUNTER — APPOINTMENT (OUTPATIENT)
Dept: MAMMOGRAPHY | Facility: HOSPITAL | Age: 76
End: 2021-06-10

## 2021-06-10 ENCOUNTER — TRANSCRIBE ORDERS (OUTPATIENT)
Dept: INTERNAL MEDICINE | Facility: CLINIC | Age: 76
End: 2021-06-10

## 2021-06-10 DIAGNOSIS — M81.0 AGE-RELATED OSTEOPOROSIS WITHOUT CURRENT PATHOLOGICAL FRACTURE: Primary | ICD-10-CM

## 2021-06-14 DIAGNOSIS — R06.00 DYSPNEA, UNSPECIFIED TYPE: Primary | ICD-10-CM

## 2021-07-19 ENCOUNTER — OFFICE VISIT (OUTPATIENT)
Dept: INTERNAL MEDICINE | Facility: CLINIC | Age: 76
End: 2021-07-19

## 2021-07-19 VITALS
SYSTOLIC BLOOD PRESSURE: 128 MMHG | OXYGEN SATURATION: 99 % | WEIGHT: 140 LBS | HEART RATE: 79 BPM | BODY MASS INDEX: 23.32 KG/M2 | DIASTOLIC BLOOD PRESSURE: 70 MMHG | HEIGHT: 65 IN | TEMPERATURE: 98.1 F

## 2021-07-19 DIAGNOSIS — T14.8XXA NONHEALING NONSURGICAL WOUND LIMITED TO BREAKDOWN OF SKIN: Primary | ICD-10-CM

## 2021-07-19 PROCEDURE — 99213 OFFICE O/P EST LOW 20 MIN: CPT | Performed by: NURSE PRACTITIONER

## 2021-07-19 NOTE — PROGRESS NOTES
Chief Complaint   Patient presents with   • Rash     small red spot on lower left leg, itchy, about 8 weeks, scabbed over       History of Present Illness    75 y.o.female presents for rash or skin lesion.  Has a spot on left lower anterior leg; has been there about 8 weeks. Itches. Not sure if was a scratch bug bite or rash of some kind. Just there. Has a small scab on area that won't go away. Itches above site. No redness or drainage no fever. She does do yard work and around thorns.    Review of Systems   Constitutional: Negative for chills and fever.   Cardiovascular: Negative for leg swelling.   Skin: Positive for skin lesions. Negative for color change.        itching         [facility]  The following portions of the patient's history were reviewed and updated as appropriate: allergies, current medications, past family history, past medical history, past social history, past surgical history and problem list.     Past Medical History:   Diagnosis Date   • Allergic rhinitis    • Anemia    • Arthritis    • Diverticulosis    • Heart murmur    • History of bone density study 03/02/2017    [facility]   • History of mammography, screening 2016   • Hyperlipidemia    • Hypertension    • Inguinal hernia 1978    repair   • Osteoarthritis (arthritis due to wear and tear of joints)    • Personal history of DVT (deep vein thrombosis) 2005    right leg- due to surgery   • Positive TB test     Always comes back + but negative further testing      Allergies   Allergen Reactions   • Overton-2 Inhibitors Anaphylaxis   • Nsaids GI Intolerance   • Augmentin [Amoxicillin-Pot Clavulanate] Nausea Only   • Celecoxib Hives      Social History     Tobacco Use   • Smoking status: Never Smoker   • Smokeless tobacco: Never Used   Vaping Use   • Vaping Use: Never used   Substance Use Topics   • Alcohol use: Yes     Alcohol/week: 1.0 standard drinks     Types: 1 Glasses of wine per week     Comment: rarely at holidays   • Drug use: No      Past Surgical History:   Procedure Laterality Date   • BACK SURGERY  2002    Scoliosis bar. T11-sacrum   • CERVICAL SPINE SURGERY  2000    cage   • CHOLECYSTECTOMY  2014   • COLONOSCOPY  2016   • FOOT SURGERY  2008   • INGUINAL HERNIA REPAIR Bilateral 1978    2019 right side repair   • KNEE SURGERY Bilateral 2017    2005 and x2 in 8/2017(left)   • NASAL SEPTUM SURGERY  1999   • TOTAL ABDOMINAL HYSTERECTOMY WITH SALPINGO OOPHORECTOMY  1986    Unilateral salpingo-oophorectomy/cervical dysplasia/AUB   • URETHRAL DILATION      Multiple times      Family History   Problem Relation Age of Onset   • Uterine cancer Mother         Metastatic to ovaries and colon; possibly bone   • Hypertension Mother    • Kidney disease Mother    • Tuberculosis Mother    • Cancer Father         Bladder   • Heart failure Father    • Stroke Father    • Hypertension Father    • Heart attack Father    • Colon polyps Father    • Hypertension Brother    • Kidney disease Brother    • Colon polyps Brother    • Kidney disease Brother    • Hypertension Brother    • Other Daughter         CVID   • Diabetes Daughter    • Colon cancer Maternal Uncle    • Colon cancer Maternal Uncle    • Breast cancer Neg Hx            Current Outpatient Medications:   •  albuterol sulfate  (90 Base) MCG/ACT inhaler, Inhale 2 puffs Every 4 (Four) Hours As Needed for Wheezing or Shortness of Air., Disp: 18 g, Rfl: 1  •  aspirin 81 MG EC tablet, Take 81 mg by mouth Daily., Disp: , Rfl:   •  cetirizine (zyrTEC) 10 MG tablet, Take 1 mg by mouth Daily., Disp: , Rfl: 5  •  losartan (COZAAR) 50 MG tablet, Take 1 tablet by mouth Daily. (Patient taking differently: Take 50 mg by mouth As Needed.), Disp: 90 tablet, Rfl: 1  •  traMADol-acetaminophen (ULTRACET) 37.5-325 MG per tablet, Take 2 tablets by mouth 2 (Two) Times a Day., Disp: , Rfl:   •  clotrimazole-betamethasone (LOTRISONE) 1-0.05 % cream, MARISOL EXT TO THE AFFECTED AND SURROUNDING AREAS BID IN THE MORNING AND IN  "THE MERCEDEZ FOR 2 WKS, Disp: , Rfl:   •  pravastatin (PRAVACHOL) 40 MG tablet, Take 40 mg by mouth As Needed., Disp: , Rfl:     VITALS:  /70   Pulse 79   Temp 98.1 °F (36.7 °C)   Ht 165.1 cm (65\")   Wt 63.5 kg (140 lb)   SpO2 99%   BMI 23.30 kg/m²     Physical Exam  Vitals reviewed.   HENT:      Head: Normocephalic.   Pulmonary:      Effort: Pulmonary effort is normal. No respiratory distress.   Skin:     General: Skin is warm and dry.          Neurological:      General: No focal deficit present.      Mental Status: She is alert and oriented to person, place, and time.   Psychiatric:         Mood and Affect: Mood normal.       Further assess of skin lesion left leg; cleaned with alcohol prep. Easily expressed scan amt bloody drainage from site. Possible foreign body small black possible thorn. Removed with sterile tweezers without difficulty. Applied topical abx ointment and covered with Band-Aid. Pt tolerated well without difficulty.    Assessment and Plan    Diagnoses and all orders for this visit:    1. Nonhealing nonsurgical wound limited to breakdown of skin (Primary)  Comments:  with uncomplicated likely thorn removal    keep area clean. Apply topical abx ointment such as neosporin 2 times daily; cover with bandaid. Report redness or drainage or fever.    I discussed the patients findings and my recommendations with patient.  Patient was encouraged to keep me informed of any acute changes, lack of improvement, or any new concerning symptoms.  Patient voiced understanding of all instructions and denied further questions.      Follow Up   Return if symptoms worsen or fail to improve.      Electronically signed by:    LOVE Ray  07/19/2021        "

## 2021-07-22 ENCOUNTER — TELEPHONE (OUTPATIENT)
Dept: INTERNAL MEDICINE | Facility: CLINIC | Age: 76
End: 2021-07-22

## 2021-07-22 DIAGNOSIS — T14.8XXA NONHEALING NONSURGICAL WOUND: Primary | ICD-10-CM

## 2021-07-22 NOTE — TELEPHONE ENCOUNTER
PATIENT HAS AREA ON HER LEG THAT WAS SEEN LAST WEEK IS GETTING WORSE, BLUISH IN COLOR, ITCHING.  SHE WOULD LIKE AN XRAY.     PLEASE CALL [phone]

## 2021-07-22 NOTE — TELEPHONE ENCOUNTER
Let pt know I have ordered xray of left leg; give directions of how where she needs to go to get done.  Also I have sent her rx for abx ointment for leg.

## 2021-07-23 ENCOUNTER — APPOINTMENT (OUTPATIENT)
Dept: GENERAL RADIOLOGY | Facility: HOSPITAL | Age: 76
End: 2021-07-23

## 2021-07-23 ENCOUNTER — HOSPITAL ENCOUNTER (EMERGENCY)
Facility: HOSPITAL | Age: 76
Discharge: HOME | End: 2021-07-23
Attending: EMERGENCY MEDICINE | Admitting: EMERGENCY MEDICINE

## 2021-07-23 ENCOUNTER — APPOINTMENT (OUTPATIENT)
Dept: CARDIOLOGY | Facility: HOSPITAL | Age: 76
End: 2021-07-23

## 2021-07-23 VITALS
RESPIRATION RATE: 18 BRPM | TEMPERATURE: 98.7 F | HEART RATE: 85 BPM | BODY MASS INDEX: 23.9 KG/M2 | HEIGHT: 64 IN | WEIGHT: 140 LBS | OXYGEN SATURATION: 99 % | SYSTOLIC BLOOD PRESSURE: 146 MMHG | DIASTOLIC BLOOD PRESSURE: 79 MMHG

## 2021-07-23 DIAGNOSIS — S81.802A LEG WOUND, LEFT, INITIAL ENCOUNTER: Primary | ICD-10-CM

## 2021-07-23 DIAGNOSIS — R20.2 LEFT LEG PARESTHESIAS: ICD-10-CM

## 2021-07-23 DIAGNOSIS — S80.12XA CONTUSION OF LEFT LEG, INITIAL ENCOUNTER: ICD-10-CM

## 2021-07-23 LAB
ALBUMIN SERPL-MCNC: 4.6 G/DL (ref 3.5–5.2)
ALBUMIN/GLOB SERPL: 1.6 G/DL
ALP SERPL-CCNC: 82 U/L (ref 39–117)
ALT SERPL W P-5'-P-CCNC: 16 U/L (ref 1–33)
ANION GAP SERPL CALCULATED.3IONS-SCNC: 10 MMOL/L (ref 5–15)
AST SERPL-CCNC: 25 U/L (ref 1–32)
BASOPHILS # BLD AUTO: 0.03 10*3/MM3 (ref 0–0.2)
BASOPHILS NFR BLD AUTO: 0.7 % (ref 0–1.5)
BH CV ECHO MEAS - BSA(HAYCOCK): 1.7 M^2
BH CV ECHO MEAS - BSA: 1.7 M^2
BH CV ECHO MEAS - BZI_BMI: 24 KILOGRAMS/M^2
BH CV ECHO MEAS - BZI_METRIC_HEIGHT: 162.6 CM
BH CV ECHO MEAS - BZI_METRIC_WEIGHT: 63.5 KG
BH CV LOWER VASCULAR LEFT COMMON FEMORAL AUGMENT: NORMAL
BH CV LOWER VASCULAR LEFT COMMON FEMORAL COMPRESS: NORMAL
BH CV LOWER VASCULAR LEFT COMMON FEMORAL PHASIC: NORMAL
BH CV LOWER VASCULAR LEFT COMMON FEMORAL SPONT: NORMAL
BH CV LOWER VASCULAR LEFT DISTAL FEMORAL COMPRESS: NORMAL
BH CV LOWER VASCULAR LEFT GASTRONEMIUS COMPRESS: NORMAL
BH CV LOWER VASCULAR LEFT GREATER SAPH AK COMPRESS: NORMAL
BH CV LOWER VASCULAR LEFT GREATER SAPH BK COMPRESS: NORMAL
BH CV LOWER VASCULAR LEFT LESSER SAPH COMPRESS: NORMAL
BH CV LOWER VASCULAR LEFT MID FEMORAL AUGMENT: NORMAL
BH CV LOWER VASCULAR LEFT MID FEMORAL COMPETENT: NORMAL
BH CV LOWER VASCULAR LEFT MID FEMORAL COMPRESS: NORMAL
BH CV LOWER VASCULAR LEFT MID FEMORAL PHASIC: NORMAL
BH CV LOWER VASCULAR LEFT MID FEMORAL SPONT: NORMAL
BH CV LOWER VASCULAR LEFT PERONEAL COMPRESS: NORMAL
BH CV LOWER VASCULAR LEFT POPLITEAL AUGMENT: NORMAL
BH CV LOWER VASCULAR LEFT POPLITEAL COMPRESS: NORMAL
BH CV LOWER VASCULAR LEFT POPLITEAL PHASIC: NORMAL
BH CV LOWER VASCULAR LEFT POPLITEAL SPONT: NORMAL
BH CV LOWER VASCULAR LEFT POSTERIOR TIBIAL COMPRESS: NORMAL
BH CV LOWER VASCULAR LEFT PROFUNDA FEMORAL COMPRESS: NORMAL
BH CV LOWER VASCULAR LEFT PROXIMAL FEMORAL COMPRESS: NORMAL
BH CV LOWER VASCULAR LEFT SAPHENOFEMORAL JUNCTION COMPRESS: NORMAL
BH CV LOWER VASCULAR RIGHT COMMON FEMORAL AUGMENT: NORMAL
BH CV LOWER VASCULAR RIGHT COMMON FEMORAL COMPRESS: NORMAL
BH CV LOWER VASCULAR RIGHT COMMON FEMORAL PHASIC: NORMAL
BH CV LOWER VASCULAR RIGHT COMMON FEMORAL SPONT: NORMAL
BILIRUB SERPL-MCNC: 0.3 MG/DL (ref 0–1.2)
BUN SERPL-MCNC: 16 MG/DL (ref 8–23)
BUN/CREAT SERPL: 18.6 (ref 7–25)
CALCIUM SPEC-SCNC: 10.2 MG/DL (ref 8.6–10.5)
CHLORIDE SERPL-SCNC: 102 MMOL/L (ref 98–107)
CO2 SERPL-SCNC: 29 MMOL/L (ref 22–29)
CREAT SERPL-MCNC: 0.86 MG/DL (ref 0.57–1)
DEPRECATED RDW RBC AUTO: 51.8 FL (ref 37–54)
EOSINOPHIL # BLD AUTO: 0.07 10*3/MM3 (ref 0–0.4)
EOSINOPHIL NFR BLD AUTO: 1.5 % (ref 0.3–6.2)
ERYTHROCYTE [DISTWIDTH] IN BLOOD BY AUTOMATED COUNT: 13.3 % (ref 12.3–15.4)
GFR SERPL CREATININE-BSD FRML MDRD: 65 ML/MIN/1.73
GLOBULIN UR ELPH-MCNC: 2.9 GM/DL
GLUCOSE SERPL-MCNC: 103 MG/DL (ref 65–99)
HCT VFR BLD AUTO: 40.3 % (ref 34–46.6)
HGB BLD-MCNC: 13 G/DL (ref 12–15.9)
HOLD SPECIMEN: NORMAL
HOLD SPECIMEN: NORMAL
IMM GRANULOCYTES # BLD AUTO: 0.01 10*3/MM3 (ref 0–0.05)
IMM GRANULOCYTES NFR BLD AUTO: 0.2 % (ref 0–0.5)
INR PPP: 0.97 (ref 0.85–1.16)
LYMPHOCYTES # BLD AUTO: 1.41 10*3/MM3 (ref 0.7–3.1)
LYMPHOCYTES NFR BLD AUTO: 30.7 % (ref 19.6–45.3)
MAXIMAL PREDICTED HEART RATE: 146 BPM
MCH RBC QN AUTO: 34.1 PG (ref 26.6–33)
MCHC RBC AUTO-ENTMCNC: 32.3 G/DL (ref 31.5–35.7)
MCV RBC AUTO: 105.8 FL (ref 79–97)
MONOCYTES # BLD AUTO: 0.51 10*3/MM3 (ref 0.1–0.9)
MONOCYTES NFR BLD AUTO: 11.1 % (ref 5–12)
NEUTROPHILS NFR BLD AUTO: 2.56 10*3/MM3 (ref 1.7–7)
NEUTROPHILS NFR BLD AUTO: 55.8 % (ref 42.7–76)
NRBC BLD AUTO-RTO: 0 /100 WBC (ref 0–0.2)
PLATELET # BLD AUTO: 240 10*3/MM3 (ref 140–450)
PMV BLD AUTO: 9.9 FL (ref 6–12)
POTASSIUM SERPL-SCNC: 4 MMOL/L (ref 3.5–5.2)
PROT SERPL-MCNC: 7.5 G/DL (ref 6–8.5)
PROTHROMBIN TIME: 12.6 SECONDS (ref 11.4–14.4)
RBC # BLD AUTO: 3.81 10*6/MM3 (ref 3.77–5.28)
SODIUM SERPL-SCNC: 141 MMOL/L (ref 136–145)
STRESS TARGET HR: 124 BPM
WBC # BLD AUTO: 4.59 10*3/MM3 (ref 3.4–10.8)
WHOLE BLOOD HOLD SPECIMEN: NORMAL

## 2021-07-23 PROCEDURE — 73590 X-RAY EXAM OF LOWER LEG: CPT

## 2021-07-23 PROCEDURE — 80053 COMPREHEN METABOLIC PANEL: CPT | Performed by: EMERGENCY MEDICINE

## 2021-07-23 PROCEDURE — 93971 EXTREMITY STUDY: CPT

## 2021-07-23 PROCEDURE — 99283 EMERGENCY DEPT VISIT LOW MDM: CPT

## 2021-07-23 PROCEDURE — 85610 PROTHROMBIN TIME: CPT | Performed by: EMERGENCY MEDICINE

## 2021-07-23 PROCEDURE — 85025 COMPLETE CBC W/AUTO DIFF WBC: CPT | Performed by: EMERGENCY MEDICINE

## 2021-07-27 ENCOUNTER — OFFICE VISIT (OUTPATIENT)
Dept: INTERNAL MEDICINE | Facility: CLINIC | Age: 76
End: 2021-07-27

## 2021-07-27 VITALS
HEART RATE: 76 BPM | TEMPERATURE: 98.1 F | HEIGHT: 64 IN | BODY MASS INDEX: 24.01 KG/M2 | OXYGEN SATURATION: 98 % | DIASTOLIC BLOOD PRESSURE: 82 MMHG | WEIGHT: 140.6 LBS | SYSTOLIC BLOOD PRESSURE: 134 MMHG

## 2021-07-27 DIAGNOSIS — S81.802D WOUND OF LEFT LOWER EXTREMITY, SUBSEQUENT ENCOUNTER: Primary | ICD-10-CM

## 2021-07-27 DIAGNOSIS — L29.9 ITCHING: ICD-10-CM

## 2021-07-27 PROCEDURE — 99213 OFFICE O/P EST LOW 20 MIN: CPT | Performed by: NURSE PRACTITIONER

## 2021-07-27 RX ORDER — PREDNISONE 20 MG/1
20 TABLET ORAL 2 TIMES DAILY
Qty: 6 TABLET | Refills: 0 | Status: SHIPPED | OUTPATIENT
Start: 2021-07-27 | End: 2021-07-30

## 2021-07-27 NOTE — PROGRESS NOTES
Chief Complaint   Patient presents with   • Leg Pain     spot on leg, still with itching burning       History of Present Illness    75 y.o.female presents for leg pain follow up with spot on left leg itching burning.    Spot on mid anterior shin left leg has been there about 9-10 weeks. Seen back on July 19. Scab was easily removed then with underlying blood blister. Easily expressed and small black thorn type removed. Pt has been keeping clean and using topical abx ointment. Spot is looking better looks like healing but she is still having itching and burning sensation above site.  She went to ER as well on July 23 for further eval. Xray and venous doppler negative. Referred to derm. Since then spot continues to heal; pt doesn't think needs derm. Is worried something with a nerve since burning and itching.     Review of Systems   Skin: Positive for rash.        Burning itching.         [facility]  The following portions of the patient's history were reviewed and updated as appropriate: allergies, current medications, past family history, past medical history, past social history, past surgical history and problem list.     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 comes back + but negative further testing      Allergies   Allergen Reactions   • Overton-2 Inhibitors Anaphylaxis   • Nsaids GI Intolerance   • Augmentin [Amoxicillin-Pot Clavulanate] Nausea Only   • Celecoxib Hives      Social History     Tobacco Use   • Smoking status: Never Smoker   • Smokeless tobacco: Never Used    Vaping Use   • Vaping Use: Never used   Substance Use Topics   • Alcohol use: Yes     Alcohol/week: 1.0 standard drinks     Types: 1 Glasses of wine per week     Comment: rarely at holidays   • Drug use: No     Past Surgical History:   Procedure Laterality Date   • BACK SURGERY  2002    Scoliosis bar. T11-sacrum   • CERVICAL SPINE SURGERY  2000    cage   • CHOLECYSTECTOMY  2014   • COLONOSCOPY  2016   • FOOT SURGERY  2008   • INGUINAL HERNIA REPAIR Bilateral 1978    2019 right side repair   • KNEE SURGERY Bilateral 2017 2005 and x2 in 8/2017(left)   • NASAL SEPTUM SURGERY  1999   • TOTAL ABDOMINAL HYSTERECTOMY WITH SALPINGO OOPHORECTOMY  1986    Unilateral salpingo-oophorectomy/cervical dysplasia/AUB   • URETHRAL DILATION      Multiple times      Family History   Problem Relation Age of Onset   • Uterine cancer Mother         Metastatic to ovaries and colon; possibly bone   • Hypertension Mother    • Kidney disease Mother    • Tuberculosis Mother    • Cancer Father         Bladder   • Heart failure Father    • Stroke Father    • Hypertension Father    • Heart attack Father    • Colon polyps Father    • Hypertension Brother    • Kidney disease Brother    • Colon polyps Brother    • Kidney disease Brother    • Hypertension Brother    • Other Daughter         CVID   • Diabetes Daughter    • Colon cancer Maternal Uncle    • Colon cancer Maternal Uncle    • Breast cancer Neg Hx            Current Outpatient Medications:   •  albuterol sulfate  (90 Base) MCG/ACT inhaler, Inhale 2 puffs Every 4 (Four) Hours As Needed for Wheezing or Shortness of Air., Disp: 18 g, Rfl: 1  •  aspirin 81 MG EC tablet, Take 81 mg by mouth Daily., Disp: , Rfl:   •  cetirizine (zyrTEC) 10 MG tablet, Take 1 mg by mouth Daily., Disp: , Rfl: 5  •  clotrimazole-betamethasone (LOTRISONE) 1-0.05 % cream, MARISOL EXT TO THE AFFECTED AND SURROUNDING AREAS BID IN THE MORNING AND IN THE MERCEDEZ FOR 2 WKS, Disp: , Rfl:   •  losartan (COZAAR) 50 MG  "tablet, Take 1 tablet by mouth Daily. (Patient taking differently: Take 50 mg by mouth As Needed.), Disp: 90 tablet, Rfl: 1  •  mupirocin (BACTROBAN) 2 % ointment, Apply  topically to the appropriate area as directed 3 (Three) Times a Day., Disp: 30 g, Rfl: 0  •  pravastatin (PRAVACHOL) 40 MG tablet, Take 40 mg by mouth As Needed., Disp: , Rfl:   •  traMADol-acetaminophen (ULTRACET) 37.5-325 MG per tablet, Take 2 tablets by mouth 2 (Two) Times a Day., Disp: , Rfl:     VITALS:  /82   Pulse 76   Temp 98.1 °F (36.7 °C)   Ht 162.6 cm (64\")   Wt 63.8 kg (140 lb 9.6 oz)   SpO2 98%   BMI 24.13 kg/m²     Physical Exam  Pulmonary:      Effort: Pulmonary effort is normal. No respiratory distress.   Skin:     Comments: Healing left leg wound on left mid shin area improved smaller aprox 0.25cm no scab or redness no swelling or drainage. No streaking around area. Area of itching and burning is aprox 1-2 inches above site and no abnormalities noted at that area.   Neurological:      Mental Status: She is alert.         Result Review :            Assessment and Plan    Diagnoses and all orders for this visit:    1. Wound of left lower extremity, subsequent encounter (Primary)  Comments:  healing. spot looks much better almost. no ss of infection. will try short burst steroid to help with itching and burning above area.  Cont bactroban cream til completely healed.  2. Itching  -     predniSONE (DELTASONE) 20 MG tablet; Take 1 tablet by mouth 2 (Two) Times a Day for 3 days.  Dispense: 6 tablet; Refill: 0  Cont zyrtec.      I discussed the patients findings and my recommendations with patient.  Patient was encouraged to keep me informed of any acute changes, lack of improvement, or any new concerning symptoms.  Patient voiced understanding of all instructions and denied further questions.      Follow Up   Return if symptoms worsen or fail to improve.      Electronically signed by:    Pura Degroot, " APRN  07/27/2021

## 2021-08-02 ENCOUNTER — TELEPHONE (OUTPATIENT)
Dept: INTERNAL MEDICINE | Facility: CLINIC | Age: 76
End: 2021-08-02

## 2021-08-02 NOTE — TELEPHONE ENCOUNTER
Caller: Joyce Jones    Relationship: Self    Best call back number:[phone]     What is the medical concern/diagnosis: NEUROPATHY IN LEFT LEG AND SHIN    What specialty or service is being requested: NEUROLOGIST   What is the provider, practice or medical service name: DR. GEETHA ARCINIEGA    What is the office location: [facility], First Floor, [facility], [place] [address]    What is the office phone number: [phone]    Any additional details: THE PATIENT REPORTS SHE SPOKE WITH MANSI DELEON ABOUT THIS AND STATES THAT SHE TOLD MANSI SHE WOULD GET BACK WITH HER ONCE SHE FOUND A NEUROLOGIST SHE WANTS TO GO TO. PLEASE CALL THE PATIENT AND ADVISE WHEN REFERRAL IS IN PLACE [phone]

## 2021-08-03 DIAGNOSIS — G62.9 NEUROPATHY: Primary | ICD-10-CM

## 2021-08-03 DIAGNOSIS — M79.605 PAIN IN LEFT LEG: ICD-10-CM

## 2021-08-03 NOTE — TELEPHONE ENCOUNTER
Return call to pt. I have ordered a nerve conduction test for left leg. I also placed an order to neurology for further eval.

## 2021-08-23 ENCOUNTER — OFFICE VISIT (OUTPATIENT)
Dept: NEUROLOGY | Facility: CLINIC | Age: 76
End: 2021-08-23

## 2021-08-23 ENCOUNTER — LAB (OUTPATIENT)
Dept: LAB | Facility: HOSPITAL | Age: 76
End: 2021-08-23

## 2021-08-23 VITALS
DIASTOLIC BLOOD PRESSURE: 78 MMHG | SYSTOLIC BLOOD PRESSURE: 118 MMHG | WEIGHT: 137.8 LBS | BODY MASS INDEX: 23.52 KG/M2 | OXYGEN SATURATION: 98 % | HEIGHT: 64 IN

## 2021-08-23 DIAGNOSIS — G62.9 NEUROPATHY: Primary | ICD-10-CM

## 2021-08-23 DIAGNOSIS — L29.9 PRURITUS, UNSPECIFIED: ICD-10-CM

## 2021-08-23 DIAGNOSIS — G62.9 NEUROPATHY: ICD-10-CM

## 2021-08-23 DIAGNOSIS — F07.81 POST CONCUSSION SYNDROME: ICD-10-CM

## 2021-08-23 DIAGNOSIS — R41.82 ALTERED MENTAL STATUS, UNSPECIFIED ALTERED MENTAL STATUS TYPE: ICD-10-CM

## 2021-08-23 PROCEDURE — 99214 OFFICE O/P EST MOD 30 MIN: CPT | Performed by: NURSE PRACTITIONER

## 2021-08-23 NOTE — PROGRESS NOTES
Neuro Office Visit      Encounter Date: 2021   Patient Name: Joyce Jones  : 1945   MRN: [id number]   PCP: LOVE Campos  Chief Complaint:    Chief Complaint   Patient presents with   • Peripheral Neuropathy       History of Present Illness: Joyce Jones is a 75 y.o. female who is here today in Neurology for LLE itching and burning    Neuropathy  Small wound on LLE for 2 months. PCP removed small thorn from left tibial area on 21.  Complains of persistent itching and burning sensation above the site. Seen in ER 21. X-ray and venous doppler neg. Referred to Derm. PCP ordered EMG due 21    2nd lesion appeared superior to the first and they are both beginning to heal. Did not have a fever. Continues with 3 months of itching on the left medial distal tibia area. Had 2 episodes of numbness in left leg from knee to foot. Last episode was 3 weeks ago. Woke her from sleep and resolved with walking. Has had 3 left knee surgies. History of DVT in RLE 40 years ago while on HRT.     Seeing Dr Albright and taking tramadol and acetamenophin. Seen every 3 months. Diagnosed with RA and connective tissue disease overlap syndrome.    Two cousins with Marfan syndrome. Daughter with ALS.     Anxiety  Does have anxiety.    She is worried about cognitive functioning, specifically word finding.  Has not been lost or confused. Feels it is worse since the pandemic    HA  Has neck stiffness that is chronic and can cause mild HA. HA are rare and intermittent. Does not interrupt her day. 2 years ago she did suffer a fall with head injury and retinal detachment. No LOC. Did not have brain imaging at that time.      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 T11-sacrum   • CERVICAL SPINE SURGERY  2000    cage   • CHOLECYSTECTOMY  2014   • COLONOSCOPY  2016   • FOOT SURGERY  2008   • INGUINAL HERNIA REPAIR Bilateral 1978 2019 right side repair   • KNEE SURGERY Bilateral 2017 2005 and x2 in 8/2017(left)   • NASAL SEPTUM SURGERY  1999   • TOTAL ABDOMINAL HYSTERECTOMY WITH SALPINGO OOPHORECTOMY  1986    Unilateral salpingo-oophorectomy/cervical dysplasia/AUB   • URETHRAL DILATION      Multiple times       Family History:   Family History   Problem Relation Age of Onset   • Uterine cancer Mother         Metastatic to ovaries and colon; possibly bone   • Hypertension Mother    • Kidney disease Mother    • Tuberculosis Mother    • Cancer Father         Bladder   • Heart failure Father    • Stroke Father    • Hypertension Father    • Heart attack Father    • Colon polyps Father    • Hypertension Brother    • Kidney disease Brother    • Colon polyps Brother    • Kidney disease Brother    • Hypertension Brother    • Other Daughter         CVID   • Diabetes Daughter    • Colon cancer Maternal Uncle    • Colon cancer Maternal Uncle    • Breast cancer Neg Hx        Social History:   Social History     Socioeconomic History   • Marital status:      Spouse name: Not on file   • Number of children: Not on file   • Years of education: Not on file   • Highest education level: Not on file   Tobacco Use   • Smoking status: Never Smoker   • Smokeless tobacco: Never Used   Vaping Use   • Vaping Use: Never used   Substance and Sexual Activity   • Alcohol use: Yes     Alcohol/week: 1.0 standard  drinks     Types: 1 Glasses of wine per week     Comment: rarely at holidays   • Drug use: No   • Sexual activity: Not Currently     Comment:        Medications:     Current Outpatient Medications:   •  albuterol sulfate  (90 Base) MCG/ACT inhaler, Inhale 2 puffs Every 4 (Four) Hours As Needed for Wheezing or Shortness of Air., Disp: 18 g, Rfl: 1  •  aspirin 81 MG EC tablet, Take 81 mg by mouth Daily., Disp: , Rfl:   •  cetirizine (zyrTEC) 10 MG tablet, Take 1 mg by mouth Daily., Disp: , Rfl: 5  •  clotrimazole-betamethasone (LOTRISONE) 1-0.05 % cream, MARISOL EXT TO THE AFFECTED AND SURROUNDING AREAS BID IN THE MORNING AND IN THE MERCEDEZ FOR 2 WKS, Disp: , Rfl:   •  losartan (COZAAR) 50 MG tablet, Take 1 tablet by mouth Daily. (Patient taking differently: Take 50 mg by mouth As Needed.), Disp: 90 tablet, Rfl: 1  •  mupirocin (BACTROBAN) 2 % ointment, Apply  topically to the appropriate area as directed 3 (Three) Times a Day., Disp: 30 g, Rfl: 0  •  pravastatin (PRAVACHOL) 40 MG tablet, Take 40 mg by mouth As Needed., Disp: , Rfl:   •  traMADol-acetaminophen (ULTRACET) 37.5-325 MG per tablet, Take 2 tablets by mouth 2 (Two) Times a Day., Disp: , Rfl:     Allergies:   Allergies   Allergen Reactions   • Overton-2 Inhibitors Anaphylaxis   • Nsaids GI Intolerance   • Augmentin [Amoxicillin-Pot Clavulanate] Nausea Only   • Celecoxib Hives       PHQ-9 Total Score:     STEADI Fall Risk Assessment was completed, and patient is at LOW risk for falls.Assessment completed on:4/16/2021    Objective     Physical Exam:   Physical Exam  Eyes:      Pupils: Pupils are equal, round, and reactive to light.   Neurological:      Mental Status: She is oriented to person, place, and time.      Coordination: Finger-Nose-Finger Test, Heel to Shin Test and Romberg Test normal.      Gait: Gait is intact.      Deep Tendon Reflexes:      Reflex Scores:       Tricep reflexes are 2+ on the right side and 2+ on the left side.       Bicep  reflexes are 2+ on the right side and 2+ on the left side.       Brachioradialis reflexes are 2+ on the right side and 2+ on the left side.       Patellar reflexes are 1+ on the right side and 0 on the left side.       Achilles reflexes are 1+ on the right side and 1+ on the left side.  Psychiatric:         Speech: Speech normal.         Neurologic Exam     Mental Status   Oriented to person, place, and time.   Follows 3 step commands.   Attention: normal. Concentration: normal.   Speech: speech is normal   Level of consciousness: alert  Knowledge: consistent with education.   Normal comprehension.     Cranial Nerves     CN III, IV, VI   Pupils are equal, round, and reactive to light.  Right pupil: Accommodation: intact.   Left pupil: Accommodation: intact.   CN III: no CN III palsy  CN VI: no CN VI palsy  Nystagmus: none   Diplopia: none  Upgaze: normal  Downgaze: normal  Conjugate gaze: present    CN VII   Facial expression full, symmetric.     CN VIII   Hearing: intact    CN XII   CN XII normal.     Motor Exam   Muscle bulk: normal  Overall muscle tone: normal    Strength   Right biceps: 5/5  Left biceps: 5/5  Right triceps: 5/5  Left triceps: 5/5  Right interossei: 5/5  Left interossei: 5/5  Right quadriceps: 5/5  Left quadriceps: 5/5  Right anterior tibial: 5/5  Left anterior tibial: 5/5  Right posterior tibial: 5/5  Left posterior tibial: 5/5    Sensory Exam   Left leg light touch: decreased from knee  Right arm pinprick: normal  Left arm pinprick: normal  Right leg pinprick: normal  Left leg pinprick: normal    Gait, Coordination, and Reflexes     Gait  Gait: normal    Coordination   Romberg: negative  Finger to nose coordination: normal  Heel to shin coordination: normal    Tremor   Resting tremor: absent  Action tremor: absent    Reflexes   Right brachioradialis: 2+  Left brachioradialis: 2+  Right biceps: 2+  Left biceps: 2+  Right triceps: 2+  Left triceps: 2+  Right patellar: 1+  Left patellar: 0  Right  "achilles: 1+  Left achilles: 1+  Right : 2+  Left : 2+       Vital Signs:   Vitals:    08/23/21 0937   BP: 118/78   SpO2: 98%   Weight: 62.5 kg (137 lb 12.8 oz)   Height: 162.6 cm (64.02\")     Body mass index is 23.64 kg/m².     Results:   Imaging:   XR Tibia Fibula 2 View Left    Result Date: 7/23/2021  No radiopaque foreign body within the soft tissues despite mild soft tissue irregularity anterior soft tissues overlying the distal tibial diaphysis area of interest labeled on lateral view.  D:  07/23/2021 E:  07/23/2021  This report was finalized on 7/23/2021 7:11 PM by Dr. Homero Chaney.       MMSE 29/30    Assessment / Plan      Assessment/Plan:   Diagnoses and all orders for this visit:    1. Neuropathy (Primary)  -     Lyme Disease Antibodies, Total and IgM with Reflex to Line Blot; Future  -     Celiac Disease Panel; Future  -     CK; Future  -     C-reactive Protein; Future  -     Cryoglobulin; Future  -     Heavy Metals Profile II, Urine - Urine, Clean Catch; Future  -     Lyme Disease, Line Blot; Future  -     Porphobilinogen & Porphyr Qn, 24-Hr Urine - Urine, Clean Catch; Future  -     Myasthenia Gravis Full Panel w/MuSK Reflex; Future  -     Rheumatoid Arthritis (RA) Profile; Future  -     Sjogren's Antibody, Anti-SS-A / -SS-B; Future  -     TSH; Future  -     Vitamin B12 & Folate; Future    2. Pruritus, unspecified   -     TSH; Future    3. Altered mental status, unspecified altered mental status type  -     MRI Brain With & Without Contrast; Future    4. Post concussion syndrome  -     MRI Brain With & Without Contrast; Future         Patient Education:   Offered reassurance for cognitive decline with MMSE 29/30. Recommended doing cross word puzzles and reading aloud.      Follow Up:   Return in about 6 weeks (around 10/4/2021) for Recheck.    Reviewed medications, potential side effects and signs and symptoms to report. Discussed risk versus benefits of treatment plan with patient and/or " family-including medications, labs and radiology that may be ordered. Addressed questions and concerns during visit. Patient and/or family verbalized understanding and agree with plan. Instructed to call the office with any questions and report to ER with any life-threatening symptoms.     During this visit the following were done:  Labs Reviewed [x]    Labs Ordered [x]    Radiology Reports Reviewed []    Radiology Ordered [x]    PCP Records Reviewed [x]    Referring Provider Records Reviewed []    ER Records Reviewed []    Hospital Records Reviewed []    History Obtained From Family []    Radiology Images Reviewed []    Other Reviewed []    Records Requested []      Snow Dukes, DNP, APRN

## 2021-08-30 ENCOUNTER — LAB (OUTPATIENT)
Dept: LAB | Facility: HOSPITAL | Age: 76
End: 2021-08-30

## 2021-08-30 DIAGNOSIS — L29.9 PRURITUS, UNSPECIFIED: ICD-10-CM

## 2021-08-30 DIAGNOSIS — G62.9 NEUROPATHY: ICD-10-CM

## 2021-08-30 LAB
CK SERPL-CCNC: 76 U/L (ref 20–180)
CRP SERPL-MCNC: 0.41 MG/DL (ref 0–0.5)
FOLATE SERPL-MCNC: >20 NG/ML (ref 4.78–24.2)
TSH SERPL DL<=0.05 MIU/L-ACNC: 0.84 UIU/ML (ref 0.27–4.2)
VIT B12 BLD-MCNC: 1245 PG/ML (ref 211–946)

## 2021-08-30 PROCEDURE — 82607 VITAMIN B-12: CPT

## 2021-08-30 PROCEDURE — 83519 RIA NONANTIBODY: CPT

## 2021-08-30 PROCEDURE — 86235 NUCLEAR ANTIGEN ANTIBODY: CPT

## 2021-08-30 PROCEDURE — 86255 FLUORESCENT ANTIBODY SCREEN: CPT

## 2021-08-30 PROCEDURE — 86431 RHEUMATOID FACTOR QUANT: CPT

## 2021-08-30 PROCEDURE — 86618 LYME DISEASE ANTIBODY: CPT

## 2021-08-30 PROCEDURE — 82746 ASSAY OF FOLIC ACID SERUM: CPT

## 2021-08-30 PROCEDURE — 82784 ASSAY IGA/IGD/IGG/IGM EACH: CPT

## 2021-08-30 PROCEDURE — 36415 COLL VENOUS BLD VENIPUNCTURE: CPT

## 2021-08-30 PROCEDURE — 82550 ASSAY OF CK (CPK): CPT

## 2021-08-30 PROCEDURE — 86200 CCP ANTIBODY: CPT

## 2021-08-30 PROCEDURE — 82595 ASSAY OF CRYOGLOBULIN: CPT

## 2021-08-30 PROCEDURE — 86140 C-REACTIVE PROTEIN: CPT

## 2021-08-30 PROCEDURE — 84443 ASSAY THYROID STIM HORMONE: CPT

## 2021-08-30 PROCEDURE — 83516 IMMUNOASSAY NONANTIBODY: CPT

## 2021-08-31 LAB
B BURGDOR IGG+IGM SER-ACNC: <0.91 ISR (ref 0–0.9)
B BURGDOR IGM SER IA-ACNC: <0.8 INDEX (ref 0–0.79)
ENA SS-A AB SER-ACNC: <0.2 AI (ref 0–0.9)
ENA SS-B AB SER-ACNC: <0.2 AI (ref 0–0.9)
ENDOMYSIUM IGA SER QL: NEGATIVE
IGA SERPL-MCNC: 185 MG/DL (ref 64–422)
TTG IGA SER-ACNC: <2 U/ML (ref 0–3)

## 2021-09-01 ENCOUNTER — HOSPITAL ENCOUNTER (OUTPATIENT)
Dept: BONE DENSITY | Facility: HOSPITAL | Age: 76
End: 2021-09-01

## 2021-09-01 ENCOUNTER — LAB (OUTPATIENT)
Dept: LAB | Facility: HOSPITAL | Age: 76
End: 2021-09-01

## 2021-09-01 DIAGNOSIS — L29.9 PRURITUS, UNSPECIFIED: ICD-10-CM

## 2021-09-01 DIAGNOSIS — G62.9 NEUROPATHY: ICD-10-CM

## 2021-09-01 LAB
CCP IGA+IGG SERPL IA-ACNC: 7 UNITS (ref 0–19)
RHEUMATOID FACT SERPL-ACNC: <10 IU/ML (ref 0–13.9)

## 2021-09-01 PROCEDURE — 82570 ASSAY OF URINE CREATININE: CPT

## 2021-09-01 PROCEDURE — 84110 ASSAY OF PORPHOBILINOGEN: CPT

## 2021-09-01 PROCEDURE — 84120 ASSAY OF URINE PORPHYRINS: CPT

## 2021-09-03 LAB — CRYOGLOB SER QL 1D COLD INC: NORMAL

## 2021-09-08 LAB
COPRO1 24H UR-MCNC: 2 UG/L
COPRO1 24H UR-MRATE: 9 UG/24 HR (ref 0–24)
COPRO3 24H UR-MCNC: 4 UG/L
COPRO3 24H UR-MRATE: 18 UG/24 HR (ref 0–74)
CREAT 24H UR-MRATE: 663 MG/24 HR (ref 800–1800)
CREAT UR-MCNC: 14.9 MG/DL
HEPTA-CP 24H UR-MRATE: <4 UG/24 HR (ref 0–4)
HEPTA-CP UR-MCNC: <1 UG/L
HEXA-CP 24H UR-MRATE: <4 UG/24 HR (ref 0–1)
HEXA-CP UR-MCNC: <1 UG/L
PBG 24H UR-MCNC: 0.3 MG/L (ref 0–2)
PBG 24H UR-MRATE: 1.3 MG/24 HR (ref 0–1.5)
PENTA-CP 24H UR-MRATE: 4 UG/24 HR (ref 0–4)
PENTA-CP UR-MCNC: 1 UG/L
UROPOR 24H UR-MRATE: 13 UG/24 HR (ref 0–24)
UROPOR UR-MCNC: 3 UG/L

## 2021-09-09 ENCOUNTER — APPOINTMENT (OUTPATIENT)
Dept: NEUROLOGY | Facility: HOSPITAL | Age: 76
End: 2021-09-09

## 2021-09-09 ENCOUNTER — TELEPHONE (OUTPATIENT)
Dept: NEUROLOGY | Facility: CLINIC | Age: 76
End: 2021-09-09

## 2021-09-09 NOTE — TELEPHONE ENCOUNTER
----- Message from Snow Dukes, GABI, APRN sent at 9/9/2021  1:05 PM EDT -----  Please let her know her labs have been normal. We are waiting on her MRI to be scheduled.

## 2021-09-10 ENCOUNTER — TELEPHONE (OUTPATIENT)
Dept: INTERNAL MEDICINE | Facility: CLINIC | Age: 76
End: 2021-09-10

## 2021-09-10 NOTE — TELEPHONE ENCOUNTER
Caller: Joyce Jones    Relationship: Self    Best call back number:     [phone]     What medication are you requesting:     MACROBID MEDICATION    PATIENT STATED SHE HAS TAKEN THIS MEDICATION IN THE PAST FOR BLADDER INFECTIONS    What are your current symptoms:     BURNING AND PAINFUL WHEN URINATING    How long have you been experiencing symptoms:     SINCE YESTERDAY, 9/9/21    Have you had these symptoms before:    [x] Yes  [] No    Have you been treated for these symptoms before:   [x] Yes  [] No    If a prescription is needed, what is your preferred pharmacy and phone number:     [facility] [place], [place]    TELEPHONE CONTACT:    [phone]    Additional notes:    N/A    SHERLY DELEON

## 2021-09-10 NOTE — TELEPHONE ENCOUNTER
"\"HUB to Read\" Please schedule pt to be seen in office will need evaluation for antibiotic.       Message left for pt to return call regarding scheduling an apt  "

## 2021-09-10 NOTE — TELEPHONE ENCOUNTER
Called patient and advised she needed to come in for an appointment for evaluation and treatment for UTI.  Patient states her daughter is end stage ALS and cant be left and she has nobody to stay with her.  Informed patient I would send message but Provider out of office today.  States she will call  In [place] as well.

## 2021-09-10 NOTE — TELEPHONE ENCOUNTER
If the patient will run out of medication over the weekend add that information to the additional details line. Send this encounter to the clinical pool.    Caller: Joyce Jones    Relationship: Self    Best call back number: [phone]    Medication needed:     MACROBID 500 MG OR ANY PREFERRED MEDICATION    When do you need the refill by: 9/10/21    What additional details did the patient provide when requesting the medication: PATIENT STATED SHE IS UNABLE TO SCHEDULE APPOINTMENT.    PATIENT REQUESTING PRESCRIPTION TO TREAT POSSIBLE BLADDER INFECTION; BURNING AND DISCOMFORT WHEN URINATING.  PATIENT STATED DIARRHEA.        Does the patient have less than a 3 day supply:  [x] Yes  [] No    What is the patient's preferred pharmacy: [id number] - [place], [place] - [address] AT [facility] [address] - [phone]  - [phone] FX           “Thank you for sharing this information with me. I will send a message to the clinical team. Please allow 48 hours for the clinical staff to follow up on this request.”

## 2021-09-13 NOTE — TELEPHONE ENCOUNTER
Check with pt to see if she was taken care of as for UTI symptoms. If still having trouble she can go online and fill out evisit for uti symptoms.

## 2021-09-14 ENCOUNTER — TELEMEDICINE (OUTPATIENT)
Dept: INTERNAL MEDICINE | Facility: CLINIC | Age: 76
End: 2021-09-14

## 2021-09-14 DIAGNOSIS — N30.00 ACUTE CYSTITIS WITHOUT HEMATURIA: Primary | ICD-10-CM

## 2021-09-14 PROCEDURE — 99213 OFFICE O/P EST LOW 20 MIN: CPT | Performed by: NURSE PRACTITIONER

## 2021-09-14 RX ORDER — NITROFURANTOIN 25; 75 MG/1; MG/1
100 CAPSULE ORAL EVERY 12 HOURS SCHEDULED
Qty: 14 CAPSULE | Refills: 0 | Status: SHIPPED | OUTPATIENT
Start: 2021-09-14 | End: 2021-09-21

## 2021-09-14 NOTE — PROGRESS NOTES
Chief Complaint   Patient presents with   • Urinary Tract Infection     The use of a video visit has been reviewed with the patient and verbal informed consent has been obtained.    History of Present Illness    75 y.o.female presents for uti sx.  The patient describes dysuria for a few days not improving with home care.  The patient reports associated urinary frequency, urgency.  There is no associated gross hematuria, incontinence or pelvic pain.  The patient denies fever or flank pain.  The patient continues to hydrate well.  Has had uti in past. Had some old macrobid and started taking; has had a couple days and seems to be helping.    Review of Systems   Constitutional: Negative for chills and fever.   Gastrointestinal: Negative for abdominal pain, nausea and vomiting.   Genitourinary: Positive for dysuria, frequency and urgency. Negative for difficulty urinating, flank pain and hematuria.   Musculoskeletal: Negative for back pain.   Neurological: Negative for light-headedness.         [facility]  The following portions of the patient's history were reviewed and updated as appropriate: allergies, current medications, past family history, past medical history, past social history, past surgical history and problem list.     Past Medical History:   Diagnosis Date   • Allergic rhinitis    • Anemia    • Arthritis    • Diverticulosis    • Heart murmur    • History of bone density study 03/02/2017    [facility]   • History of mammography, screening 2016   • Hyperlipidemia    • Hypertension    • Inguinal hernia 1978    repair   • Osteoarthritis (arthritis due to wear and tear of joints)    • Personal history of DVT (deep vein thrombosis) 2005    right leg- due to surgery   • Positive TB test     Always comes back + but negative further testing      Allergies   Allergen Reactions   • Overton-2 Inhibitors Anaphylaxis   • Nsaids GI Intolerance   • Augmentin [Amoxicillin-Pot Clavulanate] Nausea Only   • Celecoxib Hives       Social History     Tobacco Use   • Smoking status: Never Smoker   • Smokeless tobacco: Never Used   Vaping Use   • Vaping Use: Never used   Substance Use Topics   • Alcohol use: Yes     Alcohol/week: 1.0 standard drinks     Types: 1 Glasses of wine per week     Comment: rarely at holidays   • Drug use: No     Past Surgical History:   Procedure Laterality Date   • BACK SURGERY  2002    Scoliosis bar. T11-sacrum   • CERVICAL SPINE SURGERY  2000    cage   • CHOLECYSTECTOMY  2014   • COLONOSCOPY  2016   • FOOT SURGERY  2008   • INGUINAL HERNIA REPAIR Bilateral 1978    2019 right side repair   • KNEE SURGERY Bilateral 2017 2005 and x2 in 8/2017(left)   • NASAL SEPTUM SURGERY  1999   • TOTAL ABDOMINAL HYSTERECTOMY WITH SALPINGO OOPHORECTOMY  1986    Unilateral salpingo-oophorectomy/cervical dysplasia/AUB   • URETHRAL DILATION      Multiple times      Family History   Problem Relation Age of Onset   • Uterine cancer Mother         Metastatic to ovaries and colon; possibly bone   • Hypertension Mother    • Kidney disease Mother    • Tuberculosis Mother    • Cancer Father         Bladder   • Heart failure Father    • Stroke Father    • Hypertension Father    • Heart attack Father    • Colon polyps Father    • Hypertension Brother    • Kidney disease Brother    • Colon polyps Brother    • Kidney disease Brother    • Hypertension Brother    • Other Daughter         CVID   • Diabetes Daughter    • Colon cancer Maternal Uncle    • Colon cancer Maternal Uncle    • Breast cancer Neg Hx            Current Outpatient Medications:   •  albuterol sulfate  (90 Base) MCG/ACT inhaler, Inhale 2 puffs Every 4 (Four) Hours As Needed for Wheezing or Shortness of Air., Disp: 18 g, Rfl: 1  •  aspirin 81 MG EC tablet, Take 81 mg by mouth Daily., Disp: , Rfl:   •  cetirizine (zyrTEC) 10 MG tablet, Take 1 mg by mouth Daily., Disp: , Rfl: 5  •  clotrimazole-betamethasone (LOTRISONE) 1-0.05 % cream, MARISOL EXT TO THE AFFECTED AND  SURROUNDING AREAS BID IN THE MORNING AND IN THE MERCEDEZ FOR 2 WKS, Disp: , Rfl:   •  losartan (COZAAR) 50 MG tablet, Take 1 tablet by mouth Daily. (Patient taking differently: Take 50 mg by mouth As Needed.), Disp: 90 tablet, Rfl: 1  •  mupirocin (BACTROBAN) 2 % ointment, Apply  topically to the appropriate area as directed 3 (Three) Times a Day., Disp: 30 g, Rfl: 0  •  pravastatin (PRAVACHOL) 40 MG tablet, Take 40 mg by mouth As Needed., Disp: , Rfl:   •  traMADol-acetaminophen (ULTRACET) 37.5-325 MG per tablet, Take 2 tablets by mouth 2 (Two) Times a Day., Disp: , Rfl:     Physical Exam  Pulmonary:      Effort: Pulmonary effort is normal. No respiratory distress.   Neurological:      Mental Status: She is alert and oriented to person, place, and time.   Psychiatric:         Mood and Affect: Mood normal.         Assessment and Plan    Diagnoses and all orders for this visit:    1. Acute cystitis without hematuria (Primary)  -     nitrofurantoin, macrocrystal-monohydrate, (MACROBID) 100 MG capsule; Take 1 capsule by mouth Every 12 (Twelve) Hours for 7 days.  Dispense: 14 capsule; Refill: 0        I discussed the patients findings and my recommendations with patient.  Patient was encouraged to keep me informed of any acute changes, lack of improvement, or any new concerning symptoms.  Patient voiced understanding of all instructions and denied further questions.      Follow Up   Return if symptoms worsen or fail to improve.      Electronically signed by:    LOVE Ray  09/14/2021

## 2021-09-15 ENCOUNTER — APPOINTMENT (OUTPATIENT)
Dept: MRI IMAGING | Facility: HOSPITAL | Age: 76
End: 2021-09-15

## 2021-09-19 LAB
ACHR BIND AB SER-SCNC: <0.03 NMOL/L (ref 0–0.24)
ACHR BLOCK AB SER-ACNC: 24 % (ref 0–25)
ACHR MOD AB/ACHR TOTAL SFR SER: <12 % (ref 0–20)
MUSK AB SER-SCNC: <1 U/ML
REFLEX INFORMATION: NORMAL
STRIA MUS AB TITR SER IF: NEGATIVE {TITER}

## 2021-09-27 ENCOUNTER — TELEPHONE (OUTPATIENT)
Dept: INTERNAL MEDICINE | Facility: CLINIC | Age: 76
End: 2021-09-27

## 2021-09-27 NOTE — TELEPHONE ENCOUNTER
Pt states having vaginal dryness, burning, and irritation, has tried OTC creams but not helping. Stated that she would like a cream with estrogen to be called in, stated she's had this issue before and that is what helped. Please advise.

## 2021-09-27 NOTE — TELEPHONE ENCOUNTER
PATIENT IS REQUESTING A CALL BACK FROM PCP AFTER 2 PM. PATIENT IS HAVING VAGINAL ISSUES    CALL BACK NUMBER [phone]

## 2021-09-28 NOTE — TELEPHONE ENCOUNTER
Return call to pt. I do not see where I have prescribed this medicine before for pt. She will need appt. I am ok if she does a telehealth visit since her daughter is sick.

## 2021-10-01 ENCOUNTER — OFFICE VISIT (OUTPATIENT)
Dept: INTERNAL MEDICINE | Facility: CLINIC | Age: 76
End: 2021-10-01

## 2021-10-01 VITALS
OXYGEN SATURATION: 97 % | DIASTOLIC BLOOD PRESSURE: 74 MMHG | HEART RATE: 72 BPM | WEIGHT: 139.2 LBS | BODY MASS INDEX: 23.88 KG/M2 | TEMPERATURE: 97.4 F | SYSTOLIC BLOOD PRESSURE: 116 MMHG

## 2021-10-01 DIAGNOSIS — Z12.31 ENCOUNTER FOR SCREENING MAMMOGRAM FOR BREAST CANCER: ICD-10-CM

## 2021-10-01 DIAGNOSIS — G47.00 INSOMNIA, UNSPECIFIED TYPE: ICD-10-CM

## 2021-10-01 DIAGNOSIS — F41.9 ANXIETY: ICD-10-CM

## 2021-10-01 DIAGNOSIS — N89.8 VAGINAL DRYNESS: Primary | ICD-10-CM

## 2021-10-01 PROCEDURE — 99214 OFFICE O/P EST MOD 30 MIN: CPT | Performed by: NURSE PRACTITIONER

## 2021-10-01 RX ORDER — HYDROXYZINE PAMOATE 25 MG/1
25 CAPSULE ORAL NIGHTLY PRN
Qty: 30 CAPSULE | Refills: 2 | Status: SHIPPED | OUTPATIENT
Start: 2021-10-01 | End: 2022-03-14

## 2021-10-01 RX ORDER — CONJUGATED ESTROGENS 0.62 MG/G
CREAM VAGINAL
Qty: 30 G | Refills: 1 | Status: SHIPPED | OUTPATIENT
Start: 2021-10-01 | End: 2021-10-05

## 2021-10-01 NOTE — PATIENT INSTRUCTIONS
Back Exercises  These exercises help to make your trunk and back strong. They also help to keep the lower back flexible. Doing these exercises can help to prevent back pain or lessen existing pain.  · If you have back pain, try to do these exercises 2-3 times each day or as told by your doctor.  · As you get better, do the exercises once each day. Repeat the exercises more often as told by your doctor.  · To stop back pain from coming back, do the exercises once each day, or as told by your doctor.  Exercises  Single knee to chest  Do these steps 3-5 times in a row for each le. Lie on your back on a firm bed or the floor with your legs stretched out.  2. Bring one knee to your chest.  3. Grab your knee or thigh with both hands and hold them it in place.  4. Pull on your knee until you feel a gentle stretch in your lower back or buttocks.  5. Keep doing the stretch for 10-30 seconds.  6. Slowly let go of your leg and straighten it.  Pelvic tilt  Do these steps 5-10 times in a row:  1. Lie on your back on a firm bed or the floor with your legs stretched out.  2. Bend your knees so they point up to the ceiling. Your feet should be flat on the floor.  3. Tighten your lower belly (abdomen) muscles to press your lower back against the floor. This will make your tailbone point up to the ceiling instead of pointing down to your feet or the floor.  4. Stay in this position for 5-10 seconds while you gently tighten your muscles and breathe evenly.  Cat-cow  Do these steps until your lower back bends more easily:  1. Get on your hands and knees on a firm surface. Keep your hands under your shoulders, and keep your knees under your hips. You may put padding under your knees.  2. Let your head hang down toward your chest. Tighten (contract) the muscles in your belly. Point your tailbone toward the floor so your lower back becomes rounded like the back of a cat.  3. Stay in this position for 5 seconds.  4. Slowly lift your  head. Let the muscles of your belly relax. Point your tailbone up toward the ceiling so your back forms a sagging arch like the back of a cow.  5. Stay in this position for 5 seconds.    Press-ups  Do these steps 5-10 times in a row:  1. Lie on your belly (face-down) on the floor.  2. Place your hands near your head, about shoulder-width apart.  3. While you keep your back relaxed and keep your hips on the floor, slowly straighten your arms to raise the top half of your body and lift your shoulders. Do not use your back muscles. You may change where you place your hands in order to make yourself more comfortable.  4. Stay in this position for 5 seconds.  5. Slowly return to lying flat on the floor.    Bridges  Do these steps 10 times in a row:  1. Lie on your back on a firm surface.  2. Bend your knees so they point up to the ceiling. Your feet should be flat on the floor. Your arms should be flat at your sides, next to your body.  3. Tighten your butt muscles and lift your butt off the floor until your waist is almost as high as your knees. If you do not feel the muscles working in your butt and the back of your thighs, slide your feet 1-2 inches farther away from your butt.  4. Stay in this position for 3-5 seconds.  5. Slowly lower your butt to the floor, and let your butt muscles relax.  If this exercise is too easy, try doing it with your arms crossed over your chest.  Belly crunches  Do these steps 5-10 times in a row:  1. Lie on your back on a firm bed or the floor with your legs stretched out.  2. Bend your knees so they point up to the ceiling. Your feet should be flat on the floor.  3. Cross your arms over your chest.  4. Tip your chin a little bit toward your chest but do not bend your neck.  5. Tighten your belly muscles and slowly raise your chest just enough to lift your shoulder blades a tiny bit off of the floor. Avoid raising your body higher than that, because it can put too much stress on your low  back.  6. Slowly lower your chest and your head to the floor.  Back lifts  Do these steps 5-10 times in a row:  1. Lie on your belly (face-down) with your arms at your sides, and rest your forehead on the floor.  2. Tighten the muscles in your legs and your butt.  3. Slowly lift your chest off of the floor while you keep your hips on the floor. Keep the back of your head in line with the curve in your back. Look at the floor while you do this.  4. Stay in this position for 3-5 seconds.  5. Slowly lower your chest and your face to the floor.  Contact a doctor if:  · Your back pain gets a lot worse when you do an exercise.  · Your back pain does not get better 2 hours after you exercise.  If you have any of these problems, stop doing the exercises. Do not do them again unless your doctor says it is okay.  Get help right away if:  · You have sudden, very bad back pain. If this happens, stop doing the exercises. Do not do them again unless your doctor says it is okay.  This information is not intended to replace advice given to you by your health care provider. Make sure you discuss any questions you have with your health care provider.  Document Revised: 09/12/2019 Document Reviewed: 09/12/2019  Elsevier Patient Education © 2021 [employer]

## 2021-10-01 NOTE — PROGRESS NOTES
Chief Complaint   Patient presents with   • Vaginitis   • Anxiety     insomnia       History of Present Illness    75 y.o.female presents for vaginitis with dryness itching burning. Chronic recurrent. No vaginal discharge. Hysterectomy when 40. No hormone replacement. Has been trying some otc creams not helping.  With increased stress; not sleeping well increased anxiety. Daughter terminally ill and pt is primary caregiver.    Review of Systems   Constitutional: Negative for chills and fever.   Gastrointestinal: Negative for abdominal pain.   Genitourinary: Positive for vaginal pain. Negative for difficulty urinating, vaginal bleeding and vaginal discharge.   Psychiatric/Behavioral: Positive for sleep disturbance and stress. The patient is nervous/anxious.          [facility]  The following portions of the patient's history were reviewed and updated as appropriate: allergies, current medications, past family history, past medical history, past social history, past surgical history and problem list.     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 comes back + but negative further testing      Allergies   Allergen Reactions   • Overton-2 Inhibitors Anaphylaxis   • Nsaids GI Intolerance   • Augmentin [Amoxicillin-Pot Clavulanate] Nausea Only   • Celecoxib Hives      Social History     Tobacco Use   • Smoking status: Never Smoker   • Smokeless tobacco: Never Used   Vaping Use   • Vaping Use: Never used   Substance Use Topics   • Alcohol use: Yes     Alcohol/week: 1.0 standard drinks      Types: 1 Glasses of wine per week     Comment: rarely at holidays   • Drug use: No     Past Surgical History:   Procedure Laterality Date   • BACK SURGERY  2002    Scoliosis bar. T11-sacrum   • CERVICAL SPINE SURGERY  2000    cage   • CHOLECYSTECTOMY  2014   • COLONOSCOPY  2016   • FOOT SURGERY  2008   • INGUINAL HERNIA REPAIR Bilateral 1978    2019 right side repair   • KNEE SURGERY Bilateral 2017    2005 and x2 in 8/2017(left)   • NASAL SEPTUM SURGERY  1999   • TOTAL ABDOMINAL HYSTERECTOMY WITH SALPINGO OOPHORECTOMY  1986    Unilateral salpingo-oophorectomy/cervical dysplasia/AUB   • URETHRAL DILATION      Multiple times      Family History   Problem Relation Age of Onset   • Uterine cancer Mother         Metastatic to ovaries and colon; possibly bone   • Hypertension Mother    • Kidney disease Mother    • Tuberculosis Mother    • Cancer Father         Bladder   • Heart failure Father    • Stroke Father    • Hypertension Father    • Heart attack Father    • Colon polyps Father    • Hypertension Brother    • Kidney disease Brother    • Colon polyps Brother    • Kidney disease Brother    • Hypertension Brother    • Other Daughter         CVID   • Diabetes Daughter    • Colon cancer Maternal Uncle    • Colon cancer Maternal Uncle    • Breast cancer Neg Hx            Current Outpatient Medications:   •  albuterol sulfate  (90 Base) MCG/ACT inhaler, Inhale 2 puffs Every 4 (Four) Hours As Needed for Wheezing or Shortness of Air., Disp: 18 g, Rfl: 1  •  aspirin 81 MG EC tablet, Take 81 mg by mouth Daily., Disp: , Rfl:   •  cetirizine (zyrTEC) 10 MG tablet, Take 1 mg by mouth Daily., Disp: , Rfl: 5  •  clotrimazole-betamethasone (LOTRISONE) 1-0.05 % cream, MARISOL EXT TO THE AFFECTED AND SURROUNDING AREAS BID IN THE MORNING AND IN THE MERCEDEZ FOR 2 WKS, Disp: , Rfl:   •  losartan (COZAAR) 50 MG tablet, Take 1 tablet by mouth Daily. (Patient taking differently: Take 50 mg by mouth As Needed.), Disp: 90 tablet, Rfl: 1  •   mupirocin (BACTROBAN) 2 % ointment, Apply  topically to the appropriate area as directed 3 (Three) Times a Day., Disp: 30 g, Rfl: 0  •  pravastatin (PRAVACHOL) 40 MG tablet, Take 40 mg by mouth As Needed., Disp: , Rfl:   •  traMADol-acetaminophen (ULTRACET) 37.5-325 MG per tablet, Take 2 tablets by mouth 2 (Two) Times a Day., Disp: , Rfl:     VITALS:  /74   Pulse 72   Temp 97.4 °F (36.3 °C)   Wt 63.1 kg (139 lb 3.2 oz)   SpO2 97%   BMI 23.88 kg/m²     Physical Exam  HENT:      Head: Normocephalic.   Pulmonary:      Effort: Pulmonary effort is normal. No respiratory distress.   Neurological:      Mental Status: She is alert and oriented to person, place, and time.   Psychiatric:         Mood and Affect: Mood normal.         Result Review :            Assessment and Plan    Diagnoses and all orders for this visit:    1. Vaginal dryness (Primary)  -     conjugated estrogens (Premarin) 0.625 MG/GM vaginal cream; Apply small amount (pea sized) cream to vaginal area two times per week  Dispense: 30 g; Refill: 1    2. Anxiety  -     hydrOXYzine pamoate (VISTARIL) 25 MG capsule; Take 1 capsule by mouth At Night As Needed for Anxiety (sleep).  Dispense: 30 capsule; Refill: 2    3. Insomnia, unspecified type  -     hydrOXYzine pamoate (VISTARIL) 25 MG capsule; Take 1 capsule by mouth At Night As Needed for Anxiety (sleep).  Dispense: 30 capsule; Refill: 2    4. Encounter for screening mammogram for breast cancer  -     Mammo Screening Digital Tomosynthesis Bilateral With CAD; Future        I discussed the patients findings and my recommendations with patient.  Patient was encouraged to keep me informed of any acute changes, lack of improvement, or any new concerning symptoms.  Patient voiced understanding of all instructions and denied further questions.      Follow Up   Return if symptoms worsen or fail to improve.      Electronically signed by:    LOVE Ray  10/01/2021

## 2021-10-04 ENCOUNTER — TELEPHONE (OUTPATIENT)
Dept: INTERNAL MEDICINE | Facility: CLINIC | Age: 76
End: 2021-10-04

## 2021-10-04 NOTE — TELEPHONE ENCOUNTER
Caller: Joyce Jones    Relationship: Self    Best call back number: [phone]     Who are you requesting to speak with (clinical staff, provider,  specific staff member): CLINICAL     What was the call regarding: PATIENT STATES HER INSURANCE WILL NOT PAY FOR THE MEDICATION SHE WAS PRESCRIBED AND IT IS EXPENSIVE   THE PHARMACY TOLD THE PATIENT THERE IS ANOTHER MEDICATION THAT HAS ESTROGEN IN IT AND IT WOULD BE ABOUT $110.00 AND THE INSURANCE WILL PAY MORE FOR IT     Do you require a callback:   YES

## 2021-10-05 RX ORDER — ESTRADIOL 0.1 MG/G
CREAM VAGINAL
Qty: 42.5 EACH | Refills: 2 | Status: SHIPPED | OUTPATIENT
Start: 2021-10-05 | End: 2022-02-25

## 2021-10-06 ENCOUNTER — HOSPITAL ENCOUNTER (OUTPATIENT)
Dept: MRI IMAGING | Facility: HOSPITAL | Age: 76
Discharge: HOME | End: 2021-10-06
Admitting: NURSE PRACTITIONER

## 2021-10-06 DIAGNOSIS — R41.82 ALTERED MENTAL STATUS, UNSPECIFIED ALTERED MENTAL STATUS TYPE: ICD-10-CM

## 2021-10-06 DIAGNOSIS — F07.81 POST CONCUSSION SYNDROME: ICD-10-CM

## 2021-10-06 LAB — CREAT BLDA-MCNC: 1 MG/DL (ref 0.6–1.3)

## 2021-10-06 PROCEDURE — 0 GADOBENATE DIMEGLUMINE 529 MG/ML SOLUTION: Performed by: NURSE PRACTITIONER

## 2021-10-06 PROCEDURE — 70553 MRI BRAIN STEM W/O & W/DYE: CPT

## 2021-10-06 PROCEDURE — 82565 ASSAY OF CREATININE: CPT

## 2021-10-06 PROCEDURE — A9577 INJ MULTIHANCE: HCPCS | Performed by: NURSE PRACTITIONER

## 2021-10-06 RX ADMIN — GADOBENATE DIMEGLUMINE 10 ML: 529 INJECTION, SOLUTION INTRAVENOUS at 15:14

## 2021-10-11 ENCOUNTER — APPOINTMENT (OUTPATIENT)
Dept: NEUROLOGY | Facility: HOSPITAL | Age: 76
End: 2021-10-11

## 2021-10-18 ENCOUNTER — OFFICE VISIT (OUTPATIENT)
Dept: NEUROLOGY | Facility: CLINIC | Age: 76
End: 2021-10-18

## 2021-10-18 VITALS
WEIGHT: 140.2 LBS | OXYGEN SATURATION: 97 % | BODY MASS INDEX: 23.93 KG/M2 | HEART RATE: 72 BPM | DIASTOLIC BLOOD PRESSURE: 66 MMHG | HEIGHT: 64 IN | SYSTOLIC BLOOD PRESSURE: 134 MMHG

## 2021-10-18 DIAGNOSIS — G62.9 NEUROPATHY: Primary | ICD-10-CM

## 2021-10-18 PROCEDURE — 99213 OFFICE O/P EST LOW 20 MIN: CPT | Performed by: NURSE PRACTITIONER

## 2021-10-18 RX ORDER — CHLORAL HYDRATE 500 MG
CAPSULE ORAL EVERY OTHER DAY
COMMUNITY
End: 2022-03-15

## 2021-10-18 NOTE — PROGRESS NOTES
Neuro Office Visit      Encounter Date: 10/18/2021   Patient Name: Joyce Jones  : 1945   MRN: [id number]     Chief Complaint:    Chief Complaint   Patient presents with   • Peripheral Neuropathy       History of Present Illness: Joyce Jones is a 76 y.o. female who is here today in Neurology for peripheral neuropathy, HA      Last visit 21 w me-labs, MRI brain  MRI Brain With & Without Contrast (10/06/2021 15:14)-neg    Neuropathy  LLE wound from thorn 21 caused persistent itching and burning sensation. X-ray, venous doppler neg. Referred to derm-she did not go.  She rescheduled her EMG due to family emergency.    Symptoms resolved until she had Covid booster and they returned. Continues with 3 months of itching on the left medial distal tibia area. It is not painful. Small area approx 10 cm above wound and seems to be moving superiorly.    Has had 3 left knee surgies.  Recently saw her Orthopedic surgeon and is not surprised she is having neuropathy due to multiple knee surgeries.    History of DVT in RLE 40 years ago while on HRT.     Seeing Dr Albright and taking tramadol and acetamenophin taking 1 in am and 1 in pm prn. Seen every 3 months. Diagnosed with RA and connective tissue disease overlap syndrome.    HA  Chronic neck stiffness causing mild HA, rare and intermittent.  Subjective      Past Medical History:   Past Medical History:   Diagnosis Date   • Allergic rhinitis    • Anemia    • Arthritis    • Diverticulosis    • Heart murmur    • [facility]   • History of mammography, screening 2016   • Hyperlipidemia    • Hypertension    • Inguinal hernia 1978    repair   • Osteoarthritis (arthritis due to wear and tear of joints)    • Personal history of DVT (deep vein thrombosis)     right leg- due to surgery   • Positive TB test     Always comes back + but negative further testing       Past Surgical History:   Past Surgical  History:   Procedure Laterality Date   • BACK SURGERY  2002    Scoliosis bar. T11-sacrum   • CERVICAL SPINE SURGERY  2000    cage   • CHOLECYSTECTOMY  2014   • COLONOSCOPY  2016   • FOOT SURGERY  2008   • INGUINAL HERNIA REPAIR Bilateral 1978    2019 right side repair   • KNEE SURGERY Bilateral 2017    2005 and x2 in 8/2017(left)   • NASAL SEPTUM SURGERY  1999   • TOTAL ABDOMINAL HYSTERECTOMY WITH SALPINGO OOPHORECTOMY  1986    Unilateral salpingo-oophorectomy/cervical dysplasia/AUB   • URETHRAL DILATION      Multiple times       Family History:   Family History   Problem Relation Age of Onset   • Uterine cancer Mother         Metastatic to ovaries and colon; possibly bone   • Hypertension Mother    • Kidney disease Mother    • Tuberculosis Mother    • Cancer Father         Bladder   • Heart failure Father    • Stroke Father    • Hypertension Father    • Heart attack Father    • Colon polyps Father    • Hypertension Brother    • Kidney disease Brother    • Colon polyps Brother    • Kidney disease Brother    • Hypertension Brother    • Other Daughter         CVID   • Diabetes Daughter    • Colon cancer Maternal Uncle    • Colon cancer Maternal Uncle    • Breast cancer Neg Hx        Social History:   Social History     Socioeconomic History   • Marital status:    Tobacco Use   • Smoking status: Never Smoker   • Smokeless tobacco: Never Used   Vaping Use   • Vaping Use: Never used   Substance and Sexual Activity   • Alcohol use: Yes     Alcohol/week: 1.0 standard drink     Types: 1 Glasses of wine per week     Comment: rarely at holidays   • Drug use: No   • Sexual activity: Not Currently     Comment:        Medications:     Current Outpatient Medications:   •  albuterol sulfate  (90 Base) MCG/ACT inhaler, Inhale 2 puffs Every 4 (Four) Hours As Needed for Wheezing or Shortness of Air., Disp: 18 g, Rfl: 1  •  aspirin 81 MG EC tablet, Take 81 mg by mouth Daily., Disp: , Rfl:   •  cetirizine (zyrTEC)  10 MG tablet, Take 1 mg by mouth Daily., Disp: , Rfl: 5  •  estradiol (ESTRACE VAGINAL) 0.1 MG/GM vaginal cream, Apply pea sized amount to external vaginal area 1 to 2 times per week, Disp: 42.5 each, Rfl: 2  •  hydrOXYzine pamoate (VISTARIL) 25 MG capsule, Take 1 capsule by mouth At Night As Needed for Anxiety (sleep)., Disp: 30 capsule, Rfl: 2  •  losartan (COZAAR) 50 MG tablet, Take 1 tablet by mouth Daily. (Patient taking differently: Take 50 mg by mouth As Needed.), Disp: 90 tablet, Rfl: 1  •  Omega-3 Fatty Acids (fish oil) 1000 MG capsule capsule, Take  by mouth Every Other Day., Disp: , Rfl:   •  traMADol-acetaminophen (ULTRACET) 37.5-325 MG per tablet, Take 2 tablets by mouth 2 (Two) Times a Day., Disp: , Rfl:     Allergies:   Allergies   Allergen Reactions   • Overton-2 Inhibitors Anaphylaxis   • Nsaids GI Intolerance   • Augmentin [Amoxicillin-Pot Clavulanate] Nausea Only   • Celecoxib Hives       PHQ-9 Total Score:     STEADI Fall Risk Assessment was completed, and patient is at LOW risk for falls.Assessment completed on:4/16/2021    Objective     Physical Exam:   Physical Exam  Neurological:      Mental Status: She is oriented to person, place, and time.      Gait: Gait is intact.   Psychiatric:         Speech: Speech normal.         Neurologic Exam     Mental Status   Oriented to person, place, and time.   Follows 3 step commands.   Attention: normal. Concentration: normal.   Speech: speech is normal   Level of consciousness: alert  Knowledge: consistent with education.   Normal comprehension.     Cranial Nerves     CN III, IV, VI   CN III: no CN III palsy  CN VI: no CN VI palsy  Nystagmus: none   Diplopia: none  Upgaze: normal  Downgaze: normal  Conjugate gaze: present    CN VII   Facial expression full, symmetric.     CN VIII   Hearing: intact    Motor Exam   Muscle bulk: normal  Overall muscle tone: normal    Strength   Right quadriceps: 5/5  Left quadriceps: 5/5  Right anterior tibial: 5/5  Left  "anterior tibial: 5/5  Right posterior tibial: 5/5  Left posterior tibial: 5/5    Sensory Exam   Light touch normal.   Left leg pinprick: normal    Gait, Coordination, and Reflexes     Gait  Gait: normal    Tremor   Resting tremor: absent  Action tremor: absent       Vital Signs:   Vitals:    10/18/21 1326   BP: 134/66   Pulse: 72   SpO2: 97%   Weight: 63.6 kg (140 lb 3.2 oz)   Height: 162.6 cm (64\")     Body mass index is 24.07 kg/m².     Results:   Imaging:   XR Tibia Fibula 2 View Left    Result Date: 7/23/2021  No radiopaque foreign body within the soft tissues despite mild soft tissue irregularity anterior soft tissues overlying the distal tibial diaphysis area of interest labeled on lateral view.  D:  07/23/2021 E:  07/23/2021  This report was finalized on 7/23/2021 7:11 PM by Dr. Homero Chaney.      MRI Brain With & Without Contrast    Result Date: 10/14/2021  No evidence of acute intracranial disease.   D: 10/10/2021 E: 10/11/2021  This report was finalized on 10/14/2021 2:47 PM by Dr. Jarrell Martin MD.       Dr Hassan w 3 kr on left and possible nerve damage.  Assessment / Plan      Assessment/Plan:   Diagnoses and all orders for this visit:    1. Neuropathy (Primary)  Comments:  Will have pt call and reschedule the EMG previously ordered by her PCP.         Patient Education:     Reviewed medications, potential side effects and signs and symptoms to report. Discussed risk versus benefits of treatment plan with patient and/or family-including medications, labs and radiology that may be ordered. Addressed questions and concerns during visit. Patient and/or family verbalized understanding and agree with plan. Instructed to call the office with any questions and report to ER with any life-threatening symptoms.     Follow Up:   After EMG    During this visit the following were done:  Labs Reviewed [x]    Labs Ordered []    Radiology Reports Reviewed [x]    Radiology Ordered []    PCP Records Reviewed [x]  "   Referring Provider Records Reviewed []    ER Records Reviewed []    Hospital Records Reviewed []    History Obtained From Family []    Radiology Images Reviewed [x]    Other Reviewed []    Records Requested []       Snow Dukes, DNP, APRN

## 2021-10-18 NOTE — PROGRESS NOTES
[facility] at [facility] - Office Note  Joyce Jones         [address] [place] [address]  1945   [phone] (home)        Location:  [facility] office.  Visit Type: Follow Up.    10/21/21     PCP:  Pura Burk APRN    Identification:  Joyce Jones is a 76 y.o.  female  retired.      Chief Complaint: FU on HTN, HLP, VHDz    PROBLEM LIST/PMHx:  1.  Valvular Heart Disease              A.  Dx with Cardiac Murmur several years ago, in [place].              B.  Cardiac PET Nuclear 12/2019, Dr. Hart:  Acceptable negative IV Lexiscan hybrid PET cardiac CT stress scan suggestive of low probability for significant focal obstructive coronary artery disease with preserved systolic left ventricular function (LVEF 0.54).              C.  TTE 12/2019:  Mild MR, EF 65%, The aortic valve exhibits mild sclerosis, No significant structural valvular abnormality demonstrated  2.  Essential Hypertension  3.  Dyslipidemia  4.  Seasonal Allergies/Rhinitis  5.  Chronic Back Pain  6.  H/O Diverticulitis  7.  Scoliosis of the thoracic spine (T7 level)            Allergies   Allergen Reactions   • Overton-2 Inhibitors Anaphylaxis   • Nsaids GI Intolerance   • Augmentin [Amoxicillin-Pot Clavulanate] Nausea Only   • Celecoxib Hives         Current Outpatient Medications:   •  albuterol sulfate  (90 Base) MCG/ACT inhaler, Inhale 2 puffs Every 4 (Four) Hours As Needed for Wheezing or Shortness of Air., Disp: 18 g, Rfl: 1  •  aspirin 81 MG EC tablet, Take 81 mg by mouth Daily., Disp: , Rfl:   •  cetirizine (zyrTEC) 10 MG tablet, Take 1 mg by mouth Daily., Disp: , Rfl: 5  •  estradiol (ESTRACE VAGINAL) 0.1 MG/GM vaginal cream, Apply pea sized amount to external vaginal area 1 to 2 times per week, Disp: 42.5 each, Rfl: 2  •  hydrOXYzine pamoate (VISTARIL) 25 MG capsule, Take 1 capsule by mouth At Night As Needed for Anxiety (sleep)., Disp: 30 capsule, Rfl: 2  •  losartan (COZAAR) 50 MG  "tablet, Take 1 tablet by mouth Daily. (Patient taking differently: Take 50 mg by mouth As Needed.), Disp: 90 tablet, Rfl: 1  •  Omega-3 Fatty Acids (fish oil) 1000 MG capsule capsule, Take  by mouth Every Other Day., Disp: , Rfl:   •  traMADol-acetaminophen (ULTRACET) 37.5-325 MG per tablet, Take 2 tablets by mouth 2 (Two) Times a Day., Disp: , Rfl:     HPI  Joyce Jones is here today for her annual follow up on HTN, HLP and cardiac murmur.  She is doing very well from a cardiac standpoint - no complaints of chest pain, no dyspnea or weakness.  She had 1 single episode of fluttering/palpiations since her visit last year.  It lasted < 20 seconds and self resolved.  Her biggest issues is that of peripheral neuropathy in her left leg and osteoarthritis in her hands and back.          The following portions of the patient's history were reviewed in the chart and updated as appropriate: allergies, current medications, past family history, past medical history, past social history, past surgical history and problem list.    Review of Systems   Constitutional: Negative for malaise/fatigue.   Cardiovascular: Positive for palpitations. Negative for chest pain, dyspnea on exertion, irregular heartbeat, leg swelling and orthopnea.   Respiratory: Negative for cough and wheezing.    Hematologic/Lymphatic: Does not bruise/bleed easily.   Skin: Positive for color change and dry skin.   Musculoskeletal: Positive for arthritis, back pain, joint pain and stiffness.   Neurological: Negative for dizziness, headaches, light-headedness and weakness.   All other systems reviewed and are negative.            height is 162.6 cm (64.02\") and weight is 63 kg (139 lb). Her blood pressure is 108/78 and her pulse is 76. Her oxygen saturation is 99%.   Vitals and nursing note reviewed.   Constitutional:       Appearance: Normal appearance. Well-developed.   Pulmonary:      Effort: Pulmonary effort is normal.      Breath sounds: Normal " breath sounds. No wheezing. No rhonchi. No rales.   Cardiovascular:      Normal rate. Regular rhythm.   Pulses:     Intact distal pulses.   Abdominal:      General: Bowel sounds are normal.      Palpations: Abdomen is soft.   Neurological:      Mental Status: Alert.           Procedures     Assessment/ Plan   Diagnoses and all orders for this visit:    1. Valvular Heart Disease:  Murmur not appreciated on PE today.  Patient is doing well without symptoms.  Continue to monitor and hold off on repeat echo for now.  If she becomes symptomatic she is to notify our office and we can repeat echo.  Otherwise, RTC 1 year or sooner PRN.    2. Essential hypertension:  Controlled.  Continue current medication, Cozaar and get routine labs to monitor renal function.     3. Familial combined hyperlipidemia:  Continue Omega-3 and follow appropriate diet.  Get annual lipid panel with PCP.    At patient's request, will put in referral to Rheumatology.    Pura Burt PA-C functioning independently.   10/21/2021 11:26 EDT

## 2021-10-21 ENCOUNTER — OFFICE VISIT (OUTPATIENT)
Dept: CARDIOLOGY | Facility: CLINIC | Age: 76
End: 2021-10-21

## 2021-10-21 VITALS
DIASTOLIC BLOOD PRESSURE: 78 MMHG | WEIGHT: 139 LBS | HEART RATE: 76 BPM | SYSTOLIC BLOOD PRESSURE: 108 MMHG | OXYGEN SATURATION: 99 % | BODY MASS INDEX: 23.73 KG/M2 | HEIGHT: 64 IN

## 2021-10-21 DIAGNOSIS — E78.49 FAMILIAL COMBINED HYPERLIPIDEMIA: ICD-10-CM

## 2021-10-21 DIAGNOSIS — M19.042 PRIMARY OSTEOARTHRITIS OF BOTH HANDS: ICD-10-CM

## 2021-10-21 DIAGNOSIS — M19.041 PRIMARY OSTEOARTHRITIS OF BOTH HANDS: ICD-10-CM

## 2021-10-21 DIAGNOSIS — I10 ESSENTIAL HYPERTENSION: Primary | ICD-10-CM

## 2021-10-21 PROCEDURE — 99214 OFFICE O/P EST MOD 30 MIN: CPT | Performed by: PHYSICIAN ASSISTANT

## 2021-10-26 ENCOUNTER — TELEPHONE (OUTPATIENT)
Dept: INTERNAL MEDICINE | Facility: CLINIC | Age: 76
End: 2021-10-26

## 2021-10-26 NOTE — TELEPHONE ENCOUNTER
Caller: Joyce Jones    Relationship: Self    Best call back number:     What is the medical concern/diagnosis: JOINT PAIN    What specialty or service is being requested:   RHEUMOTOLOGY    What is the provider, practice or medical service name: [facility]         What is the office location: [facility] [address] [address]    What is the office phone number:     Any additional details: PATIENT HAS SEEN  DR GIFFORD AT [facility] BUT WOULD LIKE TO MAKE THE SWITCH     PLEASE CALL TO ADVISE

## 2021-10-29 DIAGNOSIS — M25.50 ARTHRALGIA, UNSPECIFIED JOINT: Primary | ICD-10-CM

## 2021-11-03 ENCOUNTER — HOSPITAL ENCOUNTER (OUTPATIENT)
Dept: NEUROLOGY | Facility: HOSPITAL | Age: 76
Discharge: HOME | End: 2021-11-03
Admitting: NURSE PRACTITIONER

## 2021-11-03 DIAGNOSIS — M79.605 PAIN IN LEFT LEG: ICD-10-CM

## 2021-11-03 DIAGNOSIS — G62.9 NEUROPATHY: ICD-10-CM

## 2021-11-03 PROCEDURE — 95909 NRV CNDJ TST 5-6 STUDIES: CPT

## 2021-11-03 PROCEDURE — 95909 NRV CNDJ TST 5-6 STUDIES: CPT | Performed by: PSYCHIATRY & NEUROLOGY

## 2021-11-03 PROCEDURE — 95886 MUSC TEST DONE W/N TEST COMP: CPT

## 2021-11-03 PROCEDURE — 95886 MUSC TEST DONE W/N TEST COMP: CPT | Performed by: PSYCHIATRY & NEUROLOGY

## 2021-11-04 ENCOUNTER — HOSPITAL ENCOUNTER (OUTPATIENT)
Dept: MAMMOGRAPHY | Facility: HOSPITAL | Age: 76
Discharge: HOME | End: 2021-11-04

## 2021-11-04 DIAGNOSIS — Z12.31 ENCOUNTER FOR SCREENING MAMMOGRAM FOR BREAST CANCER: ICD-10-CM

## 2021-11-09 ENCOUNTER — TELEPHONE (OUTPATIENT)
Dept: INTERNAL MEDICINE | Facility: CLINIC | Age: 76
End: 2021-11-09

## 2021-11-09 NOTE — PROGRESS NOTES
Ti Jones. Nerve conduction study report is available for your review. Motor sensory conduction normal; muscle tests normal. You do have what looks like some evidence of left S1 radiculopathy. This has to do with nerve coming off the spine at the S1 disc area.  With that you might be having intermittent pain down left leg.  If this is a problem for you please let me know.

## 2021-11-09 NOTE — TELEPHONE ENCOUNTER
"HUB TO READ:  \"Called pt to speak to pt in regard to Rheum referral.  Pt was not home; Pt spouse will have pt call back\"  "

## 2021-12-13 ENCOUNTER — OFFICE VISIT (OUTPATIENT)
Dept: INTERNAL MEDICINE | Facility: CLINIC | Age: 76
End: 2021-12-13

## 2021-12-13 VITALS
WEIGHT: 142.6 LBS | BODY MASS INDEX: 24.47 KG/M2 | OXYGEN SATURATION: 100 % | SYSTOLIC BLOOD PRESSURE: 134 MMHG | TEMPERATURE: 97.8 F | HEART RATE: 80 BPM | DIASTOLIC BLOOD PRESSURE: 72 MMHG

## 2021-12-13 DIAGNOSIS — G89.29 CHRONIC BILATERAL LOW BACK PAIN WITH BILATERAL SCIATICA: Primary | ICD-10-CM

## 2021-12-13 DIAGNOSIS — M54.42 CHRONIC BILATERAL LOW BACK PAIN WITH BILATERAL SCIATICA: Primary | ICD-10-CM

## 2021-12-13 DIAGNOSIS — G89.29 CHRONIC BILATERAL THORACIC BACK PAIN: ICD-10-CM

## 2021-12-13 DIAGNOSIS — M54.6 CHRONIC BILATERAL THORACIC BACK PAIN: ICD-10-CM

## 2021-12-13 DIAGNOSIS — M54.41 CHRONIC BILATERAL LOW BACK PAIN WITH BILATERAL SCIATICA: Primary | ICD-10-CM

## 2021-12-13 PROCEDURE — 99214 OFFICE O/P EST MOD 30 MIN: CPT

## 2021-12-13 RX ORDER — CYCLOBENZAPRINE HCL 10 MG
10 TABLET ORAL NIGHTLY PRN
Qty: 30 TABLET | Refills: 0 | Status: ON HOLD | OUTPATIENT
Start: 2021-12-13 | End: 2022-01-08

## 2021-12-13 RX ORDER — METHYLPREDNISOLONE 4 MG/1
TABLET ORAL
Qty: 21 TABLET | Refills: 0 | Status: SHIPPED | OUTPATIENT
Start: 2021-12-13 | End: 2021-12-18

## 2021-12-13 NOTE — PROGRESS NOTES
Chief Complaint  Tailbone Pain (from tailbone to the left hip area, going on for a while but recently getting worse, has nerve issues in that area as well)    Joyce Jones presents to [facility]    HPI: Has a hx of scoliosis and had a surgical correction performed in [place] in the early 2000's. Has been experiencing pain in the thoracic spine, lumbar spine, and coccyx area x 3 months. Pain is described as achy, dull, and cramping. Has been using acetaminophen/tramadol each day with mild improvement in symptoms. Has a DEXA scan ordered but has not scheduled it. Pain is worsened with activity. Radiates down the posterior portion of both thighs and terminates at the knee. Pain is rated as a 6 out of 10. No change in bowel or bladder function. No reports of urinary retention.         Subjective       Health Maintenance   Topic   • DXA SCAN    • MAMMOGRAM    • TDAP/TD VACCINES (2 - Td or Tdap)   • ANNUAL WELLNESS VISIT    • LIPID PANEL    • COLORECTAL CANCER SCREENING    • HEPATITIS C SCREENING    • COVID-19 Vaccine    • INFLUENZA VACCINE    • Pneumococcal Vaccine 65+    • ZOSTER VACCINE        [facility]  The following portions of the patient's history were reviewed and updated as appropriate: allergies, current medications, past family history, past medical history, past social history, past surgical history and problem list.     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 + but negative further testing      Allergies   Allergen Reactions    • Overton-2 Inhibitors Anaphylaxis   • Nsaids GI Intolerance   • Augmentin [Amoxicillin-Pot Clavulanate] Nausea Only   • Celecoxib Hives      Social History     Tobacco Use   • Smoking status: Never Smoker   • Smokeless tobacco: Never Used   Vaping Use   • Vaping Use: Never used   Substance Use Topics   • Alcohol use: Yes     Alcohol/week: 1.0 standard drink     Types: 1 Glasses of wine per week     Comment: rarely at holidays   • Drug use: No     Past Surgical History:   Procedure Laterality Date   • BACK SURGERY  2002    Scoliosis bar. T11-sacrum   • BREAST BIOPSY      over 15 years ago.    • CERVICAL SPINE SURGERY  2000    cage   • CHOLECYSTECTOMY  2014   • COLONOSCOPY  2016   • FOOT SURGERY  2008   • INGUINAL HERNIA REPAIR Bilateral 1978 2019 right side repair   • KNEE SURGERY Bilateral 2017 2005 and x2 in 8/2017(left)   • NASAL SEPTUM SURGERY  1999   • TOTAL ABDOMINAL HYSTERECTOMY WITH SALPINGO OOPHORECTOMY  1986    Unilateral salpingo-oophorectomy/cervical dysplasia/AUB   • URETHRAL DILATION      Multiple times      Family History   Problem Relation Age of Onset   • Uterine cancer Mother         Metastatic to ovaries and colon; possibly bone   • Hypertension Mother    • Kidney disease Mother    • Tuberculosis Mother    • Cancer Father         Bladder   • Heart failure Father    • Stroke Father    • Hypertension Father    • Heart attack Father    • Colon polyps Father    • Hypertension Brother    • Kidney disease Brother    • Colon polyps Brother    • Kidney disease Brother    • Hypertension Brother    • Other Daughter         CVID   • Diabetes Daughter    • Colon cancer Maternal Uncle    • Colon cancer Maternal Uncle    • Breast cancer Paternal Aunt          Current Outpatient Medications:   •  albuterol sulfate  (90 Base) MCG/ACT inhaler, Inhale 2 puffs Every 4 (Four) Hours As Needed for Wheezing or Shortness of Air., Disp: 18 g, Rfl: 1  •  aspirin 81 MG EC tablet, Take 81 mg by mouth Daily., Disp:  , Rfl:   •  cetirizine (zyrTEC) 10 MG tablet, Take 1 mg by mouth Daily., Disp: , Rfl: 5  •  estradiol (ESTRACE VAGINAL) 0.1 MG/GM vaginal cream, Apply pea sized amount to external vaginal area 1 to 2 times per week, Disp: 42.5 each, Rfl: 2  •  hydrOXYzine pamoate (VISTARIL) 25 MG capsule, Take 1 capsule by mouth At Night As Needed for Anxiety (sleep)., Disp: 30 capsule, Rfl: 2  •  losartan (COZAAR) 50 MG tablet, Take 1 tablet by mouth Daily. (Patient taking differently: Take 50 mg by mouth As Needed.), Disp: 90 tablet, Rfl: 1  •  Omega-3 Fatty Acids (fish oil) 1000 MG capsule capsule, Take  by mouth Every Other Day., Disp: , Rfl:   •  traMADol-acetaminophen (ULTRACET) 37.5-325 MG per tablet, Take 2 tablets by mouth 2 (Two) Times a Day., Disp: , Rfl:   •  cyclobenzaprine (FLEXERIL) 10 MG tablet, Take 1 tablet by mouth At Night As Needed for Muscle Spasms., Disp: 30 tablet, Rfl: 0  •  methylPREDNISolone (MEDROL) 4 MG dose pack, Take as directed on package instructions., Disp: 21 tablet, Rfl: 0    Review of Systems   Constitutional: Negative for activity change, appetite change, diaphoresis, fatigue and fever.   Respiratory: Negative for apnea, cough, choking, chest tightness, shortness of breath, wheezing and stridor.    Cardiovascular: Negative for chest pain, palpitations and leg swelling.   Musculoskeletal: Positive for arthralgias and back pain. Negative for gait problem, joint swelling, myalgias, neck pain, neck stiffness and bursitis.   Skin: Negative for color change, dry skin, pallor, rash, skin lesions and bruise.       Objective   Vital Signs  /72   Pulse 80   Temp 97.8 °F (36.6 °C)   Wt 64.7 kg (142 lb 9.6 oz)   SpO2 100%   BMI 24.47 kg/m²     Physical Exam  Constitutional:       Appearance: Normal appearance.   HENT:      Head: Normocephalic.      Mouth/Throat:      Mouth: Mucous membranes are moist.      Pharynx: Oropharynx is clear.   Eyes:      Conjunctiva/sclera: Conjunctivae normal.       Pupils: Pupils are equal, round, and reactive to light.   Cardiovascular:      Rate and Rhythm: Normal rate and regular rhythm.      Pulses: Normal pulses.      Heart sounds: Normal heart sounds.   Musculoskeletal:      Thoracic back: Deformity, spasms, tenderness and bony tenderness present. No swelling, edema, signs of trauma or lacerations. Decreased range of motion. Scoliosis present.      Lumbar back: Spasms and tenderness present. No swelling, edema, deformity, signs of trauma, lacerations or bony tenderness. Decreased range of motion. Positive right straight leg raise test and positive left straight leg raise test. Scoliosis present.   Skin:     Capillary Refill: Capillary refill takes less than 2 seconds.   Neurological:      Mental Status: She is alert and oriented to person, place, and time.   Psychiatric:         Mood and Affect: Mood normal.         Behavior: Behavior normal.         Thought Content: Thought content normal.         Judgment: Judgment normal.          Result Review :     The following data was reviewed by: LOVE Lynn on 12/13/2021:  CMP    CMP 4/23/21 7/23/21 10/6/21   Glucose 77 103 (A)    BUN 13 16    Creatinine 0.86 0.86 1.00   eGFR Non African Am 65 65    Sodium 143 141    Potassium 4.4 4.0    Chloride 105 102    Calcium 9.8 10.2    Albumin 4.10 4.60    Total Bilirubin 0.3 0.3    Alkaline Phosphatase 76 82    AST (SGOT) 26 25    ALT (SGPT) 16 16    (A) Abnormal value       Comments are available for some flowsheets but are not being displayed.           Assessment and Plan    1. Chronic bilateral low back pain with bilateral sciatica  - XR Spine Lumbar Complete 4+VW; Future  - XR sacrum and coccyx; Future  - cyclobenzaprine (FLEXERIL) 10 MG tablet; Take 1 tablet by mouth At Night As Needed for Muscle Spasms.  Dispense: 30 tablet; Refill: 0  - Ambulatory Referral to Physical Therapy  - methylPREDNISolone (MEDROL) 4 MG dose pack; Take as directed on package instructions.   Dispense: 21 tablet; Refill: 0  - For acute pain, rest, intermittent application of ice/heat, massage therapy, analgesics and muscle relaxants are recommended.   - Discussed a home neck/back care exercise regimen with flexion and extension stretching maneuvers.   - Encouraged proper lifting with avoidance of heavy lifting, pushing, or pulling discussed.   - Encouraged to call or return to clinic prn if these symptoms worsen or fail to improve as anticipated.    2. Chronic bilateral thoracic back pain  - XR Spine Thoracic 3 View; Future  - cyclobenzaprine (FLEXERIL) 10 MG tablet; Take 1 tablet by mouth At Night As Needed for Muscle Spasms.  Dispense: 30 tablet; Refill: 0  - Ambulatory Referral to Physical Therapy  - methylPREDNISolone (MEDROL) 4 MG dose pack; Take as directed on package instructions.  Dispense: 21 tablet; Refill: 0  - For acute pain, rest, intermittent application of ice/heat, massage therapy, analgesics and muscle relaxants are recommended.   - Discussed a home neck/back care exercise regimen with flexion and extension stretching maneuvers.   - Encouraged proper lifting with avoidance of heavy lifting, pushing, or pulling discussed.   - Encouraged to call or return to clinic prn if these symptoms worsen or fail to improve as anticipated.      Follow Up     Return for Next scheduled follow up.    Patient was given instructions and counseling regarding her condition or for health maintenance advice. Please see specific information pulled into the AVS if appropriate.    Part of this note may be an electronic transcription/translation of spoken language to printed text using the Dragon Dictation System.    Electronically signed by:   LOVE Lynn  12/13/2021

## 2021-12-23 ENCOUNTER — TELEPHONE (OUTPATIENT)
Dept: INTERNAL MEDICINE | Facility: CLINIC | Age: 76
End: 2021-12-23

## 2021-12-23 NOTE — TELEPHONE ENCOUNTER
Caller: Joyce Jones    Relationship: Self    Best call back number: [phone]    What medication are you requesting: ANTIBIOTIC     What are your current symptoms: SORE THROAT AND FEVER COUGH    How long have you been experiencing symptoms: SINCE YESTERDAY     Have you had these symptoms before:    [] Yes  [x] No    Have you been treated for these symptoms before:   [] Yes  [x] No    If a prescription is needed, what is your preferred pharmacy and phone [id number] [place], [place] - [address] [facility] [address] [facility] [address] - [phone] [facility] [phone]      Additional notes:  THE PATIENT STATES THAT SHE HAD A RAPID COVID-19 TEST TODAY THAT WAS NEGATIVE SHE WOULD LIKE TO KNOW  IF A MEDICATION CAN BE CALLED IN FOR HER

## 2021-12-23 NOTE — TELEPHONE ENCOUNTER
Called and explained she would need to be seen and we cannot see her today and we are closed until Monday. Suggested an urgent care facility if this is an urgent need.

## 2021-12-31 ENCOUNTER — TELEPHONE (OUTPATIENT)
Dept: URGENT CARE | Facility: CLINIC | Age: 76
End: 2021-12-31

## 2022-01-08 ENCOUNTER — HOSPITAL ENCOUNTER (OUTPATIENT)
Facility: HOSPITAL | Age: 77
Setting detail: OBSERVATION
Discharge: HOME | End: 2022-01-09
Attending: EMERGENCY MEDICINE | Admitting: INTERNAL MEDICINE

## 2022-01-08 ENCOUNTER — APPOINTMENT (OUTPATIENT)
Dept: MRI IMAGING | Facility: HOSPITAL | Age: 77
End: 2022-01-08

## 2022-01-08 ENCOUNTER — APPOINTMENT (OUTPATIENT)
Dept: GENERAL RADIOLOGY | Facility: HOSPITAL | Age: 77
End: 2022-01-08

## 2022-01-08 DIAGNOSIS — R42 POSTURAL DIZZINESS WITH PRESYNCOPE: Primary | ICD-10-CM

## 2022-01-08 DIAGNOSIS — R55 POSTURAL DIZZINESS WITH PRESYNCOPE: Primary | ICD-10-CM

## 2022-01-08 LAB
ALBUMIN SERPL-MCNC: 4.4 G/DL (ref 3.5–5.2)
ALBUMIN/GLOB SERPL: 1.8 G/DL
ALP SERPL-CCNC: 81 U/L (ref 39–117)
ALT SERPL W P-5'-P-CCNC: 18 U/L (ref 1–33)
ANION GAP SERPL CALCULATED.3IONS-SCNC: 11 MMOL/L (ref 5–15)
AST SERPL-CCNC: 23 U/L (ref 1–32)
BASOPHILS # BLD AUTO: 0.03 10*3/MM3 (ref 0–0.2)
BASOPHILS NFR BLD AUTO: 0.4 % (ref 0–1.5)
BILIRUB SERPL-MCNC: 0.4 MG/DL (ref 0–1.2)
BUN SERPL-MCNC: 20 MG/DL (ref 8–23)
BUN/CREAT SERPL: 24.7 (ref 7–25)
CALCIUM SPEC-SCNC: 9.6 MG/DL (ref 8.6–10.5)
CHLORIDE SERPL-SCNC: 100 MMOL/L (ref 98–107)
CK SERPL-CCNC: 41 U/L (ref 20–180)
CO2 SERPL-SCNC: 25 MMOL/L (ref 22–29)
CREAT SERPL-MCNC: 0.81 MG/DL (ref 0.57–1)
D DIMER PPP FEU-MCNC: 0.52 MCGFEU/ML (ref 0–0.56)
DEPRECATED RDW RBC AUTO: 47.8 FL (ref 37–54)
EOSINOPHIL # BLD AUTO: 0.04 10*3/MM3 (ref 0–0.4)
EOSINOPHIL NFR BLD AUTO: 0.5 % (ref 0.3–6.2)
ERYTHROCYTE [DISTWIDTH] IN BLOOD BY AUTOMATED COUNT: 13.1 % (ref 12.3–15.4)
FLUAV RNA RESP QL NAA+PROBE: NOT DETECTED
FLUBV RNA RESP QL NAA+PROBE: NOT DETECTED
GFR SERPL CREATININE-BSD FRML MDRD: 69 ML/MIN/1.73
GLOBULIN UR ELPH-MCNC: 2.5 GM/DL
GLUCOSE SERPL-MCNC: 102 MG/DL (ref 65–99)
HCT VFR BLD AUTO: 38.6 % (ref 34–46.6)
HGB BLD-MCNC: 12.9 G/DL (ref 12–15.9)
IMM GRANULOCYTES # BLD AUTO: 0.03 10*3/MM3 (ref 0–0.05)
IMM GRANULOCYTES NFR BLD AUTO: 0.4 % (ref 0–0.5)
LYMPHOCYTES # BLD AUTO: 1.37 10*3/MM3 (ref 0.7–3.1)
LYMPHOCYTES NFR BLD AUTO: 18.3 % (ref 19.6–45.3)
MAGNESIUM SERPL-MCNC: 2.2 MG/DL (ref 1.6–2.4)
MCH RBC QN AUTO: 33.2 PG (ref 26.6–33)
MCHC RBC AUTO-ENTMCNC: 33.4 G/DL (ref 31.5–35.7)
MCV RBC AUTO: 99.2 FL (ref 79–97)
MONOCYTES # BLD AUTO: 0.45 10*3/MM3 (ref 0.1–0.9)
MONOCYTES NFR BLD AUTO: 6 % (ref 5–12)
NEUTROPHILS NFR BLD AUTO: 5.58 10*3/MM3 (ref 1.7–7)
NEUTROPHILS NFR BLD AUTO: 74.4 % (ref 42.7–76)
NRBC BLD AUTO-RTO: 0 /100 WBC (ref 0–0.2)
NT-PROBNP SERPL-MCNC: 162 PG/ML (ref 0–1800)
PLATELET # BLD AUTO: 272 10*3/MM3 (ref 140–450)
PMV BLD AUTO: 9.2 FL (ref 6–12)
POTASSIUM SERPL-SCNC: 4.3 MMOL/L (ref 3.5–5.2)
PROT SERPL-MCNC: 6.9 G/DL (ref 6–8.5)
RBC # BLD AUTO: 3.89 10*6/MM3 (ref 3.77–5.28)
RSV RNA NPH QL NAA+NON-PROBE: NOT DETECTED
SARS-COV-2 RNA RESP QL NAA+PROBE: NOT DETECTED
SODIUM SERPL-SCNC: 136 MMOL/L (ref 136–145)
TROPONIN T SERPL-MCNC: <0.01 NG/ML (ref 0–0.03)
TROPONIN T SERPL-MCNC: <0.01 NG/ML (ref 0–0.03)
WBC NRBC COR # BLD: 7.5 10*3/MM3 (ref 3.4–10.8)

## 2022-01-08 PROCEDURE — G0378 HOSPITAL OBSERVATION PER HR: HCPCS

## 2022-01-08 PROCEDURE — 80053 COMPREHEN METABOLIC PANEL: CPT | Performed by: NURSE PRACTITIONER

## 2022-01-08 PROCEDURE — 83880 ASSAY OF NATRIURETIC PEPTIDE: CPT | Performed by: NURSE PRACTITIONER

## 2022-01-08 PROCEDURE — 84484 ASSAY OF TROPONIN QUANT: CPT | Performed by: NURSE PRACTITIONER

## 2022-01-08 PROCEDURE — 70551 MRI BRAIN STEM W/O DYE: CPT

## 2022-01-08 PROCEDURE — 85025 COMPLETE CBC W/AUTO DIFF WBC: CPT | Performed by: NURSE PRACTITIONER

## 2022-01-08 PROCEDURE — 85379 FIBRIN DEGRADATION QUANT: CPT | Performed by: NURSE PRACTITIONER

## 2022-01-08 PROCEDURE — 99220 PR INITIAL OBSERVATION CARE/DAY 70 MINUTES: CPT | Performed by: INTERNAL MEDICINE

## 2022-01-08 PROCEDURE — 99284 EMERGENCY DEPT VISIT MOD MDM: CPT

## 2022-01-08 PROCEDURE — 82550 ASSAY OF CK (CPK): CPT | Performed by: NURSE PRACTITIONER

## 2022-01-08 PROCEDURE — 93005 ELECTROCARDIOGRAM TRACING: CPT | Performed by: NURSE PRACTITIONER

## 2022-01-08 PROCEDURE — 87637 SARSCOV2&INF A&B&RSV AMP PRB: CPT | Performed by: INTERNAL MEDICINE

## 2022-01-08 PROCEDURE — 83735 ASSAY OF MAGNESIUM: CPT | Performed by: NURSE PRACTITIONER

## 2022-01-08 PROCEDURE — 71045 X-RAY EXAM CHEST 1 VIEW: CPT

## 2022-01-08 PROCEDURE — C9803 HOPD COVID-19 SPEC COLLECT: HCPCS | Performed by: INTERNAL MEDICINE

## 2022-01-08 PROCEDURE — 84484 ASSAY OF TROPONIN QUANT: CPT | Performed by: INTERNAL MEDICINE

## 2022-01-08 RX ORDER — HEPARIN SODIUM 5000 [USP'U]/ML
5000 INJECTION, SOLUTION INTRAVENOUS; SUBCUTANEOUS EVERY 8 HOURS SCHEDULED
Status: DISCONTINUED | OUTPATIENT
Start: 2022-01-09 | End: 2022-01-09 | Stop reason: HOSPADM

## 2022-01-08 RX ORDER — ACETAMINOPHEN 160 MG/5ML
650 SOLUTION ORAL EVERY 4 HOURS PRN
Status: DISCONTINUED | OUTPATIENT
Start: 2022-01-08 | End: 2022-01-09 | Stop reason: HOSPADM

## 2022-01-08 RX ORDER — SODIUM CHLORIDE 0.9 % (FLUSH) 0.9 %
10 SYRINGE (ML) INJECTION EVERY 12 HOURS SCHEDULED
Status: DISCONTINUED | OUTPATIENT
Start: 2022-01-09 | End: 2022-01-09 | Stop reason: HOSPADM

## 2022-01-08 RX ORDER — ACETAMINOPHEN 325 MG/1
650 TABLET ORAL EVERY 4 HOURS PRN
Status: DISCONTINUED | OUTPATIENT
Start: 2022-01-08 | End: 2022-01-09 | Stop reason: HOSPADM

## 2022-01-08 RX ORDER — SODIUM CHLORIDE 0.9 % (FLUSH) 0.9 %
10 SYRINGE (ML) INJECTION AS NEEDED
Status: DISCONTINUED | OUTPATIENT
Start: 2022-01-08 | End: 2022-01-09 | Stop reason: HOSPADM

## 2022-01-08 RX ORDER — CHOLECALCIFEROL (VITAMIN D3) 125 MCG
5 CAPSULE ORAL NIGHTLY PRN
Status: DISCONTINUED | OUTPATIENT
Start: 2022-01-08 | End: 2022-01-09 | Stop reason: HOSPADM

## 2022-01-08 RX ORDER — ONDANSETRON 2 MG/ML
4 INJECTION INTRAMUSCULAR; INTRAVENOUS EVERY 6 HOURS PRN
Status: DISCONTINUED | OUTPATIENT
Start: 2022-01-08 | End: 2022-01-09 | Stop reason: HOSPADM

## 2022-01-08 RX ORDER — ACETAMINOPHEN 650 MG/1
650 SUPPOSITORY RECTAL EVERY 4 HOURS PRN
Status: DISCONTINUED | OUTPATIENT
Start: 2022-01-08 | End: 2022-01-09 | Stop reason: HOSPADM

## 2022-01-08 RX ORDER — ASPIRIN 81 MG/1
81 TABLET ORAL DAILY
Status: DISCONTINUED | OUTPATIENT
Start: 2022-01-09 | End: 2022-01-09 | Stop reason: HOSPADM

## 2022-01-08 RX ORDER — MECLIZINE HCL 12.5 MG/1
12.5 TABLET ORAL EVERY 8 HOURS SCHEDULED
Status: DISCONTINUED | OUTPATIENT
Start: 2022-01-08 | End: 2022-01-09 | Stop reason: HOSPADM

## 2022-01-08 RX ORDER — ASPIRIN 81 MG/1
324 TABLET, CHEWABLE ORAL ONCE
Status: COMPLETED | OUTPATIENT
Start: 2022-01-08 | End: 2022-01-08

## 2022-01-08 RX ADMIN — ASPIRIN 324 MG: 81 TABLET, CHEWABLE ORAL at 20:48

## 2022-01-08 RX ADMIN — MECLIZINE HYDROCHLORIDE 12.5 MG: 25 TABLET ORAL at 20:46

## 2022-01-08 RX ADMIN — SODIUM CHLORIDE, POTASSIUM CHLORIDE, SODIUM LACTATE AND CALCIUM CHLORIDE 500 ML: 600; 310; 30; 20 INJECTION, SOLUTION INTRAVENOUS at 20:44

## 2022-01-08 RX ADMIN — SODIUM CHLORIDE, PRESERVATIVE FREE 10 ML: 5 INJECTION INTRAVENOUS at 23:22

## 2022-01-08 NOTE — ED PROVIDER NOTES
" EMERGENCY DEPARTMENT ENCOUNTER    Pt Name: Joyce Jones  MRN: [id number]  Pt :   1945  Room Number:    Date of encounter:  2022  PCP: Pura Burk APRN  ED Provider: LOVE Falcon    Historian: patient    HPI:  Chief Complaint: near fainting         Context: Joyce Jones is a 76 y.o. female who presents to the ED describing near fainting events at least once daily for the last 10 days.  The last occasion of a near fainting event was while driving her car.  Further inquiry reveals that the patient associates sudden events of nausea followed by feeling hot, getting profoundly diaphoretic and profoundly dizzy 15 minutes after taking her first dose of a prednisone Dosepak which was provided her by her primary care provider for \"bronchitis\" about 10 days ago.  Patient had several more events of near syncope that relieved with lying down for several consecutive minutes until she felt restored.  She continued to believe she was having a reaction to the prednisone.  As result, she took Benadryl.  She called her primary care provider who called in a Z-Jose instead.  Patient states that she had more events of near syncope even after taking the Z-Jose.  But she did complete the entire 5-day course.  She has been taking Benadryl but has not noticed improvement.  It is now 2 days after completing the Z-Jose and she still continues to have multiple events of near syncope without other known trigger.  Patient has never had such event before taking these new medications.  Patient states she consistently improves when she lies down.  She states she drinks ample amounts of water each day.  She is on no other new medications.  She does not experience chest pain or syncope or neurosensory complaints with these events.    Patient states she has a prior history of hypertension for which she once took losartan but has not taken it in approximately 6 months now.    review of systems is negative for " fever chills or other recent illness.  Her bronchitis symptoms are resolved.  She denies any chest pain, cough or shortness of breath.  Positive for nausea without vomiting when the events arise.  No abdominal pain.  She has had a few bouts of diarrhea since starting the antibiotic.  No neurosensory complaint or focal weakness.  Positive for profound weakness and dizziness without syncope.      PAST MEDICAL HISTORY  Past Medical History:   Diagnosis Date   • Allergic rhinitis    • Anemia    • Arthritis    • Diverticulosis    • Heart murmur    • History of bone density study 03/02/2017    [facility]   • History of mammography, screening 2016   • Hyperlipidemia    • Hypertension    • Inguinal hernia 1978    repair   • Osteoarthritis (arthritis due to wear and tear of joints)    • Personal history of DVT (deep vein thrombosis) 2005    right leg- due to surgery   • Positive TB test     Always comes back + but negative further testing         PAST SURGICAL HISTORY  Past Surgical History:   Procedure Laterality Date   • BACK SURGERY  2002    Scoliosis bar. T11-sacrum   • BREAST BIOPSY      over 15 years ago.    • CERVICAL SPINE SURGERY  2000    cage   • CHOLECYSTECTOMY  2014   • COLONOSCOPY  2016   • FOOT SURGERY  2008   • INGUINAL HERNIA REPAIR Bilateral 1978 2019 right side repair   • KNEE SURGERY Bilateral 2017 2005 and x2 in 8/2017(left)   • NASAL SEPTUM SURGERY  1999   • TOTAL ABDOMINAL HYSTERECTOMY WITH SALPINGO OOPHORECTOMY  1986    Unilateral salpingo-oophorectomy/cervical dysplasia/AUB   • URETHRAL DILATION      Multiple times         FAMILY HISTORY  Family History   Problem Relation Age of Onset   • Uterine cancer Mother         Metastatic to ovaries and colon; possibly bone   • Hypertension Mother    • Kidney disease Mother    • Tuberculosis Mother    • Cancer Father         Bladder   • Heart failure Father    • Stroke Father    • Hypertension Father    • Heart attack Father    • Colon polyps Father     • Hypertension Brother    • Kidney disease Brother    • Colon polyps Brother    • Kidney disease Brother    • Hypertension Brother    • Other Daughter         CVID   • Diabetes Daughter    • Colon cancer Maternal Uncle    • Colon cancer Maternal Uncle    • Breast cancer Paternal Aunt          SOCIAL HISTORY  Social History     Socioeconomic History   • Marital status:    Tobacco Use   • Smoking status: Never Smoker   • Smokeless tobacco: Never Used   Vaping Use   • Vaping Use: Never used   Substance and Sexual Activity   • Alcohol use: Yes     Alcohol/week: 1.0 standard drink     Types: 1 Glasses of wine per week     Comment: rarely at holidays   • Drug use: No   • Sexual activity: Not Currently     Comment:          ALLERGIES  Overton-2 inhibitors, Nsaids, Augmentin [amoxicillin-pot clavulanate], Celecoxib, and Zithromax [azithromycin]        REVIEW OF SYSTEMS  Review of Systems     All systems reviewed and negative except for those discussed in HPI.       PHYSICAL EXAM    I have reviewed the triage vital signs and nursing notes.    ED Triage Vitals [01/08/22 1422]   Temp Heart Rate Resp BP SpO2   97.7 °F (36.5 °C) 82 16 141/97 99 %      Temp src Heart Rate Source Patient Position BP Location FiO2 (%)   Temporal Monitor Sitting -- --       Physical Exam  GENERAL:   Appears well.  She is in excellent historian.  Her vital signs are normal  HENT: Nares patent.  EYES: No scleral icterus.  CV: Regular rhythm, regular rate.  No murmur.  No bradycardia.  No peripheral edema.  RESPIRATORY: Normal effort.  No audible wheezes, rales or rhonchi.  ABDOMEN: Soft, nontender  MUSCULOSKELETAL: No deformities.   NEURO: Alert, moves all extremities, follows commands.  No neurosensory deficits or focal weakness.  SKIN: Warm, dry, no rash visualized.        LAB RESULTS  Recent Results (from the past 24 hour(s))   D-dimer, Quantitative    Collection Time: 01/08/22  4:24 PM    Specimen: Blood   Result Value Ref Range     D-Dimer, Quantitative 0.52 0.00 - 0.56 MCGFEU/mL   CK    Collection Time: 01/08/22  4:24 PM    Specimen: Blood   Result Value Ref Range    Creatine Kinase 41 20 - 180 U/L   Magnesium    Collection Time: 01/08/22  4:24 PM    Specimen: Blood   Result Value Ref Range    Magnesium 2.2 1.6 - 2.4 mg/dL   Troponin    Collection Time: 01/08/22  4:24 PM    Specimen: Blood   Result Value Ref Range    Troponin T <0.010 0.000 - 0.030 ng/mL   BNP    Collection Time: 01/08/22  4:24 PM    Specimen: Blood   Result Value Ref Range    proBNP 162.0 0.0-1,800.0 pg/mL   Comprehensive Metabolic Panel    Collection Time: 01/08/22  4:24 PM    Specimen: Blood   Result Value Ref Range    Glucose 102 (H) 65 - 99 mg/dL    BUN 20 8 - 23 mg/dL    Creatinine 0.81 0.57 - 1.00 mg/dL    Sodium 136 136 - 145 mmol/L    Potassium 4.3 3.5 - 5.2 mmol/L    Chloride 100 98 - 107 mmol/L    CO2 25.0 22.0 - 29.0 mmol/L    Calcium 9.6 8.6 - 10.5 mg/dL    Total Protein 6.9 6.0 - 8.5 g/dL    Albumin 4.40 3.50 - 5.20 g/dL    ALT (SGPT) 18 1 - 33 U/L    AST (SGOT) 23 1 - 32 U/L    Alkaline Phosphatase 81 39 - 117 U/L    Total Bilirubin 0.4 0.0 - 1.2 mg/dL    eGFR Non African Amer 69 >60 mL/min/1.73    Globulin 2.5 gm/dL    A/G Ratio 1.8 g/dL    BUN/Creatinine Ratio 24.7 7.0 - 25.0    Anion Gap 11.0 5.0 - 15.0 mmol/L   CBC Auto Differential    Collection Time: 01/08/22  5:18 PM    Specimen: Blood   Result Value Ref Range    WBC 7.50 3.40 - 10.80 10*3/mm3    RBC 3.89 3.77 - 5.28 10*6/mm3    Hemoglobin 12.9 12.0 - 15.9 g/dL    Hematocrit 38.6 34.0 - 46.6 %    MCV 99.2 (H) 79.0 - 97.0 fL    MCH 33.2 (H) 26.6 - 33.0 pg    MCHC 33.4 31.5 - 35.7 g/dL    RDW 13.1 12.3 - 15.4 %    RDW-SD 47.8 37.0 - 54.0 fl    MPV 9.2 6.0 - 12.0 fL    Platelets 272 140 - 450 10*3/mm3    Neutrophil % 74.4 42.7 - 76.0 %    Lymphocyte % 18.3 (L) 19.6 - 45.3 %    Monocyte % 6.0 5.0 - 12.0 %    Eosinophil % 0.5 0.3 - 6.2 %    Basophil % 0.4 0.0 - 1.5 %    Immature Grans % 0.4 0.0 - 0.5 %     Neutrophils, Absolute 5.58 1.70 - 7.00 10*3/mm3    Lymphocytes, Absolute 1.37 0.70 - 3.10 10*3/mm3    Monocytes, Absolute 0.45 0.10 - 0.90 10*3/mm3    Eosinophils, Absolute 0.04 0.00 - 0.40 10*3/mm3    Basophils, Absolute 0.03 0.00 - 0.20 10*3/mm3    Immature Grans, Absolute 0.03 0.00 - 0.05 10*3/mm3    nRBC 0.0 0.0 - 0.2 /100 WBC       If labs were ordered, I independently reviewed the results.        RADIOLOGY  XR Chest 1 View    Result Date: 1/8/2022   EXAMINATION: XR CHEST 1 VW - 01/08/2022  INDICATION: Recurring near syncope.  COMPARISON: 12/16/2019  FINDINGS: Portable chest reveals cardiac and mediastinal silhouettes within normal limits. The lung fields are grossly clear with underlying chronic and emphysematous changes seen bilaterally. Scoliotic curvature identified of the spine with convexity to the right. No definite pleural effusion or pneumothorax. Pulmonary vascularity is within normal limits.      Chronic changes in the lung fields with no evidence of acute parenchymal disease.  DICTATED:   01/08/2022 EDITED/lfs:   01/08/2022           PROCEDURES    Procedures    ECG 12 Lead             MEDICATIONS GIVEN IN ER    Medications   sodium chloride 0.9 % flush 10 mL (has no administration in time range)             All labs have been independently reviewed by me.  All radiology studies have been reviewed by me and the radiologist dictating the report.   EKG's have been independently viewed and interpreted by me.        ED Course as of 01/08/22 1838   Sat Jan 08, 2022   1836 Patient's work-up is reassuring.  No arrhythmias have been appreciated on the monitor throughout her ED course.  Her vital signs have been stable.  I discussed her case with Dr. Kwok as well as hospitalist, Dr. Salcido who accepts inpatient care and monitoring.  Patient understands and concurs with this inpatient plan [MS]      ED Course User Index  [MS] Magdalena Cordoba APRN             AS OF 18:38 EST VITALS:    BP - 140/84  HR -  70  TEMP - 97.7 °F (36.5 °C) (Temporal)  O2 SATS - 100%                  DIAGNOSIS  Final diagnoses:   Postural dizziness with presyncope         DISPOSITION    ADMITTED             Magdalena Cordoba, LOVE  01/08/22 9194

## 2022-01-09 ENCOUNTER — READMISSION MANAGEMENT (OUTPATIENT)
Dept: CALL CENTER | Facility: HOSPITAL | Age: 77
End: 2022-01-09

## 2022-01-09 ENCOUNTER — APPOINTMENT (OUTPATIENT)
Dept: CARDIOLOGY | Facility: HOSPITAL | Age: 77
End: 2022-01-09

## 2022-01-09 VITALS
OXYGEN SATURATION: 99 % | HEART RATE: 78 BPM | BODY MASS INDEX: 23.58 KG/M2 | HEIGHT: 65 IN | TEMPERATURE: 98.7 F | WEIGHT: 141.54 LBS | RESPIRATION RATE: 16 BRPM | DIASTOLIC BLOOD PRESSURE: 65 MMHG | SYSTOLIC BLOOD PRESSURE: 119 MMHG

## 2022-01-09 LAB
ANION GAP SERPL CALCULATED.3IONS-SCNC: 12 MMOL/L (ref 5–15)
BASOPHILS # BLD AUTO: 0.04 10*3/MM3 (ref 0–0.2)
BASOPHILS NFR BLD AUTO: 0.8 % (ref 0–1.5)
BH CV ECHO MEAS - AI DEC SLOPE: 221 CM/SEC^2
BH CV ECHO MEAS - AI MAX PG: 46.1 MMHG
BH CV ECHO MEAS - AI MAX VEL: 339.3 CM/SEC
BH CV ECHO MEAS - AI P1/2T: 449.8 MSEC
BH CV ECHO MEAS - AO MAX PG (FULL): 2.2 MMHG
BH CV ECHO MEAS - AO MAX PG: 6 MMHG
BH CV ECHO MEAS - AO MEAN PG (FULL): 1.6 MMHG
BH CV ECHO MEAS - AO MEAN PG: 3.7 MMHG
BH CV ECHO MEAS - AO ROOT AREA (BSA CORRECTED): 1.5
BH CV ECHO MEAS - AO ROOT AREA: 4.8 CM^2
BH CV ECHO MEAS - AO ROOT DIAM: 2.5 CM
BH CV ECHO MEAS - AO V2 MAX: 126 CM/SEC
BH CV ECHO MEAS - AO V2 MEAN: 91.2 CM/SEC
BH CV ECHO MEAS - AO V2 VTI: 27.6 CM
BH CV ECHO MEAS - AVA(I,A): 1.7 CM^2
BH CV ECHO MEAS - AVA(I,D): 1.7 CM^2
BH CV ECHO MEAS - AVA(V,A): 2 CM^2
BH CV ECHO MEAS - AVA(V,D): 2 CM^2
BH CV ECHO MEAS - BSA(HAYCOCK): 1.7 M^2
BH CV ECHO MEAS - BSA: 1.7 M^2
BH CV ECHO MEAS - BZI_BMI: 23.5 KILOGRAMS/M^2
BH CV ECHO MEAS - BZI_METRIC_HEIGHT: 165.1 CM
BH CV ECHO MEAS - BZI_METRIC_WEIGHT: 64 KG
BH CV ECHO MEAS - EDV(CUBED): 57.6 ML
BH CV ECHO MEAS - EDV(MOD-SP2): 57.7 ML
BH CV ECHO MEAS - EDV(MOD-SP4): 39.3 ML
BH CV ECHO MEAS - EDV(TEICH): 64.4 ML
BH CV ECHO MEAS - EF(CUBED): 88.1 %
BH CV ECHO MEAS - EF(MOD-BP): 59.6 %
BH CV ECHO MEAS - EF(MOD-SP2): 60.5 %
BH CV ECHO MEAS - EF(MOD-SP4): 57.3 %
BH CV ECHO MEAS - EF(TEICH): 82.7 %
BH CV ECHO MEAS - ESV(CUBED): 6.8 ML
BH CV ECHO MEAS - ESV(MOD-SP2): 22.8 ML
BH CV ECHO MEAS - ESV(MOD-SP4): 16.8 ML
BH CV ECHO MEAS - ESV(TEICH): 11.1 ML
BH CV ECHO MEAS - FS: 50.9 %
BH CV ECHO MEAS - IVS/LVPW: 1
BH CV ECHO MEAS - IVSD: 0.79 CM
BH CV ECHO MEAS - LA DIMENSION: 2.1 CM
BH CV ECHO MEAS - LA/AO: 0.86
BH CV ECHO MEAS - LAD MAJOR: 3.6 CM
BH CV ECHO MEAS - LAT PEAK E' VEL: 9.5 CM/SEC
BH CV ECHO MEAS - LATERAL E/E' RATIO: 8.2
BH CV ECHO MEAS - LV DIASTOLIC VOL/BSA (35-75): 23 ML/M^2
BH CV ECHO MEAS - LV IVRT: 0.08 SEC
BH CV ECHO MEAS - LV MASS(C)D: 84.9 GRAMS
BH CV ECHO MEAS - LV MASS(C)DI: 49.8 GRAMS/M^2
BH CV ECHO MEAS - LV MAX PG: 3.8 MMHG
BH CV ECHO MEAS - LV MEAN PG: 2.1 MMHG
BH CV ECHO MEAS - LV SYSTOLIC VOL/BSA (12-30): 9.9 ML/M^2
BH CV ECHO MEAS - LV V1 MAX: 96.8 CM/SEC
BH CV ECHO MEAS - LV V1 MEAN: 66.8 CM/SEC
BH CV ECHO MEAS - LV V1 VTI: 18.1 CM
BH CV ECHO MEAS - LVIDD: 3.9 CM
BH CV ECHO MEAS - LVIDS: 1.9 CM
BH CV ECHO MEAS - LVLD AP2: 7.1 CM
BH CV ECHO MEAS - LVLD AP4: 7.6 CM
BH CV ECHO MEAS - LVLS AP2: 5.9 CM
BH CV ECHO MEAS - LVLS AP4: 6.1 CM
BH CV ECHO MEAS - LVOT AREA (M): 2.5 CM^2
BH CV ECHO MEAS - LVOT AREA: 2.6 CM^2
BH CV ECHO MEAS - LVOT DIAM: 1.8 CM
BH CV ECHO MEAS - LVPWD: 0.76 CM
BH CV ECHO MEAS - MED PEAK E' VEL: 9.4 CM/SEC
BH CV ECHO MEAS - MEDIAL E/E' RATIO: 8.2
BH CV ECHO MEAS - MV A MAX VEL: 91.7 CM/SEC
BH CV ECHO MEAS - MV DEC SLOPE: 435.4 CM/SEC^2
BH CV ECHO MEAS - MV DEC TIME: 0.17 SEC
BH CV ECHO MEAS - MV E MAX VEL: 77.1 CM/SEC
BH CV ECHO MEAS - MV E/A: 0.84
BH CV ECHO MEAS - MV MAX PG: 3.7 MMHG
BH CV ECHO MEAS - MV MEAN PG: 1.8 MMHG
BH CV ECHO MEAS - MV P1/2T MAX VEL: 80.4 CM/SEC
BH CV ECHO MEAS - MV P1/2T: 54.1 MSEC
BH CV ECHO MEAS - MV V2 MAX: 96.6 CM/SEC
BH CV ECHO MEAS - MV V2 MEAN: 63.8 CM/SEC
BH CV ECHO MEAS - MV V2 VTI: 24.2 CM
BH CV ECHO MEAS - MVA P1/2T LCG: 2.7 CM^2
BH CV ECHO MEAS - MVA(P1/2T): 4.1 CM^2
BH CV ECHO MEAS - MVA(VTI): 1.9 CM^2
BH CV ECHO MEAS - RAP SYSTOLE: 3 MMHG
BH CV ECHO MEAS - RVSP: 35 MMHG
BH CV ECHO MEAS - SI(AO): 77.9 ML/M^2
BH CV ECHO MEAS - SI(CUBED): 29.8 ML/M^2
BH CV ECHO MEAS - SI(LVOT): 27.6 ML/M^2
BH CV ECHO MEAS - SI(MOD-SP2): 20.5 ML/M^2
BH CV ECHO MEAS - SI(MOD-SP4): 13.2 ML/M^2
BH CV ECHO MEAS - SI(TEICH): 31.2 ML/M^2
BH CV ECHO MEAS - SV(AO): 132.9 ML
BH CV ECHO MEAS - SV(CUBED): 50.8 ML
BH CV ECHO MEAS - SV(LVOT): 47.1 ML
BH CV ECHO MEAS - SV(MOD-SP2): 34.9 ML
BH CV ECHO MEAS - SV(MOD-SP4): 22.5 ML
BH CV ECHO MEAS - SV(TEICH): 53.3 ML
BH CV ECHO MEAS - TAPSE (>1.6): 2.3 CM
BH CV ECHO MEAS - TR MAX PG: 32 MMHG
BH CV ECHO MEAS - TR MAX VEL: 285 CM/SEC
BH CV ECHO MEASUREMENTS AVERAGE E/E' RATIO: 8.16
BH CV VAS BP RIGHT ARM: NORMAL MMHG
BH CV XLRA - RV BASE: 3.7 CM
BH CV XLRA - RV LENGTH: 5.3 CM
BH CV XLRA - RV MID: 2.9 CM
BH CV XLRA - TDI S': 9.4 CM/SEC
BUN SERPL-MCNC: 18 MG/DL (ref 8–23)
BUN/CREAT SERPL: 20.5 (ref 7–25)
CALCIUM SPEC-SCNC: 9.2 MG/DL (ref 8.6–10.5)
CHLORIDE SERPL-SCNC: 107 MMOL/L (ref 98–107)
CO2 SERPL-SCNC: 23 MMOL/L (ref 22–29)
CREAT SERPL-MCNC: 0.88 MG/DL (ref 0.57–1)
DEPRECATED RDW RBC AUTO: 48.6 FL (ref 37–54)
EOSINOPHIL # BLD AUTO: 0.04 10*3/MM3 (ref 0–0.4)
EOSINOPHIL NFR BLD AUTO: 0.8 % (ref 0.3–6.2)
ERYTHROCYTE [DISTWIDTH] IN BLOOD BY AUTOMATED COUNT: 13.2 % (ref 12.3–15.4)
GFR SERPL CREATININE-BSD FRML MDRD: 63 ML/MIN/1.73
GLUCOSE SERPL-MCNC: 93 MG/DL (ref 65–99)
HCT VFR BLD AUTO: 36.2 % (ref 34–46.6)
HGB BLD-MCNC: 12.1 G/DL (ref 12–15.9)
IMM GRANULOCYTES # BLD AUTO: 0.04 10*3/MM3 (ref 0–0.05)
IMM GRANULOCYTES NFR BLD AUTO: 0.8 % (ref 0–0.5)
LEFT ATRIUM VOLUME INDEX: 10.9 ML/M^2
LEFT ATRIUM VOLUME: 18.6 ML
LV EF 2D ECHO EST: 60 %
LYMPHOCYTES # BLD AUTO: 1.55 10*3/MM3 (ref 0.7–3.1)
LYMPHOCYTES NFR BLD AUTO: 31.3 % (ref 19.6–45.3)
MAXIMAL PREDICTED HEART RATE: 145 BPM
MCH RBC QN AUTO: 33.4 PG (ref 26.6–33)
MCHC RBC AUTO-ENTMCNC: 33.4 G/DL (ref 31.5–35.7)
MCV RBC AUTO: 100 FL (ref 79–97)
MONOCYTES # BLD AUTO: 0.44 10*3/MM3 (ref 0.1–0.9)
MONOCYTES NFR BLD AUTO: 8.9 % (ref 5–12)
NEUTROPHILS NFR BLD AUTO: 2.84 10*3/MM3 (ref 1.7–7)
NEUTROPHILS NFR BLD AUTO: 57.4 % (ref 42.7–76)
NRBC BLD AUTO-RTO: 0 /100 WBC (ref 0–0.2)
PLATELET # BLD AUTO: 239 10*3/MM3 (ref 140–450)
PMV BLD AUTO: 9.6 FL (ref 6–12)
POTASSIUM SERPL-SCNC: 3.4 MMOL/L (ref 3.5–5.2)
RBC # BLD AUTO: 3.62 10*6/MM3 (ref 3.77–5.28)
SODIUM SERPL-SCNC: 142 MMOL/L (ref 136–145)
STRESS TARGET HR: 123 BPM
WBC NRBC COR # BLD: 4.95 10*3/MM3 (ref 3.4–10.8)

## 2022-01-09 PROCEDURE — G0378 HOSPITAL OBSERVATION PER HR: HCPCS

## 2022-01-09 PROCEDURE — 96372 THER/PROPH/DIAG INJ SC/IM: CPT

## 2022-01-09 PROCEDURE — 93306 TTE W/DOPPLER COMPLETE: CPT

## 2022-01-09 PROCEDURE — 99219 PR INITIAL OBSERVATION CARE/DAY 50 MINUTES: CPT | Performed by: INTERNAL MEDICINE

## 2022-01-09 PROCEDURE — 96374 THER/PROPH/DIAG INJ IV PUSH: CPT

## 2022-01-09 PROCEDURE — 93306 TTE W/DOPPLER COMPLETE: CPT | Performed by: INTERNAL MEDICINE

## 2022-01-09 PROCEDURE — 80048 BASIC METABOLIC PNL TOTAL CA: CPT | Performed by: INTERNAL MEDICINE

## 2022-01-09 PROCEDURE — 85025 COMPLETE CBC W/AUTO DIFF WBC: CPT | Performed by: INTERNAL MEDICINE

## 2022-01-09 PROCEDURE — 99217 PR OBSERVATION CARE DISCHARGE MANAGEMENT: CPT | Performed by: INTERNAL MEDICINE

## 2022-01-09 PROCEDURE — 25010000002 HEPARIN (PORCINE) PER 1000 UNITS: Performed by: INTERNAL MEDICINE

## 2022-01-09 PROCEDURE — 25010000002 ONDANSETRON PER 1 MG: Performed by: INTERNAL MEDICINE

## 2022-01-09 RX ORDER — SACCHAROMYCES BOULARDII 250 MG
250 CAPSULE ORAL 2 TIMES DAILY
Status: DISCONTINUED | OUTPATIENT
Start: 2022-01-09 | End: 2022-01-09 | Stop reason: HOSPADM

## 2022-01-09 RX ORDER — POTASSIUM CHLORIDE 1.5 G/1.77G
40 POWDER, FOR SOLUTION ORAL AS NEEDED
Status: DISCONTINUED | OUTPATIENT
Start: 2022-01-09 | End: 2022-01-09 | Stop reason: HOSPADM

## 2022-01-09 RX ORDER — POTASSIUM CHLORIDE 750 MG/1
40 CAPSULE, EXTENDED RELEASE ORAL AS NEEDED
Status: DISCONTINUED | OUTPATIENT
Start: 2022-01-09 | End: 2022-01-09 | Stop reason: HOSPADM

## 2022-01-09 RX ORDER — SACCHAROMYCES BOULARDII 250 MG
250 CAPSULE ORAL 2 TIMES DAILY
Qty: 14 CAPSULE | Refills: 0 | Status: SHIPPED | OUTPATIENT
Start: 2022-01-09 | End: 2022-01-16

## 2022-01-09 RX ORDER — MECLIZINE HCL 12.5 MG/1
12.5 TABLET ORAL 3 TIMES DAILY PRN
Qty: 15 TABLET | Refills: 0 | Status: SHIPPED | OUTPATIENT
Start: 2022-01-09 | End: 2022-01-11 | Stop reason: SDUPTHER

## 2022-01-09 RX ADMIN — HEPARIN SODIUM 5000 UNITS: 5000 INJECTION, SOLUTION INTRAVENOUS; SUBCUTANEOUS at 05:02

## 2022-01-09 RX ADMIN — SODIUM CHLORIDE, PRESERVATIVE FREE 10 ML: 5 INJECTION INTRAVENOUS at 08:00

## 2022-01-09 RX ADMIN — MECLIZINE HYDROCHLORIDE 12.5 MG: 25 TABLET ORAL at 12:13

## 2022-01-09 RX ADMIN — MECLIZINE HYDROCHLORIDE 12.5 MG: 25 TABLET ORAL at 05:02

## 2022-01-09 RX ADMIN — Medication 250 MG: at 12:13

## 2022-01-09 RX ADMIN — ASPIRIN 81 MG: 81 TABLET, COATED ORAL at 07:59

## 2022-01-09 RX ADMIN — ONDANSETRON 4 MG: 2 INJECTION INTRAMUSCULAR; INTRAVENOUS at 10:23

## 2022-01-09 RX ADMIN — POTASSIUM CHLORIDE 40 MEQ: 1.5 POWDER, FOR SOLUTION ORAL at 09:52

## 2022-01-09 RX ADMIN — HEPARIN SODIUM 5000 UNITS: 5000 INJECTION, SOLUTION INTRAVENOUS; SUBCUTANEOUS at 12:13

## 2022-01-09 NOTE — PLAN OF CARE
Goal Outcome Evaluation:      VSS on RA, c/o weakness and dizziness, pt. Attribute partly to diarrhea, probiotic given, no other complaints per patient, will continue to monitor

## 2022-01-09 NOTE — H&P
"    [facility] Medicine Services  HISTORY AND PHYSICAL    Patient Name: Joyce Jones  : 1945  MRN: [id number]  Primary Care Physician: Pura Burk, LOVE  Date of admission: 2022      Subjective   Subjective     Chief Complaint:  \"I Keep feeling like I'm going to pass out\"    HPI:  Joyce Jones is a 76 y.o. female with past medical history of essential hypertension, history of DVT, SVT, hyperlipidemia, CKD, rheumatoid arthritis, chronic low back pain, osteoporosis who presents to the ER with complaints of presyncope.    Patient states that she began having symptoms of bronchitis with cough, nasal congestion, postnasal drainage and \"feeling bad\" a little over 2 weeks ago and saw her PCP in the office. She states that she was given a Medrol Dosepak which she took for 2 to 3 days. Shortly after starting the medication, patient began having episodes of diaphoresis, nausea, weakness, and disequilibrium/dizziness (not vertiginous) to the point that she feels like she is about to pass out. Despite discontinuation of the Medrol dose pack after 2-3 doses, she continues to have recurrent episodes. Patient was changed to azithromycin.    Patient reports that episodes can happen laying down, sitting, or standing. Seems to be random in occurrence with no preceding factors. She denies any chest pressure or pain. Denies any associated shortness of breath. Denies any unilateral weakness or numbness or changes in vision. She reports that she has been taking 12.5 mg of Benadryl every 3-4 times a day which seems to make her symptoms milder.    She does report that she feels as if her heart rate is \"racing\" during the episodes, however not felt like her prior episodes of SVT. Usually these are nonsustained and very mild in the past.    Reports she took a home Covid test 2 weeks ago that was negative. She denies any other symptoms at this time. Reports that she has had adequate water " "intake and reports that she probably drinks \"too much water\" as she has \"kidney issues in my family\".      COVID Details:    Symptoms:    [x] NONE [] Fever []  Cough [] Shortness of breath [] Change in taste/smell      Review of Systems   Gen- No fevers, chills  CV- No chest pain, palpitations  Resp-reports cough, denies dyspnea  GI- No N/V/D, abd pain      All other systems reviewed and are negative.     Personal History     Past Medical History:   Diagnosis Date   • Allergic rhinitis    • Anemia    • Arthritis    • Diverticulosis    • Heart murmur    • History of bone density study 03/02/2017    [facility]   • History of mammography, screening 2016   • Hyperlipidemia    • Hypertension    • Inguinal hernia 1978    repair   • Osteoarthritis (arthritis due to wear and tear of joints)    • Personal history of DVT (deep vein thrombosis) 2005    right leg- due to surgery   • Positive TB test     Always comes back + but negative further testing       Past Surgical History:   Procedure Laterality Date   • BACK SURGERY  2002    Scoliosis bar. T11-sacrum   • BREAST BIOPSY      over 15 years ago.    • CERVICAL SPINE SURGERY  2000    cage   • CHOLECYSTECTOMY  2014   • COLONOSCOPY  2016   • FOOT SURGERY  2008   • INGUINAL HERNIA REPAIR Bilateral 1978    2019 right side repair   • KNEE SURGERY Bilateral 2017 2005 and x2 in 8/2017(left)   • NASAL SEPTUM SURGERY  1999   • TOTAL ABDOMINAL HYSTERECTOMY WITH SALPINGO OOPHORECTOMY  1986    Unilateral salpingo-oophorectomy/cervical dysplasia/AUB   • URETHRAL DILATION      Multiple times       Family History:family history includes Breast cancer in her paternal aunt; Cancer in her father; Colon cancer in her maternal uncle and maternal uncle; Colon polyps in her brother and father; Diabetes in her daughter; Heart attack in her father; Heart failure in her father; Hypertension in her brother, brother, father, and mother; Kidney disease in her brother, brother, and mother; Other " in her daughter; Stroke in her father; Tuberculosis in her mother; Uterine cancer in her mother. Otherwise pertinent FHx was reviewed and unremarkable.     Social History:  reports that she has never smoked. She has never used smokeless tobacco. She reports current alcohol use of about 1.0 standard drink of alcohol per week. She reports that she does not use drugs.  Social History     Social History Narrative   • Not on file       Medications:  Available home medication information reviewed.  (Not in a hospital admission)      Allergies   Allergen Reactions   • Overton-2 Inhibitors Anaphylaxis   • Nsaids GI Intolerance   • Augmentin [Amoxicillin-Pot Clavulanate] Nausea Only   • Celecoxib Hives   • Zithromax [Azithromycin] Dizziness       Objective   Objective     Vital Signs:   Temp:  [97.7 °F (36.5 °C)-97.8 °F (36.6 °C)] 97.7 °F (36.5 °C)  Heart Rate:  [70-85] 70  Resp:  [16] 16  BP: (140-150)/(77-97) 140/84       Physical Exam   Constitutional: Awake, alert, nontoxic appearance, appears frail  Eyes: PERRLA, sclerae anicteric, no conjunctival injection  HENT: NCAT, mucous membranes moist  Neck: Supple, no thyromegaly, no lymphadenopathy, trachea midline  Respiratory: Clear to auscultation bilaterally, nonlabored respirations   Cardiovascular: RRR, no murmurs, rubs, or gallops, palpable pedal pulses bilaterally  Gastrointestinal: Positive bowel sounds, soft, nontender, nondistended  Musculoskeletal: No bilateral ankle edema, no clubbing or cyanosis to extremities  Psychiatric: Appropriate affect, cooperative  Neurologic: Oriented x 3, strength symmetric in all extremities, Cranial Nerves grossly intact to confrontation, speech clear  Skin: No rashes      Result Review:  I have personally reviewed the results from the time of this admission to 1/8/2022 19:13 EST and agree with these findings:  [x]  Laboratory  []  Microbiology  [x]  Radiology  [x]  EKG/Telemetry   []  Cardiology/Vascular   []  Pathology  []  Old  records  []  Other:  Most notable findings include: Chest x-ray without any acute abnormalities, relatively normal laboratory data, EKG without any acute ischemic changes.    LAB RESULTS:      Lab 01/08/22  1718 01/08/22  1624   WBC 7.50  --    HEMOGLOBIN 12.9  --    HEMATOCRIT 38.6  --    PLATELETS 272  --    NEUTROS ABS 5.58  --    IMMATURE GRANS (ABS) 0.03  --    LYMPHS ABS 1.37  --    MONOS ABS 0.45  --    EOS ABS 0.04  --    MCV 99.2*  --    D DIMER QUANT  --  0.52         Lab 01/08/22  1624   SODIUM 136   POTASSIUM 4.3   CHLORIDE 100   CO2 25.0   ANION GAP 11.0   BUN 20   CREATININE 0.81   GLUCOSE 102*   CALCIUM 9.6   MAGNESIUM 2.2         Lab 01/08/22  1624   TOTAL PROTEIN 6.9   ALBUMIN 4.40   GLOBULIN 2.5   ALT (SGPT) 18   AST (SGOT) 23   BILIRUBIN 0.4   ALK PHOS 81         Lab 01/08/22  1624   PROBNP 162.0   TROPONIN T <0.010                     Microbiology Results (last 10 days)     ** No results found for the last 240 hours. **          XR Chest 1 View    Result Date: 1/8/2022   EXAMINATION: XR CHEST 1 VW - 01/08/2022  INDICATION: Recurring near syncope.  COMPARISON: 12/16/2019  FINDINGS: Portable chest reveals cardiac and mediastinal silhouettes within normal limits. The lung fields are grossly clear with underlying chronic and emphysematous changes seen bilaterally. Scoliotic curvature identified of the spine with convexity to the right. No definite pleural effusion or pneumothorax. Pulmonary vascularity is within normal limits.      Impression: Chronic changes in the lung fields with no evidence of acute parenchymal disease.  DICTATED:   01/08/2022 EDITED/lfs:   01/08/2022         Results for orders placed during the hospital encounter of 12/16/19    Adult Transthoracic Echo Complete W/ Cont if Necessary Per Protocol    Interpretation Summary  · Mild mitral valve regurgitation is present.  · Estimated EF = 65%.  · Left ventricular systolic function is normal.  · Left ventricular diastolic dysfunction  is abnormal consistent with: age.  · Normal right ventricular cavity size, wall thickness, systolic function and septal motion noted.  · The aortic valve exhibits mild sclerosis.  · Mild pulmonary hypertension is present.  · There is no evidence of pericardial effusion.  · No significant structural valvular abnormality demonstrated.      Assessment/Plan   Assessment & Plan     Active Hospital Problems    Diagnosis  POA   • **Postural dizziness with presyncope [R42, R55]  Yes   • Osteoporosis per patient [M81.0]  Yes   • Essential hypertension [I10]  Yes   • History of deep vein thrombosis [Z86.718]  Not Applicable   • Supraventricular tachycardia (HCC) [I47.1]  Yes   • Hyperlipidemia [E78.5]  Yes   • Chronic kidney disease [N18.9]  Yes   • Chronic low back pain [M54.50, G89.29]  Yes   • Rheumatoid arthritis (HCC) [M06.9]  Yes       Patient is a 76-year-old female with past medical history of essential hypertension, history of DVT, SVT, hyperlipidemia, CKD, rheumatoid arthritis, chronic low back pain, osteoporosis who presents to the ER with complaints of presyncope.    1.  Presyncope  2.  Essential hypertension  3.  Hyperlipidemia  4.  History of SVT  5.  Hyperlipidemia  6.  CKD   7.  Chronic low back pain ambulate.      --unclear etiology, rule out cardiac disease, CNS abnormality. Differential also includes inner ear dysfunction related to recent congestion/illness. Rule out covid.  Rule out orthostatic hypotension  --cards eval, likely needs repeat stress test  --echo  --orthostatics, small bolus of fluids  --asprin now  --MRI brain  --serial trop  --hold benadryl and try meclizine to see if sx resolve.   --strict bed rest for tonight    DVT prophylaxis:  heparin      CODE STATUS:  Full code  There are no questions and answers to display.       Admission Status:  I believe this patient meets OBSERVATION status, however if further evaluation or treatment plans warrant, status may change.  Based upon current  information, I predict patient's care encounter to be less than or equal to 2 midnights.          Ken Winn DO  01/08/22\

## 2022-01-09 NOTE — OUTREACH NOTE
Prep Survey      Responses   Restoration facility patient discharged from? [place]   Is LACE score < 7 ? Yes   Emergency Room discharge w/ pulse ox? No   Eligibility [facility]   Date of Admission 01/08/22   Date of Discharge 01/09/22   Discharge Disposition Home or Self Care   Discharge diagnosis presyncope, URI, CKD   Does the patient have one of the following disease processes/diagnoses(primary or secondary)? Other   Does the patient have Home health ordered? No   Is there a DME ordered? No   Prep survey completed? Yes          Estrella Cooper RN

## 2022-01-09 NOTE — CONSULTS
Cardiology Consult    Joyce Jones  1945  [phone]    01/09/22    DATE OF ADMISSION: 1/8/2022  [facility] 2F      Primary Cardiologist: previously seen by MARIE Gaines, Pura GAMBOA, APRN  [address] [address]   Chief Complaint   Patient presents with   • Allergic Reaction     1.  Valvular Heart Disease              A.  Dx with Cardiac Murmur several years ago, in [place].              B.  Cardiac PET Nuclear 12/2019, Dr. Hart:  Acceptable negative IV Lexiscan hybrid PET cardiac CT stress scan suggestive of low probability for significant focal obstructive coronary artery disease with preserved systolic left ventricular function (LVEF 0.54).              C.  TTE 12/2019:  Mild MR, EF 65%, The aortic valve exhibits mild sclerosis, No significant structural valvular abnormality demonstrated  2.  Essential Hypertension  3.  Dyslipidemia  4.  Seasonal Allergies/Rhinitis  5.  Chronic Back Pain  6.  H/O Diverticulitis  7.  Scoliosis of the thoracic spine (T7 level)      History of Present Illness: Mrs. Jones is a pleasant 76-year-old  female resident of [facility]. She has a past medical history significant for hypertension, intermittent palpitations, hyperlipidemia, RA, chronic low back pain, osteoporosis.    Patient presented to the [facility] emergency department yesterday 1/8/2022 after experiencing upper respiratory symptoms of cough, nasal congestion and feeling weak, fatigue for 2 week. After her symptoms started she was seen by her PCP and given Solu-Medrol which she took for 2 or 3 days. However after starting the medication she started having symptoms of profound dizziness and presyncope with associated nausea and flushing.Initially this happened several times per day, was severe in nature and she was worried she was pass out completely.  She stopped taking the steroids after he symptoms started. She notified her PCP who called  "in a Zpack which she also took and finished 2 days ago. She continues with recurrent episodes of dizziness, weakness, nausea.  The episodes of dizziness/weakness have happened while she is laying down, sitting, and standing. She is not able to determine any specific preceding factors. She has not had chest pain or shortness of breath associated with the episodes. She states that even prior to admission th episodes were less frequent and since being here she has only had 2 episodes that were mild.     She states she had intermittent palpitations approximately 10 years ago while living in [place].  She was evaluated at [facility] and had a monitor placed.  She states she had a \"minor heart problem\" however is not sure of the exact diagnosis.  When asked about diagnosis of SVT she is not sure if that was the exact diagnosis she was given.  She denies ever having a left heart catheterization or having any type of cardiac ablation.  At home she takes losartan for hypertension and says her home blood pressures are usually 1 20-1 30 over 70s.  Even in the setting of her recent acute illness she had been monitoring her blood pressures which were normal.      Allergies   Allergen Reactions   • Overton-2 Inhibitors Anaphylaxis   • Nsaids GI Intolerance   • Augmentin [Amoxicillin-Pot Clavulanate] Nausea Only   • Celecoxib Hives   • Zithromax [Azithromycin] Dizziness       Prior to Admission Medications     Prescriptions Last Dose Informant Patient Reported? Taking?    aspirin 81 MG EC tablet 1/8/2022  Yes Yes    Take 81 mg by mouth Daily.    azithromycin (ZITHROMAX) 250 MG tablet 1/6/2022  No Yes    2 tabs day 1, 1 tab days 2 through 5    estradiol (ESTRACE VAGINAL) 0.1 MG/GM vaginal cream 1/7/2022  No Yes    Apply pea sized amount to external vaginal area 1 to 2 times per week    Omega-3 Fatty Acids (fish oil) 1000 MG capsule capsule 1/7/2022  Yes Yes    Take  by mouth Every Other Day.    traMADol-acetaminophen (ULTRACET) " 37.5-325 MG per tablet 1/7/2022  Yes Yes    Take 2 tablets by mouth 2 (Two) Times a Day.    cetirizine (zyrTEC) 10 MG tablet Unknown  Yes No    Take 1 mg by mouth Daily.    hydrOXYzine pamoate (VISTARIL) 25 MG capsule Unknown  No No    Take 1 capsule by mouth At Night As Needed for Anxiety (sleep).    losartan (COZAAR) 50 MG tablet Unknown  No No    Take 1 tablet by mouth Daily.    Patient taking differently:  Take 50 mg by mouth As Needed.            Current Facility-Administered Medications:   •  acetaminophen (TYLENOL) tablet 650 mg, 650 mg, Oral, Q4H PRN **OR** acetaminophen (TYLENOL) 160 MG/5ML solution 650 mg, 650 mg, Oral, Q4H PRN **OR** acetaminophen (TYLENOL) suppository 650 mg, 650 mg, Rectal, Q4H PRN, Ken Winn, DO  •  aspirin EC tablet 81 mg, 81 mg, Oral, Daily, Ken Winn, DO, 81 mg at 01/09/22 0759  •  heparin (porcine) 5000 UNIT/ML injection 5,000 Units, 5,000 Units, Subcutaneous, Q8H, Ken Winn, DO, 5,000 Units at 01/09/22 0502  •  meclizine (ANTIVERT) tablet 12.5 mg, 12.5 mg, Oral, Q8H, Ken Winn, DO, 12.5 mg at 01/09/22 0502  •  melatonin tablet 5 mg, 5 mg, Oral, Nightly PRN, Ken Winn, DO  •  ondansetron (ZOFRAN) injection 4 mg, 4 mg, Intravenous, Q6H PRN, Ken Winn, DO  •  [COMPLETED] Insert peripheral IV, , , Once **AND** sodium chloride 0.9 % flush 10 mL, 10 mL, Intravenous, PRN, Ken Winn, DO  •  sodium chloride 0.9 % flush 10 mL, 10 mL, Intravenous, Q12H, Ken Winn, DO, 10 mL at 01/09/22 0800  •  sodium chloride 0.9 % flush 10 mL, 10 mL, Intravenous, PRN, Ken Winn, DO    Social History     Socioeconomic History   • Marital status:    Tobacco Use   • Smoking status: Never Smoker   • Smokeless tobacco: Never Used   Vaping Use   • Vaping Use: Never used   Substance and Sexual Activity   • Alcohol use: Yes     Alcohol/week: 1.0 standard drink     Types: 1 Glasses of wine per week     Comment: rarely at holidays   • Drug use: No   • Sexual  activity: Not Currently     Comment:        Family History   Problem Relation Age of Onset   • Uterine cancer Mother         Metastatic to ovaries and colon; possibly bone   • Hypertension Mother    • Kidney disease Mother    • Tuberculosis Mother    • Cancer Father         Bladder   • Heart failure Father    • Stroke Father    • Hypertension Father    • Heart attack Father    • Colon polyps Father    • Hypertension Brother    • Kidney disease Brother    • Colon polyps Brother    • Kidney disease Brother    • Hypertension Brother    • Other Daughter         CVID   • Diabetes Daughter    • Colon cancer Maternal Uncle    • Colon cancer Maternal Uncle    • Breast cancer Paternal Aunt        REVIEW OF SYSTEMS:   CONST:  No weight loss, fever, chills, +weakness or + fatigue.   HEENT:  No visual loss, blurred vision, double vision, yellow sclerae.                   No hearing loss, + nasal congestion, + sore/ scratchy throat.   SKIN:      No rashes, urticaria, ulcers, sores.     RESP:     No shortness of breath, hemoptysis, +cough, No sputum.   GI:           No anorexia, nausea, vomiting, diarrhea. No abdominal pain, melena.   :         No burning on urination, hematuria or increased frequency.  ENDO:    No diaphoresis, cold or heat intolerance. No polyuria or polydipsia.   NEURO:  No headache,+ dizziness, +presyncope, No paralysis, ataxia, or parasthesias.                  No change in bowel or bladder control. No history of CVA/TIA  MUSC:    No muscle, back pain, joint pain or stiffness.   HEME:    No anemia, bleeding, bruising. No history of DVT/PE.  PSYCH:  No history of depression, anxiety    Vitals:    01/09/22 0300 01/09/22 0500 01/09/22 0521 01/09/22 0713   BP: 143/74   138/71   BP Location: Right arm   Right arm   Patient Position: Lying   Lying   Pulse: 66 68  104   Resp: 16   16   Temp: 98.8 °F (37.1 °C)   98.9 °F (37.2 °C)   TempSrc: Oral   Oral   SpO2: 96% 94%  98%   Weight:   64.2 kg (141 lb 8.6 oz)     Height:             Vital Sign Min/Max for last 24 hours  Temp  Min: 97.7 °F (36.5 °C)  Max: 98.9 °F (37.2 °C)   BP  Min: 136/79  Max: 156/85   Pulse  Min: 66  Max: 104   Resp  Min: 16  Max: 16   SpO2  Min: 93 %  Max: 100 %   No data recorded    No intake or output data in the 24 hours ending 01/09/22 0858          Physical Exam:  GEN: Well nourished, Well- developed  No acute distress  HEENT: Normocephalic, Atraumatic, PERRLA, moist mucous membranes  NECK: supple, NO JVD, no thyromegaly, no lymphadenopathy  CARD: S1S2  RRR no murmur, gallop, rub  LUNGS: Clear to auscultataion, normal respiratory effort  ABDOMEN: Soft, nontender, normal bowel sounds  EXTREMITIES:No gross deformities,  No clubbing, cyanosis, or edema  SKIN: Warm, dry  NEURO: No focal deficits  PSYCHIATRIC: Normal affect and mood      I personally viewed and interpreted the patient's EKG/Telemetry/lab data    Data:   Results from last 7 days   Lab Units 01/09/22  0701 01/08/22  1718   WBC 10*3/mm3 4.95 7.50   HEMOGLOBIN g/dL 12.1 12.9   HEMATOCRIT % 36.2 38.6   PLATELETS 10*3/mm3 239 272     Results from last 7 days   Lab Units 01/09/22  0701 01/08/22  1624 01/08/22  1624   SODIUM mmol/L 142  --  136   POTASSIUM mmol/L 3.4*  --  4.3   CHLORIDE mmol/L 107   < > 100   CO2 mmol/L 23.0   < > 25.0   BUN mg/dL 18  --  20   CREATININE mg/dL 0.88  --  0.81   GLUCOSE mg/dL 93   < > 102*    < > = values in this interval not displayed.              Results from last 7 days   Lab Units 01/08/22  1624   PROBNP pg/mL 162.0         Results from last 7 days   Lab Units 01/08/22  1933 01/08/22  1624   CK TOTAL U/L  --  41   TROPONIN T ng/mL <0.010 <0.010         Results from last 7 days   Lab Units 01/08/22  1624   PROBNP pg/mL 162.0     No intake or output data in the 24 hours ending 01/09/22 0858    Chest X-Ray:  Imaging Results (Last 24 Hours)     Procedure Component Value Units Date/Time    MRI Brain Without Contrast [[id number]] Collected: 01/08/22 6864      Updated: 22    Narrative:      MRI Brain WO    INDICATION:   Dizziness with near syncope    TECHNIQUE:   MRI of the brain without IV contrast.    COMPARISON:    10/6/2021    FINDINGS:  Diffusion-weighted images are unremarkable. No evidence of recent infarct. The routine brain images show normal ventricular size. There is no evidence of mass lesion, hemorrhage or edema. No white matter signal abnormalities are seen. Mild cortical  atrophy is noted. No significant changes are seen since the previous examination.      Impression:      Cortical atrophy to a mild degree. No acute findings.    Signer Name: Andreas Espana MD   Signed: 2022 10:27 PM   Workstation Name: [id number]    [facility]    XR Chest 1 View [[id number]] Collected: 22 1643     Updated: 22 1650    Narrative:         EXAMINATION: XR CHEST 1 VW - 2022     INDICATION: Recurring near syncope.      COMPARISON: 2019     FINDINGS: Portable chest reveals cardiac and mediastinal silhouettes  within normal limits. The lung fields are grossly clear with underlying  chronic and emphysematous changes seen bilaterally. Scoliotic curvature  identified of the spine with convexity to the right. No definite pleural  effusion or pneumothorax. Pulmonary vascularity is within normal limits.       Impression:      Chronic changes in the lung fields with no evidence of acute  parenchymal disease.     DICTATED:   2022  EDITED/lfs:   2022                Telemetry: Normal sinus rhythm 60 to 84 bpm  EK2022 at 1621: Normal sinus rhythm at 81 bpm no acute ST changes. QTc interval 440ms    Assessment and Plan:   1) Presyncope/dizziness  -2022 brain MRI: Cortical atrophy to mild degree. No acute findings  -Echocardiogram pending  -Negative troponin x2 this admission, no acute EKG changes, currently chest pain-free.  May consider ischemic evaluation if echocardiogram is abnormal  -Normal  "echocardiogram and normal stress test in 2019  -Episodes began after an acute upper respiratory infection and taking steroid pack/ azithromycin.  They have been less frequent/less severe even prior to admission.  Have continued to improve since being here on meclizine.     2)History of palpitations,   -She reports being diagnosed with a \"heart abnormality\" at [facility] several years ago.  She does not recall the specific name of her diagnosis.  She has not had symptomatic palpitations in the recent years.  -No documented arrhythmia during this admission  -May consider outpatient monitor after discharge    3)Hypertension  -Acceptable control current medical therapy  -We will check orthostatics now    4) recent upper respiratory tract infection treated with steroids and Z-Jose.  -COVID-19 negative  -Continues with cough, nasal drip states she is significantly better  -Possibly dehydrated in the setting of recent URI    Scribed for  Dr. Kovacs by  LOVE Galloway . 1/9/2022  08:58 EST     History and physical examination verified.  Agree with nurse practitioner's note.    76-year-old white female with a history of valvular heart disease, hypertension, dyslipidemia, and seasonal allergies with a history of tachypalpitations in the past who subsequently was admitted secondary to dizziness, lightheadedness, and presyncope.  We are asked to evaluate the patient for the possibility of a cardiac source of her symptoms.  The patient does have a longstanding history of intermittent tachypalpitations and was apparently seen at [facility] in the past.  Work-up is unknown at that time but the patient was told that no significant abnormalities from a cardiac standpoint was present.  The patient started having URI type symptoms and subsequently was treated with Solu-Medrol as well as a Z-Jose.  Following initiation of the medications, she started to note tachypalpitations with and without dizziness and " lightheadedness associated with feeling overall flushed.  The episodes were happening frequently initially and has pretty much resolved since being in the hospital.  The episodes were associated with fatigue and weakness but no ceferino syncope.  They were not positional related.  Denied any room spinning type symptoms.  Over the past 12 to 24 hours, her symptoms nearly ablated and is interested now going back home.  Blood pressures been stable at home.  Presently feels well with no episodes of chest pain, shortness of breath, dyspnea on exertion, near-syncope or syncope.  Of note, the patient has been tested COVID-19 negative.    Family history, social history, review of systems as per nurse practitioner's note.    Physical Exam Blood pressure 119/65 pulse is 70 temperature is 98.7 O2 saturation room air 96% respiratory rate is 16.  Constitutional: oriented to person, place, and time.  well-developed and well-nourished. No distress.   HENT: Normocephalic.   Eyes: Conjunctivae are normal. No scleral icterus.   Neck: Normal carotid pulses, no hepatojugular reflux and no JVD present. Carotid bruit is not present. No tracheal deviation, no edema and no erythema present. No thyromegaly present.   Cardiovascular: Regular rate and rhythm normal S1 and S2 there is an S4 no murmurs. PMI NL heart sounds are distant.  Pulses: equal and symmetrical.   Pulmonary/Chest: Clear to Auscultation bilaterally with no rales or wheezes. Resp effort NL  Abdominal: Soft. Bowel sounds are normal. no distension and no mass. There is no hepatosplenomegaly. There is no tenderness. There is no rebound and no guarding. No aortic pulsations.  Musculoskeletal:  exhibits no edema, tenderness or deformity.   Neurological: is alert and oriented to person, place, and time.  Rest is nonfocal.    Skin: Skin is warm and dry. No rash noted. No diaphoretic. No cyanosis or erythema. No pallor. Nails show no clubbing.   Psychiatric: Normal mood and  affect.Speech is normal and behavior is normal.    Laboratory data as per nurse practitioner's note.  Potassium is low at 3.4.  Creatinine normal at 0.88.  Troponin test were normal.  Hematocrit 36.  Platelet count 239.  BNP normal.    Twelve-lead EKGs: Normal sinus rhythm normal GA, QRS and QT intervals.  Heart rate 70 bpm.    Telemetry: Normal sinus rhythm heart rates from 60 to 84 bpm.  No bradycardia arrhythmias or SVT noted.    Brain MRI and chest x-ray results noted.    Impression/plan:    1. Dizziness, lightheadedness, and presyncope in the setting of a URI most likely viral in nature exacerbated by steroid and antibiotic treatment presently resolved after medication stopped and doing well.  May also have inner ear issues related to her URI.  Work-up in the past demonstrated normal echocardiogram and normal stress test.  Echocardiogram here today demonstrates normal LV size and function with mild to moderate TR.  Twelve-lead EKGs are unremarkable.  Patient does have a history of tachypalpitations in the past.  During her hospitalization, no arrhythmias were noted.  Would make no changes to current medical regimen.  Okay for discharge with me.  Will need follow-up with cardiology in a few months for possible event recorder.    2. hypertension: Continue current medical therapy for now.  No orthostasis by examination.    3. low potassium: Per hospitalist.      I, Garcia Kovacs MD, personally performed the services face to face as described and documented by the above named individual. I have made any necessary edits and it is both accurate and complete 1/9/2022  13:25 EST

## 2022-01-09 NOTE — PROGRESS NOTES
[facility] Medicine Services  PROGRESS NOTE    Patient Name: Joyce Jones  : 1945  MRN: [id number]    Date of Admission: 2022  Primary Care Physician: Pura Burk APRN    Subjective   Subjective     CC:  F/U dizziness     HPI:  Feels a little nauseated today but otherwise dizziness feeling is improved.  She says this all started with taking the steroids and she seems to recall not tolerating steroids in the past.    ROS:  Gen- No fevers, chills  CV- No chest pain, palpitations  Resp- Improved cough, no dyspnea  GI- + nausea after taking potassium      Objective   Objective     Vital Signs:   Temp:  [97.7 °F (36.5 °C)-98.9 °F (37.2 °C)] 98.9 °F (37.2 °C)  Heart Rate:  [] 104  Resp:  [16] 16  BP: (136-156)/(71-97) 138/71     Physical Exam:  Constitutional: No acute distress, awake, alert, laying in bed  HENT: NCAT, mucous membranes moist  Respiratory: Clear to auscultation bilaterally, respiratory effort normal   Cardiovascular: RRR, no murmurs, rubs, or gallops  Gastrointestinal: Positive bowel sounds, soft, nontender, nondistended  Musculoskeletal: No bilateral ankle edema  Psychiatric: Appropriate affect, cooperative  Neurologic: Cranial Nerves grossly intact to confrontation, speech clear  Skin: No rashes on exposed surfaces    Results Reviewed:  LAB RESULTS:      Lab 22  1718 22  1624   WBC 7.50  --    HEMOGLOBIN 12.9  --    HEMATOCRIT 38.6  --    PLATELETS 272  --    NEUTROS ABS 5.58  --    IMMATURE GRANS (ABS) 0.03  --    LYMPHS ABS 1.37  --    MONOS ABS 0.45  --    EOS ABS 0.04  --    MCV 99.2*  --    D DIMER QUANT  --  0.52         Lab 22  1624   SODIUM 136   POTASSIUM 4.3   CHLORIDE 100   CO2 25.0   ANION GAP 11.0   BUN 20   CREATININE 0.81   GLUCOSE 102*   CALCIUM 9.6   MAGNESIUM 2.2         Lab 22  1624   TOTAL PROTEIN 6.9   ALBUMIN 4.40   GLOBULIN 2.5   ALT (SGPT) 18   AST (SGOT) 23   BILIRUBIN 0.4   ALK PHOS 81         Lab  01/08/22  1933 01/08/22  1624   PROBNP  --  162.0   TROPONIN T <0.010 <0.010                 Brief Urine Lab Results  (Last result in the past 365 days)      Color   Clarity   Blood   Leuk Est   Nitrite   Protein   CREAT   Urine HCG        09/01/21 1057             14.9               Microbiology Results Abnormal     Procedure Component Value - Date/Time    COVID PRE-OP / PRE-PROCEDURE SCREENING ORDER (NO ISOLATION) - Swab, Nasopharynx [[id number]]  (Normal) Collected: 01/08/22 1908    Lab Status: Final result Specimen: Swab from Nasopharynx Updated: 01/08/22 2010    Narrative:      The following orders were created for panel order COVID PRE-OP / PRE-PROCEDURE SCREENING ORDER (NO ISOLATION) - Swab, Nasopharynx.  Procedure                               Abnormality         Status                     ---------                               -----------         ------                     COVID-19, FLU A/B, RSV P...[[id number]]  Normal              Final result                 Please view results for these tests on the individual orders.    COVID-19, FLU A/B, RSV PCR - Swab, Nasopharynx [[id number]]  (Normal) Collected: 01/08/22 1908    Lab Status: Final result Specimen: Swab from Nasopharynx Updated: 01/08/22 2010     COVID19 Not Detected     Influenza A PCR Not Detected     Influenza B PCR Not Detected     RSV, PCR Not Detected          MRI Brain Without Contrast    Result Date: 1/8/2022  MRI Brain WO INDICATION: Dizziness with near syncope TECHNIQUE: MRI of the brain without IV contrast. COMPARISON:  10/6/2021 FINDINGS: Diffusion-weighted images are unremarkable. No evidence of recent infarct. The routine brain images show normal ventricular size. There is no evidence of mass lesion, hemorrhage or edema. No white matter signal abnormalities are seen. Mild cortical atrophy is noted. No significant changes are seen since the previous examination.     Impression: Cortical atrophy to a mild degree. No acute findings. Signer  Name: Andreas Espana MD  Signed: 1/8/2022 10:27 PM  Workstation Name: [id number]  [facility]    XR Chest 1 View    Result Date: 1/8/2022   EXAMINATION: XR CHEST 1 VW - 01/08/2022  INDICATION: Recurring near syncope.  COMPARISON: 12/16/2019  FINDINGS: Portable chest reveals cardiac and mediastinal silhouettes within normal limits. The lung fields are grossly clear with underlying chronic and emphysematous changes seen bilaterally. Scoliotic curvature identified of the spine with convexity to the right. No definite pleural effusion or pneumothorax. Pulmonary vascularity is within normal limits.      Impression: Chronic changes in the lung fields with no evidence of acute parenchymal disease.  DICTATED:   01/08/2022 EDITED/lfs:   01/08/2022         Results for orders placed during the hospital encounter of 12/16/19    Adult Transthoracic Echo Complete W/ Cont if Necessary Per Protocol    Interpretation Summary  · Mild mitral valve regurgitation is present.  · Estimated EF = 65%.  · Left ventricular systolic function is normal.  · Left ventricular diastolic dysfunction is abnormal consistent with: age.  · Normal right ventricular cavity size, wall thickness, systolic function and septal motion noted.  · The aortic valve exhibits mild sclerosis.  · Mild pulmonary hypertension is present.  · There is no evidence of pericardial effusion.  · No significant structural valvular abnormality demonstrated.      I have reviewed the medications:  Scheduled Meds:aspirin, 81 mg, Oral, Daily  heparin (porcine), 5,000 Units, Subcutaneous, Q8H  meclizine, 12.5 mg, Oral, Q8H  sodium chloride, 10 mL, Intravenous, Q12H      Continuous Infusions:   PRN Meds:.•  acetaminophen **OR** acetaminophen **OR** acetaminophen  •  melatonin  •  ondansetron  •  [COMPLETED] Insert peripheral IV **AND** sodium chloride  •  sodium chloride    Assessment/Plan   Assessment & Plan     Active Hospital Problems    Diagnosis  POA   •  **Postural dizziness with presyncope [R42, R55]  Yes   • Osteoporosis per patient [M81.0]  Yes   • Essential hypertension [I10]  Yes   • History of deep vein thrombosis [Z86.718]  Not Applicable   • Supraventricular tachycardia (HCC) [I47.1]  Yes   • Hyperlipidemia [E78.5]  Yes   • Chronic kidney disease [N18.9]  Yes   • Chronic low back pain [M54.50, G89.29]  Yes   • Rheumatoid arthritis (HCC) [M06.9]  Yes      Resolved Hospital Problems   No resolved problems to display.        Brief Hospital Course to date:  Joyce Jones is a 76 y.o. female with HTN, prior DVT, SVT, HLD, CKD, RA and osteoporosis who presented to the ED with presyncope and palpitations.    Presyncope  Palpitations  History of SVT  -MRI brain with no acute findings  -Not orthostatic on blood pressure readings  -EKG here NSR, troponin negative x 2  -Echo pending  -Trial of meclizine  -IV fluids  -May need cardiac monitor at discharge    CKD II  -Stable    HTN  -Losartan held on admission, resume as tolerated    DVT prophylaxis:  Medical and mechanical DVT prophylaxis orders are present.       AM-PAC 6 Clicks Score (PT): 24 (01/08/22 8573)    Disposition: I expect the patient to be discharged pending improvement.    CODE STATUS:   Code Status and Medical Interventions:   Ordered at: 01/09/22 1018     Level Of Support Discussed With:    Patient     Code Status (Patient has no pulse and is not breathing):    CPR (Attempt to Resuscitate)     Medical Interventions (Patient has pulse or is breathing):    Full Support       Claudette Lee MD  01/09/22

## 2022-01-09 NOTE — NURSING NOTE
Orthostatics obtained this Am    117/72 HR 82 laying    131/71 HR 86 sitting    127/83 HR 96 standing

## 2022-01-09 NOTE — PLAN OF CARE
Problem: Adult Inpatient Plan of Care  Goal: Readiness for Transition of Care  Intervention: Mutually Develop Transition Plan  Description: Identify available resources for support (e.g., family, friends, community).  Identify and address barriers to ongoing treatment and home management (e.g., environmental, financial).  Provide opportunities to practice self-management skills.  Assess and monitor emotional readiness for transition.  Establish/reconnect linkage with outpatient providers or community-based services.  Recent Flowsheet Documentation  Taken 1/8/2022 2322 by Carlos Valle RN  Equipment Currently Used at Home: none  Transportation Anticipated: family or friend will provide  Patient/Family Anticipated Services at Transition: none  Patient/Family Anticipates Transition to: home with family     Problem: Pain Acute  Goal: Optimal Pain Control  Outcome: Ongoing, Progressing  Intervention: Develop Pain Management Plan  Description: Acknowledge patient as the expert in pain self-management.  Use a consistent, validated tool for pain assessment.  Set pain management goals; determine acceptable level of discomfort to allow for maximal functioning.  Determine [id number] pain management plan, including both pharmacologic and nonpharmacologic measures; integrate management of chronic (persistent) pain.  Identify and integrate past successful measures, if able.  Encourage patient and caregiver involvement in pain assessment, interventions and safety measures.  Re-evaluate plan regularly.  Flowsheets (Taken 1/8/2022 4340)  Pain Management Interventions:   care clustered   see MAR   relaxation techniques promoted   unnecessary movement minimized   Goal Outcome Evaluation:

## 2022-01-09 NOTE — DISCHARGE SUMMARY
[facility] Medicine Services  DISCHARGE SUMMARY    Patient Name: Joyce Jones  : 1945  MRN: [id number]    Date of Admission: 2022  3:40 PM  Date of Discharge:  22  Primary Care Physician: Pura Burk APRN    Consults     Date and Time Order Name Status Description    2022  7:08 PM Inpatient Cardiology Consult Completed           Hospital Course     Presenting Problem:   Postural dizziness with presyncope [R42, R55]    Active Hospital Problems    Diagnosis  POA   • **Postural dizziness with presyncope [R42, R55]  Yes   • Osteoporosis per patient [M81.0]  Yes   • Essential hypertension [I10]  Yes   • History of deep vein thrombosis [Z86.718]  Not Applicable   • Supraventricular tachycardia (HCC) [I47.1]  Yes   • Hyperlipidemia [E78.5]  Yes   • Chronic kidney disease [N18.9]  Yes   • Chronic low back pain [M54.50, G89.29]  Yes   • Rheumatoid arthritis (HCC) [M06.9]  Yes      Resolved Hospital Problems   No resolved problems to display.          Hospital Course:  Joyce Jones is a 76 y.o. female with HTN, prior DVT, SVT, HLD, CKD, RA and osteoporosis who presented to the ED with presyncope and palpitations.  Symptoms began after taking steroids and she recalls that she did not tolerate steroids well in the past.     Presyncope  Palpitations  History of SVT  -MRI brain with no acute findings  -Not orthostatic on blood pressure readings  -EKG here NSR, troponin negative x 2  -Echo without source of syncope  -No arrhythmias on telemetry  -Feeling better after IV fluids and meclizine.  D/C home with meclizine PRN.  Follow up with PCP within 1 week.  -Seen by cardiology and they recommend outpatient follow up in a few months to consider cardiac monitor.    Antibiotic induced diarrhea  -Now off antibiotics  -Start probiotic  -No leukocytosis, fever, abdominal pain to suggest C. diff        Discharge Follow Up Recommendations for outpatient  labs/diagnostics:  Follow up with PCP within 1 week  Follow up with cardiology in 1-3 months    Day of Discharge     HPI:   She feels better today.  Wanting to go home this afternoon.    Review of Systems  Gen- No fevers, chills  CV- No chest pain, palpitations  Resp- No cough, dyspnea  GI- + diarrhea      Vital Signs:   Temp:  [98 °F (36.7 °C)-98.9 °F (37.2 °C)] 98.7 °F (37.1 °C)  Heart Rate:  [] 78  Resp:  [16] 16  BP: (119-156)/(65-85) 119/65      Physical Exam:  Constitutional: No acute distress, awake, alert, laying in bed  HENT: NCAT, mucous membranes moist  Respiratory: Clear to auscultation bilaterally, respiratory effort normal   Cardiovascular: RRR, no murmurs, rubs, or gallops  Gastrointestinal: Positive bowel sounds, soft, nontender, nondistended  Musculoskeletal: No bilateral ankle edema  Psychiatric: Appropriate affect, cooperative  Neurologic: Cranial Nerves grossly intact to confrontation, speech clear  Skin: No rashes on exposed surfaces    Pertinent  and/or Most Recent Results     LAB RESULTS:      Lab 01/09/22  0701 01/08/22  1718 01/08/22  1624   WBC 4.95 7.50  --    HEMOGLOBIN 12.1 12.9  --    HEMATOCRIT 36.2 38.6  --    PLATELETS 239 272  --    NEUTROS ABS 2.84 5.58  --    IMMATURE GRANS (ABS) 0.04 0.03  --    LYMPHS ABS 1.55 1.37  --    MONOS ABS 0.44 0.45  --    EOS ABS 0.04 0.04  --    .0* 99.2*  --    D DIMER QUANT  --   --  0.52         Lab 01/09/22  0701 01/08/22  1624   SODIUM 142 136   POTASSIUM 3.4* 4.3   CHLORIDE 107 100   CO2 23.0 25.0   ANION GAP 12.0 11.0   BUN 18 20   CREATININE 0.88 0.81   GLUCOSE 93 102*   CALCIUM 9.2 9.6   MAGNESIUM  --  2.2         Lab 01/08/22  1624   TOTAL PROTEIN 6.9   ALBUMIN 4.40   GLOBULIN 2.5   ALT (SGPT) 18   AST (SGOT) 23   BILIRUBIN 0.4   ALK PHOS 81         Lab 01/08/22  1933 01/08/22  1624   PROBNP  --  162.0   TROPONIN T <0.010 <0.010                 Brief Urine Lab Results  (Last result in the past 365 days)      Color   Clarity    Blood   Leuk Est   Nitrite   Protein   CREAT   Urine HCG        09/01/21 1057             14.9             Microbiology Results (last 10 days)     Procedure Component Value - Date/Time    COVID PRE-OP / PRE-PROCEDURE SCREENING ORDER (NO ISOLATION) - Swab, Nasopharynx [[id number]]  (Normal) Collected: 01/08/22 1908    Lab Status: Final result Specimen: Swab from Nasopharynx Updated: 01/08/22 2010    Narrative:      The following orders were created for panel order COVID PRE-OP / PRE-PROCEDURE SCREENING ORDER (NO ISOLATION) - Swab, Nasopharynx.  Procedure                               Abnormality         Status                     ---------                               -----------         ------                     COVID-19, FLU A/B, RSV P...[[id number]]  Normal              Final result                 Please view results for these tests on the individual orders.    COVID-19, FLU A/B, RSV PCR - Swab, Nasopharynx [[id number]]  (Normal) Collected: 01/08/22 1908    Lab Status: Final result Specimen: Swab from Nasopharynx Updated: 01/08/22 2010     COVID19 Not Detected     Influenza A PCR Not Detected     Influenza B PCR Not Detected     RSV, PCR Not Detected          Adult Transthoracic Echo Complete W/ Cont if Necessary Per Protocol    Result Date: 1/9/2022  · Estimated left ventricular EF = 60% Left ventricular systolic function is normal. · Left ventricular diastolic function is consistent with (grade I) impaired relaxation. · Mild aortic valve regurgitation is present. · Moderate tricuspid valve regurgitation is present. · Calculated right ventricular systolic pressure from tricuspid regurgitation is 35 mmHg. · No cardiac source for syncope is seen on this study.      MRI Brain Without Contrast    Result Date: 1/8/2022  MRI Brain WO INDICATION: Dizziness with near syncope TECHNIQUE: MRI of the brain without IV contrast. COMPARISON:  10/6/2021 FINDINGS: Diffusion-weighted images are unremarkable. No evidence of recent  infarct. The routine brain images show normal ventricular size. There is no evidence of mass lesion, hemorrhage or edema. No white matter signal abnormalities are seen. Mild cortical atrophy is noted. No significant changes are seen since the previous examination.     Cortical atrophy to a mild degree. No acute findings. Signer Name: Andreas Espana MD  Signed: 1/8/2022 10:27 PM  Workstation Name: [id number]  [facility]    XR Chest 1 View    Result Date: 1/8/2022   EXAMINATION: XR CHEST 1 VW - 01/08/2022  INDICATION: Recurring near syncope.  COMPARISON: 12/16/2019  FINDINGS: Portable chest reveals cardiac and mediastinal silhouettes within normal limits. The lung fields are grossly clear with underlying chronic and emphysematous changes seen bilaterally. Scoliotic curvature identified of the spine with convexity to the right. No definite pleural effusion or pneumothorax. Pulmonary vascularity is within normal limits.      Chronic changes in the lung fields with no evidence of acute parenchymal disease.  DICTATED:   01/08/2022 EDITED/lfs:   01/08/2022         Results for orders placed during the hospital encounter of 07/23/21    Duplex Venous Lower Extremity - Left CAR    Interpretation Summary  · Normal left lower extremity venous duplex scan.      Results for orders placed during the hospital encounter of 07/23/21    Duplex Venous Lower Extremity - Left CAR    Interpretation Summary  · Normal left lower extremity venous duplex scan.      Results for orders placed during the hospital encounter of 01/08/22    Adult Transthoracic Echo Complete W/ Cont if Necessary Per Protocol    Interpretation Summary  · Estimated left ventricular EF = 60% Left ventricular systolic function is normal.  · Left ventricular diastolic function is consistent with (grade I) impaired relaxation.  · Mild aortic valve regurgitation is present.  · Moderate tricuspid valve regurgitation is present.  · Calculated right  ventricular systolic pressure from tricuspid regurgitation is 35 mmHg.  · No cardiac source for syncope is seen on this study.      Plan for Follow-up of Pending Labs/Results: none pending    Discharge Details        Discharge Medications      New Medications      Instructions Start Date   meclizine 12.5 MG tablet  Commonly known as: ANTIVERT   12.5 mg, Oral, 3 Times Daily PRN      saccharomyces boulardii 250 MG capsule  Commonly known as: FLORASTOR   250 mg, Oral, 2 Times Daily         Changes to Medications      Instructions Start Date   losartan 50 MG tablet  Commonly known as: COZAAR  What changed:   · when to take this  · reasons to take this   50 mg, Oral, Daily         Continue These Medications      Instructions Start Date   aspirin 81 MG EC tablet   81 mg, Oral, Daily      cetirizine 10 MG tablet  Commonly known as: zyrTEC   1 mg, Oral, Daily      estradiol 0.1 MG/GM vaginal cream  Commonly known as: ESTRACE VAGINAL   Apply pea sized amount to external vaginal area 1 to 2 times per week      fish oil 1000 MG capsule capsule   Oral, Every Other Day      hydrOXYzine pamoate 25 MG capsule  Commonly known as: VISTARIL   25 mg, Oral, Nightly PRN      traMADol-acetaminophen 37.5-325 MG per tablet  Commonly known as: ULTRACET   2 tablets, Oral, 2 Times Daily         Stop These Medications    azithromycin 250 MG tablet  Commonly known as: ZITHROMAX            Allergies   Allergen Reactions   • Overton-2 Inhibitors Anaphylaxis   • Nsaids GI Intolerance   • Augmentin [Amoxicillin-Pot Clavulanate] Nausea Only   • Celecoxib Hives   • Zithromax [Azithromycin] Dizziness         Discharge Disposition:  Home or Self Care    Diet:  Hospital:  Diet Order   Procedures   • Diet Regular          CODE STATUS:    Code Status and Medical Interventions:   Ordered at: 01/09/22 1018     Level Of Support Discussed With:    Patient     Code Status (Patient has no pulse and is not breathing):    CPR (Attempt to Resuscitate)     Medical  Interventions (Patient has pulse or is breathing):    Full Support       Future Appointments   Date Time Provider Department Center   1/14/2022  2:15 PM Moose Wilkins, SUMA[facility]  [facility] OPP1 [facility]   3/16/2022  2:40 PM [facility] BEAU DEXA 1  [facility] DX BE [facility]   3/16/2022  3:10 PM [facility] BEAU MAMM 2  [facility] BR BE [place]   4/26/2022  8:00 AM Pura Burk APRN [facility]   11/3/2022 10:45 AM Pura Butr PA [facility]       Additional Instructions for the Follow-ups that You Need to Schedule     Discharge Follow-up with PCP   As directed       Currently Documented PCP:    Pura Burk APRN    PCP Phone Number:    [phone]     Follow Up Details: 1 week         Discharge Follow-up with Specialty: Cardiology; 1 Month   As directed      Specialty: Cardiology    Follow Up: 1 Month                     Claudette Lee MD  01/09/22      Time Spent on Discharge:  I spent 32  minutes on this discharge activity which included: face-to-face encounter with the patient, reviewing the data in the system, coordination of the care with the nursing staff as well as consultants, documentation, and entering orders.

## 2022-01-10 ENCOUNTER — HOSPITAL ENCOUNTER (EMERGENCY)
Facility: HOSPITAL | Age: 77
Discharge: LEFT BEFORE DISCHARGE | End: 2022-01-10

## 2022-01-10 ENCOUNTER — TELEPHONE (OUTPATIENT)
Dept: INTERNAL MEDICINE | Facility: CLINIC | Age: 77
End: 2022-01-10

## 2022-01-10 ENCOUNTER — TRANSITIONAL CARE MANAGEMENT TELEPHONE ENCOUNTER (OUTPATIENT)
Dept: CALL CENTER | Facility: HOSPITAL | Age: 77
End: 2022-01-10

## 2022-01-10 ENCOUNTER — APPOINTMENT (OUTPATIENT)
Dept: GENERAL RADIOLOGY | Facility: HOSPITAL | Age: 77
End: 2022-01-10

## 2022-01-10 VITALS
BODY MASS INDEX: 25.44 KG/M2 | OXYGEN SATURATION: 100 % | SYSTOLIC BLOOD PRESSURE: 139 MMHG | HEIGHT: 64 IN | WEIGHT: 149 LBS | HEART RATE: 88 BPM | DIASTOLIC BLOOD PRESSURE: 93 MMHG | RESPIRATION RATE: 22 BRPM | TEMPERATURE: 99.4 F

## 2022-01-10 DIAGNOSIS — Z09 HOSPITAL DISCHARGE FOLLOW-UP: ICD-10-CM

## 2022-01-10 LAB
ALBUMIN SERPL-MCNC: 4.4 G/DL (ref 3.5–5.2)
ALBUMIN/GLOB SERPL: 1.8 G/DL
ALP SERPL-CCNC: 83 U/L (ref 39–117)
ALT SERPL W P-5'-P-CCNC: 16 U/L (ref 1–33)
ANION GAP SERPL CALCULATED.3IONS-SCNC: 11 MMOL/L (ref 5–15)
AST SERPL-CCNC: 19 U/L (ref 1–32)
BASOPHILS # BLD AUTO: 0.04 10*3/MM3 (ref 0–0.2)
BASOPHILS NFR BLD AUTO: 0.4 % (ref 0–1.5)
BILIRUB SERPL-MCNC: 0.4 MG/DL (ref 0–1.2)
BUN SERPL-MCNC: 18 MG/DL (ref 8–23)
BUN/CREAT SERPL: 23.1 (ref 7–25)
CALCIUM SPEC-SCNC: 9.5 MG/DL (ref 8.6–10.5)
CHLORIDE SERPL-SCNC: 100 MMOL/L (ref 98–107)
CO2 SERPL-SCNC: 21 MMOL/L (ref 22–29)
CREAT SERPL-MCNC: 0.78 MG/DL (ref 0.57–1)
DEPRECATED RDW RBC AUTO: 48 FL (ref 37–54)
EOSINOPHIL # BLD AUTO: 0.02 10*3/MM3 (ref 0–0.4)
EOSINOPHIL NFR BLD AUTO: 0.2 % (ref 0.3–6.2)
ERYTHROCYTE [DISTWIDTH] IN BLOOD BY AUTOMATED COUNT: 13.3 % (ref 12.3–15.4)
GFR SERPL CREATININE-BSD FRML MDRD: 72 ML/MIN/1.73
GLOBULIN UR ELPH-MCNC: 2.5 GM/DL
GLUCOSE SERPL-MCNC: 114 MG/DL (ref 65–99)
HCT VFR BLD AUTO: 38 % (ref 34–46.6)
HGB BLD-MCNC: 13.1 G/DL (ref 12–15.9)
HOLD SPECIMEN: NORMAL
IMM GRANULOCYTES # BLD AUTO: 0.06 10*3/MM3 (ref 0–0.05)
IMM GRANULOCYTES NFR BLD AUTO: 0.7 % (ref 0–0.5)
LYMPHOCYTES # BLD AUTO: 1.58 10*3/MM3 (ref 0.7–3.1)
LYMPHOCYTES NFR BLD AUTO: 17.6 % (ref 19.6–45.3)
MAGNESIUM SERPL-MCNC: 1.8 MG/DL (ref 1.6–2.4)
MCH RBC QN AUTO: 33.9 PG (ref 26.6–33)
MCHC RBC AUTO-ENTMCNC: 34.5 G/DL (ref 31.5–35.7)
MCV RBC AUTO: 98.2 FL (ref 79–97)
MONOCYTES # BLD AUTO: 0.79 10*3/MM3 (ref 0.1–0.9)
MONOCYTES NFR BLD AUTO: 8.8 % (ref 5–12)
NEUTROPHILS NFR BLD AUTO: 6.49 10*3/MM3 (ref 1.7–7)
NEUTROPHILS NFR BLD AUTO: 72.3 % (ref 42.7–76)
NRBC BLD AUTO-RTO: 0 /100 WBC (ref 0–0.2)
PLATELET # BLD AUTO: 275 10*3/MM3 (ref 140–450)
PMV BLD AUTO: 9.3 FL (ref 6–12)
POTASSIUM SERPL-SCNC: 4 MMOL/L (ref 3.5–5.2)
PROT SERPL-MCNC: 6.9 G/DL (ref 6–8.5)
RBC # BLD AUTO: 3.87 10*6/MM3 (ref 3.77–5.28)
SODIUM SERPL-SCNC: 132 MMOL/L (ref 136–145)
TROPONIN T SERPL-MCNC: <0.01 NG/ML (ref 0–0.03)
WBC NRBC COR # BLD: 8.98 10*3/MM3 (ref 3.4–10.8)
WHOLE BLOOD HOLD SPECIMEN: NORMAL
WHOLE BLOOD HOLD SPECIMEN: NORMAL

## 2022-01-10 PROCEDURE — 84443 ASSAY THYROID STIM HORMONE: CPT

## 2022-01-10 PROCEDURE — 85025 COMPLETE CBC W/AUTO DIFF WBC: CPT

## 2022-01-10 PROCEDURE — 93005 ELECTROCARDIOGRAM TRACING: CPT

## 2022-01-10 PROCEDURE — 83735 ASSAY OF MAGNESIUM: CPT

## 2022-01-10 PROCEDURE — 71045 X-RAY EXAM CHEST 1 VIEW: CPT

## 2022-01-10 PROCEDURE — 84484 ASSAY OF TROPONIN QUANT: CPT

## 2022-01-10 PROCEDURE — 99211 OFF/OP EST MAY X REQ PHY/QHP: CPT

## 2022-01-10 PROCEDURE — 80053 COMPREHEN METABOLIC PANEL: CPT

## 2022-01-10 RX ORDER — SODIUM CHLORIDE 0.9 % (FLUSH) 0.9 %
10 SYRINGE (ML) INJECTION AS NEEDED
Status: DISCONTINUED | OUTPATIENT
Start: 2022-01-10 | End: 2022-01-11 | Stop reason: HOSPADM

## 2022-01-10 NOTE — OUTREACH NOTE
Call Center TCM Note      Responses   [facility] patient discharged from? [place]   Does the patient have one of the following disease processes/diagnoses(primary or secondary)? Other   TCM attempt successful? Yes   Call start time 1252   Call end time 1258   Discharge diagnosis presyncope, URI, CKD   Meds reviewed with patient/caregiver? Yes   Is the patient having any side effects they believe may be caused by any medication additions or changes? No   Does the patient have all medications ordered at discharge? Yes   Is the patient taking all medications as directed (includes completed medication regime)? Yes   Medication comments /57   Does the patient have a primary care provider?  Yes   Does the patient have an appointment with their PCP within 7 days of discharge? Greater than 7 days   Comments regarding PCP HOSP DC FU appt 1/18/22 @ 2:15 pm.    What is preventing the patient from scheduling follow up appointments within 7 days of discharge? Earlier appointment not available   Nursing Interventions Verified appointment date/time/provider   Has the patient kept scheduled appointments due by today? N/A   Has home health visited the patient within 72 hours of discharge? N/A   Psychosocial issues? No   Did the patient receive a copy of their discharge instructions? Yes   Nursing interventions Reviewed instructions with patient   What is the patient's perception of their health status since discharge? Improving   Is the patient/caregiver able to teach back signs and symptoms related to disease process for when to call PCP? Yes   Is the patient/caregiver able to teach back signs and symptoms related to disease process for when to call 911? Yes   Is the patient/caregiver able to teach back the hierarchy of who to call/visit for symptoms/problems? PCP, Specialist, Home health nurse, Urgent Care, ED, 911 Yes   If the patient is a current smoker, are they able to teach back resources for cessation? Not a  smoker   TCM call completed? Yes   Wrap up additional comments . Pt reports she still feels a little dizzy, however much better than she did feel. BP this am was 118/57          Marva Roy RN    1/10/2022, 12:58 EST

## 2022-01-11 ENCOUNTER — OFFICE VISIT (OUTPATIENT)
Dept: INTERNAL MEDICINE | Facility: CLINIC | Age: 77
End: 2022-01-11

## 2022-01-11 ENCOUNTER — TELEPHONE (OUTPATIENT)
Dept: INTERNAL MEDICINE | Facility: CLINIC | Age: 77
End: 2022-01-11

## 2022-01-11 DIAGNOSIS — Z09 HOSPITAL DISCHARGE FOLLOW-UP: Primary | ICD-10-CM

## 2022-01-11 LAB
QT INTERVAL: 366 MS
QTC INTERVAL: 425 MS
TSH SERPL DL<=0.05 MIU/L-ACNC: 0.82 UIU/ML (ref 0.27–4.2)

## 2022-01-11 PROCEDURE — 99495 TRANSJ CARE MGMT MOD F2F 14D: CPT

## 2022-01-11 PROCEDURE — 1111F DSCHRG MED/CURRENT MED MERGE: CPT

## 2022-01-11 RX ORDER — MECLIZINE HYDROCHLORIDE 25 MG/1
25 TABLET ORAL 3 TIMES DAILY PRN
Qty: 30 TABLET | Refills: 0 | Status: SHIPPED | OUTPATIENT
Start: 2022-01-11 | End: 2022-03-14

## 2022-01-11 NOTE — PROGRESS NOTES
Transitional Care Follow Up Visit  Subjective     Joyce Jones is a 76 y.o. female who presents for a transitional care management visit.    Within 48 business hours after discharge our office contacted her via telephone to coordinate her care and needs.      I reviewed and discussed the details of that call along with the discharge summary, hospital problems, inpatient lab results, inpatient diagnostic studies, and consultation reports with Joyce.     Current outpatient and discharge medications have been reconciled for the patient.    Reviewed by: Pratik Steiner, LOVE      Date of TCM Phone Call 1/9/2022   [facility]   Date of Admission 1/8/2022   Date of Discharge 1/9/2022   Discharge Disposition Home or Self Care     Risk for Readmission (LACE) Score: 7 (1/9/2022  6:00 AM)      Joyce Jones is a 76 y.o. female with HTN, prior DVT, SVT, HLD, CKD, RA and osteoporosis who presented to the ED with presyncope and palpitations.  Symptoms began after taking steroids and she recalls that she did not tolerate steroids well in the past. Was admitted from 1/8-1/9. MRI brain with no acute findings Not orthostatic on blood pressure readings EKG NSR, troponin negative x 2 Echo without source of syncope No arrhythmias on telemetry Felt better after IV fluids and meclizine.  D/C home with meclizine PRN.  Follow up with PCP within 1 week. Was also seen by cardiology and they recommend outpatient follow up in a few months to consider cardiac monitor. Presented back to the ED on 1/10/21 and all work-up was negative so was discharged. Has an appointment with cardiology scheduled in one month. Has tried taking meclizine which has improved symptoms but only improves them for 3-4 hours. Reports still experiencing episodes of pre syncope and feels like her heat is racing during these times. Feels like she is looking down a tunnel. Feels hot and cold then starts become nauseous. Is also experiencing a constant  mild tremor at rest. Episodes are now occurring less frequently and severity is less. Currently asymptomatic today in office.         The following portions of the patient's history were reviewed and updated as appropriate: allergies, current medications, past family history, past medical history, past social history, past surgical history and problem list.    Review of Systems   Constitutional: Negative for activity change, appetite change, chills, fatigue and fever.   Respiratory: Negative for apnea, cough, choking, chest tightness, shortness of breath, wheezing and stridor.    Cardiovascular: Negative for chest pain, palpitations and leg swelling.   Gastrointestinal: Negative for abdominal distention, abdominal pain, anal bleeding, blood in stool, constipation, diarrhea, nausea, rectal pain and vomiting.   Skin: Negative for color change, pallor, rash and wound.   Neurological: Positive for dizziness, tremors and syncope. Negative for seizures, facial asymmetry, speech difficulty, weakness, light-headedness, numbness and headaches.   Psychiatric/Behavioral: Negative for agitation, behavioral problems, confusion, decreased concentration, dysphoric mood, hallucinations, self-injury, sleep disturbance and suicidal ideas. The patient is not nervous/anxious and is not hyperactive.        Objective   Physical Exam  Constitutional:       Appearance: Normal appearance.   HENT:      Head: Normocephalic.      Mouth/Throat:      Mouth: Mucous membranes are moist.      Pharynx: Oropharynx is clear. No oropharyngeal exudate.   Neck:      Vascular: Normal carotid pulses. No carotid bruit, hepatojugular reflux or JVD.   Cardiovascular:      Rate and Rhythm: Normal rate and regular rhythm.      Pulses: Normal pulses.      Heart sounds: Normal heart sounds.   Pulmonary:      Effort: Pulmonary effort is normal.      Breath sounds: Normal breath sounds.   Abdominal:      General: Bowel sounds are normal.      Palpations: Abdomen is  soft.   Skin:     General: Skin is warm and dry.      Capillary Refill: Capillary refill takes less than 2 seconds.   Neurological:      Mental Status: She is alert and oriented to person, place, and time.      Cranial Nerves: Cranial nerves are intact.      Sensory: Sensation is intact.      Motor: Tremor present.      Coordination: Coordination is intact.      Gait: Gait is intact.      Deep Tendon Reflexes: Reflexes normal.   Psychiatric:         Mood and Affect: Mood normal.         Behavior: Behavior normal.         Thought Content: Thought content normal.         Judgment: Judgment normal.         Assessment/Plan   Diagnoses and all orders for this visit:    1. Hospital discharge follow-up (Primary)  -     TSH Rfx On Abnormal To Free T4; Future       -    Thyroid function was WNL. RRR in office today. Will increase meclizine as patient is tolerating 12.5 mg doses and it is providing some relief in symptoms. Encouraged patient to attend follow-up with cardiology. Would consider VVS. Encouraged to avoid any stimulants such as caffeine and to also avoid any CS if possible in the future. Discussed if tremor did not resolve to follow-up with PCP for consideration for neurology referral. No bruits and auscultated carotids exhibited good flow but would consider obtaining US if symptoms were to persist. Discussed S/S that would warrant seeking emergency care. Patient verbalized understanding and was agreeable with POC.   Other orders  -     meclizine (ANTIVERT) 25 MG tablet; Take 1 tablet by mouth 3 (Three) Times a Day As Needed for Dizziness.  Dispense: 30 tablet; Refill: 0

## 2022-01-11 NOTE — TELEPHONE ENCOUNTER
LVM for pt to return call, pt left behind rx w/ few doses of meclizine in room, Pratik did send in new rx of higher mg than what was left. LM if they would like to come  to call back and would put on hold.

## 2022-01-12 ENCOUNTER — TELEPHONE (OUTPATIENT)
Dept: INTERNAL MEDICINE | Facility: CLINIC | Age: 77
End: 2022-01-12

## 2022-01-12 NOTE — TELEPHONE ENCOUNTER
PATIENT SPOUSE CALLED, THEY LEFT A BOTTLE OF meclizine (ANTIVERT) 25 MG tablet IN THE OFFICE DURING THEIR VISIT TUESDAY AND WANT TO MAKE SURE IT GETS DISCARDED.     GAVE HER A NEW PRESCRIPTION SO THIS ISN'T NEEDED.    ANY QUESTIONS CALL:     Williams Jones [phone]

## 2022-01-13 VITALS
BODY MASS INDEX: 25.44 KG/M2 | HEART RATE: 88 BPM | DIASTOLIC BLOOD PRESSURE: 70 MMHG | HEIGHT: 64 IN | SYSTOLIC BLOOD PRESSURE: 124 MMHG | WEIGHT: 149 LBS | RESPIRATION RATE: 16 BRPM | OXYGEN SATURATION: 99 %

## 2022-01-17 ENCOUNTER — TELEPHONE (OUTPATIENT)
Dept: INTERNAL MEDICINE | Facility: CLINIC | Age: 77
End: 2022-01-17

## 2022-01-17 NOTE — TELEPHONE ENCOUNTER
Diverticulitis, medication only when she has a flare and cannot remember the name.  Ache and soreness. Burning in left side.

## 2022-01-17 NOTE — TELEPHONE ENCOUNTER
Caller: LEXDEISY    Relationship: SELF    Best call back number: [phone]    What medication are you requesting:     What are your current symptoms: ACHING, BURNING. DIVERTICULITIS     How long have you been experiencing symptoms: 3 DAYS    Have you had these symptoms before:    [x] Yes  [] No    Have you been treated for these symptoms before:   [x] Yes  [] No    If a prescription is needed, what is your preferred pharmacy and phone number:  [id number] - [place], [place] - [facility] [address] AT [facility] [address] - [phone]  - [phone] FX        Additional notes:PATIENT IS REQUESTING REFILL TODAY. PATIENT STATES THAT SHE IS IN A LOT OF PAIN

## 2022-01-17 NOTE — TELEPHONE ENCOUNTER
PT called, stated she remembered one of the medication she was taking for the Diverticulitis, METRONIDAZOLE. Please advise.

## 2022-01-17 NOTE — TELEPHONE ENCOUNTER
Caller: Deisy Jones    Relationship: Self    Best call back number:     What is the best time to reach you: ANYTIME    Who are you requesting to speak with (clinical staff, provider,  specific staff member): CLINICAL STAFF    Do you know the name of the person who called: DEISY    What was the call regarding: PATIENT IS HAVING DIVERTICULTIS FLARE UP AND BEEN ONLY LIQUID DIET    [address]    Do you require a callback: YES    SHE SAID MANSI KNOWS HER HISTORY AND WHAT SHE HAS TAKEN BEFORE

## 2022-01-18 ENCOUNTER — TELEPHONE (OUTPATIENT)
Dept: INTERNAL MEDICINE | Facility: CLINIC | Age: 77
End: 2022-01-18

## 2022-01-18 RX ORDER — CIPROFLOXACIN 500 MG/1
500 TABLET, FILM COATED ORAL 2 TIMES DAILY
Qty: 14 TABLET | Refills: 0 | Status: SHIPPED | OUTPATIENT
Start: 2022-01-18 | End: 2022-01-25

## 2022-01-18 RX ORDER — METRONIDAZOLE 500 MG/1
500 TABLET ORAL 3 TIMES DAILY
Qty: 21 TABLET | Refills: 0 | Status: SHIPPED | OUTPATIENT
Start: 2022-01-18 | End: 2022-01-25

## 2022-01-18 NOTE — TELEPHONE ENCOUNTER
I returned pt call. C/o Swollen discomfort left lower side abd. No fever. No n/v. Has been having clear liquid diet. Has had diverticulitis before; feels same way. Her daughter is terminal at home and pt is primary care giver not able to come in for appt. I have sent rx for cipro and flagyl

## 2022-01-25 LAB
QT INTERVAL: 418 MS
QTC INTERVAL: 454 MS

## 2022-02-01 ENCOUNTER — OFFICE VISIT (OUTPATIENT)
Dept: INTERNAL MEDICINE | Facility: CLINIC | Age: 77
End: 2022-02-01

## 2022-02-01 VITALS
WEIGHT: 140 LBS | BODY MASS INDEX: 24.03 KG/M2 | SYSTOLIC BLOOD PRESSURE: 132 MMHG | DIASTOLIC BLOOD PRESSURE: 74 MMHG | TEMPERATURE: 97.3 F | OXYGEN SATURATION: 100 % | HEART RATE: 75 BPM

## 2022-02-01 DIAGNOSIS — M41.9 SCOLIOSIS OF THORACOLUMBAR SPINE, UNSPECIFIED SCOLIOSIS TYPE: ICD-10-CM

## 2022-02-01 DIAGNOSIS — M54.50 CHRONIC MIDLINE LOW BACK PAIN WITHOUT SCIATICA: Primary | ICD-10-CM

## 2022-02-01 DIAGNOSIS — G89.29 CHRONIC MIDLINE LOW BACK PAIN WITHOUT SCIATICA: Primary | ICD-10-CM

## 2022-02-01 PROCEDURE — 99213 OFFICE O/P EST LOW 20 MIN: CPT | Performed by: NURSE PRACTITIONER

## 2022-02-01 NOTE — PROGRESS NOTES
Chief Complaint   Patient presents with   • Back Pain       History of Present Illness    76 y.o.female presents for back pain.  Has kyphosis and chronic scoliosis.   wants to consider a brace to help with scoliosis and resulting pain  Takes tramadol tylenol.   Review of Systems   Constitutional: Negative for chills and fever.   Respiratory: Negative for shortness of breath.    Musculoskeletal: Positive for back pain.   Neurological: Negative for weakness and numbness.       [facility]  The following portions of the patient's history were reviewed and updated as appropriate: allergies, current medications, past family history, past medical history, past social history, past surgical history and problem list.     Past Medical History:   Diagnosis Date   • Allergic rhinitis    • Anemia    • Arthritis    • Diverticulosis    • Heart murmur    • History of bone density study 03/02/2017    [facility]   • History of mammography, screening 2016   • Hyperlipidemia    • Hypertension    • Inguinal hernia 1978    repair   • Osteoarthritis (arthritis due to wear and tear of joints)    • Personal history of DVT (deep vein thrombosis) 2005    right leg- due to surgery   • Positive TB test     Always comes back + but negative further testing      Allergies   Allergen Reactions   • Overton-2 Inhibitors Anaphylaxis   • Nsaids GI Intolerance   • Augmentin [Amoxicillin-Pot Clavulanate] Nausea Only   • Celecoxib Hives   • Zithromax [Azithromycin] Dizziness      Social History     Tobacco Use   • Smoking status: Never Smoker   • Smokeless tobacco: Never Used   Vaping Use   • Vaping Use: Never used   Substance Use Topics   • Alcohol use: Yes     Alcohol/week: 1.0 standard drink     Types: 1 Glasses of wine per week     Comment: rarely at holidays   • Drug use: No     Past Surgical History:   Procedure Laterality Date   • BACK SURGERY  2002    Scoliosis bar. T11-sacrum   • BREAST BIOPSY      over 15 years ago.    • CERVICAL SPINE SURGERY   2000    cage   • CHOLECYSTECTOMY  2014   • COLONOSCOPY  2016   • FOOT SURGERY  2008   • INGUINAL HERNIA REPAIR Bilateral 1978    2019 right side repair   • KNEE SURGERY Bilateral 2017    2005 and x2 in 8/2017(left)   • NASAL SEPTUM SURGERY  1999   • TOTAL ABDOMINAL HYSTERECTOMY WITH SALPINGO OOPHORECTOMY  1986    Unilateral salpingo-oophorectomy/cervical dysplasia/AUB   • URETHRAL DILATION      Multiple times      Family History   Problem Relation Age of Onset   • Uterine cancer Mother         Metastatic to ovaries and colon; possibly bone   • Hypertension Mother    • Kidney disease Mother    • Tuberculosis Mother    • Cancer Father         Bladder   • Heart failure Father    • Stroke Father    • Hypertension Father    • Heart attack Father    • Colon polyps Father    • Hypertension Brother    • Kidney disease Brother    • Colon polyps Brother    • Kidney disease Brother    • Hypertension Brother    • Other Daughter         CVID   • Diabetes Daughter    • Colon cancer Maternal Uncle    • Colon cancer Maternal Uncle    • Breast cancer Paternal Aunt            Current Outpatient Medications:   •  aspirin 81 MG EC tablet, Take 81 mg by mouth Daily., Disp: , Rfl:   •  cetirizine (zyrTEC) 10 MG tablet, Take 1 mg by mouth Daily., Disp: , Rfl: 5  •  estradiol (ESTRACE VAGINAL) 0.1 MG/GM vaginal cream, Apply pea sized amount to external vaginal area 1 to 2 times per week, Disp: 42.5 each, Rfl: 2  •  hydrOXYzine pamoate (VISTARIL) 25 MG capsule, Take 1 capsule by mouth At Night As Needed for Anxiety (sleep)., Disp: 30 capsule, Rfl: 2  •  losartan (COZAAR) 50 MG tablet, Take 1 tablet by mouth Daily. (Patient taking differently: Take 50 mg by mouth As Needed.), Disp: 90 tablet, Rfl: 1  •  meclizine (ANTIVERT) 25 MG tablet, Take 1 tablet by mouth 3 (Three) Times a Day As Needed for Dizziness., Disp: 30 tablet, Rfl: 0  •  Omega-3 Fatty Acids (fish oil) 1000 MG capsule capsule, Take  by mouth Every Other Day., Disp: , Rfl:   •   traMADol-acetaminophen (ULTRACET) 37.5-325 MG per tablet, Take 2 tablets by mouth 2 (Two) Times a Day., Disp: , Rfl:     VITALS:  /74   Pulse 75   Temp 97.3 °F (36.3 °C)   Wt 63.5 kg (140 lb)   SpO2 100%   BMI 24.03 kg/m²     Physical Exam  Vitals reviewed.   Pulmonary:      Effort: Pulmonary effort is normal. No respiratory distress.   Musculoskeletal:      Comments: Upper kyphosis. Thoracic scoliosis noted.   Neurological:      Mental Status: She is alert and oriented to person, place, and time.   Psychiatric:         Mood and Affect: Mood normal.         Result Review :            Assessment and Plan    Diagnoses and all orders for this visit:    1. Chronic midline low back pain without sciatica (Primary)  -     Back Brace    2. Scoliosis of thoracolumbar spine, unspecified scoliosis type  -     Back Brace        I discussed the patients findings and my recommendations with patient.  Patient was encouraged to keep me informed of any acute changes, lack of improvement, or any new concerning symptoms.  Patient voiced understanding of all instructions and denied further questions.      Follow Up   Return if symptoms worsen or fail to improve.      Electronically signed by:    LOVE Ray  02/01/2022

## 2022-02-09 ENCOUNTER — OFFICE VISIT (OUTPATIENT)
Dept: INTERNAL MEDICINE | Facility: CLINIC | Age: 77
End: 2022-02-09

## 2022-02-09 VITALS
DIASTOLIC BLOOD PRESSURE: 62 MMHG | OXYGEN SATURATION: 98 % | BODY MASS INDEX: 24.37 KG/M2 | HEART RATE: 75 BPM | WEIGHT: 142 LBS | TEMPERATURE: 98.2 F | SYSTOLIC BLOOD PRESSURE: 122 MMHG

## 2022-02-09 DIAGNOSIS — R10.32 LEFT LOWER QUADRANT PAIN: Primary | ICD-10-CM

## 2022-02-09 LAB
BILIRUB BLD-MCNC: NEGATIVE MG/DL
CLARITY, POC: CLEAR
COLOR UR: YELLOW
EXPIRATION DATE: NORMAL
GLUCOSE UR STRIP-MCNC: NEGATIVE MG/DL
KETONES UR QL: NEGATIVE
LEUKOCYTE EST, POC: NEGATIVE
Lab: NORMAL
NITRITE UR-MCNC: NEGATIVE MG/ML
PH UR: 6 [PH] (ref 5–8)
PROT UR STRIP-MCNC: NEGATIVE MG/DL
RBC # UR STRIP: NEGATIVE /UL
SP GR UR: 1.01 (ref 1–1.03)
UROBILINOGEN UR QL: NORMAL

## 2022-02-09 PROCEDURE — 99213 OFFICE O/P EST LOW 20 MIN: CPT | Performed by: NURSE PRACTITIONER

## 2022-02-09 PROCEDURE — 81003 URINALYSIS AUTO W/O SCOPE: CPT | Performed by: NURSE PRACTITIONER

## 2022-02-09 NOTE — PROGRESS NOTES
Chief Complaint   Patient presents with   • Abdominal Pain       History of Present Illness    76 y.o.female presents for abdominal pain.    left lower side area, not consistant and feels different from diverticulosis, states she can feel something like a small finger or ridge.  Had hernia in past 40 years. Onset about 1 1/2 week. Ache. Thomas. No difficulty with eat drink; bm ok.  Has family history of ovarian cancer.  Patient had partial hysterectomy in past still has ovaries.     Review of Systems   Constitutional: Negative for chills and fever.   Gastrointestinal: Positive for abdominal pain. Negative for blood in stool, constipation, diarrhea, nausea and vomiting.   Genitourinary: Negative for difficulty urinating.       [facility]  The following portions of the patient's history were reviewed and updated as appropriate: allergies, current medications, past family history, past medical history, past social history, past surgical history and problem list.     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 comes back + but negative further testing      Allergies   Allergen Reactions   • Overton-2 Inhibitors Anaphylaxis   • Nsaids GI Intolerance   • Augmentin [Amoxicillin-Pot Clavulanate] Nausea Only   • Celecoxib Hives   • Zithromax [Azithromycin] Dizziness      Social History     Tobacco Use   • Smoking status: Never Smoker   • Smokeless tobacco: Never Used   Vaping Use   • Vaping Use: Never used   Substance Use Topics   • Alcohol use: Yes     Alcohol/week: 1.0 standard drink     Types:  1 Glasses of wine per week     Comment: rarely at holidays   • Drug use: No     Past Surgical History:   Procedure Laterality Date   • BACK SURGERY  2002    Scoliosis bar. T11-sacrum   • BREAST BIOPSY      over 15 years ago.    • CERVICAL SPINE SURGERY  2000    cage   • CHOLECYSTECTOMY  2014   • COLONOSCOPY  2016   • FOOT SURGERY  2008   • INGUINAL HERNIA REPAIR Bilateral 1978    2019 right side repair   • KNEE SURGERY Bilateral 2017    2005 and x2 in 8/2017(left)   • NASAL SEPTUM SURGERY  1999   • TOTAL ABDOMINAL HYSTERECTOMY WITH SALPINGO OOPHORECTOMY  1986    Unilateral salpingo-oophorectomy/cervical dysplasia/AUB   • URETHRAL DILATION      Multiple times      Family History   Problem Relation Age of Onset   • Uterine cancer Mother         Metastatic to ovaries and colon; possibly bone   • Hypertension Mother    • Kidney disease Mother    • Tuberculosis Mother    • Cancer Father         Bladder   • Heart failure Father    • Stroke Father    • Hypertension Father    • Heart attack Father    • Colon polyps Father    • Hypertension Brother    • Kidney disease Brother    • Colon polyps Brother    • Kidney disease Brother    • Hypertension Brother    • Other Daughter         CVID   • Diabetes Daughter    • Colon cancer Maternal Uncle    • Colon cancer Maternal Uncle    • Breast cancer Paternal Aunt            Current Outpatient Medications:   •  aspirin 81 MG EC tablet, Take 81 mg by mouth Daily., Disp: , Rfl:   •  cetirizine (zyrTEC) 10 MG tablet, Take 1 mg by mouth Daily., Disp: , Rfl: 5  •  estradiol (ESTRACE VAGINAL) 0.1 MG/GM vaginal cream, Apply pea sized amount to external vaginal area 1 to 2 times per week, Disp: 42.5 each, Rfl: 2  •  hydrOXYzine pamoate (VISTARIL) 25 MG capsule, Take 1 capsule by mouth At Night As Needed for Anxiety (sleep)., Disp: 30 capsule, Rfl: 2  •  losartan (COZAAR) 50 MG tablet, Take 1 tablet by mouth Daily. (Patient taking differently: Take 50 mg by mouth As Needed.), Disp: 90 tablet,  Rfl: 1  •  meclizine (ANTIVERT) 25 MG tablet, Take 1 tablet by mouth 3 (Three) Times a Day As Needed for Dizziness., Disp: 30 tablet, Rfl: 0  •  Omega-3 Fatty Acids (fish oil) 1000 MG capsule capsule, Take  by mouth Every Other Day., Disp: , Rfl:   •  traMADol-acetaminophen (ULTRACET) 37.5-325 MG per tablet, Take 2 tablets by mouth 2 (Two) Times a Day., Disp: , Rfl:     VITALS:  /62   Pulse 75   Temp 98.2 °F (36.8 °C)   Wt 64.4 kg (142 lb)   SpO2 98%   BMI 24.37 kg/m²     Physical Exam  Vitals reviewed.   Constitutional:       General: She is not in acute distress.  HENT:      Head: Normocephalic.   Eyes:      Pupils: Pupils are equal, round, and reactive to light.   Cardiovascular:      Rate and Rhythm: Normal rate and regular rhythm.      Heart sounds: Normal heart sounds.   Pulmonary:      Effort: Pulmonary effort is normal. No respiratory distress.      Breath sounds: Normal breath sounds.   Abdominal:      General: Bowel sounds are normal. There is no distension.      Palpations: Abdomen is soft. There is no mass.      Tenderness: There is abdominal tenderness in the left lower quadrant. There is no guarding or rebound.      Hernia: No hernia is present.   Neurological:      Mental Status: She is alert and oriented to person, place, and time.   Psychiatric:         Mood and Affect: Mood normal.         Result Review :            Assessment and Plan    Diagnoses and all orders for this visit:    1. Left lower quadrant pain (Primary)  -     POC Urinalysis Dipstick, Automated  -     US Abdomen Limited; Future    Negative urine.  Will check abdominal ultrasound.  If nothing significant will consider transvaginal ultrasound or CT scan.    I discussed the patients findings and my recommendations with patient.  Patient was encouraged to keep me informed of any acute changes, lack of improvement, or any new concerning symptoms.  Patient voiced understanding of all instructions and denied further  questions.      Follow Up   Return if symptoms worsen or fail to improve.      Electronically signed by:    LOVE Ray  02/09/2022

## 2022-02-17 ENCOUNTER — HOSPITAL ENCOUNTER (OUTPATIENT)
Dept: ULTRASOUND IMAGING | Facility: HOSPITAL | Age: 77
Discharge: HOME | End: 2022-02-17
Admitting: NURSE PRACTITIONER

## 2022-02-17 DIAGNOSIS — R10.32 LEFT LOWER QUADRANT PAIN: ICD-10-CM

## 2022-02-17 PROCEDURE — 76705 ECHO EXAM OF ABDOMEN: CPT

## 2022-02-23 NOTE — PROGRESS NOTES
Nikolai Duong Karen. Ultrasound report shows  Area indicated by the patient left lower quadrant appears to show focal  fascial laxity, but no true abdominal wall hernia can be identified.     Fascial laxity is where connective tissue is looser.  If you are having pain or worsening symptoms would could do a CT scan.  Please let me know.

## 2022-02-25 ENCOUNTER — OFFICE VISIT (OUTPATIENT)
Dept: INTERNAL MEDICINE | Facility: CLINIC | Age: 77
End: 2022-02-25

## 2022-02-25 VITALS
OXYGEN SATURATION: 99 % | DIASTOLIC BLOOD PRESSURE: 82 MMHG | BODY MASS INDEX: 24.21 KG/M2 | HEART RATE: 74 BPM | RESPIRATION RATE: 20 BRPM | TEMPERATURE: 98.4 F | WEIGHT: 141.8 LBS | SYSTOLIC BLOOD PRESSURE: 146 MMHG | HEIGHT: 64 IN

## 2022-02-25 DIAGNOSIS — G47.19 EXCESSIVE DAYTIME SLEEPINESS: ICD-10-CM

## 2022-02-25 DIAGNOSIS — R06.83 LOUD SNORING: Primary | ICD-10-CM

## 2022-02-25 DIAGNOSIS — H66.004 RECURRENT ACUTE SUPPURATIVE OTITIS MEDIA OF RIGHT EAR WITHOUT SPONTANEOUS RUPTURE OF TYMPANIC MEMBRANE: ICD-10-CM

## 2022-02-25 PROBLEM — K57.92 DIVERTICULITIS: Status: ACTIVE | Noted: 2022-02-25

## 2022-02-25 PROCEDURE — 99214 OFFICE O/P EST MOD 30 MIN: CPT

## 2022-02-25 RX ORDER — CEFDINIR 300 MG/1
300 CAPSULE ORAL 2 TIMES DAILY
Qty: 14 CAPSULE | Refills: 0 | Status: SHIPPED | OUTPATIENT
Start: 2022-02-25 | End: 2022-03-04

## 2022-02-25 NOTE — PROGRESS NOTES
Chief Complaint  Sleep Apnea (Pt would like to discuss sleep apnea screening) and Earache    Joyce Jones presents to [facility]    HPI: Reports waking up fatigued and states her  wakes her up frequently in the night due to loud snoring and apneic episodes. Wakes up foggy headed and with morning headaches. Endorses daytime excessive daytime sleepiness. Has never been evaluated for INGRIS before.     Ear pain: Right ear feels full, stuffy, and painful x 7 days. Pain is described as aching and pressure. Has tried using zyrtec and fluticasone without improvement in symptoms. Pain is rated as a 3 out of 10.     Subjective         Health Maintenance   Topic   • DXA SCAN    • MAMMOGRAM    • TDAP/TD VACCINES (2 - Td or Tdap)   • ANNUAL WELLNESS VISIT    • LIPID PANEL    • COLORECTAL CANCER SCREENING    • HEPATITIS C SCREENING    • COVID-19 Vaccine    • INFLUENZA VACCINE    • Pneumococcal Vaccine 65+    • ZOSTER VACCINE        [facility]  The following portions of the patient's history were reviewed and updated as appropriate: allergies, current medications, past family history, past medical history, past social history, past surgical history and problem list.     Past Medical History:   Diagnosis Date   • Allergic    • Allergic rhinitis    • Anemia    • Arthritis    • Cholelithiasis    • Deep vein thrombosis (HCC) Clot following left knee replacement   • Diverticulosis    • Heart murmur    • History of bone density study 03/02/2017    [facility]   • History of mammography, screening 2016   • Hyperlipidemia    • Hypertension    • Inguinal hernia 1978    repair   • Irritable bowel syndrome Self   • Kidney stone Embedded   • Low back pain Scoliosis/kyphosis   • Osteoarthritis (arthritis due to wear and tear of joints)    • Osteopenia    • Personal history of DVT (deep vein thrombosis) 2005    right leg- due to surgery   • Pneumonia 5 times   • Positive TB test     Always comes  back + but negative further testing   • Pulmonary embolism (HCC) Right  thigh following knee replace   • Scoliosis Joyce      Allergies   Allergen Reactions   • Overton-2 Inhibitors Anaphylaxis   • Nsaids GI Intolerance   • Augmentin [Amoxicillin-Pot Clavulanate] Nausea Only   • Celecoxib Hives   • Zithromax [Azithromycin] Dizziness      Social History     Tobacco Use   • Smoking status: Never Smoker   • Smokeless tobacco: Never Used   Vaping Use   • Vaping Use: Never used   Substance Use Topics   • Alcohol use: Never     Alcohol/week: 1.0 standard drink     Types: 1 Glasses of wine per week     Comment: rarely at holidays   • Drug use: No     Past Surgical History:   Procedure Laterality Date   • BACK SURGERY  2002    Scoliosis bar. T11-sacrum   • BREAST BIOPSY      over 15 years ago.    • CERVICAL SPINE SURGERY  2000    cage   • CHOLECYSTECTOMY  2014   • COLONOSCOPY  2016   • EYE SURGERY  Detached retina   • FOOT SURGERY  2008   • HERNIA REPAIR  3 both bowl areas   • INGUINAL HERNIA REPAIR Bilateral 1978    2019 right side repair   • INGUINAL HERNIA REPAIR  Both   • KNEE SURGERY Bilateral 2017 2005 and x2 in 8/2017(left)   • NASAL SEPTUM SURGERY  1999   • SINUS SURGERY  1986   • SPINE SURGERY  2004   • TOTAL ABDOMINAL HYSTERECTOMY WITH SALPINGO OOPHORECTOMY  1986    Unilateral salpingo-oophorectomy/cervical dysplasia/AUB   • TUBAL ABDOMINAL LIGATION  1982   • URETHRAL DILATION      Multiple times      Family History   Problem Relation Age of Onset   • Uterine cancer Mother         Metastatic to ovaries and colon; possibly bone   • Hypertension Mother    • Kidney disease Mother    • Tuberculosis Mother    • Arthritis Mother    • Thyroid disease Mother    • Cancer Father         Bladder   • Heart failure Father    • Stroke Father    • Hypertension Father    • Heart attack Father    • Colon polyps Father    • Hypertension Brother    • Kidney disease Brother    • Colon polyps Brother    • Cancer Brother    • Heart  disease Brother    • Kidney disease Brother    • Hypertension Brother    • Cancer Brother    • Other Daughter         CVID   • Diabetes Daughter    • Colon cancer Maternal Uncle    • Colon cancer Maternal Uncle    • Breast cancer Paternal Aunt    • Cancer Brother          Current Outpatient Medications:   •  aspirin 81 MG EC tablet, Take 81 mg by mouth Daily., Disp: , Rfl:   •  cetirizine (zyrTEC) 10 MG tablet, Take 1 mg by mouth Daily., Disp: , Rfl: 5  •  losartan (COZAAR) 50 MG tablet, Take 1 tablet by mouth Daily. (Patient taking differently: Take 50 mg by mouth As Needed.), Disp: 90 tablet, Rfl: 1  •  traMADol-acetaminophen (ULTRACET) 37.5-325 MG per tablet, Take 2 tablets by mouth 2 (Two) Times a Day., Disp: , Rfl:   •  cefdinir (OMNICEF) 300 MG capsule, Take 1 capsule by mouth 2 (Two) Times a Day for 7 days., Disp: 14 capsule, Rfl: 0  •  hydrOXYzine pamoate (VISTARIL) 25 MG capsule, Take 1 capsule by mouth At Night As Needed for Anxiety (sleep)., Disp: 30 capsule, Rfl: 2  •  meclizine (ANTIVERT) 25 MG tablet, Take 1 tablet by mouth 3 (Three) Times a Day As Needed for Dizziness., Disp: 30 tablet, Rfl: 0  •  Omega-3 Fatty Acids (fish oil) 1000 MG capsule capsule, Take  by mouth Every Other Day., Disp: , Rfl:     Review of Systems   Constitutional: Positive for fatigue. Negative for fever.   HENT: Positive for ear pain and rhinorrhea. Negative for congestion, dental problem, drooling, ear discharge, facial swelling, hearing loss, mouth sores, nosebleeds, postnasal drip, sinus pressure, sneezing, sore throat, swollen glands, tinnitus, trouble swallowing and voice change.    Respiratory: Negative for apnea, cough, choking, chest tightness, shortness of breath, wheezing and stridor.    Cardiovascular: Negative for chest pain, palpitations and leg swelling.   Gastrointestinal: Negative for vomiting.   Musculoskeletal: Negative for neck pain.   Skin: Negative for rash.   Neurological: Positive for headache. Negative  "for dizziness, tremors, seizures, syncope, facial asymmetry, speech difficulty, weakness, light-headedness, numbness, memory problem and confusion.   Hematological: Negative for adenopathy. Does not bruise/bleed easily.   Psychiatric/Behavioral: Positive for sleep disturbance. Negative for agitation, behavioral problems, decreased concentration, dysphoric mood, hallucinations, self-injury, suicidal ideas, negative for hyperactivity, depressed mood and stress. The patient is not nervous/anxious.        Objective   Vital Signs  /82   Pulse 74   Temp 98.4 °F (36.9 °C) (Infrared)   Resp 20   Ht 162.6 cm (64\")   Wt 64.3 kg (141 lb 12.8 oz)   SpO2 99%   BMI 24.34 kg/m²     Physical Exam  Constitutional:       Appearance: Normal appearance.   HENT:      Head: Normocephalic.      Right Ear: Hearing normal. No decreased hearing noted. No laceration, drainage, swelling or tenderness. No middle ear effusion. There is no impacted cerumen. No foreign body. No mastoid tenderness. No PE tube. No hemotympanum. Tympanic membrane is erythematous and bulging. Tympanic membrane is not injected, scarred, perforated or retracted. Tympanic membrane has decreased mobility.      Left Ear: Hearing, tympanic membrane, ear canal and external ear normal.      Mouth/Throat:      Mouth: Mucous membranes are moist.      Pharynx: Oropharynx is clear.   Eyes:      Extraocular Movements: Extraocular movements intact.      Conjunctiva/sclera: Conjunctivae normal.      Pupils: Pupils are equal, round, and reactive to light.   Cardiovascular:      Rate and Rhythm: Normal rate and regular rhythm.      Pulses: Normal pulses.      Heart sounds: Normal heart sounds.   Pulmonary:      Effort: Pulmonary effort is normal.      Breath sounds: Normal breath sounds.   Abdominal:      General: Bowel sounds are normal.      Palpations: Abdomen is soft.   Skin:     General: Skin is warm and dry.      Capillary Refill: Capillary refill takes less than 2 " seconds.   Neurological:      Mental Status: She is alert and oriented to person, place, and time.   Psychiatric:         Mood and Affect: Mood normal.         Behavior: Behavior normal.         Thought Content: Thought content normal.         Judgment: Judgment normal.          Result Review :     The following data was reviewed by: LOVE Lynn on 02/25/2022:  CMP    CMP 1/8/22 1/9/22 1/10/22   Glucose 102 (A) 93 114 (A)   BUN 20 18 18   Creatinine 0.81 0.88 0.78   eGFR Non African Am 69 63 72   Sodium 136 142 132 (A)   Potassium 4.3 3.4 (A) 4.0   Chloride 100 107 100   Calcium 9.6 9.2 9.5   Albumin 4.40  4.40   Total Bilirubin 0.4  0.4   Alkaline Phosphatase 81  83   AST (SGOT) 23  19   ALT (SGPT) 18  16   (A) Abnormal value       Comments are available for some flowsheets but are not being displayed.               Assessment and Plan    1. Loud snoring  - Ambulatory Referral to Sleep Medicine  - Patient has a normal BMI and airway appears to WNL however history warrants evaluation for INGRIS.     2. Recurrent acute suppurative otitis media of right ear without spontaneous rupture of tympanic membrane  - cefdinir (OMNICEF) 300 MG capsule; Take 1 capsule by mouth 2 (Two) Times a Day for 7 days.  Dispense: 14 capsule; Refill: 0    3. Excessive daytime sleepiness  - Ambulatory Referral to Sleep Medicine      Follow Up     Return for Next scheduled follow up.    Patient was given instructions and counseling regarding her condition or for health maintenance advice. Please see specific information pulled into the AVS if appropriate.    Part of this note may be an electronic transcription/translation of spoken language to printed text using the Dragon Dictation System.    Electronically signed by:   LOVE Lynn  02/25/2022

## 2022-03-14 ENCOUNTER — OFFICE VISIT (OUTPATIENT)
Dept: SLEEP MEDICINE | Facility: HOSPITAL | Age: 77
End: 2022-03-14

## 2022-03-14 VITALS
HEIGHT: 64 IN | WEIGHT: 142 LBS | SYSTOLIC BLOOD PRESSURE: 131 MMHG | BODY MASS INDEX: 24.24 KG/M2 | HEART RATE: 74 BPM | DIASTOLIC BLOOD PRESSURE: 62 MMHG | OXYGEN SATURATION: 96 %

## 2022-03-14 DIAGNOSIS — R06.83 SNORING: Primary | ICD-10-CM

## 2022-03-14 DIAGNOSIS — R53.83 FATIGUE, UNSPECIFIED TYPE: ICD-10-CM

## 2022-03-14 DIAGNOSIS — G47.33 OSA (OBSTRUCTIVE SLEEP APNEA): ICD-10-CM

## 2022-03-14 DIAGNOSIS — R06.81 WITNESSED EPISODE OF APNEA: ICD-10-CM

## 2022-03-14 PROCEDURE — 99204 OFFICE O/P NEW MOD 45 MIN: CPT | Performed by: INTERNAL MEDICINE

## 2022-03-14 NOTE — PROGRESS NOTES
New Sleep Patient Office Visit      Patient Name: Joyce Jones    Referring Physician: Pratik Steiner APRN    Chief Complaint:    Chief Complaint   Patient presents with   • Sleeping Problem       History of Present Illness: Joyce Jones is a 76 y.o. female who is here today to establish care with Sleep Medicine.    76-year-old female coming here for initial evaluation.  Patient states that she is having problem with difficulty sleeping going on for about a year.  States that her daughter was diagnosed with ALS about 8 months ago and that has been quite stressful for her.  She does not feel that she is depressed but she has a lot of stress and anxiety about the diagnosis and how it is impacting her daughter's life.  States that her  has also told her that she is starts to snore and have stopping breathing episodes at night.  Patient sleeps for about 5 hours a night.  Does not take long to fall asleep but wakes up multiple times for unclear reasons and sometimes to use the restroom.  She does feel that dryness of the mouth and every time she wakes up she has to take a drink of water.  She has morning headaches occasionally as well.  Does not feel rested when she wakes up and ends up taking a nap for 30 to 40 minutes during the daytime.  Denies any significant restless leg symptoms.   does have arthritis and does not think that is impacting her ability to sleep as she has been on tramadol.  Previously she had stopped taking tramadol and went through withdrawal syndrome but now that is all improved.  Denies any REM behavior disorder.  Denies any other parasomnias or heartburn symptoms grinding teeth and nighttime sweating and occasional nightmares.  Patient denies any driving problems or sleep-related accidents.    Patient does not smoke.  Does not drink alcohol or have any illicit drug use history.  No history of head injury or head trauma.    Further sleep history is as below.    [place]  Scale: 7/24    Estimated average amount of sleep per night: 5  Number of times  she wakes up at night: 3  Difficulty falling back asleep: no  It usually takes 10 minutes to go to sleep.  She feels sleepy upon waking up: yes  Rotating or night shift work: no    Drowsiness/Sleepiness:  She exhibits the following:  excessive daytime fatigue  takes scheduled naps during the day    Snoring/Breathing:  She exhibits the following:  loud snoring  awoken with dry mouth  morning headaches    Reflux:  She describes the following:  NA    Narcolepsy:  She exhibits the following:  sudden episodes of sleep during the day    RLS/PLMs:  She describes the following:  none    Insomnia:  She describes the following:  frequent awakenings  bothered by pain at night  restless sleep    Parasomnia:  She exhibits the following:  frequent nightmares  excessive sweating while sleeping    Weight:  Weight change in the last year:  Stable    Subjective      Review of Systems:   Review of Systems   Constitutional: Positive for activity change, chills and fatigue.   HENT: Positive for congestion, ear pain, postnasal drip and sinus pressure.    Eyes: Positive for itching and visual disturbance.   Respiratory: Positive for apnea and chest tightness.    Cardiovascular: Positive for leg swelling.   Gastrointestinal: Positive for anal bleeding, diarrhea and rectal pain.   Endocrine: Positive for cold intolerance and heat intolerance.   Genitourinary: Positive for difficulty urinating and frequency.   Musculoskeletal: Positive for arthralgias, back pain, gait problem, myalgias, neck pain and neck stiffness.   Skin: Negative.    Allergic/Immunologic: Positive for environmental allergies.   Neurological: Positive for speech difficulty, weakness and confusion.   Hematological: Negative.    Psychiatric/Behavioral: Positive for agitation, decreased concentration and sleep disturbance.   All other systems reviewed and are negative.      Past Medical History:    Past Medical History:   Diagnosis Date   • Allergic    • Allergic rhinitis    • Anemia    • Arthritis    • Cholelithiasis    • Deep vein thrombosis (HCC) Clot following left knee replacement   • Diverticulosis    • Heart murmur    • History of bone density study 03/02/2017    [facility]   • History of mammography, screening 2016   • Hyperlipidemia    • Hypertension    • Inguinal hernia 1978    repair   • Irritable bowel syndrome Self   • Kidney stone Embedded   • Low back pain Scoliosis/kyphosis   • Osteoarthritis (arthritis due to wear and tear of joints)    • Osteopenia    • Personal history of DVT (deep vein thrombosis) 2005    right leg- due to surgery   • Pneumonia 5 times   • Positive TB test     Always comes back + but negative further testing   • Pulmonary embolism (HCC) Right  thigh following knee replace   • Scoliosis Joyce       Past Surgical History:   Past Surgical History:   Procedure Laterality Date   • BACK SURGERY  2002    Scoliosis bar. T11-sacrum   • BREAST BIOPSY      over 15 years ago.    • CERVICAL SPINE SURGERY  2000    cage   • CHOLECYSTECTOMY  2014   • COLONOSCOPY  2016   • EYE SURGERY  Detached retina   • FOOT SURGERY  2008   • HERNIA REPAIR  3 both bowl areas   • INGUINAL HERNIA REPAIR Bilateral 1978 2019 right side repair   • INGUINAL HERNIA REPAIR  Both   • KNEE SURGERY Bilateral 2017 2005 and x2 in 8/2017(left)   • NASAL SEPTUM SURGERY  1999   • SINUS SURGERY  1986   • SPINE SURGERY  2004   • TOTAL ABDOMINAL HYSTERECTOMY WITH SALPINGO OOPHORECTOMY  1986    Unilateral salpingo-oophorectomy/cervical dysplasia/AUB   • TUBAL ABDOMINAL LIGATION  1982   • URETHRAL DILATION      Multiple times       Family History:   Family History   Problem Relation Age of Onset   • Uterine cancer Mother         Metastatic to ovaries and colon; possibly bone   • Hypertension Mother    • Kidney disease Mother    • Tuberculosis Mother    • Arthritis Mother    • Thyroid disease Mother    • Cancer  Father         Bladder   • Heart failure Father    • Stroke Father    • Hypertension Father    • Heart attack Father    • Colon polyps Father    • Hypertension Brother    • Kidney disease Brother    • Colon polyps Brother    • Cancer Brother    • Heart disease Brother    • Kidney disease Brother    • Hypertension Brother    • Cancer Brother    • Other Daughter         CVID   • Diabetes Daughter    • Colon cancer Maternal Uncle    • Colon cancer Maternal Uncle    • Breast cancer Paternal Aunt    • Cancer Brother        Social History:   Social History     Socioeconomic History   • Marital status:    Tobacco Use   • Smoking status: Never Smoker   • Smokeless tobacco: Never Used   Vaping Use   • Vaping Use: Never used   Substance and Sexual Activity   • Alcohol use: Not Currently     Alcohol/week: 1.0 standard drink     Types: 1 Glasses of wine per week     Comment: rarely at holidays   • Drug use: No   • Sexual activity: Not Currently     Birth control/protection: Post-menopausal, Surgical     Comment: Hysterectomy       Medications:     Current Outpatient Medications:   •  losartan (COZAAR) 50 MG tablet, Take 1 tablet by mouth Daily. (Patient taking differently: Take 50 mg by mouth As Needed.), Disp: 90 tablet, Rfl: 1  •  Omega-3 Fatty Acids (fish oil) 1000 MG capsule capsule, Take  by mouth Every Other Day., Disp: , Rfl:   •  traMADol-acetaminophen (ULTRACET) 37.5-325 MG per tablet, Take 2 tablets by mouth 2 (Two) Times a Day., Disp: , Rfl:   •  aspirin 81 MG EC tablet, Take 81 mg by mouth Daily., Disp: , Rfl:   •  cetirizine (zyrTEC) 10 MG tablet, Take 1 mg by mouth Daily., Disp: , Rfl: 5    Allergies:   Allergies   Allergen Reactions   • Overton-2 Inhibitors Anaphylaxis   • Nsaids GI Intolerance   • Augmentin [Amoxicillin-Pot Clavulanate] Nausea Only   • Celecoxib Hives   • Zithromax [Azithromycin] Dizziness       Objective     Physical Exam:  Vital Signs:   Vitals:    03/14/22 1044   BP: 131/62   Pulse: 74  "  SpO2: 96%   Weight: 64.4 kg (142 lb)   Height: 162.6 cm (64\")     Body mass index is 24.37 kg/m².    Physical Exam  Vitals and nursing note reviewed.   Constitutional:       General: She is not in acute distress.     Appearance: She is well-developed. She is not diaphoretic.   HENT:      Head: Normocephalic and atraumatic.      Comments: Mallampati 3 airway     Nose: Nose normal.      Mouth/Throat:      Pharynx: No oropharyngeal exudate.   Eyes:      General: No scleral icterus.        Right eye: No discharge.         Left eye: No discharge.      Pupils: Pupils are equal, round, and reactive to light.   Neck:      Thyroid: No thyromegaly.      Trachea: No tracheal deviation.   Cardiovascular:      Rate and Rhythm: Normal rate and regular rhythm.      Heart sounds: Normal heart sounds. No murmur heard.    No friction rub. No gallop.   Pulmonary:      Effort: Pulmonary effort is normal. No respiratory distress.      Breath sounds: Normal breath sounds. No stridor. No wheezing or rales.   Abdominal:      Palpations: Abdomen is soft.   Musculoskeletal:         General: No tenderness.      Cervical back: Neck supple.      Right lower leg: No edema.      Left lower leg: No edema.      Comments: Clubbing: none   Neurological:      Mental Status: She is alert and oriented to person, place, and time.      Cranial Nerves: No cranial nerve deficit.      Coordination: Coordination normal.   Psychiatric:         Behavior: Behavior normal.         Thought Content: Thought content normal.         Judgment: Judgment normal.         Results Review:   I reviewed the patient's new clinical results.  Lab Results   Component Value Date    TSH 0.818 01/10/2022     '  Assessment / Plan      Assessment:   Problem List Items Addressed This Visit    None     Visit Diagnoses     Snoring    -  Primary    Witnessed episode of apnea        Fatigue, unspecified type        INGRIS (obstructive sleep apnea)        Relevant Orders    Home Sleep Study "            Plan:   1.  Patient with poor quality sleep which is likely multifactorial.  Patient does have pretty significant stressors in her life with a recent diagnosis of ALS and daughter which is likely impacting her nighttime sleep with sleep maintenance insomnia.  Also patient is having issues with snoring, witnessed apnea, nonrestorative sleep and significant fatigue and tiredness during daytime along with Mallampati 3 airway with redundant soft palate. All this put her at high risk of sleep disordered breathing.  Discussed at length about pathophysiology of sleep apnea.  Discussed side effects of untreated sleep apnea including poor quality sleep, cardiovascular, neurologic and metabolic side effects also discussed.  Further diagnostic testing recommended.  Patient is amenable to proceed with further testing and treatment as needed.  We discussed home study versus in lab study.  Patient is amenable to proceeding with home-based testing.    We will set her up for home-based testing and follow-up after.  Discussed that if home-based testing is nondiagnostic or negative then we could consider bringing her in the lab to have more comprehensive study to rule out other sleep disorders.  We will discuss it further.     2.  If found to have significant sleep apnea available treatment options discussed including CPAP therapy, oral appliance, surgical options.  Weight loss as a treatment option for sleep apnea also discussed and counseled.     3.  Patient is trying to do meditation and some relaxing exercises at night prior to going to sleep and she was advised to continue working on those aspects.   She feels that she is coping pretty well and does not feel depressed at this time.    4.  Continue pain control for underlying arthritis.    Thank you for allowing us to participate in the care of this patient.  We will follow her closely.      Follow Up:   After HST    Discussed plan of care in detail with patient today.  Patient verbally understands and agrees. I spent 45 minutes on this date of service. This time includes time spent by me in the following activities:preparing for the visit, reviewing tests, obtaining and/or reviewing a separately obtained history, performing a medically appropriate examination, counseling the patient, ordering tests, or procedures, and/or documenting information in the medical record.      Edmund Lora MD  Pulmonary Critical Care and Sleep Medicine

## 2022-03-15 ENCOUNTER — OFFICE VISIT (OUTPATIENT)
Dept: INTERNAL MEDICINE | Facility: CLINIC | Age: 77
End: 2022-03-15

## 2022-03-15 VITALS
TEMPERATURE: 99.2 F | RESPIRATION RATE: 14 BRPM | SYSTOLIC BLOOD PRESSURE: 112 MMHG | BODY MASS INDEX: 24.24 KG/M2 | OXYGEN SATURATION: 96 % | HEART RATE: 79 BPM | HEIGHT: 64 IN | DIASTOLIC BLOOD PRESSURE: 84 MMHG | WEIGHT: 142 LBS

## 2022-03-15 DIAGNOSIS — N81.83 INCOMPETENCE OF RECTOVAGINAL TISSUE: Primary | ICD-10-CM

## 2022-03-15 DIAGNOSIS — M19.91 PRIMARY OSTEOARTHRITIS, UNSPECIFIED SITE: ICD-10-CM

## 2022-03-15 PROCEDURE — 99215 OFFICE O/P EST HI 40 MIN: CPT

## 2022-03-15 NOTE — PROGRESS NOTES
Chief Complaint  prolapsed rectum, Dizziness ( ), and Osteoarthritis    Joyce Jones presents to [facility]    OA: Reports having a history of osteoarthritis. States she has had multiple knee replacements in the past. Taking ultracet for this and seeing a provider at [facility]. Feels like pain has worsened because she has not been active due to pelvic pain. Pain is described as aching. Pain is rated as a 3 out of 10.     Pelvic Pain- Reports having increased pelvic and rectal pain that has continuously worsened over the past few years. Pain is described as aching and pressure. Associated with anal leakage. Denies any change in bowel movements. Has been using topical lidocaine with some improvement in symptoms. Pain is worsened with movement. Pain is rated as a 7 out of 10.      Subjective         PMSFH  The following portions of the patient's history were reviewed and updated as appropriate: allergies, current medications, past family history, past medical history, past social history, past surgical history and problem list.     Past Medical History:   Diagnosis Date   • Allergic    • Allergic rhinitis    • Anemia    • Arthritis    • Cholelithiasis    • Deep vein thrombosis (HCC) Clot following left knee replacement   • Diverticulosis    • Heart murmur    • History of bone density study 03/02/2017    [facility]   • History of mammography, screening 2016   • Hyperlipidemia    • Hypertension    • Inguinal hernia 1978    repair   • Irritable bowel syndrome Self   • Kidney stone Embedded   • Low back pain Scoliosis/kyphosis   • Osteoarthritis (arthritis due to wear and tear of joints)    • Osteopenia    • Personal history of DVT (deep vein thrombosis) 2005    right leg- due to surgery   • Pneumonia 5 times   • Positive TB test     Always comes back + but negative further testing   • Pulmonary embolism (HCC) Right  thigh following knee replace   • Scoliosis Joyce       Allergies   Allergen Reactions   • Overton-2 Inhibitors Anaphylaxis   • Nsaids GI Intolerance   • Augmentin [Amoxicillin-Pot Clavulanate] Nausea Only   • Celecoxib Hives   • Zithromax [Azithromycin] Dizziness      Social History     Tobacco Use   • Smoking status: Never Smoker   • Smokeless tobacco: Never Used   Vaping Use   • Vaping Use: Never used   Substance Use Topics   • Alcohol use: Not Currently     Alcohol/week: 1.0 standard drink     Types: 1 Glasses of wine per week     Comment: rarely at holidays   • Drug use: No     Past Surgical History:   Procedure Laterality Date   • BACK SURGERY  2002    Scoliosis bar. T11-sacrum   • BREAST BIOPSY      over 15 years ago.    • CERVICAL SPINE SURGERY  2000    cage   • CHOLECYSTECTOMY  2014   • COLONOSCOPY  2016   • EYE SURGERY  Detached retina   • FOOT SURGERY  2008   • HERNIA REPAIR  3 both bowl areas   • INGUINAL HERNIA REPAIR Bilateral 1978 2019 right side repair   • INGUINAL HERNIA REPAIR  Both   • KNEE SURGERY Bilateral 2017 2005 and x2 in 8/2017(left)   • NASAL SEPTUM SURGERY  1999   • SINUS SURGERY  1986   • SPINE SURGERY  2004   • TOTAL ABDOMINAL HYSTERECTOMY WITH SALPINGO OOPHORECTOMY  1986    Unilateral salpingo-oophorectomy/cervical dysplasia/AUB   • TUBAL ABDOMINAL LIGATION  1982   • URETHRAL DILATION      Multiple times      Family History   Problem Relation Age of Onset   • Uterine cancer Mother         Metastatic to ovaries and colon; possibly bone   • Hypertension Mother    • Kidney disease Mother    • Tuberculosis Mother    • Arthritis Mother    • Thyroid disease Mother    • Cancer Father         Bladder   • Heart failure Father    • Stroke Father    • Hypertension Father    • Heart attack Father    • Colon polyps Father    • Hypertension Brother    • Kidney disease Brother    • Colon polyps Brother    • Cancer Brother    • Heart disease Brother    • Kidney disease Brother    • Hypertension Brother    • Cancer Brother    • Other Daughter          "CVID   • Diabetes Daughter    • Colon cancer Maternal Uncle    • Colon cancer Maternal Uncle    • Breast cancer Paternal Aunt    • Cancer Brother          Current Outpatient Medications:   •  aspirin 81 MG EC tablet, Take 81 mg by mouth Daily., Disp: , Rfl:   •  cetirizine (zyrTEC) 10 MG tablet, Take 1 mg by mouth Daily., Disp: , Rfl: 5  •  losartan (COZAAR) 50 MG tablet, Take 1 tablet by mouth Daily. (Patient taking differently: Take 50 mg by mouth As Needed.), Disp: 90 tablet, Rfl: 1  •  traMADol-acetaminophen (ULTRACET) 37.5-325 MG per tablet, Take 2 tablets by mouth 2 (Two) Times a Day., Disp: , Rfl:     Review of Systems   Constitutional: Negative for activity change, appetite change, chills, fatigue and fever.   Respiratory: Negative for apnea, cough, choking, chest tightness, shortness of breath, wheezing and stridor.    Cardiovascular: Negative for chest pain, palpitations and leg swelling.   Gastrointestinal: Positive for anal bleeding and rectal pain. Negative for abdominal distention, abdominal pain, blood in stool, constipation, diarrhea, nausea, vomiting, GERD and indigestion.   Endocrine: Negative for cold intolerance and heat intolerance.   Genitourinary: Positive for pelvic pain, pelvic pressure and vaginal pain. Negative for amenorrhea, breast discharge, breast lump, breast pain, decreased libido, decreased urine volume, difficulty urinating, dyspareunia, dysuria, flank pain, frequency, genital sores, hematuria, menstrual problem, urgency, urinary incontinence, vaginal bleeding and vaginal discharge.   Allergic/Immunologic: Negative for environmental allergies and food allergies.   Neurological: Negative for dizziness, tremors, seizures, syncope, facial asymmetry, speech difficulty, weakness, light-headedness, numbness, headache, memory problem and confusion.       Objective   Vital Signs  /84   Pulse 79   Temp 99.2 °F (37.3 °C) (Temporal)   Resp 14   Ht 162.6 cm (64\")   Wt 64.4 kg (142 " lb)   SpO2 96%   BMI 24.37 kg/m²     Physical Exam  Exam conducted with a chaperone present.   Constitutional:       Appearance: Normal appearance.   HENT:      Head: Normocephalic.      Nose: Nose normal.      Mouth/Throat:      Mouth: Mucous membranes are moist.      Pharynx: Oropharynx is clear.   Cardiovascular:      Rate and Rhythm: Normal rate and regular rhythm.      Pulses: Normal pulses.      Heart sounds: Normal heart sounds.   Pulmonary:      Effort: Pulmonary effort is normal.      Breath sounds: Normal breath sounds.   Abdominal:      General: Bowel sounds are normal.      Palpations: Abdomen is soft.   Genitourinary:     Vagina: No signs of injury and foreign body. Prolapsed vaginal walls present. No vaginal discharge, erythema, tenderness, bleeding or lesions.      Uterus: With uterine prolapse. Not deviated, not enlarged, not fixed and not tender.       Rectum: Guaiac result negative. Mass, external hemorrhoid and internal hemorrhoid present. No tenderness or anal fissure. Abnormal anal tone.   Skin:     General: Skin is warm and dry.      Capillary Refill: Capillary refill takes less than 2 seconds.   Neurological:      Mental Status: She is alert and oriented to person, place, and time.   Psychiatric:         Mood and Affect: Mood normal.         Behavior: Behavior normal.         Thought Content: Thought content normal.         Judgment: Judgment normal.          Result Review :     The following data was reviewed by: LOVE Lynn on 03/15/2022:  CMP    CMP 1/8/22 1/9/22 1/10/22   Glucose 102 (A) 93 114 (A)   BUN 20 18 18   Creatinine 0.81 0.88 0.78   eGFR Non African Am 69 63 72   Sodium 136 142 132 (A)   Potassium 4.3 3.4 (A) 4.0   Chloride 100 107 100   Calcium 9.6 9.2 9.5   Albumin 4.40  4.40   Total Bilirubin 0.4  0.4   Alkaline Phosphatase 81  83   AST (SGOT) 23  19   ALT (SGPT) 18  16   (A) Abnormal value       Comments are available for some flowsheets but are not being displayed.            CBC    CBC 1/8/22 1/9/22 1/10/22   WBC 7.50 4.95 8.98   RBC 3.89 3.62 (A) 3.87   Hemoglobin 12.9 12.1 13.1   Hematocrit 38.6 36.2 38.0   MCV 99.2 (A) 100.0 (A) 98.2 (A)   MCH 33.2 (A) 33.4 (A) 33.9 (A)   MCHC 33.4 33.4 34.5   RDW 13.1 13.2 13.3   Platelets 272 239 275   (A) Abnormal value              Assessment and Plan    1. Incompetence of rectovaginal tissue  - Ambulatory Referral to Obstetrics / Gynecology  - Appears to be complete vaginal vault prolapse which will need to be surgically corrected. Also seems to have a degree of rectal prolapse as well.     2. Primary osteoarthritis, unspecified site  - Discussed need to increase physical activity and remain active. Encouraged use of prn capsaicin OTC. Encouraged continued use of ultracet as prescribed. Discussed following with provider managing OA and inquiring about duloxetine if symptoms remain uncontrolled.       I spent 45 minutes caring for Joyce on this date of service. This time includes time spent by me in the following activities:preparing for the visit, reviewing tests, obtaining and/or reviewing a separately obtained history, performing a medically appropriate examination and/or evaluation , counseling and educating the patient/family/caregiver, ordering medications, tests, or procedures, referring and communicating with other health care professionals , documenting information in the medical record, independently interpreting results and communicating that information with the patient/family/caregiver and care coordination    Follow Up     No follow-ups on file.    Patient was given instructions and counseling regarding her condition or for health maintenance advice. Please see specific information pulled into the AVS if appropriate.    Part of this note may be an electronic transcription/translation of spoken language to printed text using the Dragon Dictation System.    Electronically signed by:   LOVE Lynn  03/15/2022

## 2022-03-16 ENCOUNTER — TELEPHONE (OUTPATIENT)
Dept: GASTROENTEROLOGY | Facility: CLINIC | Age: 77
End: 2022-03-16

## 2022-03-16 ENCOUNTER — HOSPITAL ENCOUNTER (OUTPATIENT)
Dept: BONE DENSITY | Facility: HOSPITAL | Age: 77
Discharge: HOME | End: 2022-03-16

## 2022-03-16 ENCOUNTER — HOSPITAL ENCOUNTER (OUTPATIENT)
Dept: MAMMOGRAPHY | Facility: HOSPITAL | Age: 77
Discharge: HOME | End: 2022-03-16

## 2022-03-16 PROCEDURE — 77080 DXA BONE DENSITY AXIAL: CPT

## 2022-03-16 PROCEDURE — 77067 SCR MAMMO BI INCL CAD: CPT | Performed by: RADIOLOGY

## 2022-03-16 PROCEDURE — 77063 BREAST TOMOSYNTHESIS BI: CPT

## 2022-03-16 PROCEDURE — 77063 BREAST TOMOSYNTHESIS BI: CPT | Performed by: RADIOLOGY

## 2022-03-16 PROCEDURE — 77067 SCR MAMMO BI INCL CAD: CPT

## 2022-03-29 ENCOUNTER — OFFICE VISIT (OUTPATIENT)
Dept: INTERNAL MEDICINE | Facility: CLINIC | Age: 77
End: 2022-03-29

## 2022-03-29 VITALS
RESPIRATION RATE: 16 BRPM | HEART RATE: 78 BPM | SYSTOLIC BLOOD PRESSURE: 138 MMHG | OXYGEN SATURATION: 98 % | WEIGHT: 141 LBS | DIASTOLIC BLOOD PRESSURE: 80 MMHG | HEIGHT: 64 IN | BODY MASS INDEX: 24.07 KG/M2

## 2022-03-29 DIAGNOSIS — M54.40 CHRONIC BILATERAL LOW BACK PAIN WITH SCIATICA, SCIATICA LATERALITY UNSPECIFIED: Primary | ICD-10-CM

## 2022-03-29 DIAGNOSIS — M41.9 SCOLIOSIS OF THORACOLUMBAR SPINE, UNSPECIFIED SCOLIOSIS TYPE: ICD-10-CM

## 2022-03-29 DIAGNOSIS — G89.29 CHRONIC BILATERAL LOW BACK PAIN WITH SCIATICA, SCIATICA LATERALITY UNSPECIFIED: Primary | ICD-10-CM

## 2022-03-29 PROCEDURE — 99214 OFFICE O/P EST MOD 30 MIN: CPT

## 2022-03-29 NOTE — PROGRESS NOTES
Chief Complaint  Leg Pain (Pt c/o pain in hip/bottom area)    Joyce Jones presents to [facility]    HPI: Pain in the lower lumbar spine that radiates down the right hip and buttocks for many years. Has worsened over the past 3 months. Has been following with [facility]. Had an appointment on 3/24/22 and x-ray that revealed Status post T11-S1 posterior fusion. There is stable fracture of the cameron at on the left at L3-L4. 2. Stable S-shaped scoliosis. Patient is requesting to transfer her care to another provider for her spine concerns. Pain is described as burning and aching. Pain is worsened with ambulation. Has tried taking tramadol with mild improvement in symptoms. Pain is rated as an 8 out of 10.     Subjective         PMSFH  The following portions of the patient's history were reviewed and updated as appropriate: allergies, current medications, past family history, past medical history, past social history, past surgical history and problem list.     Past Medical History:   Diagnosis Date   • Allergic    • Allergic rhinitis    • Anemia    • Arthritis    • Cholelithiasis    • Deep vein thrombosis (HCC) Clot following left knee replacement   • Diverticulosis    • Heart murmur    • History of bone density study 03/02/2017    [facility]   • History of mammography, screening 2016   • Hyperlipidemia    • Hypertension    • Inguinal hernia 1978    repair   • Irritable bowel syndrome Self   • Kidney stone Embedded   • Low back pain Scoliosis/kyphosis   • Osteoarthritis (arthritis due to wear and tear of joints)    • Osteopenia    • Personal history of DVT (deep vein thrombosis) 2005    right leg- due to surgery   • Pneumonia 5 times   • Positive TB test     Always comes back + but negative further testing   • Pulmonary embolism (HCC) Right  thigh following knee replace   • Scoliosis Joyce      Allergies   Allergen Reactions   • Overton-2 Inhibitors Anaphylaxis   •  Nabumetone Anaphylaxis   • Nsaids GI Intolerance   • Augmentin [Amoxicillin-Pot Clavulanate] Nausea Only   • Celecoxib Hives   • Zithromax [Azithromycin] Dizziness      Social History     Tobacco Use   • Smoking status: Never Smoker   • Smokeless tobacco: Never Used   Vaping Use   • Vaping Use: Never used   Substance Use Topics   • Alcohol use: Not Currently     Alcohol/week: 1.0 standard drink     Types: 1 Glasses of wine per week     Comment: rarely at holidays   • Drug use: No     Past Surgical History:   Procedure Laterality Date   • BACK SURGERY  2002    Scoliosis bar. T11-sacrum   • BREAST BIOPSY      over 15 years ago.    • CERVICAL SPINE SURGERY  2000    cage   • CHOLECYSTECTOMY  2014   • COLONOSCOPY  2016   • EYE SURGERY  Detached retina   • FOOT SURGERY  2008   • HERNIA REPAIR  3 both bowl areas   • INGUINAL HERNIA REPAIR Bilateral 1978 2019 right side repair   • INGUINAL HERNIA REPAIR  Both   • KNEE SURGERY Bilateral 2017 2005 and x2 in 8/2017(left)   • NASAL SEPTUM SURGERY  1999   • SINUS SURGERY  1986   • SPINE SURGERY  2004   • TOTAL ABDOMINAL HYSTERECTOMY WITH SALPINGO OOPHORECTOMY  1986    Unilateral salpingo-oophorectomy/cervical dysplasia/AUB   • TUBAL ABDOMINAL LIGATION  1982   • URETHRAL DILATION      Multiple times      Family History   Problem Relation Age of Onset   • Uterine cancer Mother         Metastatic to ovaries and colon; possibly bone   • Hypertension Mother    • Kidney disease Mother    • Tuberculosis Mother    • Arthritis Mother    • Thyroid disease Mother    • Cancer Father         Bladder   • Heart failure Father    • Stroke Father    • Hypertension Father    • Heart attack Father    • Colon polyps Father    • Hypertension Brother    • Kidney disease Brother    • Colon polyps Brother    • Cancer Brother    • Heart disease Brother    • Kidney disease Brother    • Hypertension Brother    • Cancer Brother    • Other Daughter         CVID   • Diabetes Daughter    • Colon cancer  "Maternal Uncle    • Colon cancer Maternal Uncle    • Breast cancer Paternal Aunt    • Cancer Brother          Current Outpatient Medications:   •  aspirin 81 MG EC tablet, Take 81 mg by mouth Daily., Disp: , Rfl:   •  cetirizine (zyrTEC) 10 MG tablet, Take 1 mg by mouth Daily., Disp: , Rfl: 5  •  losartan (COZAAR) 50 MG tablet, Take 1 tablet by mouth Daily. (Patient taking differently: Take 50 mg by mouth As Needed.), Disp: 90 tablet, Rfl: 1  •  traMADol-acetaminophen (ULTRACET) 37.5-325 MG per tablet, Take 2 tablets by mouth 2 (Two) Times a Day., Disp: , Rfl:     Review of Systems   Constitutional: Negative for activity change, appetite change, chills, fatigue and fever.   Respiratory: Negative for apnea, cough, choking, chest tightness, shortness of breath, wheezing and stridor.    Cardiovascular: Negative for chest pain, palpitations and leg swelling.   Genitourinary: Negative for amenorrhea, breast discharge, breast lump, breast pain, decreased libido, decreased urine volume, difficulty urinating, dyspareunia, dysuria, flank pain, frequency, genital sores, hematuria, menstrual problem, pelvic pain, pelvic pressure, urgency, urinary incontinence, vaginal bleeding, vaginal discharge and vaginal pain.   Musculoskeletal: Positive for arthralgias, back pain and myalgias. Negative for gait problem, joint swelling, neck pain, neck stiffness and bursitis.       Objective   Vital Signs  /80   Pulse 78   Resp 16   Ht 162.6 cm (64\")   Wt 64 kg (141 lb)   SpO2 98%   BMI 24.20 kg/m²     Physical Exam  Constitutional:       Appearance: Normal appearance.   HENT:      Head: Normocephalic.   Cardiovascular:      Rate and Rhythm: Normal rate and regular rhythm.      Pulses: Normal pulses.      Heart sounds: Normal heart sounds.   Pulmonary:      Effort: Pulmonary effort is normal.      Breath sounds: Normal breath sounds.   Abdominal:      General: Bowel sounds are normal.      Palpations: Abdomen is soft. "   Musculoskeletal:      Cervical back: Deformity present. No swelling, erythema, signs of trauma, lacerations, rigidity, spasms, torticollis, tenderness, bony tenderness or crepitus. No pain with movement. Decreased range of motion.      Thoracic back: Deformity, tenderness and bony tenderness present. No swelling, edema, signs of trauma, lacerations or spasms. Decreased range of motion. Scoliosis present.      Lumbar back: Deformity, tenderness and bony tenderness present. No swelling, edema, signs of trauma, lacerations or spasms. Decreased range of motion. Negative right straight leg raise test and negative left straight leg raise test.   Skin:     General: Skin is warm and dry.      Capillary Refill: Capillary refill takes less than 2 seconds.   Neurological:      Mental Status: She is alert and oriented to person, place, and time.   Psychiatric:         Mood and Affect: Mood normal.         Behavior: Behavior normal.         Thought Content: Thought content normal.         Judgment: Judgment normal.          Result Review :     The following data was reviewed by: LOVE Lynn on 03/29/2022:  CMP    CMP 1/8/22 1/9/22 1/10/22   Glucose 102 (A) 93 114 (A)   BUN 20 18 18   Creatinine 0.81 0.88 0.78   eGFR Non African Am 69 63 72   Sodium 136 142 132 (A)   Potassium 4.3 3.4 (A) 4.0   Chloride 100 107 100   Calcium 9.6 9.2 9.5   Albumin 4.40  4.40   Total Bilirubin 0.4  0.4   Alkaline Phosphatase 81  83   AST (SGOT) 23  19   ALT (SGPT) 18  16   (A) Abnormal value       Comments are available for some flowsheets but are not being displayed.             Assessment and Plan    1. Chronic bilateral low back pain with sciatica, sciatica laterality unspecified  - Ambulatory Referral to Neurosurgery  - MRI Lumbar Spine With & Without Contrast; Future  - Will refer to NS at patient's request. Will obtain imaging in anticipation of referral. Patient has been referred to PT but states she does not have time for this.  Encouraged to continue with prescribed tramadol for pain control. Encouraged to follow-up in office if symptoms worsen or fail to improve as expected.     2. Scoliosis of thoracolumbar spine, unspecified scoliosis type  - MRI thoracic spine w wo contrast; Future    Follow Up     Return for Next scheduled follow up.    Patient was given instructions and counseling regarding her condition or for health maintenance advice. Please see specific information pulled into the AVS if appropriate.    Part of this note may be an electronic transcription/translation of spoken language to printed text using the Dragon Dictation System.    Electronically signed by:   LOVE Lynn  03/29/2022

## 2022-04-11 ENCOUNTER — HOSPITAL ENCOUNTER (OUTPATIENT)
Dept: SLEEP MEDICINE | Facility: HOSPITAL | Age: 77
Discharge: HOME | End: 2022-04-11
Admitting: INTERNAL MEDICINE

## 2022-04-11 VITALS — WEIGHT: 141.98 LBS | HEIGHT: 64 IN | BODY MASS INDEX: 24.24 KG/M2

## 2022-04-11 DIAGNOSIS — G47.33 OSA (OBSTRUCTIVE SLEEP APNEA): ICD-10-CM

## 2022-04-11 PROCEDURE — 95806 SLEEP STUDY UNATT&RESP EFFT: CPT | Performed by: INTERNAL MEDICINE

## 2022-04-11 PROCEDURE — 95806 SLEEP STUDY UNATT&RESP EFFT: CPT

## 2022-04-14 DIAGNOSIS — G47.33 OSA (OBSTRUCTIVE SLEEP APNEA): Primary | ICD-10-CM

## 2022-04-14 DIAGNOSIS — I10 ESSENTIAL HYPERTENSION: ICD-10-CM

## 2022-04-14 DIAGNOSIS — I47.1 SUPRAVENTRICULAR TACHYCARDIA: ICD-10-CM

## 2022-04-15 NOTE — PROGRESS NOTES
CALLED PATIENT AND ADVISED OF STUDY RESULTS. PATIENT VERBALIZED UNDERSTANDING AND WAS AGREEABLE TO PAP THERAPY. FAXED ORDER TO JEANNETTE 04/15/22 TRC

## 2022-04-27 ENCOUNTER — HOSPITAL ENCOUNTER (OUTPATIENT)
Dept: MRI IMAGING | Facility: HOSPITAL | Age: 77
End: 2022-04-27

## 2022-04-27 ENCOUNTER — APPOINTMENT (OUTPATIENT)
Dept: MRI IMAGING | Facility: HOSPITAL | Age: 77
End: 2022-04-27

## 2022-05-02 ENCOUNTER — OFFICE VISIT (OUTPATIENT)
Dept: INTERNAL MEDICINE | Facility: CLINIC | Age: 77
End: 2022-05-02

## 2022-05-02 VITALS
DIASTOLIC BLOOD PRESSURE: 64 MMHG | WEIGHT: 142.2 LBS | HEART RATE: 88 BPM | SYSTOLIC BLOOD PRESSURE: 108 MMHG | TEMPERATURE: 98.2 F | HEIGHT: 64 IN | BODY MASS INDEX: 24.28 KG/M2 | OXYGEN SATURATION: 99 %

## 2022-05-02 DIAGNOSIS — Z00.00 MEDICARE ANNUAL WELLNESS VISIT, SUBSEQUENT: ICD-10-CM

## 2022-05-02 DIAGNOSIS — M06.9 RHEUMATOID ARTHRITIS INVOLVING BOTH HANDS, UNSPECIFIED WHETHER RHEUMATOID FACTOR PRESENT: ICD-10-CM

## 2022-05-02 DIAGNOSIS — I10 ESSENTIAL HYPERTENSION: ICD-10-CM

## 2022-05-02 DIAGNOSIS — E78.2 MIXED HYPERLIPIDEMIA: ICD-10-CM

## 2022-05-02 DIAGNOSIS — R41.89 ALTERATION IN COGNITION: Primary | ICD-10-CM

## 2022-05-02 DIAGNOSIS — Z23 NEED FOR COVID-19 VACCINE: ICD-10-CM

## 2022-05-02 PROCEDURE — G0439 PPPS, SUBSEQ VISIT: HCPCS

## 2022-05-02 PROCEDURE — 1125F AMNT PAIN NOTED PAIN PRSNT: CPT

## 2022-05-02 PROCEDURE — 1170F FXNL STATUS ASSESSED: CPT

## 2022-05-02 PROCEDURE — 1160F RVW MEDS BY RX/DR IN RCRD: CPT

## 2022-05-02 NOTE — PROGRESS NOTES
The ABCs of the Annual Wellness Visit  Subsequent Medicare Wellness Visit    Chief Complaint   Patient presents with   • Medicare Wellness-subsequent     Patient state she would like to discuss arthritis that is getting progressively worse. Would like to discuss the medication options because it's getting worse. Would like to see if [facility] has a rheumatology specialist and keep all healthcare within [facility]       Subjective    History of Present Illness:  Joyce Jones is a 76 y.o. female who presents for a Subsequent Medicare Wellness Visit.    RA- Following with Dr. Quiroz at [facility] for management of RA. Requesting to see a new provider. Pain is located in the hands and in the lumbar spine. Described as achy. Taking tramadol for this BID which she reports is not as beneficial as it used to be. Pain averages around a 4-5 daily and is interfering with ability to do physical activity and carryout ADL's.     Cognition- Feels like she is more confused that she used to be and having difficulty with short term memory and remembering how to carry out daily tasks. States this isn't the case on a daily basis but happens more often than not. Was diagnosed with INGRIS recently and has been started on Cpap. Feels like some of her symptoms have been related to this but does not note any drastic improvement. States that stress has been higher which she feels also worsens symptoms. Had MRI on 1/8/22. IMPRESSION: Cortical atrophy to a mild degree. No acute findings.     The following portions of the patient's history were reviewed and   updated as appropriate: allergies, current medications, past family history, past medical history, past social history, past surgical history and problem list.    Compared to one year ago, the patient feels her physical   health is worse.    Compared to one year ago, the patient feels her mental   health is better.    Recent Hospitalizations:  This patient has had a [facility]  Hospital admission record on file within the last 365 days.    Current Medical Providers:  Patient Care Team:  Pratik Steiner APRN as PCP - General (Nurse Practitioner)  Zeeshan Shaw MD (Inactive) as Consulting Physician (Neurosurgery)  Hu Dewey MD as Consulting Physician (Pain Medicine)    Outpatient Medications Prior to Visit   Medication Sig Dispense Refill   • aspirin 81 MG EC tablet Take 81 mg by mouth Daily.     • losartan (COZAAR) 50 MG tablet Take 1 tablet by mouth Daily. (Patient taking differently: Take 50 mg by mouth As Needed.) 90 tablet 1   • traMADol-acetaminophen (ULTRACET) 37.5-325 MG per tablet Take 2 tablets by mouth 2 (Two) Times a Day.     • cetirizine (zyrTEC) 10 MG tablet Take 1 mg by mouth Daily.  5     No facility-administered medications prior to visit.       Opioid medication/s are on active medication list.  and I have evaluated her active treatment plan and pain score trends (see table).  There were no vitals filed for this visit.  I have reviewed the chart for potential of high risk medication and harmful drug interactions in the elderly.            Aspirin is on active medication list. Patient is currently taking aspirin.       Patient Active Problem List   Diagnosis   • Hyperlipidemia   • Primary osteoarthritis involving multiple joints   • Gastroesophageal reflux disease without esophagitis   • Heart valve disorder   • Allergic rhinitis   • Degenerative disc disease, cervical   • Bulging of cervical intervertebral disc   • History of fusion of cervical spine   • Tendonitis of left rotator cuff   • Subacromial bursitis of left shoulder joint   • Essential hypertension   • Elevated d-dimer   • Osteoporosis per patient   • Asthenia   • Chronic kidney disease   • Chronic low back pain   • Connective tissue disease overlap syndrome (HCC)   • Diverticulosis   • Dyspnea   • Familial combined hyperlipidemia   • Gastro-esophageal reflux disease with esophagitis   • Gluteal  tendinitis   • Pain of left lower extremity   • Hip pain   • History of deep vein thrombosis   • History of diverticulitis   • Hypercalcemia   • Localized edema   • Loose total knee arthroplasty (HCC)   • Mantoux: positive   • Megaloblastic anemia due to vitamin B12 deficiency   • Neck pain   • Osteopenia   • Rheumatoid arthritis (HCC)   • Right inguinal hernia   • Scoliosis   • Spontaneous ecchymosis   • Supraventricular tachycardia (HCC)   • Tricuspid regurgitation   • Postural dizziness with presyncope   • Diverticulitis   • INGRIS (obstructive sleep apnea)     Advance Care Planning  Advance Directive is not on file.  ACP discussion was held with the patient during this visit. Patient has an advance directive (not in EMR), copy requested.    Review of Systems   Constitutional: Positive for fatigue. Negative for activity change, appetite change, chills, diaphoresis and fever.   HENT: Negative.    Eyes: Negative for photophobia, pain, discharge, redness, itching and visual disturbance.   Respiratory: Negative for apnea, cough, choking, chest tightness, shortness of breath, wheezing and stridor.    Cardiovascular: Negative for chest pain, palpitations and leg swelling.   Gastrointestinal: Negative.    Endocrine: Negative.  Negative for cold intolerance, heat intolerance, polydipsia, polyphagia and polyuria.   Genitourinary: Negative.    Musculoskeletal: Positive for arthralgias, back pain, joint swelling and myalgias. Negative for gait problem, neck pain and neck stiffness.   Skin: Negative for color change, pallor, rash and wound.   Allergic/Immunologic: Negative.    Neurological: Positive for confusion. Negative for dizziness, tremors, seizures, syncope, facial asymmetry, speech difficulty, weakness, light-headedness and numbness.   Hematological: Negative for adenopathy. Does not bruise/bleed easily.   Psychiatric/Behavioral: Positive for decreased concentration. Negative for agitation, behavioral problems,  "dysphoric mood, hallucinations, self-injury, sleep disturbance and suicidal ideas. The patient is not nervous/anxious and is not hyperactive.         Objective    Vitals:    05/02/22 1041   BP: 108/64   Pulse: 88   Temp: 98.2 °F (36.8 °C)   SpO2: 99%   Weight: 64.5 kg (142 lb 3.2 oz)   Height: 163 cm (64.17\")     BMI Readings from Last 1 Encounters:   05/02/22 24.28 kg/m²   BMI is within normal parameters. No follow-up required.    Does the patient have evidence of cognitive impairment? Yes    Physical Exam  Constitutional:       Appearance: Normal appearance.   HENT:      Head: Normocephalic.      Nose: Nose normal.      Mouth/Throat:      Mouth: Mucous membranes are moist.      Pharynx: Oropharynx is clear.   Eyes:      Extraocular Movements: Extraocular movements intact.      Conjunctiva/sclera: Conjunctivae normal.      Pupils: Pupils are equal, round, and reactive to light.   Cardiovascular:      Rate and Rhythm: Normal rate and regular rhythm.      Pulses: Normal pulses.      Heart sounds: Normal heart sounds.   Pulmonary:      Effort: Pulmonary effort is normal.      Breath sounds: Normal breath sounds.   Abdominal:      General: Bowel sounds are normal.      Palpations: Abdomen is soft.   Skin:     General: Skin is warm and dry.      Capillary Refill: Capillary refill takes less than 2 seconds.   Neurological:      Mental Status: She is alert and oriented to person, place, and time.   Psychiatric:         Mood and Affect: Mood normal.         Behavior: Behavior normal.         Thought Content: Thought content normal.         Judgment: Judgment normal.                 HEALTH RISK ASSESSMENT    Smoking Status:  Social History     Tobacco Use   Smoking Status Never Smoker   Smokeless Tobacco Never Used     Alcohol Consumption:  Social History     Substance and Sexual Activity   Alcohol Use Not Currently   • Alcohol/week: 1.0 standard drink   • Types: 1 Glasses of wine per week    Comment: rarely at holidays "     Fall Risk Screen:    STEADI Fall Risk Assessment was completed, and patient is at LOW risk for falls.Assessment completed on:5/2/2022    Depression Screening:  PHQ-2/PHQ-9 Depression Screening 5/2/2022   Retired PHQ-9 Total Score -   Retired Total Score -   Little Interest or Pleasure in Doing Things 0-->not at all   Feeling Down, Depressed or Hopeless 1-->several days   PHQ-9: Brief Depression Severity Measure Score 1       Health Habits and Functional and Cognitive Screening:  Functional & Cognitive Status 5/2/2022   Do you have difficulty preparing food and eating? No   Do you have difficulty bathing yourself, getting dressed or grooming yourself? No   Do you have difficulty using the toilet? No   Do you have difficulty moving around from place to place? No   Do you have trouble with steps or getting out of a bed or a chair? No   Current Diet Well Balanced Diet   Dental Exam Up to date   Eye Exam Up to date   Exercise (times per week) 7 times per week   Current Exercise Activities Include -   Do you need help using the phone?  No   Are you deaf or do you have serious difficulty hearing?  No   Do you need help with transportation? No   Do you need help shopping? No   Do you need help preparing meals?  No   Do you need help with housework?  No   Do you need help with laundry? No   Do you need help taking your medications? No   Do you need help managing money? No   Do you ever drive or ride in a car without wearing a seat belt? No   Have you felt unusual stress, anger or loneliness in the last month? Yes   Who do you live with? Spouse   If you need help, do you have trouble finding someone available to you? No   Have you been bothered in the last four weeks by sexual problems? No   Do you have difficulty concentrating, remembering or making decisions? No       Age-appropriate Screening Schedule:  Refer to the list below for future screening recommendations based on patient's age, sex and/or medical conditions.  Orders for these recommended tests are listed in the plan section. The patient has been provided with a written plan.    Health Maintenance   Topic Date Due   • LIPID PANEL  04/23/2022   • TDAP/TD VACCINES (2 - Td or Tdap) 01/11/2023 (Originally 1/1/2022)   • INFLUENZA VACCINE  08/01/2022   • MAMMOGRAM  03/16/2024   • DXA SCAN  03/16/2024   • ZOSTER VACCINE  Discontinued              Assessment/Plan   CMS Preventative Services Quick Reference  Risk Factors Identified During Encounter  Cardiovascular Disease  Dementia/Memory   Depression/Dysphoria  Fall Risk-High or Moderate  The above risks/problems have been discussed with the patient.  Follow up actions/plans if indicated are seen below in the Assessment/Plan Section.  Pertinent information has been shared with the patient in the After Visit Summary.    Diagnoses and all orders for this visit:    1. Alteration in cognition (Primary)  -     Ambulatory Referral to Neurology  -     Vitamin B12; Future    2. Rheumatoid arthritis involving both hands, unspecified whether rheumatoid factor present (HCC)  -     Ambulatory Referral to Rheumatology        -     Referral placed at patient's request to transfer care to  rheumatology.     3. Medicare annual wellness visit, subsequent        -    Nutrition and activity goals reviewed including: mainly water to drink, limit white flour/processed sugar; increase high protein, high fiber carbs, good breakfast, working toward 150 mins cardio per week, resistance training 2x/week.    The patient is here for a health maintenance visit.  Screening lab work is ordered.  Immunizations are reported as current.  Advice and education is given regarding nutrition, aerobic exercise, routine dental evaluations, routine eye exams, reproductive health, cardiovascular risk reduction.  Further recommendations after lab evaluation.  Annual wellness evaluations recommended.     I discussed the patients findings and my recommendations with  patient.  Patient was encouraged to keep me informed of any acute changes or any new concerning symptoms.  Patient voiced understanding of all instructions and denied further questions.    4. Mixed hyperlipidemia  -     Lipid Panel; Future    5. Essential hypertension  -     CBC & Differential; Future  -     Comprehensive Metabolic Panel; Future  -     TSH Rfx On Abnormal To Free T4; Future    Follow Up:   Return in about 1 year (around 5/2/2023) for Medicare Wellness.     An After Visit Summary and PPPS were made available to the patient.

## 2022-05-04 ENCOUNTER — HOSPITAL ENCOUNTER (OUTPATIENT)
Dept: MRI IMAGING | Facility: HOSPITAL | Age: 77
Discharge: HOME | End: 2022-05-04

## 2022-05-04 DIAGNOSIS — G89.29 CHRONIC BILATERAL LOW BACK PAIN WITH SCIATICA, SCIATICA LATERALITY UNSPECIFIED: ICD-10-CM

## 2022-05-04 DIAGNOSIS — M54.40 CHRONIC BILATERAL LOW BACK PAIN WITH SCIATICA, SCIATICA LATERALITY UNSPECIFIED: ICD-10-CM

## 2022-05-04 DIAGNOSIS — M41.9 SCOLIOSIS OF THORACOLUMBAR SPINE, UNSPECIFIED SCOLIOSIS TYPE: ICD-10-CM

## 2022-05-04 LAB — CREAT BLDA-MCNC: 0.9 MG/DL (ref 0.6–1.3)

## 2022-05-04 PROCEDURE — A9577 INJ MULTIHANCE: HCPCS

## 2022-05-04 PROCEDURE — 72158 MRI LUMBAR SPINE W/O & W/DYE: CPT

## 2022-05-04 PROCEDURE — 0 GADOBENATE DIMEGLUMINE 529 MG/ML SOLUTION

## 2022-05-04 PROCEDURE — 72157 MRI CHEST SPINE W/O & W/DYE: CPT

## 2022-05-04 PROCEDURE — 82565 ASSAY OF CREATININE: CPT

## 2022-05-04 RX ADMIN — GADOBENATE DIMEGLUMINE 10 ML: 529 INJECTION, SOLUTION INTRAVENOUS at 10:51

## 2022-05-09 ENCOUNTER — TELEPHONE (OUTPATIENT)
Dept: INTERNAL MEDICINE | Facility: CLINIC | Age: 77
End: 2022-05-09

## 2022-05-18 ENCOUNTER — APPOINTMENT (OUTPATIENT)
Dept: CT IMAGING | Facility: HOSPITAL | Age: 77
End: 2022-05-18

## 2022-05-18 ENCOUNTER — HOSPITAL ENCOUNTER (EMERGENCY)
Facility: HOSPITAL | Age: 77
Discharge: HOME | End: 2022-05-18
Attending: EMERGENCY MEDICINE | Admitting: EMERGENCY MEDICINE

## 2022-05-18 ENCOUNTER — HOSPITAL ENCOUNTER (INPATIENT)
Facility: HOSPITAL | Age: 77
LOS: 2 days | Discharge: HOME | End: 2022-05-20
Attending: EMERGENCY MEDICINE | Admitting: HOSPITALIST

## 2022-05-18 ENCOUNTER — TELEPHONE (OUTPATIENT)
Dept: INTERNAL MEDICINE | Facility: CLINIC | Age: 77
End: 2022-05-18

## 2022-05-18 VITALS
OXYGEN SATURATION: 91 % | HEART RATE: 87 BPM | SYSTOLIC BLOOD PRESSURE: 122 MMHG | DIASTOLIC BLOOD PRESSURE: 74 MMHG | HEIGHT: 64 IN | BODY MASS INDEX: 23.9 KG/M2 | WEIGHT: 140 LBS | RESPIRATION RATE: 16 BRPM | TEMPERATURE: 98 F

## 2022-05-18 DIAGNOSIS — R10.32 LEFT LOWER QUADRANT ABDOMINAL PAIN: ICD-10-CM

## 2022-05-18 DIAGNOSIS — R11.2 NAUSEA AND VOMITING, UNSPECIFIED VOMITING TYPE: ICD-10-CM

## 2022-05-18 DIAGNOSIS — R50.9 FEBRILE ILLNESS: ICD-10-CM

## 2022-05-18 DIAGNOSIS — K57.92 DIVERTICULITIS: Primary | ICD-10-CM

## 2022-05-18 PROBLEM — E87.6 HYPOKALEMIA: Status: ACTIVE | Noted: 2022-05-18

## 2022-05-18 LAB
ALBUMIN SERPL-MCNC: 3.9 G/DL (ref 3.5–5.2)
ALBUMIN SERPL-MCNC: 4 G/DL (ref 3.5–5.2)
ALBUMIN/GLOB SERPL: 1.3 G/DL
ALBUMIN/GLOB SERPL: 1.5 G/DL
ALP SERPL-CCNC: 102 U/L (ref 39–117)
ALP SERPL-CCNC: 106 U/L (ref 39–117)
ALT SERPL W P-5'-P-CCNC: 13 U/L (ref 1–33)
ALT SERPL W P-5'-P-CCNC: 16 U/L (ref 1–33)
ANION GAP SERPL CALCULATED.3IONS-SCNC: 12 MMOL/L (ref 5–15)
ANION GAP SERPL CALCULATED.3IONS-SCNC: 14 MMOL/L (ref 5–15)
AST SERPL-CCNC: 20 U/L (ref 1–32)
AST SERPL-CCNC: 25 U/L (ref 1–32)
BACTERIA UR QL AUTO: ABNORMAL /HPF
BASOPHILS # BLD AUTO: 0.03 10*3/MM3 (ref 0–0.2)
BASOPHILS # BLD AUTO: 0.04 10*3/MM3 (ref 0–0.2)
BASOPHILS NFR BLD AUTO: 0.3 % (ref 0–1.5)
BASOPHILS NFR BLD AUTO: 0.6 % (ref 0–1.5)
BILIRUB SERPL-MCNC: 0.4 MG/DL (ref 0–1.2)
BILIRUB SERPL-MCNC: 0.5 MG/DL (ref 0–1.2)
BILIRUB UR QL STRIP: NEGATIVE
BUN SERPL-MCNC: 16 MG/DL (ref 8–23)
BUN SERPL-MCNC: 17 MG/DL (ref 8–23)
BUN/CREAT SERPL: 17.6 (ref 7–25)
BUN/CREAT SERPL: 18.7 (ref 7–25)
CALCIUM SPEC-SCNC: 9.3 MG/DL (ref 8.6–10.5)
CALCIUM SPEC-SCNC: 9.6 MG/DL (ref 8.6–10.5)
CHLORIDE SERPL-SCNC: 98 MMOL/L (ref 98–107)
CHLORIDE SERPL-SCNC: 99 MMOL/L (ref 98–107)
CLARITY UR: ABNORMAL
CO2 SERPL-SCNC: 23 MMOL/L (ref 22–29)
CO2 SERPL-SCNC: 26 MMOL/L (ref 22–29)
COLOR UR: YELLOW
CREAT BLDA-MCNC: 0.8 MG/DL
CREAT BLDA-MCNC: 0.8 MG/DL (ref 0.6–1.3)
CREAT SERPL-MCNC: 0.91 MG/DL (ref 0.57–1)
CREAT SERPL-MCNC: 0.91 MG/DL (ref 0.57–1)
D-LACTATE SERPL-SCNC: 1.1 MMOL/L (ref 0.5–2)
D-LACTATE SERPL-SCNC: 1.2 MMOL/L (ref 0.5–2)
DEPRECATED RDW RBC AUTO: 47.7 FL (ref 37–54)
DEPRECATED RDW RBC AUTO: 49 FL (ref 37–54)
EGFRCR SERPLBLD CKD-EPI 2021: 65.9 ML/MIN/1.73
EGFRCR SERPLBLD CKD-EPI 2021: 65.9 ML/MIN/1.73
EOSINOPHIL # BLD AUTO: 0.01 10*3/MM3 (ref 0–0.4)
EOSINOPHIL # BLD AUTO: 0.06 10*3/MM3 (ref 0–0.4)
EOSINOPHIL NFR BLD AUTO: 0.1 % (ref 0.3–6.2)
EOSINOPHIL NFR BLD AUTO: 0.9 % (ref 0.3–6.2)
ERYTHROCYTE [DISTWIDTH] IN BLOOD BY AUTOMATED COUNT: 12.7 % (ref 12.3–15.4)
ERYTHROCYTE [DISTWIDTH] IN BLOOD BY AUTOMATED COUNT: 13.2 % (ref 12.3–15.4)
FLUAV SUBTYP SPEC NAA+PROBE: NOT DETECTED
FLUBV RNA ISLT QL NAA+PROBE: NOT DETECTED
GLOBULIN UR ELPH-MCNC: 2.6 GM/DL
GLOBULIN UR ELPH-MCNC: 3.1 GM/DL
GLUCOSE SERPL-MCNC: 104 MG/DL (ref 65–99)
GLUCOSE SERPL-MCNC: 139 MG/DL (ref 65–99)
GLUCOSE UR STRIP-MCNC: NEGATIVE MG/DL
HCT VFR BLD AUTO: 36 % (ref 34–46.6)
HCT VFR BLD AUTO: 37.4 % (ref 34–46.6)
HGB BLD-MCNC: 12 G/DL (ref 12–15.9)
HGB BLD-MCNC: 12.6 G/DL (ref 12–15.9)
HGB UR QL STRIP.AUTO: NEGATIVE
HOLD SPECIMEN: NORMAL
HYALINE CASTS UR QL AUTO: ABNORMAL /LPF
IMM GRANULOCYTES # BLD AUTO: 0.02 10*3/MM3 (ref 0–0.05)
IMM GRANULOCYTES # BLD AUTO: 0.07 10*3/MM3 (ref 0–0.05)
IMM GRANULOCYTES NFR BLD AUTO: 0.3 % (ref 0–0.5)
IMM GRANULOCYTES NFR BLD AUTO: 0.6 % (ref 0–0.5)
KETONES UR QL STRIP: NEGATIVE
LEUKOCYTE ESTERASE UR QL STRIP.AUTO: ABNORMAL
LIPASE SERPL-CCNC: 38 U/L (ref 13–60)
LYMPHOCYTES # BLD AUTO: 0.23 10*3/MM3 (ref 0.7–3.1)
LYMPHOCYTES # BLD AUTO: 1.51 10*3/MM3 (ref 0.7–3.1)
LYMPHOCYTES NFR BLD AUTO: 2 % (ref 19.6–45.3)
LYMPHOCYTES NFR BLD AUTO: 22.8 % (ref 19.6–45.3)
MCH RBC QN AUTO: 33.7 PG (ref 26.6–33)
MCH RBC QN AUTO: 34.1 PG (ref 26.6–33)
MCHC RBC AUTO-ENTMCNC: 33.3 G/DL (ref 31.5–35.7)
MCHC RBC AUTO-ENTMCNC: 33.7 G/DL (ref 31.5–35.7)
MCV RBC AUTO: 101.1 FL (ref 79–97)
MCV RBC AUTO: 101.4 FL (ref 79–97)
MONOCYTES # BLD AUTO: 0.74 10*3/MM3 (ref 0.1–0.9)
MONOCYTES # BLD AUTO: 0.99 10*3/MM3 (ref 0.1–0.9)
MONOCYTES NFR BLD AUTO: 14.9 % (ref 5–12)
MONOCYTES NFR BLD AUTO: 6.3 % (ref 5–12)
NEUTROPHILS NFR BLD AUTO: 10.64 10*3/MM3 (ref 1.7–7)
NEUTROPHILS NFR BLD AUTO: 4.01 10*3/MM3 (ref 1.7–7)
NEUTROPHILS NFR BLD AUTO: 60.5 % (ref 42.7–76)
NEUTROPHILS NFR BLD AUTO: 90.7 % (ref 42.7–76)
NITRITE UR QL STRIP: NEGATIVE
NRBC BLD AUTO-RTO: 0 /100 WBC (ref 0–0.2)
NRBC BLD AUTO-RTO: 0 /100 WBC (ref 0–0.2)
PH UR STRIP.AUTO: 7.5 [PH] (ref 5–8)
PLATELET # BLD AUTO: 290 10*3/MM3 (ref 140–450)
PLATELET # BLD AUTO: 299 10*3/MM3 (ref 140–450)
PMV BLD AUTO: 9.1 FL (ref 6–12)
PMV BLD AUTO: 9.4 FL (ref 6–12)
POTASSIUM SERPL-SCNC: 3.3 MMOL/L (ref 3.5–5.2)
POTASSIUM SERPL-SCNC: 3.6 MMOL/L (ref 3.5–5.2)
PROT SERPL-MCNC: 6.6 G/DL (ref 6–8.5)
PROT SERPL-MCNC: 7 G/DL (ref 6–8.5)
PROT UR QL STRIP: NEGATIVE
RBC # BLD AUTO: 3.56 10*6/MM3 (ref 3.77–5.28)
RBC # BLD AUTO: 3.69 10*6/MM3 (ref 3.77–5.28)
RBC # UR STRIP: ABNORMAL /HPF
REF LAB TEST METHOD: ABNORMAL
SARS-COV-2 RNA PNL SPEC NAA+PROBE: NOT DETECTED
SODIUM SERPL-SCNC: 135 MMOL/L (ref 136–145)
SODIUM SERPL-SCNC: 137 MMOL/L (ref 136–145)
SP GR UR STRIP: 1.01 (ref 1–1.03)
SQUAMOUS #/AREA URNS HPF: ABNORMAL /HPF
UROBILINOGEN UR QL STRIP: ABNORMAL
WBC # UR STRIP: ABNORMAL /HPF
WBC NRBC COR # BLD: 11.72 10*3/MM3 (ref 3.4–10.8)
WBC NRBC COR # BLD: 6.63 10*3/MM3 (ref 3.4–10.8)
WHOLE BLOOD HOLD SPECIMEN: NORMAL

## 2022-05-18 PROCEDURE — 99284 EMERGENCY DEPT VISIT MOD MDM: CPT

## 2022-05-18 PROCEDURE — 99285 EMERGENCY DEPT VISIT HI MDM: CPT

## 2022-05-18 PROCEDURE — 83605 ASSAY OF LACTIC ACID: CPT | Performed by: EMERGENCY MEDICINE

## 2022-05-18 PROCEDURE — 25010000002 HYDROMORPHONE PER 4 MG: Performed by: EMERGENCY MEDICINE

## 2022-05-18 PROCEDURE — 96374 THER/PROPH/DIAG INJ IV PUSH: CPT

## 2022-05-18 PROCEDURE — 82565 ASSAY OF CREATININE: CPT

## 2022-05-18 PROCEDURE — 81001 URINALYSIS AUTO W/SCOPE: CPT | Performed by: EMERGENCY MEDICINE

## 2022-05-18 PROCEDURE — 25010000002 ONDANSETRON PER 1 MG: Performed by: EMERGENCY MEDICINE

## 2022-05-18 PROCEDURE — 85025 COMPLETE CBC W/AUTO DIFF WBC: CPT | Performed by: EMERGENCY MEDICINE

## 2022-05-18 PROCEDURE — 99223 1ST HOSP IP/OBS HIGH 75: CPT | Performed by: INTERNAL MEDICINE

## 2022-05-18 PROCEDURE — 80053 COMPREHEN METABOLIC PANEL: CPT | Performed by: EMERGENCY MEDICINE

## 2022-05-18 PROCEDURE — 36415 COLL VENOUS BLD VENIPUNCTURE: CPT

## 2022-05-18 PROCEDURE — 25010000002 IOPAMIDOL 61 % SOLUTION: Performed by: EMERGENCY MEDICINE

## 2022-05-18 PROCEDURE — 25010000002 MORPHINE PER 10 MG: Performed by: NURSE PRACTITIONER

## 2022-05-18 PROCEDURE — 96375 TX/PRO/DX INJ NEW DRUG ADDON: CPT

## 2022-05-18 PROCEDURE — 25010000002 LEVOFLOXACIN PER 250 MG: Performed by: EMERGENCY MEDICINE

## 2022-05-18 PROCEDURE — 74177 CT ABD & PELVIS W/CONTRAST: CPT

## 2022-05-18 PROCEDURE — 87636 SARSCOV2 & INF A&B AMP PRB: CPT | Performed by: EMERGENCY MEDICINE

## 2022-05-18 PROCEDURE — 83690 ASSAY OF LIPASE: CPT | Performed by: EMERGENCY MEDICINE

## 2022-05-18 PROCEDURE — 87040 BLOOD CULTURE FOR BACTERIA: CPT | Performed by: EMERGENCY MEDICINE

## 2022-05-18 PROCEDURE — 25010000002 MORPHINE PER 10 MG: Performed by: EMERGENCY MEDICINE

## 2022-05-18 RX ORDER — SODIUM CHLORIDE, SODIUM LACTATE, POTASSIUM CHLORIDE, CALCIUM CHLORIDE 600; 310; 30; 20 MG/100ML; MG/100ML; MG/100ML; MG/100ML
100 INJECTION, SOLUTION INTRAVENOUS CONTINUOUS
Status: ACTIVE | OUTPATIENT
Start: 2022-05-18 | End: 2022-05-19

## 2022-05-18 RX ORDER — METRONIDAZOLE 500 MG/100ML
500 INJECTION, SOLUTION INTRAVENOUS EVERY 8 HOURS
Status: DISCONTINUED | OUTPATIENT
Start: 2022-05-19 | End: 2022-05-20 | Stop reason: HOSPADM

## 2022-05-18 RX ORDER — ONDANSETRON 2 MG/ML
4 INJECTION INTRAMUSCULAR; INTRAVENOUS ONCE
Status: COMPLETED | OUTPATIENT
Start: 2022-05-18 | End: 2022-05-18

## 2022-05-18 RX ORDER — LEVOFLOXACIN 5 MG/ML
750 INJECTION, SOLUTION INTRAVENOUS ONCE
Status: COMPLETED | OUTPATIENT
Start: 2022-05-18 | End: 2022-05-18

## 2022-05-18 RX ORDER — LEVOFLOXACIN 5 MG/ML
750 INJECTION, SOLUTION INTRAVENOUS EVERY 24 HOURS
Status: DISCONTINUED | OUTPATIENT
Start: 2022-05-19 | End: 2022-05-20 | Stop reason: HOSPADM

## 2022-05-18 RX ORDER — POTASSIUM CHLORIDE 7.45 MG/ML
10 INJECTION INTRAVENOUS
Status: DISCONTINUED | OUTPATIENT
Start: 2022-05-18 | End: 2022-05-20 | Stop reason: HOSPADM

## 2022-05-18 RX ORDER — ONDANSETRON 2 MG/ML
4 INJECTION INTRAMUSCULAR; INTRAVENOUS EVERY 6 HOURS PRN
Status: DISCONTINUED | OUTPATIENT
Start: 2022-05-18 | End: 2022-05-20 | Stop reason: HOSPADM

## 2022-05-18 RX ORDER — ONDANSETRON 2 MG/ML
4 INJECTION INTRAMUSCULAR; INTRAVENOUS
Status: DISCONTINUED | OUTPATIENT
Start: 2022-05-18 | End: 2022-05-18

## 2022-05-18 RX ORDER — ASPIRIN 81 MG/1
81 TABLET ORAL DAILY
Status: DISCONTINUED | OUTPATIENT
Start: 2022-05-19 | End: 2022-05-20 | Stop reason: HOSPADM

## 2022-05-18 RX ORDER — MORPHINE SULFATE 2 MG/ML
1 INJECTION, SOLUTION INTRAMUSCULAR; INTRAVENOUS EVERY 4 HOURS PRN
Status: DISCONTINUED | OUTPATIENT
Start: 2022-05-18 | End: 2022-05-20 | Stop reason: HOSPADM

## 2022-05-18 RX ORDER — POTASSIUM CHLORIDE 750 MG/1
40 CAPSULE, EXTENDED RELEASE ORAL AS NEEDED
Status: DISCONTINUED | OUTPATIENT
Start: 2022-05-18 | End: 2022-05-20 | Stop reason: HOSPADM

## 2022-05-18 RX ORDER — POTASSIUM CHLORIDE 1.5 G/1.77G
40 POWDER, FOR SOLUTION ORAL AS NEEDED
Status: DISCONTINUED | OUTPATIENT
Start: 2022-05-18 | End: 2022-05-20 | Stop reason: HOSPADM

## 2022-05-18 RX ORDER — SODIUM CHLORIDE 0.9 % (FLUSH) 0.9 %
10 SYRINGE (ML) INJECTION AS NEEDED
Status: DISCONTINUED | OUTPATIENT
Start: 2022-05-18 | End: 2022-05-20 | Stop reason: HOSPADM

## 2022-05-18 RX ORDER — ONDANSETRON 4 MG/1
4 TABLET, FILM COATED ORAL EVERY 8 HOURS PRN
Qty: 15 TABLET | Refills: 0 | Status: SHIPPED | OUTPATIENT
Start: 2022-05-18 | End: 2022-07-27

## 2022-05-18 RX ORDER — ONDANSETRON 4 MG/1
4 TABLET, FILM COATED ORAL EVERY 6 HOURS PRN
Status: DISCONTINUED | OUTPATIENT
Start: 2022-05-18 | End: 2022-05-20 | Stop reason: HOSPADM

## 2022-05-18 RX ORDER — SODIUM CHLORIDE 9 MG/ML
10 INJECTION INTRAVENOUS AS NEEDED
Status: DISCONTINUED | OUTPATIENT
Start: 2022-05-18 | End: 2022-05-18 | Stop reason: HOSPADM

## 2022-05-18 RX ORDER — HYDROMORPHONE HYDROCHLORIDE 1 MG/ML
0.5 INJECTION, SOLUTION INTRAMUSCULAR; INTRAVENOUS; SUBCUTANEOUS ONCE
Status: COMPLETED | OUTPATIENT
Start: 2022-05-18 | End: 2022-05-18

## 2022-05-18 RX ORDER — HYDROCODONE BITARTRATE AND ACETAMINOPHEN 5; 325 MG/1; MG/1
1 TABLET ORAL EVERY 6 HOURS PRN
Qty: 12 TABLET | Refills: 0 | Status: SHIPPED | OUTPATIENT
Start: 2022-05-18 | End: 2022-06-29

## 2022-05-18 RX ORDER — AMOXICILLIN AND CLAVULANATE POTASSIUM 875; 125 MG/1; MG/1
1 TABLET, FILM COATED ORAL ONCE
Status: COMPLETED | OUTPATIENT
Start: 2022-05-18 | End: 2022-05-18

## 2022-05-18 RX ORDER — METRONIDAZOLE 500 MG/100ML
500 INJECTION, SOLUTION INTRAVENOUS EVERY 8 HOURS
Status: DISCONTINUED | OUTPATIENT
Start: 2022-05-18 | End: 2022-05-18

## 2022-05-18 RX ORDER — AMOXICILLIN AND CLAVULANATE POTASSIUM 875; 125 MG/1; MG/1
1 TABLET, FILM COATED ORAL 2 TIMES DAILY
Qty: 20 TABLET | Refills: 0 | Status: SHIPPED | OUTPATIENT
Start: 2022-05-18 | End: 2022-05-20 | Stop reason: HOSPADM

## 2022-05-18 RX ORDER — CETIRIZINE HYDROCHLORIDE 10 MG/1
5 TABLET ORAL DAILY
Status: DISCONTINUED | OUTPATIENT
Start: 2022-05-19 | End: 2022-05-20 | Stop reason: HOSPADM

## 2022-05-18 RX ORDER — NALOXONE HCL 0.4 MG/ML
0.4 VIAL (ML) INJECTION
Status: DISCONTINUED | OUTPATIENT
Start: 2022-05-18 | End: 2022-05-20 | Stop reason: HOSPADM

## 2022-05-18 RX ORDER — MORPHINE SULFATE 4 MG/ML
4 INJECTION, SOLUTION INTRAMUSCULAR; INTRAVENOUS
Status: DISCONTINUED | OUTPATIENT
Start: 2022-05-18 | End: 2022-05-18

## 2022-05-18 RX ADMIN — ONDANSETRON 4 MG: 2 INJECTION INTRAMUSCULAR; INTRAVENOUS at 20:32

## 2022-05-18 RX ADMIN — SODIUM CHLORIDE 500 ML: 9 INJECTION, SOLUTION INTRAVENOUS at 06:54

## 2022-05-18 RX ADMIN — LEVOFLOXACIN 750 MG: 750 INJECTION, SOLUTION INTRAVENOUS at 21:12

## 2022-05-18 RX ADMIN — ONDANSETRON 4 MG: 2 INJECTION INTRAMUSCULAR; INTRAVENOUS at 06:53

## 2022-05-18 RX ADMIN — MORPHINE SULFATE 1 MG: 2 INJECTION, SOLUTION INTRAMUSCULAR; INTRAVENOUS at 23:25

## 2022-05-18 RX ADMIN — HYDROMORPHONE HYDROCHLORIDE 0.5 MG: 1 INJECTION, SOLUTION INTRAMUSCULAR; INTRAVENOUS; SUBCUTANEOUS at 06:53

## 2022-05-18 RX ADMIN — IOPAMIDOL 98 ML: 612 INJECTION, SOLUTION INTRAVENOUS at 08:17

## 2022-05-18 RX ADMIN — MORPHINE SULFATE 4 MG: 4 INJECTION, SOLUTION INTRAMUSCULAR; INTRAVENOUS at 20:32

## 2022-05-18 RX ADMIN — AMOXICILLIN AND CLAVULANATE POTASSIUM 1 TABLET: 875; 125 TABLET, FILM COATED ORAL at 08:48

## 2022-05-18 RX ADMIN — METRONIDAZOLE 500 MG: 500 INJECTION, SOLUTION INTRAVENOUS at 20:39

## 2022-05-18 RX ADMIN — SODIUM CHLORIDE 1000 ML: 9 INJECTION, SOLUTION INTRAVENOUS at 21:11

## 2022-05-18 NOTE — TELEPHONE ENCOUNTER
Caller: Joyce Jones    Relationship: Self    Best call back number: [phone]    If a prescription is needed, what is your preferred pharmacy and phone number: [id number] - [place], [place] - [address] AT [facility] [address] - [phone]  - [phone] FX     Additional notes: PATIENT RECEIVED AUGMENTIN AT HOSPITAL FOR DIVERTICULITIS THIS MORNING. SHE CANNOT TAKE THAT MEDICATION. SHE WOULD LIKE A DIFFERENT MEDICATION FOR THIS CONDITION, AS WELL AS A MEDICATION TO HELP CONTROL NAUSEA.

## 2022-05-18 NOTE — TELEPHONE ENCOUNTER
Spoke with patient and advised per Andreas that she needs to follow up ED to get a new medicine prescribed.

## 2022-05-19 LAB
ALBUMIN SERPL-MCNC: 3.3 G/DL (ref 3.5–5.2)
ALBUMIN/GLOB SERPL: 1.9 G/DL
ALP SERPL-CCNC: 78 U/L (ref 39–117)
ALT SERPL W P-5'-P-CCNC: 13 U/L (ref 1–33)
ANION GAP SERPL CALCULATED.3IONS-SCNC: 9 MMOL/L (ref 5–15)
AST SERPL-CCNC: 16 U/L (ref 1–32)
BASOPHILS # BLD AUTO: 0.02 10*3/MM3 (ref 0–0.2)
BASOPHILS NFR BLD AUTO: 0.2 % (ref 0–1.5)
BILIRUB SERPL-MCNC: 0.5 MG/DL (ref 0–1.2)
BUN SERPL-MCNC: 15 MG/DL (ref 8–23)
BUN/CREAT SERPL: 18.1 (ref 7–25)
CALCIUM SPEC-SCNC: 8.6 MG/DL (ref 8.6–10.5)
CHLORIDE SERPL-SCNC: 106 MMOL/L (ref 98–107)
CO2 SERPL-SCNC: 24 MMOL/L (ref 22–29)
CREAT SERPL-MCNC: 0.83 MG/DL (ref 0.57–1)
DEPRECATED RDW RBC AUTO: 48.5 FL (ref 37–54)
EGFRCR SERPLBLD CKD-EPI 2021: 73.6 ML/MIN/1.73
EOSINOPHIL # BLD AUTO: 0.02 10*3/MM3 (ref 0–0.4)
EOSINOPHIL NFR BLD AUTO: 0.2 % (ref 0.3–6.2)
ERYTHROCYTE [DISTWIDTH] IN BLOOD BY AUTOMATED COUNT: 13.4 % (ref 12.3–15.4)
GLOBULIN UR ELPH-MCNC: 1.7 GM/DL
GLUCOSE SERPL-MCNC: 105 MG/DL (ref 65–99)
HCT VFR BLD AUTO: 30.8 % (ref 34–46.6)
HGB BLD-MCNC: 10.5 G/DL (ref 12–15.9)
IMM GRANULOCYTES # BLD AUTO: 0.05 10*3/MM3 (ref 0–0.05)
IMM GRANULOCYTES NFR BLD AUTO: 0.6 % (ref 0–0.5)
LYMPHOCYTES # BLD AUTO: 0.59 10*3/MM3 (ref 0.7–3.1)
LYMPHOCYTES NFR BLD AUTO: 7 % (ref 19.6–45.3)
MAGNESIUM SERPL-MCNC: 1.6 MG/DL (ref 1.6–2.4)
MCH RBC QN AUTO: 33.7 PG (ref 26.6–33)
MCHC RBC AUTO-ENTMCNC: 34.1 G/DL (ref 31.5–35.7)
MCV RBC AUTO: 98.7 FL (ref 79–97)
MONOCYTES # BLD AUTO: 0.97 10*3/MM3 (ref 0.1–0.9)
MONOCYTES NFR BLD AUTO: 11.4 % (ref 5–12)
NEUTROPHILS NFR BLD AUTO: 6.83 10*3/MM3 (ref 1.7–7)
NEUTROPHILS NFR BLD AUTO: 80.6 % (ref 42.7–76)
NRBC BLD AUTO-RTO: 0 /100 WBC (ref 0–0.2)
PLATELET # BLD AUTO: 247 10*3/MM3 (ref 140–450)
PMV BLD AUTO: 9.5 FL (ref 6–12)
POTASSIUM SERPL-SCNC: 4 MMOL/L (ref 3.5–5.2)
PROT SERPL-MCNC: 5 G/DL (ref 6–8.5)
RBC # BLD AUTO: 3.12 10*6/MM3 (ref 3.77–5.28)
SODIUM SERPL-SCNC: 139 MMOL/L (ref 136–145)
WBC NRBC COR # BLD: 8.48 10*3/MM3 (ref 3.4–10.8)

## 2022-05-19 PROCEDURE — 80053 COMPREHEN METABOLIC PANEL: CPT | Performed by: NURSE PRACTITIONER

## 2022-05-19 PROCEDURE — 25010000002 LEVOFLOXACIN PER 250 MG: Performed by: NURSE PRACTITIONER

## 2022-05-19 PROCEDURE — 25010000002 ONDANSETRON PER 1 MG: Performed by: NURSE PRACTITIONER

## 2022-05-19 PROCEDURE — 25010000002 MORPHINE PER 10 MG: Performed by: NURSE PRACTITIONER

## 2022-05-19 PROCEDURE — 83735 ASSAY OF MAGNESIUM: CPT | Performed by: NURSE PRACTITIONER

## 2022-05-19 PROCEDURE — 99233 SBSQ HOSP IP/OBS HIGH 50: CPT | Performed by: HOSPITALIST

## 2022-05-19 PROCEDURE — 85025 COMPLETE CBC W/AUTO DIFF WBC: CPT | Performed by: NURSE PRACTITIONER

## 2022-05-19 RX ORDER — NEOMYCIN SULFATE, POLYMYXIN B SULFATE, HYDROCORTISONE 3.5; 10000; 1 MG/ML; [USP'U]/ML; MG/ML
3 SOLUTION/ DROPS AURICULAR (OTIC) 4 TIMES DAILY
Status: DISCONTINUED | OUTPATIENT
Start: 2022-05-19 | End: 2022-05-20 | Stop reason: HOSPADM

## 2022-05-19 RX ORDER — FLUTICASONE PROPIONATE 50 MCG
2 SPRAY, SUSPENSION (ML) NASAL DAILY
Status: DISCONTINUED | OUTPATIENT
Start: 2022-05-19 | End: 2022-05-20 | Stop reason: HOSPADM

## 2022-05-19 RX ORDER — HYDROCODONE BITARTRATE AND ACETAMINOPHEN 5; 325 MG/1; MG/1
1 TABLET ORAL EVERY 6 HOURS PRN
Status: DISCONTINUED | OUTPATIENT
Start: 2022-05-19 | End: 2022-05-20 | Stop reason: HOSPADM

## 2022-05-19 RX ADMIN — METRONIDAZOLE 500 MG: 500 INJECTION, SOLUTION INTRAVENOUS at 05:53

## 2022-05-19 RX ADMIN — ASPIRIN 81 MG: 81 TABLET, COATED ORAL at 08:42

## 2022-05-19 RX ADMIN — CETIRIZINE HYDROCHLORIDE 5 MG: 10 TABLET, FILM COATED ORAL at 08:42

## 2022-05-19 RX ADMIN — NEOMYCIN SULFATE, POLYMYXIN B SULFATE AND HYDROCORTISONE 3 DROP: 3.5; 10000; 1 SOLUTION AURICULAR (OTIC) at 22:03

## 2022-05-19 RX ADMIN — FLUTICASONE PROPIONATE 2 SPRAY: 50 SPRAY, METERED NASAL at 12:53

## 2022-05-19 RX ADMIN — METRONIDAZOLE 500 MG: 500 INJECTION, SOLUTION INTRAVENOUS at 12:52

## 2022-05-19 RX ADMIN — METRONIDAZOLE 500 MG: 500 INJECTION, SOLUTION INTRAVENOUS at 21:53

## 2022-05-19 RX ADMIN — HYDROCODONE BITARTRATE AND ACETAMINOPHEN 1 TABLET: 5; 325 TABLET ORAL at 22:02

## 2022-05-19 RX ADMIN — LEVOFLOXACIN 750 MG: 750 INJECTION, SOLUTION INTRAVENOUS at 22:59

## 2022-05-19 RX ADMIN — MORPHINE SULFATE 1 MG: 2 INJECTION, SOLUTION INTRAMUSCULAR; INTRAVENOUS at 02:46

## 2022-05-19 RX ADMIN — SODIUM CHLORIDE, POTASSIUM CHLORIDE, SODIUM LACTATE AND CALCIUM CHLORIDE 100 ML/HR: 600; 310; 30; 20 INJECTION, SOLUTION INTRAVENOUS at 02:29

## 2022-05-19 RX ADMIN — POTASSIUM CHLORIDE 40 MEQ: 10 CAPSULE, COATED, EXTENDED RELEASE ORAL at 02:29

## 2022-05-19 RX ADMIN — ONDANSETRON 4 MG: 2 INJECTION INTRAMUSCULAR; INTRAVENOUS at 02:41

## 2022-05-19 RX ADMIN — MORPHINE SULFATE 1 MG: 2 INJECTION, SOLUTION INTRAMUSCULAR; INTRAVENOUS at 08:43

## 2022-05-19 RX ADMIN — Medication 10 ML: at 08:43

## 2022-05-20 ENCOUNTER — READMISSION MANAGEMENT (OUTPATIENT)
Dept: CALL CENTER | Facility: HOSPITAL | Age: 77
End: 2022-05-20

## 2022-05-20 VITALS
WEIGHT: 145 LBS | BODY MASS INDEX: 24.75 KG/M2 | DIASTOLIC BLOOD PRESSURE: 72 MMHG | SYSTOLIC BLOOD PRESSURE: 131 MMHG | RESPIRATION RATE: 16 BRPM | HEIGHT: 64 IN | HEART RATE: 66 BPM | OXYGEN SATURATION: 96 % | TEMPERATURE: 98 F

## 2022-05-20 PROBLEM — E87.6 HYPOKALEMIA: Status: RESOLVED | Noted: 2022-05-18 | Resolved: 2022-05-20

## 2022-05-20 LAB
ALBUMIN SERPL-MCNC: 3.6 G/DL (ref 3.5–5.2)
ALBUMIN/GLOB SERPL: 1.6 G/DL
ALP SERPL-CCNC: 80 U/L (ref 39–117)
ALT SERPL W P-5'-P-CCNC: 12 U/L (ref 1–33)
ANION GAP SERPL CALCULATED.3IONS-SCNC: 9 MMOL/L (ref 5–15)
AST SERPL-CCNC: 17 U/L (ref 1–32)
BILIRUB SERPL-MCNC: 0.3 MG/DL (ref 0–1.2)
BUN SERPL-MCNC: 12 MG/DL (ref 8–23)
BUN/CREAT SERPL: 13.2 (ref 7–25)
CALCIUM SPEC-SCNC: 9.1 MG/DL (ref 8.6–10.5)
CHLORIDE SERPL-SCNC: 107 MMOL/L (ref 98–107)
CO2 SERPL-SCNC: 25 MMOL/L (ref 22–29)
CREAT SERPL-MCNC: 0.91 MG/DL (ref 0.57–1)
DEPRECATED RDW RBC AUTO: 49.1 FL (ref 37–54)
EGFRCR SERPLBLD CKD-EPI 2021: 65.9 ML/MIN/1.73
ERYTHROCYTE [DISTWIDTH] IN BLOOD BY AUTOMATED COUNT: 13.1 % (ref 12.3–15.4)
GLOBULIN UR ELPH-MCNC: 2.2 GM/DL
GLUCOSE SERPL-MCNC: 90 MG/DL (ref 65–99)
HCT VFR BLD AUTO: 33.4 % (ref 34–46.6)
HGB BLD-MCNC: 10.8 G/DL (ref 12–15.9)
MAGNESIUM SERPL-MCNC: 1.7 MG/DL (ref 1.6–2.4)
MCH RBC QN AUTO: 33 PG (ref 26.6–33)
MCHC RBC AUTO-ENTMCNC: 32.3 G/DL (ref 31.5–35.7)
MCV RBC AUTO: 102.1 FL (ref 79–97)
PLATELET # BLD AUTO: 247 10*3/MM3 (ref 140–450)
PMV BLD AUTO: 9.5 FL (ref 6–12)
POTASSIUM SERPL-SCNC: 4.3 MMOL/L (ref 3.5–5.2)
PROT SERPL-MCNC: 5.8 G/DL (ref 6–8.5)
RBC # BLD AUTO: 3.27 10*6/MM3 (ref 3.77–5.28)
SODIUM SERPL-SCNC: 141 MMOL/L (ref 136–145)
WBC NRBC COR # BLD: 7.05 10*3/MM3 (ref 3.4–10.8)

## 2022-05-20 PROCEDURE — 85027 COMPLETE CBC AUTOMATED: CPT | Performed by: HOSPITALIST

## 2022-05-20 PROCEDURE — 80053 COMPREHEN METABOLIC PANEL: CPT | Performed by: HOSPITALIST

## 2022-05-20 PROCEDURE — 83735 ASSAY OF MAGNESIUM: CPT

## 2022-05-20 PROCEDURE — 99239 HOSP IP/OBS DSCHRG MGMT >30: CPT | Performed by: HOSPITALIST

## 2022-05-20 RX ORDER — SIMETHICONE 80 MG
80 TABLET,CHEWABLE ORAL 4 TIMES DAILY PRN
Status: DISCONTINUED | OUTPATIENT
Start: 2022-05-20 | End: 2022-05-20 | Stop reason: HOSPADM

## 2022-05-20 RX ORDER — LOPERAMIDE HYDROCHLORIDE 2 MG/1
2 CAPSULE ORAL 2 TIMES DAILY PRN
Status: SHIPPED | OUTPATIENT
Start: 2022-05-20 | End: 2022-06-19

## 2022-05-20 RX ORDER — METRONIDAZOLE 500 MG/1
500 TABLET ORAL 3 TIMES DAILY
Qty: 15 TABLET | Refills: 0 | Status: SHIPPED | OUTPATIENT
Start: 2022-05-20 | End: 2022-05-25

## 2022-05-20 RX ORDER — LEVOFLOXACIN 750 MG/1
750 TABLET ORAL DAILY
Qty: 5 TABLET | Refills: 0 | Status: SHIPPED | OUTPATIENT
Start: 2022-05-20 | End: 2022-05-25

## 2022-05-20 RX ADMIN — FLUTICASONE PROPIONATE 2 SPRAY: 50 SPRAY, METERED NASAL at 09:05

## 2022-05-20 RX ADMIN — SIMETHICONE 80 MG: 80 TABLET, CHEWABLE ORAL at 01:22

## 2022-05-20 RX ADMIN — ASPIRIN 81 MG: 81 TABLET, COATED ORAL at 09:05

## 2022-05-20 RX ADMIN — NEOMYCIN SULFATE, POLYMYXIN B SULFATE AND HYDROCORTISONE 3 DROP: 3.5; 10000; 1 SOLUTION AURICULAR (OTIC) at 09:05

## 2022-05-20 RX ADMIN — CETIRIZINE HYDROCHLORIDE 5 MG: 10 TABLET, FILM COATED ORAL at 09:05

## 2022-05-20 RX ADMIN — METRONIDAZOLE 500 MG: 500 INJECTION, SOLUTION INTRAVENOUS at 06:34

## 2022-05-20 NOTE — OUTREACH NOTE
Prep Survey    Flowsheet Row Responses   [facility] patient discharged from? [place]   Is LACE score < 7 ? No   Emergency Room discharge w/ pulse ox? No   Eligibility [facility]   Date of Admission 05/18/22   Date of Discharge 05/20/22   Discharge Disposition Home or Self Care   Discharge diagnosis Acute diverticulitis    Does the patient have one of the following disease processes/diagnoses(primary or secondary)? Other   Does the patient have Home health ordered? No   Is there a DME ordered? No   Prep survey completed? Yes          MELVIN SHAW - Registered Nurse

## 2022-05-23 ENCOUNTER — TRANSITIONAL CARE MANAGEMENT TELEPHONE ENCOUNTER (OUTPATIENT)
Dept: CALL CENTER | Facility: HOSPITAL | Age: 77
End: 2022-05-23

## 2022-05-23 LAB
BACTERIA SPEC AEROBE CULT: NORMAL
BACTERIA SPEC AEROBE CULT: NORMAL

## 2022-05-23 NOTE — OUTREACH NOTE
Call Center TCM Note    Flowsheet Row Responses   [facility] patient discharged from? [place]   Does the patient have one of the following disease processes/diagnoses(primary or secondary)? Other   TCM attempt successful? No   Unsuccessful attempts Attempt 1          Delio Ace RN    5/23/2022, 17:21 EDT

## 2022-05-23 NOTE — OUTREACH NOTE
Call Center TCM Note    Flowsheet Row Responses   [facility] patient discharged from? [place]   Does the patient have one of the following disease processes/diagnoses(primary or secondary)? Other   TCM attempt successful? No   Unsuccessful attempts Attempt 2          Delio Ace RN    5/23/2022, 17:53 EDT

## 2022-05-24 ENCOUNTER — TRANSITIONAL CARE MANAGEMENT TELEPHONE ENCOUNTER (OUTPATIENT)
Dept: CALL CENTER | Facility: HOSPITAL | Age: 77
End: 2022-05-24

## 2022-05-24 NOTE — OUTREACH NOTE
Call Center TCM Note    Flowsheet Row Responses   [facility] patient discharged from? [place]   Does the patient have one of the following disease processes/diagnoses(primary or secondary)? Other   TCM attempt successful? Yes   Call start time 0951   Call end time 0953   Discharge diagnosis Acute diverticulitis    Is patient permission given to speak with other caregiver? Yes   Person spoke with today (if not patient) and relationship  Williams Santoyo reviewed with patient/caregiver? Yes   Is the patient having any side effects they believe may be caused by any medication additions or changes? No   Does the patient have all medications ordered at discharge? Yes   Is the patient taking all medications as directed (includes completed medication regime)? Yes   Does the patient have an appointment with their PCP within 7 days of discharge? Yes   Comments regarding PCP HOSP DC FU appt 5/25/2022 with Pratik Steiner   Has the patient kept scheduled appointments due by today? N/A   Has home health visited the patient within 72 hours of discharge? N/A   Psychosocial issues? No   Did the patient receive a copy of their discharge instructions? Yes   Nursing interventions Reviewed instructions with patient   What is the patient's perception of their health status since discharge? Improving   Is the patient/caregiver able to teach back signs and symptoms related to disease process for when to call PCP? Yes   Is the patient/caregiver able to teach back signs and symptoms related to disease process for when to call 911? Yes   Is the patient/caregiver able to teach back the hierarchy of who to call/visit for symptoms/problems? PCP, Specialist, Home health nurse, Urgent Care, ED, 911 Yes   If the patient is a current smoker, are they able to teach back resources for cessation? Not a smoker   TCM call completed? Yes   Wrap up additional comments Per  she is weak but doing better. No needs or concerns.           Delio  MAYA Ace RN    5/24/2022, 09:53 EDT

## 2022-05-25 ENCOUNTER — OFFICE VISIT (OUTPATIENT)
Dept: INTERNAL MEDICINE | Facility: CLINIC | Age: 77
End: 2022-05-25

## 2022-05-25 VITALS
HEIGHT: 64 IN | WEIGHT: 138 LBS | TEMPERATURE: 97.8 F | HEART RATE: 84 BPM | RESPIRATION RATE: 16 BRPM | BODY MASS INDEX: 23.56 KG/M2 | DIASTOLIC BLOOD PRESSURE: 72 MMHG | SYSTOLIC BLOOD PRESSURE: 120 MMHG | OXYGEN SATURATION: 98 %

## 2022-05-25 DIAGNOSIS — K57.92 DIVERTICULITIS: ICD-10-CM

## 2022-05-25 DIAGNOSIS — Z09 HOSPITAL DISCHARGE FOLLOW-UP: Primary | ICD-10-CM

## 2022-05-25 DIAGNOSIS — J30.1 SEASONAL ALLERGIC RHINITIS DUE TO POLLEN: ICD-10-CM

## 2022-05-25 PROCEDURE — 1111F DSCHRG MED/CURRENT MED MERGE: CPT

## 2022-05-25 PROCEDURE — 99495 TRANSJ CARE MGMT MOD F2F 14D: CPT

## 2022-05-25 RX ORDER — CIPROFLOXACIN 500 MG/1
500 TABLET, FILM COATED ORAL 2 TIMES DAILY
Qty: 20 TABLET | Refills: 0 | Status: SHIPPED | OUTPATIENT
Start: 2022-05-25 | End: 2022-05-26 | Stop reason: SDUPTHER

## 2022-05-25 RX ORDER — AZELASTINE 1 MG/ML
2 SPRAY, METERED NASAL 2 TIMES DAILY
Qty: 30 ML | Refills: 12 | Status: SHIPPED | OUTPATIENT
Start: 2022-05-25 | End: 2022-05-26 | Stop reason: SDUPTHER

## 2022-05-25 RX ORDER — METRONIDAZOLE 500 MG/1
500 TABLET ORAL 3 TIMES DAILY
Qty: 30 TABLET | Refills: 0 | Status: SHIPPED | OUTPATIENT
Start: 2022-05-25 | End: 2022-05-26 | Stop reason: SDUPTHER

## 2022-05-25 NOTE — PROGRESS NOTES
Transitional Care Follow Up Visit  Subjective     Joyce Jones is a 76 y.o. female who presents for a transitional care management visit.    Within 48 business hours after discharge our office contacted her via telephone to coordinate her care and needs.      I reviewed and discussed the details of that call along with the discharge summary, hospital problems, inpatient lab results, inpatient diagnostic studies, and consultation reports with Joyce.     Current outpatient and discharge medications have been reconciled for the patient.  Reviewed by: LOVE Calle      Date of TCM Phone Call 5/20/2022   [facility]   Date of Admission 5/18/2022   Date of Discharge 5/20/2022   Discharge Disposition Home or Self Care     Risk for Readmission (LACE) Score: 13 (5/20/2022  6:02 AM)      Hospital Course:  Joyce Jones is a 76 year old female with known diverticulosis presents to the ED with abdominal pain, nausea and vomiting.      Acute diverticulitis   -CT of abdomen showing diverticulitis  -maintained on IV flagyl and Levaquin while admitted- patient to be discharged home on 5 more days of both by mouth  -BC x2 negative at 24 hours   -clear diet now and advance as tolerated     Hypokalemia  Hypomagnesemia  -potassium replaced per protocol and stable now  - magnesium low at 1.6- replaced and stable now     INGRIS  -CPAP at HS     Essential hypertension   -hold home cozaar, BP borderline in the ED     GERD  -PPI     Seasonal allergies  - continue Zyrtec     Discharge Follow Up Recommendations for outpatient labs/diagnostics:  - Take Levaquin and Flagyl to completion  - advance diet as tolerated    Patient states she is feeling much better since discharge. Finishing last dose of Levaquin today. Has been able to keep down foods and fluids. No abdominal pain. No nausea. No other complaints today other than requesting something to help with her seasonal allergies. Is also requesting to  have a prescription to treat diverticulitis if she should have a future reoccurrence as she has a daughter who is critically ill with ALS and cannot always leave her to seek care.         The following portions of the patient's history were reviewed and updated as appropriate: allergies, current medications, past family history, past medical history, past social history, past surgical history and problem list.    Review of Systems   Constitutional: Negative for activity change, appetite change, chills, fatigue and fever.   Respiratory: Negative for apnea, cough, choking, chest tightness, shortness of breath, wheezing and stridor.    Cardiovascular: Negative for chest pain, palpitations and leg swelling.   Gastrointestinal: Negative for abdominal distention, abdominal pain, anal bleeding, blood in stool, constipation, diarrhea, nausea, rectal pain and vomiting.   Allergic/Immunologic: Positive for environmental allergies. Negative for food allergies.       Objective   Physical Exam  Constitutional:       Appearance: Normal appearance.   HENT:      Head: Normocephalic.      Mouth/Throat:      Mouth: Mucous membranes are moist.      Pharynx: Oropharynx is clear.   Eyes:      Conjunctiva/sclera: Conjunctivae normal.      Pupils: Pupils are equal, round, and reactive to light.   Cardiovascular:      Rate and Rhythm: Normal rate and regular rhythm.      Pulses: Normal pulses.      Heart sounds: Normal heart sounds.   Pulmonary:      Effort: Pulmonary effort is normal.      Breath sounds: Normal breath sounds.   Abdominal:      General: Bowel sounds are normal.      Palpations: Abdomen is soft.      Tenderness: There is no abdominal tenderness. There is no right CVA tenderness, left CVA tenderness, guarding or rebound. Negative signs include Sheldon's sign, Rovsing's sign, McBurney's sign, psoas sign and obturator sign.   Skin:     General: Skin is warm and dry.      Capillary Refill: Capillary refill takes less than 2  seconds.   Neurological:      Mental Status: She is alert and oriented to person, place, and time.   Psychiatric:         Mood and Affect: Mood normal.         Behavior: Behavior normal.         Thought Content: Thought content normal.         Judgment: Judgment normal.         Assessment & Plan   Diagnoses and all orders for this visit:    1. Hospital discharge follow-up (Primary)  -     Comprehensive Metabolic Panel; Future  -     Magnesium; Future        -     Patient is doing much better since discharge and is back to baseline. Will review renal function and electrolytes. Encouraged to follow-up in office if symptoms were to return or fail to continue to improve as expected.   2. Diverticulitis  -     CBC & Differential; Future  -     ciprofloxacin (Cipro) 500 MG tablet; Take 1 tablet by mouth 2 (Two) Times a Day for 10 days.  Dispense: 20 tablet; Refill: 0  -     metroNIDAZOLE (Flagyl) 500 MG tablet; Take 1 tablet by mouth 3 (Three) Times a Day for 10 days.  Dispense: 30 tablet; Refill: 0        -     Had a discussion with the patient about her request. Due to unique situation I do feel that it is reasonable to provide patient with antibiotics to start if she experiences any S/S of diverticulitis in the future. Discussed S/S that would warrant seeking emergency care. Patient verbalized understanding and was agreeable with POC.     3. Seasonal allergic rhinitis due to pollen  -     azelastine (ASTELIN) 0.1 % nasal spray; 2 sprays into the nostril(s) as directed by provider 2 (Two) Times a Day. Use in each nostril as directed  Dispense: 30 mL; Refill: 12

## 2022-05-26 ENCOUNTER — TELEPHONE (OUTPATIENT)
Dept: INTERNAL MEDICINE | Facility: CLINIC | Age: 77
End: 2022-05-26

## 2022-05-26 DIAGNOSIS — J30.1 SEASONAL ALLERGIC RHINITIS DUE TO POLLEN: ICD-10-CM

## 2022-05-26 DIAGNOSIS — K57.92 DIVERTICULITIS: ICD-10-CM

## 2022-05-26 RX ORDER — AZELASTINE 1 MG/ML
2 SPRAY, METERED NASAL 2 TIMES DAILY
Qty: 30 ML | Refills: 12 | Status: SHIPPED | OUTPATIENT
Start: 2022-05-26 | End: 2022-07-27 | Stop reason: SDUPTHER

## 2022-05-26 RX ORDER — CIPROFLOXACIN 500 MG/1
500 TABLET, FILM COATED ORAL 2 TIMES DAILY
Qty: 20 TABLET | Refills: 0 | Status: SHIPPED | OUTPATIENT
Start: 2022-05-26 | End: 2022-06-05

## 2022-05-26 RX ORDER — METRONIDAZOLE 500 MG/1
500 TABLET ORAL 3 TIMES DAILY
Qty: 30 TABLET | Refills: 0 | Status: SHIPPED | OUTPATIENT
Start: 2022-05-26 | End: 2022-06-05

## 2022-05-26 NOTE — TELEPHONE ENCOUNTER
PHONE CALL FROM PATIENT.  MEDICATIONS SENT TO WRONG PHARMACY.  PLEASE RESEND TO     [facility].     CALL IF NEEDED

## 2022-06-02 ENCOUNTER — TELEPHONE (OUTPATIENT)
Dept: INTERNAL MEDICINE | Facility: CLINIC | Age: 77
End: 2022-06-02

## 2022-06-02 NOTE — TELEPHONE ENCOUNTER
"HUB TO READ:  \"Returned Pt's VM regarding her appointments w/ Neurosurgery & Neurology; Neurosurgery is for the back & Neurology for Cognition.\"  "

## 2022-06-09 NOTE — ED PROVIDER NOTES
Subjective   Pt is a pleasant 75 year old female who presents with a leg discomfort and a skin lesion. She states that a couple of months ago she experienced a break in her skin secondary to a thorn.  It had been very slow to head, concerning the patient.  She states that there is a blue color around the area.  Given the color, vague discomfort she has recently experienced, along with a history of DVT in the right lower, she has decided to have the leg evaluated.  She denies fever, chills, chest pain, SOA, abd pain, nausea, vomiting, or other acute complaints.  She has been feeling well otherwise.      Leg Pain  Location:  Leg  Injury: yes    Leg location:  L leg  Pain details:     Quality:  Burning (itching and burning)    Radiates to:  Does not radiate    Severity:  Mild    Onset quality:  Gradual    Timing:  Constant    Progression:  Improving  Chronicity:  New  Dislocation: no    Foreign body present:  No foreign bodies  Tetanus status:  Up to date  Relieved by:  Nothing  Worsened by:  Nothing  Ineffective treatments:  None tried  Associated symptoms: itching, muscle weakness and tingling    Associated symptoms: no back pain, no decreased ROM, no fatigue, no fever, no neck pain and no stiffness    Risk factors: no concern for non-accidental trauma        Review of Systems   Constitutional: Negative for fatigue and fever.   Musculoskeletal: Negative for back pain, neck pain and stiffness.   Skin: Positive for itching.   All other systems reviewed and are negative.      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 T11-sacrum   • CERVICAL SPINE SURGERY  2000    cage   • CHOLECYSTECTOMY  2014   • COLONOSCOPY  2016   • FOOT SURGERY  2008   • INGUINAL HERNIA REPAIR Bilateral 1978    2019 right side repair   • KNEE SURGERY Bilateral 2017 2005 and x2 in 8/2017(left)   • NASAL SEPTUM SURGERY  1999   • TOTAL ABDOMINAL HYSTERECTOMY WITH SALPINGO OOPHORECTOMY  1986    Unilateral salpingo-oophorectomy/cervical dysplasia/AUB   • URETHRAL DILATION      Multiple times       Family History   Problem Relation Age of Onset   • Uterine cancer Mother         Metastatic to ovaries and colon; possibly bone   • Hypertension Mother    • Kidney disease Mother    • Tuberculosis Mother    • Cancer Father         Bladder   • Heart failure Father    • Stroke Father    • Hypertension Father    • Heart attack Father    • Colon polyps Father    • Hypertension Brother    • Kidney disease Brother    • Colon polyps Brother    • Kidney disease Brother    • Hypertension Brother    • Other Daughter         CVID   • Diabetes Daughter    • Colon cancer Maternal Uncle    • Colon cancer Maternal Uncle    • Breast cancer Neg Hx        Social History     Socioeconomic History   • Marital status:      Spouse name: Not on file   • Number of children: Not on file   • Years of education: Not on file   • Highest education level: Not on file   Tobacco Use   • Smoking status: Never Smoker   • Smokeless tobacco: Never Used   Vaping Use   • Vaping Use: Never used   Substance and Sexual Activity   • Alcohol use: Yes     Alcohol/week: 1.0 standard drinks     Types: 1 Glasses of wine per week     Comment: rarely at holidays    • Drug use: No   • Sexual activity: Not Currently     Comment:            Objective   Physical Exam  Vitals and nursing note reviewed.   Constitutional:       General: She is not in acute distress.  HENT:      Head: Normocephalic and atraumatic.      Mouth/Throat:      Mouth: Mucous membranes are moist.   Eyes:      Conjunctiva/sclera: Conjunctivae normal.      Pupils: Pupils are equal, round, and reactive to light.   Neck:      Thyroid: No thyromegaly.   Cardiovascular:      Rate and Rhythm: Normal rate and regular rhythm.      Heart sounds: Normal heart sounds. No murmur heard.   No friction rub. No gallop.    Pulmonary:      Effort: Pulmonary effort is normal. No respiratory distress.      Breath sounds: Normal breath sounds.   Abdominal:      General: Bowel sounds are normal.      Palpations: Abdomen is soft.      Tenderness: There is no abdominal tenderness.   Musculoskeletal:         General: No swelling, tenderness or deformity. Normal range of motion.      Cervical back: Normal range of motion.      Right lower leg: No edema.      Left lower leg: No edema.   Skin:     General: Skin is warm and dry.      Capillary Refill: Capillary refill takes less than 2 seconds.      Comments: Small, superficial 3mm lesion to the left shin.  No induration, erythema, warm, significant TTP, or other definitive sign of infection.   Neurological:      Mental Status: She is alert and oriented to person, place, and time.   Psychiatric:         Mood and Affect: Mood normal.         Behavior: Behavior normal.         Procedures           ED Course      Results for DEISY BURNETT (MRN [id number]) as of 8/14/2021 15:31   Ref. Range 7/23/2021 11:11   WBC Latest Ref Range: 3.40 - 10.80 10*3/mm3 4.59   RBC Latest Ref Range: 3.77 - 5.28 10*6/mm3 3.81   Hemoglobin Latest Ref Range: 12.0 - 15.9 g/dL 13.0   Hematocrit Latest Ref Range: 34.0 - 46.6 % 40.3   RDW Latest Ref Range: 12.3 - 15.4 % 13.3   MCV Latest Ref Range: 79.0 -  97.0 fL 105.8 (H)   MCH Latest Ref Range: 26.6 - 33.0 pg 34.1 (H)   MCHC Latest Ref Range: 31.5 - 35.7 g/dL 32.3   MPV Latest Ref Range: 6.0 - 12.0 fL 9.9   Platelets Latest Ref Range: 140 - 450 10*3/mm3 240   RDW-SD Latest Ref Range: 37.0 - 54.0 fl 51.8   Neutrophil Rel % Latest Ref Range: 42.7 - 76.0 % 55.8   Lymphocyte Rel % Latest Ref Range: 19.6 - 45.3 % 30.7   Monocyte Rel % Latest Ref Range: 5.0 - 12.0 % 11.1   Eosinophil Rel % Latest Ref Range: 0.3 - 6.2 % 1.5   Basophil Rel % Latest Ref Range: 0.0 - 1.5 % 0.7   Immature Granulocyte Rel % Latest Ref Range: 0.0 - 0.5 % 0.2   Neutrophils Absolute Latest Ref Range: 1.70 - 7.00 10*3/mm3 2.56   Lymphocytes Absolute Latest Ref Range: 0.70 - 3.10 10*3/mm3 1.41   Monocytes Absolute Latest Ref Range: 0.10 - 0.90 10*3/mm3 0.51   Eosinophils Absolute Latest Ref Range: 0.00 - 0.40 10*3/mm3 0.07   Basophils Absolute Latest Ref Range: 0.00 - 0.20 10*3/mm3 0.03   Immature Grans, Absolute Latest Ref Range: 0.00 - 0.05 10*3/mm3 0.01   nRBC Latest Ref Range: 0.0 - 0.2 /100 WBC 0.0   Results for DEISY BURNETT (MRN [id number]) as of 8/14/2021 15:31   Ref. Range 7/23/2021 13:37   Glucose Latest Ref Range: 65 - 99 mg/dL 103 (H)   Sodium Latest Ref Range: 136 - 145 mmol/L 141   Potassium Latest Ref Range: 3.5 - 5.2 mmol/L 4.0   CO2 Latest Ref Range: 22.0 - 29.0 mmol/L 29.0   Chloride Latest Ref Range: 98 - 107 mmol/L 102   Anion Gap Latest Ref Range: 5.0 - 15.0 mmol/L 10.0   Creatinine Latest Ref Range: 0.57 - 1.00 mg/dL 0.86   BUN Latest Ref Range: 8 - 23 mg/dL 16   BUN/Creatinine Ratio Latest Ref Range: 7.0 - 25.0  18.6   Calcium Latest Ref Range: 8.6 - 10.5 mg/dL 10.2   eGFR Non  Am Latest Ref Range: >60 mL/min/1.73 65   Alkaline Phosphatase Latest Ref Range: 39 - 117 U/L 82   Total Protein Latest Ref Range: 6.0 - 8.5 g/dL 7.5   ALT (SGPT) Latest Ref Range: 1 - 33 U/L 16   AST (SGOT) Latest Ref Range: 1 - 32 U/L 25   Total Bilirubin Latest Ref Range: 0.0 - 1.2 mg/dL  "0.3   Albumin Latest Ref Range: 3.50 - 5.20 g/dL 4.60   Globulin Latest Units: gm/dL 2.9   A/G Ratio Latest Units: g/dL 1.6   Protime Latest Ref Range: 11.4 - 14.4 Seconds 12.6   INR Latest Ref Range: 0.85 - 1.16  0.97     No results found for this or any previous visit (from the past 24 hour(s)).  Note: In addition to lab results from this visit, the labs listed above may include labs taken at another facility or during a different encounter within the last 24 hours. Please correlate lab times with ED admission and discharge times for further clarification of the services performed during this visit.    XR Tibia Fibula 2 View Left   Final Result   No radiopaque foreign body within the soft tissues despite   mild soft tissue irregularity anterior soft tissues overlying the distal   tibial diaphysis area of interest labeled on lateral view.       D:  07/23/2021   E:  07/23/2021       This report was finalized on 7/23/2021 7:11 PM by Dr. Homero Chaney.            Vitals:    07/23/21 1230 07/23/21 1300 07/23/21 1330 07/23/21 1409   BP: 135/76 146/79     BP Location: Right arm Right arm     Patient Position: Lying Lying     Pulse: 78 85     Resp: 16 18     Temp:       TempSrc:       SpO2: 100% 100% 99%    Weight:    63.5 kg (140 lb)   Height:    162.6 cm (64\")     Medications - No data to display  ECG/EMG Results (last 24 hours)     ** No results found for the last 24 hours. **        No orders to display       Clinicallly, pt's exam is inconsistent with DVT, abscess, cellulitis, or other significant underlying pathology.  Pt is reassured by today's evaluation.  She and her  are both comfortable with DC at this time.  They understand to have a low threshold to return to the ED if symptoms worsen or other concerns arise.          MDM    Final diagnoses:   Leg wound, left, initial encounter   Left leg paresthesias   Contusion of left leg, initial encounter       ED Disposition  ED Disposition     ED Disposition " Condition Comment    Discharge Stable         DISCHARGE    Patient discharged in stable condition.    Reviewed implications of results, diagnosis, meds, responsibility to follow up, warning signs and symptoms of possible worsening, potential complications and reasons to return to ER.    Patient/Family voiced understanding of above instructions.    Discussed plan for discharge, as there is no emergent indication for admission.  Pt/family is agreeable and understands need for follow up and possible repeat testing.  Pt/family is aware that discharge does not mean that nothing is wrong but that it indicates no emergency is currently present that requires admission and they must continue care with follow-up as given below or with a physician of their choice.     FOLLOW-UP  Pura Burk, APRN  [address] [id number] [phone]    Schedule an appointment as soon as possible for a visit       [facility] - [address]  [place] [id number] [phone]  Schedule an appointment as soon as possible for a visit       [facility] [place] [address] [phone]  Schedule an appointment as soon as possible for a visit            Medication List      Changed    losartan 50 MG tablet  Commonly known as: COZAAR  Take 1 tablet by mouth Daily.  What changed:   · when to take this  · reasons to take this              Pura Burk, APRN  [address] [id number] [phone]    Schedule an appointment as soon as possible for a visit       [facility] - [address]  [place] [id number] [phone]  Schedule an appointment as soon as possible for a visit       [facility] [address] [phone]  Schedule an appointment as soon as possible for a visit             Medication List      Changed    losartan 50 MG tablet  Commonly known as: COZAAR  Take 1 tablet by mouth Daily.  What changed:   · when to take this  · reasons to take this             Demar Josue,   08/14/21 1530       Demar Josue,   08/14/21 1532     Never

## 2022-06-29 ENCOUNTER — OFFICE VISIT (OUTPATIENT)
Dept: FAMILY MEDICINE CLINIC | Facility: CLINIC | Age: 77
End: 2022-06-29

## 2022-06-29 ENCOUNTER — PATIENT ROUNDING (BHMG ONLY) (OUTPATIENT)
Dept: FAMILY MEDICINE CLINIC | Facility: CLINIC | Age: 77
End: 2022-06-29

## 2022-06-29 VITALS
WEIGHT: 140 LBS | OXYGEN SATURATION: 97 % | HEIGHT: 64 IN | HEART RATE: 79 BPM | TEMPERATURE: 97.8 F | BODY MASS INDEX: 23.9 KG/M2 | SYSTOLIC BLOOD PRESSURE: 130 MMHG | DIASTOLIC BLOOD PRESSURE: 82 MMHG

## 2022-06-29 DIAGNOSIS — Z00.00 ENCOUNTER FOR MEDICAL EXAMINATION TO ESTABLISH CARE: ICD-10-CM

## 2022-06-29 DIAGNOSIS — F32.A ANXIETY AND DEPRESSION: ICD-10-CM

## 2022-06-29 DIAGNOSIS — Z87.19 HISTORY OF DIVERTICULITIS: ICD-10-CM

## 2022-06-29 DIAGNOSIS — M25.50 POLYARTHRALGIA: ICD-10-CM

## 2022-06-29 DIAGNOSIS — M54.40 CHRONIC BILATERAL LOW BACK PAIN WITH SCIATICA, SCIATICA LATERALITY UNSPECIFIED: Primary | ICD-10-CM

## 2022-06-29 DIAGNOSIS — J30.9 ALLERGIC RHINITIS, UNSPECIFIED SEASONALITY, UNSPECIFIED TRIGGER: ICD-10-CM

## 2022-06-29 DIAGNOSIS — M15.9 PRIMARY OSTEOARTHRITIS INVOLVING MULTIPLE JOINTS: ICD-10-CM

## 2022-06-29 DIAGNOSIS — M35.3 POLYMYALGIA RHEUMATICA: ICD-10-CM

## 2022-06-29 DIAGNOSIS — I10 ESSENTIAL HYPERTENSION: ICD-10-CM

## 2022-06-29 DIAGNOSIS — F41.9 ANXIETY AND DEPRESSION: ICD-10-CM

## 2022-06-29 DIAGNOSIS — M79.7 FIBROMYALGIA: ICD-10-CM

## 2022-06-29 DIAGNOSIS — G89.29 CHRONIC BILATERAL LOW BACK PAIN WITH SCIATICA, SCIATICA LATERALITY UNSPECIFIED: Primary | ICD-10-CM

## 2022-06-29 PROBLEM — M81.0 OSTEOPOROSIS: Status: RESOLVED | Noted: 2020-09-29 | Resolved: 2022-06-29

## 2022-06-29 PROBLEM — R76.11 MANTOUX: POSITIVE: Status: RESOLVED | Noted: 2018-09-14 | Resolved: 2022-06-29

## 2022-06-29 PROBLEM — K40.90 RIGHT INGUINAL HERNIA: Status: RESOLVED | Noted: 2019-04-14 | Resolved: 2022-06-29

## 2022-06-29 PROBLEM — R79.89 ELEVATED D-DIMER: Status: RESOLVED | Noted: 2019-12-16 | Resolved: 2022-06-29

## 2022-06-29 PROBLEM — K57.92 DIVERTICULITIS: Status: RESOLVED | Noted: 2022-05-18 | Resolved: 2022-06-29

## 2022-06-29 PROBLEM — K57.92 DIVERTICULITIS: Status: RESOLVED | Noted: 2022-02-25 | Resolved: 2022-06-29

## 2022-06-29 PROCEDURE — 99214 OFFICE O/P EST MOD 30 MIN: CPT | Performed by: STUDENT IN AN ORGANIZED HEALTH CARE EDUCATION/TRAINING PROGRAM

## 2022-06-29 RX ORDER — DULOXETIN HYDROCHLORIDE 20 MG/1
20 CAPSULE, DELAYED RELEASE ORAL DAILY
Qty: 30 CAPSULE | Refills: 2 | Status: SHIPPED | OUTPATIENT
Start: 2022-06-29 | End: 2022-07-27

## 2022-06-29 NOTE — PROGRESS NOTES
New Patient Office Visit      Patient Name: Joyce Jones  : 1945   MRN: [id number]   Care Team: Patient Care Team:  Pratik Steiner APRN as PCP - General (Nurse Practitioner)  Zeeshan Shaw MD (Inactive) as Consulting Physician (Neurosurgery)  Hu Dewey MD as Consulting Physician (Pain Medicine)    Chief Complaint:    Chief Complaint   Patient presents with   • Establish Care     Onboard from [facility]   • Diverticulitis     Pt states she was recently hospitalized she states she had really bad diarrhea and vomiting and wasn't able to get in to see PCP before it got too bad.    • Back Pain     Pt states she has spinal stenosis and has been using lidocaine but is having a lot of pain in her back.    • Abdominal Pain     Pt states she's having pain in her stomach similar to what she had when she had a hernia.        History of Present Illness: Joyce Jones is a 76 y.o. female who is here today to establish care.  Previously following with [facility]. Her daughter has ALS and she has been struggling mentally with this diagnosis - feels she has been very anxious, depressed and is interested in talking with therapist. She is also interested in medication management. Denies SI. Has never tried prior medication for this.     Diverticulitis - hospitalized - for flare of diverticulitis. Reports having 2-3 flares in her life. She recovered well after taking antibiotics.     History of DVT after right knee surgery and was taking HRT at the time. No DVT since.     Allergic rhinitis, eustachian tube dysfunction - improved with zyrtec and azelastine spray. Worsens with outdoor exposure to grass.     Chronic joint pain 2/2 bilateral knee OA, fibromyalgia, polymyalgia rheumatica - following with rheumatology, Dr. Quiroz. Taking tramadol BID for this. Wants to switch practices, not currently happy with care.     Lumbar spinal stenosis - reports bilateral low back pain with shooting  pain into bilateral LE and buttox. Had MR lumbar spine 5/2022 revealing mod-severe neuroforaminal narrowing at L3-L4. Has been using lidocaine patches without significant relief. Has appointment with NS next month to establish care.     Subjective      Review of Systems:   Review of Systems - See HPI    Past Medical History:   Past Medical History:   Diagnosis Date   • Allergic    • Allergic rhinitis    • Anemia    • Arthritis    • Cholelithiasis    • Deep vein thrombosis (HCC) Clot following left knee replacement   • Diverticulosis    • Heart murmur    • History of bone density study 03/02/2017    [facility]   • History of mammography, screening 2016   • Hyperlipidemia    • Hypertension    • Inguinal hernia 1978    repair   • Irritable bowel syndrome Self   • Kidney stone Embedded   • Low back pain Scoliosis/kyphosis   • Neuromuscular disorder (HCC) Shoulder pain , arthritic issues, more severe   • Osteoarthritis (arthritis due to wear and tear of joints)    • Osteopenia    • Personal history of DVT (deep vein thrombosis) 2005    right leg- due to surgery   • Pneumonia 5 times   • Positive TB test     Always comes back + but negative further testing   • Pulmonary embolism (HCC) Right  thigh following knee replace   • Renal insufficiency 1999. Has been under control . Function stable   • Scoliosis Joyce       Past Surgical History:   Past Surgical History:   Procedure Laterality Date   • BACK SURGERY  2002    Scoliosis bar. T11-sacrum   • BREAST BIOPSY      over 15 years ago.    • CERVICAL SPINE SURGERY  2000    cage   • CHOLECYSTECTOMY  2014   • COLONOSCOPY  2016   • EYE SURGERY  Detached retina   • FOOT SURGERY  2008   • HERNIA REPAIR  3 both bowl areas   • INGUINAL HERNIA REPAIR Bilateral 1978    2019 right side repair   • INGUINAL HERNIA REPAIR  Both   • JOINT REPLACEMENT  4 knee replacement surgeries    Right knee, 1980 approx. left knee , three replacements appears stable now.   • KNEE SURGERY  Bilateral 2017    2005 and x2 in 8/2017(left)   • NASAL SEPTUM SURGERY  1999   • SINUS SURGERY  1986   • SPINE SURGERY  2004   • TOTAL ABDOMINAL HYSTERECTOMY WITH SALPINGO OOPHORECTOMY  1986    Unilateral salpingo-oophorectomy/cervical dysplasia/AUB   • TUBAL ABDOMINAL LIGATION  1982   • URETHRAL DILATION      Multiple times       Family History:   Family History   Problem Relation Age of Onset   • Uterine cancer Mother         Metastatic to ovaries and colon; possibly bone   • Hypertension Mother    • Kidney disease Mother    • Tuberculosis Mother    • Arthritis Mother    • Thyroid disease Mother    • Cancer Father         Bladder   • Heart failure Father    • Stroke Father    • Hypertension Father    • Heart attack Father    • Colon polyps Father    • Arthritis Father    • Hypertension Brother    • Kidney disease Brother    • Colon polyps Brother    • Cancer Brother    • Heart disease Brother    • Kidney disease Brother    • Hypertension Brother    • Cancer Brother         Cancer   • Other Daughter         CVID   • Diabetes Daughter    • Colon cancer Maternal Uncle    • Colon cancer Maternal Uncle    • Breast cancer Paternal Aunt    • Cancer Brother        Social History:   Social History     Socioeconomic History   • Marital status:    Tobacco Use   • Smoking status: Never Smoker   • Smokeless tobacco: Never Used   Vaping Use   • Vaping Use: Never used   Substance and Sexual Activity   • Alcohol use: Not Currently     Alcohol/week: 1.0 standard drink     Types: 1 Glasses of wine per week     Comment: rarely at holidays   • Drug use: Never   • Sexual activity: Not Currently     Partners: Male     Birth control/protection: Post-menopausal, Surgical     Comment: Hysterectomy       Tobacco History:   Social History     Tobacco Use   Smoking Status Never Smoker   Smokeless Tobacco Never Used       Medications:     Current Outpatient Medications:   •  azelastine (ASTELIN) 0.1 % nasal spray, 2 sprays into the  "nostril(s) as directed by provider 2 (Two) Times a Day. Use in each nostril as directed, Disp: 30 mL, Rfl: 12  •  cetirizine (zyrTEC) 10 MG tablet, Take 1 mg by mouth Daily., Disp: , Rfl: 5  •  DULoxetine (Cymbalta) 20 MG capsule, Take 1 capsule by mouth Daily., Disp: 30 capsule, Rfl: 2  •  losartan (COZAAR) 50 MG tablet, Take 1 tablet by mouth Daily. (Patient taking differently: Take 50 mg by mouth As Needed.), Disp: 90 tablet, Rfl: 1  •  ondansetron (ZOFRAN) 4 MG tablet, Take 1 tablet by mouth Every 8 (Eight) Hours As Needed for Nausea., Disp: 15 tablet, Rfl: 0  •  PHARMACY MEDS TO BED CONSULT, Daily., Disp: , Rfl:   •  traMADol-acetaminophen (ULTRACET) 37.5-325 MG per tablet, Take 2 tablets by mouth 2 (Two) Times a Day., Disp: , Rfl:     Allergies:   Allergies   Allergen Reactions   • Overton-2 Inhibitors Anaphylaxis   • Nabumetone Anaphylaxis   • Nsaids GI Intolerance   • Augmentin [Amoxicillin-Pot Clavulanate] Nausea Only   • Celecoxib Hives   • Zithromax [Azithromycin] Dizziness       Objective     Physical Exam:  Vital Signs:   Vitals:    06/29/22 1313   BP: 130/82   Pulse: 79   Temp: 97.8 °F (36.6 °C)   SpO2: 97%   Weight: 63.5 kg (140 lb)   Height: 162.6 cm (64\")   PainSc:   6   PainLoc: Back     Body mass index is 24.03 kg/m².     Physical Exam  Vitals reviewed.   Constitutional:       Appearance: Normal appearance. She is not ill-appearing.   Cardiovascular:      Rate and Rhythm: Normal rate.   Pulmonary:      Effort: Pulmonary effort is normal. No respiratory distress.   Musculoskeletal:         General: Normal range of motion.      Comments: Tenderness to MCPs in bilaterally. Synovitis of right 2nd and 3rd MCP without warmth or erythema.  Left 2nd DIP deformity.    Skin:     General: Skin is warm and dry.   Neurological:      Mental Status: She is alert.      Gait: Gait normal.   Psychiatric:         Behavior: Behavior normal.         Judgment: Judgment normal.      Comments: anxious         Assessment / " Plan      Assessment/Plan:   Problems Addressed This Visit  Diagnoses and all orders for this visit:    1. Chronic bilateral low back pain with sciatica, sciatica laterality unspecified (Primary)  2. Polyarthralgia  3. Polymyalgia rheumatica (HCC)  4. Fibromyalgia  -     DULoxetine (Cymbalta) 20 MG capsule; Take 1 capsule by mouth Daily.  Dispense: 30 capsule; Refill: 2  -     Ambulatory Referral to Rheumatology    Previously following with [facility], Dr. Quiroz. Wants to transfer care. Referral placed today. Only current management is tramadol BID and she feels pain is not adequately controlled. Will start cymbalta 20mg daily in hopes that this will help with both mood and chronic pain.     She has appointment with [facility] next month to discuss management of chronic low back pain - MRI 05/2022 reviewed and agree with NS referral.    5. Anxiety and depression  -     DULoxetine (Cymbalta) 20 MG capsule; Take 1 capsule by mouth Daily.  Dispense: 30 capsule; Refill: 2  -     Ambulatory Referral to Behavioral Health  Symptoms mostly surrounding daughters medical illness (ALS diagnosis). She is struggling with finding healthy coping mechanisms, interested in talk therapy and medications. Will refer to  for talk therapy and start cymbalta as above. Stressed the importance of active lifestyle, strong support system.    6. Primary osteoarthritis involving multiple joints  S/p bilateral knee replacements. Taking tramadol BID for pain - managed per rheumatology     7. Allergic rhinitis, unspecified seasonality, unspecified trigger  Well controled with zyrtec and azelastine spray    8. Essential hypertension  Well controlled with current losartan    9. History of diverticulitis  Resolved    Discussed importance of preventative care including vaccinations, age appropriate cancer screening, routine lab work, healthy diet, and active lifestyle.        Plan of care reviewed with patient at the conclusion of today's  visit. Education was provided regarding diagnosis and management.  Patient verbalizes understanding of and agreement with management plan.      Follow Up:   Return in about 4 weeks (around 7/27/2022) for Recheck mental health and chronic pain - 30 min appointment.          DO LANA Ba RD  [facility]  [address] [facility] [address]  Fax [phone]  Phone [phone]

## 2022-07-19 ENCOUNTER — TELEPHONE (OUTPATIENT)
Dept: FAMILY MEDICINE CLINIC | Facility: CLINIC | Age: 77
End: 2022-07-19

## 2022-07-19 NOTE — TELEPHONE ENCOUNTER
Caller: Joyce Jones    Relationship: Self    Who are you requesting to speak with (clinical staff, provider,  specific staff member):   REFERRAL     What was the call regarding: PATIENT STATES SHE NEEDS TO CANCEL HER July 27TH APPOINTMENT WITH LILIBETH GOODWIN WITH [facility] DUE TO A DEATH IN HER FAMILY. PATIENT STATES SHE WILL CALL BACK TO RESCHEDULE.    PLEASE CANCEL THIS APPOINTMENT.     Do you require a callback: NO.    THANK YOU.

## 2022-07-22 ENCOUNTER — OFFICE VISIT (OUTPATIENT)
Dept: NEUROSURGERY | Facility: CLINIC | Age: 77
End: 2022-07-22

## 2022-07-22 VITALS
SYSTOLIC BLOOD PRESSURE: 158 MMHG | TEMPERATURE: 97.1 F | HEIGHT: 65 IN | WEIGHT: 141 LBS | DIASTOLIC BLOOD PRESSURE: 82 MMHG | BODY MASS INDEX: 23.49 KG/M2

## 2022-07-22 DIAGNOSIS — M41.00 INFANTILE IDIOPATHIC SCOLIOSIS, UNSPECIFIED SPINAL REGION: ICD-10-CM

## 2022-07-22 DIAGNOSIS — M53.3 SACRAL PAIN: Primary | ICD-10-CM

## 2022-07-22 DIAGNOSIS — M43.25 FUSION OF SPINE OF THORACOLUMBAR REGION: ICD-10-CM

## 2022-07-22 DIAGNOSIS — M54.9 BACK PAIN WITHOUT RADIATION: ICD-10-CM

## 2022-07-22 PROCEDURE — 99214 OFFICE O/P EST MOD 30 MIN: CPT | Performed by: PHYSICIAN ASSISTANT

## 2022-07-22 NOTE — PROGRESS NOTES
Patient: Joyce Jones  : 1945  Chart #: [id number]    Date of Service: 2022    CHIEF COMPLAINT: low back and buttock pain     History of Present Illness Patient is a very pleasant 76-year-old woman who is new to our clinic.  She has a history significant for undergoing T11-S1 posterior fusion for scoliosis about 20 years ago.  This was done in [place].  About 5 months ago she developed some increased low back pain that radiates into the buttock bilaterally.  No symptoms into her legs.  Her pain is worse with lifting, squatting, sitting, or going up stairs.  She has relief by lying on her side.  She went to the [facility] [place] in [place] and was told there was nothing to be done surgically.  She has known stable fracture in her cameron at L3-4.  She does not describe symptoms of neurogenic claudication.  No bowel or bladder incontinence or focal weakness.  She has not tried any therapies.  Physical therapy has not been helpful to her in the past.      Past Medical History:   Diagnosis Date   • Allergic    • Allergic rhinitis    • Anemia    • Arthritis    • Cholelithiasis    • Deep vein thrombosis (HCC) Clot following left knee replacement   • Diverticulosis    • Heart murmur    • History of bone density study 2017    [facility]   • History of mammography, screening    • Hyperlipidemia    • Hypertension    • Inguinal hernia 1978    repair   • Irritable bowel syndrome Self   • Kidney stone Embedded   • Low back pain Scoliosis/kyphosis   • Neuromuscular disorder (HCC) Shoulder pain , arthritic issues, more severe   • Osteoarthritis (arthritis due to wear and tear of joints)    • Osteopenia    • Personal history of DVT (deep vein thrombosis)     right leg- due to surgery   • Pneumonia 5 times   • Positive TB test     Always comes back + but negative further testing   • Pulmonary embolism (HCC) Right  thigh following knee replace   • Renal insufficiency . Has been  under control . Function stable   • Scoliosis Joyce         Current Outpatient Medications:   •  azelastine (ASTELIN) 0.1 % nasal spray, 2 sprays into the nostril(s) as directed by provider 2 (Two) Times a Day. Use in each nostril as directed, Disp: 30 mL, Rfl: 12  •  cetirizine (zyrTEC) 10 MG tablet, Take 1 mg by mouth Daily., Disp: , Rfl: 5  •  DULoxetine (Cymbalta) 20 MG capsule, Take 1 capsule by mouth Daily., Disp: 30 capsule, Rfl: 2  •  losartan (COZAAR) 50 MG tablet, Take 1 tablet by mouth Daily. (Patient taking differently: Take 50 mg by mouth As Needed.), Disp: 90 tablet, Rfl: 1  •  ondansetron (ZOFRAN) 4 MG tablet, Take 1 tablet by mouth Every 8 (Eight) Hours As Needed for Nausea., Disp: 15 tablet, Rfl: 0  •  PHARMACY MEDS TO BED CONSULT, Daily., Disp: , Rfl:   •  traMADol-acetaminophen (ULTRACET) 37.5-325 MG per tablet, Take 2 tablets by mouth 2 (Two) Times a Day., Disp: , Rfl:     Past Surgical History:   Procedure Laterality Date   • BACK SURGERY  2002    Scoliosis bar. T11-sacrum   • BREAST BIOPSY      over 15 years ago.    • CERVICAL SPINE SURGERY  2000    cage   • CHOLECYSTECTOMY  2014   • COLONOSCOPY  2016   • EYE SURGERY  Detached retina   • FOOT SURGERY  2008   • HERNIA REPAIR  3 both bowl areas   • INGUINAL HERNIA REPAIR Bilateral 1978 2019 right side repair   • INGUINAL HERNIA REPAIR  Both   • JOINT REPLACEMENT  4 knee replacement surgeries    Right knee, 1980 approx. left knee , three replacements appears stable now.   • KNEE SURGERY Bilateral 2017 2005 and x2 in 8/2017(left)   • NASAL SEPTUM SURGERY  1999   • SINUS SURGERY  1986   • SPINE SURGERY  2004   • TOTAL ABDOMINAL HYSTERECTOMY WITH SALPINGO OOPHORECTOMY  1986    Unilateral salpingo-oophorectomy/cervical dysplasia/AUB   • TUBAL ABDOMINAL LIGATION  1982   • URETHRAL DILATION      Multiple times       Social History     Socioeconomic History   • Marital status:    Tobacco Use   • Smoking status: Never Smoker   • Smokeless  tobacco: Never Used   Vaping Use   • Vaping Use: Never used   Substance and Sexual Activity   • Alcohol use: Not Currently     Alcohol/week: 1.0 standard drink     Types: 1 Glasses of wine per week     Comment: rarely at holidays   • Drug use: Never   • Sexual activity: Not Currently     Partners: Male     Birth control/protection: Post-menopausal, Surgical     Comment: Hysterectomy         Review of Systems   Constitutional: Positive for appetite change and fatigue. Negative for activity change, chills, diaphoresis, fever and unexpected weight change.   HENT: Positive for congestion, ear pain and sneezing. Negative for dental problem, drooling, ear discharge, facial swelling, hearing loss, mouth sores, nosebleeds, postnasal drip, rhinorrhea, sinus pressure, sore throat, tinnitus, trouble swallowing and voice change.    Eyes: Negative for photophobia, pain, discharge, redness, itching and visual disturbance.   Respiratory: Positive for apnea. Negative for cough, choking, chest tightness, shortness of breath, wheezing and stridor.    Cardiovascular: Negative for chest pain, palpitations and leg swelling.   Gastrointestinal: Positive for abdominal pain and rectal pain. Negative for abdominal distention, anal bleeding, blood in stool, constipation, diarrhea, nausea and vomiting.   Endocrine: Negative for cold intolerance, heat intolerance, polydipsia, polyphagia and polyuria.   Genitourinary: Negative for decreased urine volume, difficulty urinating, dysuria, enuresis, flank pain, frequency, genital sores, hematuria and urgency.   Musculoskeletal: Positive for back pain, joint swelling, neck pain and neck stiffness. Negative for arthralgias, gait problem and myalgias.   Skin: Positive for rash. Negative for color change, pallor and wound.   Allergic/Immunologic: Negative for environmental allergies, food allergies and immunocompromised state.   Neurological: Positive for weakness. Negative for dizziness, tremors,  "seizures, syncope, facial asymmetry, speech difficulty, light-headedness, numbness and headaches.   Hematological: Negative for adenopathy. Does not bruise/bleed easily.   Psychiatric/Behavioral: Negative for agitation, behavioral problems, confusion, decreased concentration, dysphoric mood, hallucinations, self-injury, sleep disturbance and suicidal ideas. The patient is not nervous/anxious and is not hyperactive.    All other systems reviewed and are negative.      Objective   Vital Signs: Blood pressure 158/82, temperature 97.1 °F (36.2 °C), temperature source Infrared, height 165.1 cm (65\"), weight 64 kg (141 lb), not currently breastfeeding.  Physical Exam  Vitals and nursing note reviewed.   Constitutional:       General: She is not in acute distress.     Appearance: She is well-developed.   HENT:      Head: Normocephalic and atraumatic.   Eyes:      Pupils: Pupils are equal, round, and reactive to light.   Cardiovascular:      Heart sounds: Normal heart sounds.   Pulmonary:      Breath sounds: Normal breath sounds.   Psychiatric:         Behavior: Behavior normal.         Thought Content: Thought content normal.     Musculoskeletal:     Strength is intact in upper and lower extremities to direct testing.     Station and gait are normal.     Straight leg raising is negative.   Neurologic:     Muscle tone is normal throughout.     Coordination is intact.     Sensation is intact to light touch throughout.     Patient is oriented to person, place, and time.         Independent review of radiographic imaging: MRI of the lumbar spine demonstrates artifact from previous fusion T11-S1.  There is moderate spinal canal stenosis at L3-4    Assessment & Plan   Diagnosis:  1.  Lumbar stenosis L3-4  2.  Scoliosis status post T11-S1 posterior fusion, 2005- [place]    Medical Decision Making: In the absence of neurogenic claudication, I do not see source or surgical solution for patient's current complaints.  I have recommended " she see pain management for consideration of injections.  She will follow-up in our clinic as needed    Diagnoses and all orders for this visit:    1. Sacral pain (Primary)  -     Ambulatory Referral to Pain Management    2. Back pain without radiation  -     Ambulatory Referral to Pain Management    3. Infantile idiopathic scoliosis, unspecified spinal region    4. Fusion of spine of thoracolumbar region                        BMI is within normal parameters. No other follow-up for BMI required.         Arely Ahumada PA-C  Patient Care Team:  Gayle Murray DO as PCP - General (Internal Medicine)  Zeeshan Shaw MD (Inactive) as Consulting Physician (Neurosurgery)  Hu Dewey MD as Consulting Physician (Pain Medicine)

## 2022-07-27 ENCOUNTER — OFFICE VISIT (OUTPATIENT)
Dept: FAMILY MEDICINE CLINIC | Facility: CLINIC | Age: 77
End: 2022-07-27

## 2022-07-27 VITALS
SYSTOLIC BLOOD PRESSURE: 126 MMHG | HEIGHT: 65 IN | HEART RATE: 78 BPM | BODY MASS INDEX: 23.19 KG/M2 | DIASTOLIC BLOOD PRESSURE: 88 MMHG | WEIGHT: 139.2 LBS | OXYGEN SATURATION: 97 %

## 2022-07-27 DIAGNOSIS — J30.1 SEASONAL ALLERGIC RHINITIS DUE TO POLLEN: ICD-10-CM

## 2022-07-27 DIAGNOSIS — M79.7 FIBROMYALGIA: Primary | ICD-10-CM

## 2022-07-27 DIAGNOSIS — F41.9 ANXIETY AND DEPRESSION: ICD-10-CM

## 2022-07-27 DIAGNOSIS — F32.A ANXIETY AND DEPRESSION: ICD-10-CM

## 2022-07-27 DIAGNOSIS — M54.40 CHRONIC BILATERAL LOW BACK PAIN WITH SCIATICA, SCIATICA LATERALITY UNSPECIFIED: ICD-10-CM

## 2022-07-27 DIAGNOSIS — I10 ESSENTIAL HYPERTENSION: ICD-10-CM

## 2022-07-27 DIAGNOSIS — G89.29 CHRONIC BILATERAL LOW BACK PAIN WITH SCIATICA, SCIATICA LATERALITY UNSPECIFIED: ICD-10-CM

## 2022-07-27 DIAGNOSIS — M35.1 CONNECTIVE TISSUE DISEASE OVERLAP SYNDROME: ICD-10-CM

## 2022-07-27 PROCEDURE — 99214 OFFICE O/P EST MOD 30 MIN: CPT | Performed by: STUDENT IN AN ORGANIZED HEALTH CARE EDUCATION/TRAINING PROGRAM

## 2022-07-27 RX ORDER — AZELASTINE 1 MG/ML
2 SPRAY, METERED NASAL 2 TIMES DAILY
Qty: 30 ML | Refills: 12 | Status: SHIPPED | OUTPATIENT
Start: 2022-07-27

## 2022-07-27 RX ORDER — AMITRIPTYLINE HYDROCHLORIDE 10 MG/1
10 TABLET, FILM COATED ORAL NIGHTLY
Qty: 30 TABLET | Refills: 2 | Status: SHIPPED | OUTPATIENT
Start: 2022-07-27 | End: 2022-12-05

## 2022-07-27 NOTE — PROGRESS NOTES
Established Office Visit      Patient Name: Joyce Jones  : 1945   MRN: [id number]   Care Team: Patient Care Team:  Gayle Murray DO as PCP - General (Internal Medicine)  Zeeshan Shaw MD (Inactive) as Consulting Physician (Neurosurgery)  Hu Dewey MD as Consulting Physician (Pain Medicine)    Chief Complaint:    Chief Complaint   Patient presents with   • Anxiety   • Depression   • Fibromyalgia       History of Present Illness: Joyce Jones is a 76 y.o. female who is here today to follow up with chronic pain, anxiety/depression, HTN    Cymbalta was started one month ago to help with chronic pain, fibromyalgia, and mood. She has been taking this every day and noticed some improvement with pain but has noticed light headedness and generalized malaise since starting this, she would like to stop this. She continues to have anxiety, difficulty sleeping through the night.     Lumbar stenosis - established with NS last week. No surgical intervention indicated. Referred to pain management for injection therapy.     Chronic joint pain 2/2 bilateral knee OA, lumbar spinal stenosis, fibromyalgia, polymyalgia rheumatica - previously following with rheumatology, Dr. Quiroz but referred to alternative provider at last visit due to patient preference. Taking tramadol 37.5mg BID for chronic pain. Chronic back and neck pain is not well controlled. Now feeling occasional numbness/tingling/shooting pain down right arm. Mid back pain is constant.     HTN - only taking losartan prn    Subjective      Review of Systems:   Review of Systems - See HPI    I have reviewed and the following portions of the patient's history were updated as appropriate: past family history, past medical history, past social history, past surgical history and problem list.    Medications:     Current Outpatient Medications:   •  azelastine (ASTELIN) 0.1 % nasal spray, 2 sprays into the nostril(s) as directed by provider 2  "(Two) Times a Day. Use in each nostril as directed, Disp: 30 mL, Rfl: 12  •  losartan (COZAAR) 50 MG tablet, Take 1 tablet by mouth Daily. (Patient taking differently: Take 50 mg by mouth As Needed.), Disp: 90 tablet, Rfl: 1  •  PHARMACY MEDS TO BED CONSULT, Daily., Disp: , Rfl:   •  traMADol-acetaminophen (ULTRACET) 37.5-325 MG per tablet, Take 2 tablets by mouth 2 (Two) Times a Day., Disp: , Rfl:   •  amitriptyline (ELAVIL) 10 MG tablet, Take 1 tablet by mouth Every Night., Disp: 30 tablet, Rfl: 2    Allergies:   Allergies   Allergen Reactions   • Overton-2 Inhibitors Anaphylaxis   • Nabumetone Anaphylaxis   • Nsaids GI Intolerance   • Augmentin [Amoxicillin-Pot Clavulanate] Nausea Only   • Celecoxib Hives   • Zithromax [Azithromycin] Dizziness       Objective     Physical Exam:  Vital Signs:   Vitals:    07/27/22 1228   BP: 126/88   Pulse: 78   SpO2: 97%   Weight: 63.1 kg (139 lb 3.2 oz)   Height: 165.1 cm (65\")     Body mass index is 23.16 kg/m².     Physical Exam  Vitals reviewed.   Constitutional:       Appearance: Normal appearance. She is not ill-appearing.   Cardiovascular:      Rate and Rhythm: Normal rate.   Pulmonary:      Effort: Pulmonary effort is normal. No respiratory distress.   Skin:     General: Skin is warm and dry.   Neurological:      Mental Status: She is alert.   Psychiatric:         Mood and Affect: Mood normal.         Behavior: Behavior normal.         Judgment: Judgment normal.         Assessment / Plan      Assessment/Plan:   Problems Addressed This Visit  Diagnoses and all orders for this visit:    1. Fibromyalgia (Primary)  2. Anxiety and depression  -     amitriptyline (ELAVIL) 10 MG tablet; Take 1 tablet by mouth Every Night.  Dispense: 30 tablet; Refill: 2  Stop cymbalta due to medication side effect (light headed, malaise) Will start amitriptyline with hopes that this will improve chronic pain and sleeping habits. Discussed importance of healthy sleeping habits and mental health in " treatment of fibromyalgia.  She has several stressors in her life and may benefit from additional treatment for anxiety/depression if not improved with current treatment.     3. Connective tissue disease overlap syndrome (HCC)  She is awaiting appointment with new Rheumatologist at [facility]. Transitioning care from Dr. Quiroz per patient preference.     4. Chronic bilateral low back pain with sciatica, sciatica laterality unspecified  -     amitriptyline (ELAVIL) 10 MG tablet; Take 1 tablet by mouth Every Night.  Dispense: 30 tablet; Refill: 2  Established with NS - no surgical intervention indicated but she was referred to pain management to discuss possible injection therapy and has upcoming appointment in august. She is very eager for this as pain is not well controlled. She continues to take tramadol 37.5mg BID for pain with somewhat helps.     5. Seasonal allergic rhinitis due to pollen  -     azelastine (ASTELIN) 0.1 % nasal spray; 2 sprays into the nostril(s) as directed by provider 2 (Two) Times a Day. Use in each nostril as directed  Dispense: 30 mL; Refill: 12    6. Essential hypertension  Encouraged daily use of losartan rather than prn. Goal <130/80        Plan of care reviewed with patient at the conclusion of today's visit. Education was provided regarding diagnosis and management.  Patient verbalizes understanding of and agreement with management plan.    Follow Up:   Return in about 6 weeks (around 9/7/2022) for Recheck.        DO LANA Ba RD  [facility] PRIMARY [facility]  [address]  [facility] [address]  Fax [phone]  Phone [phone]

## 2022-08-10 NOTE — PROGRESS NOTES
Established Office Visit      Patient Name: Joyce Jones  : 1945   MRN: [id number]   Care Team: Patient Care Team:  Gayle Murray DO as PCP - General (Internal Medicine)  Zeeshan Shaw MD (Inactive) as Consulting Physician (Neurosurgery)  Hu Dewey MD as Consulting Physician (Pain Medicine)    Chief Complaint:    Chief Complaint   Patient presents with   • Abdominal Pain     Lower left side, had a hernia years ago, hurts, the pain comes and goes, soreness        History of Present Illness: Joyce Jones is a 76 y.o. female with fibromyalgia, connective tissue disease, chronic low back pain with sciatica, HTN who is here today to discuss lower left abdominal pain.    Patient reports left lower abdominal pain ongoing for several months-years.  Gradually worsening.  Describes pain as mild but uncomfortable.  Notices pain most often when bending or heavy lifting.  Describes feeling reducible bulge.  She has had hernia repair >40 years ago at this same location. She had abdominal UA about 6 months ago revealing facial laxity in area of concern without true herniation. CT A/P 22 for LLQ pain revealed sigmoid diverticulitis at the time but no mention of hernia. Over the past 6 months she feels the LLQ discomfort has become worse, wore frequently notices bulge. Denies diarrhea, hematochezia, constipation.     Fibromyalgia, anxiety, depression - Cymbalta was stopped about 2 weeks ago due to medication side effect and amitriptyline was started in place of this. She has noticed improvement in mood and sleeping habits with amitriptyline, happy with this.     Connective tissue disease - still awaiting appointment to establish with new rheumatologist at [facility].     Subjective      Review of Systems:   Review of Systems - See HPI    I have reviewed and the following portions of the patient's history were updated as appropriate: past family history, past medical history, past social history,  "past surgical history and problem list.    Medications:     Current Outpatient Medications:   •  amitriptyline (ELAVIL) 10 MG tablet, Take 1 tablet by mouth Every Night., Disp: 30 tablet, Rfl: 2  •  azelastine (ASTELIN) 0.1 % nasal spray, 2 sprays into the nostril(s) as directed by provider 2 (Two) Times a Day. Use in each nostril as directed, Disp: 30 mL, Rfl: 12  •  losartan (COZAAR) 50 MG tablet, Take 1 tablet by mouth Daily. (Patient taking differently: Take 50 mg by mouth As Needed.), Disp: 90 tablet, Rfl: 1  •  PHARMACY MEDS TO BED CONSULT, Daily., Disp: , Rfl:   •  traMADol-acetaminophen (ULTRACET) 37.5-325 MG per tablet, Take 2 tablets by mouth 2 (Two) Times a Day., Disp: , Rfl:     Allergies:   Allergies   Allergen Reactions   • Overton-2 Inhibitors Anaphylaxis   • Nabumetone Anaphylaxis   • Nsaids GI Intolerance   • Augmentin [Amoxicillin-Pot Clavulanate] Nausea Only   • Celecoxib Hives   • Zithromax [Azithromycin] Dizziness       Objective     Physical Exam:  Vital Signs:   Vitals:    08/11/22 1042   BP: 130/90   Pulse: 77   SpO2: 99%   Weight: 63.1 kg (139 lb 3.2 oz)   Height: 165.1 cm (65\")   PainSc:   6     Body mass index is 23.16 kg/m².     Physical Exam  Vitals reviewed.   Constitutional:       General: She is not in acute distress.     Appearance: Normal appearance. She is not ill-appearing.   Cardiovascular:      Rate and Rhythm: Normal rate.      Pulses: Normal pulses.   Pulmonary:      Effort: Pulmonary effort is normal. No respiratory distress.   Abdominal:      General: Abdomen is flat. There is no distension.      Comments: Palpation of the left lower quadrant is soft, tender to deep palpation without rebound or guarding.  There is appreciable 3 to 5 cm bulge with bearing down.  This is reducible with relaxation.   Skin:     General: Skin is warm and dry.   Neurological:      Mental Status: She is alert.      Gait: Gait normal.   Psychiatric:         Mood and Affect: Mood normal.         " Behavior: Behavior normal.         Judgment: Judgment normal.         Assessment / Plan      Assessment/Plan:   Problems Addressed This Visit  Diagnoses and all orders for this visit:    1. Recurrent abdominal hernia without obstruction or gangrene, unspecified hernia type (Primary)  -     Ambulatory Referral to General Surgery  Area of concern is location of previous hernia repair greater than 40 years ago.  On exam today I do appreciate a bulge with bearing down.  Suspect weak abdominal with reducible hernia. She had abdominal UA about 6 months ago revealing facial laxity in area of concern without true herniation. CT A/P 5/22/22 for LLQ pain revealed sigmoid diverticulitis at the time but no mention of hernia.   Given this discomfort is becoming more frequent, she is interested in referral to general surgery for further evaluation.  Referral placed today.  We discussed importance of exercise and strengthening abdominal wall muscles.  Especially now that low back pain has improved, she can resume focusing on core strengthening.    2. Essential hypertension  Diastolic slightly elevated today.  Encouraged continued compliance with losartan daily  3. Chronic bilateral low back pain with sciatica, sciatica laterality unspecified  She is following with pain management and received her first coccygeal injection last week.  Reports significant improvement in pain.  Able to ambulate without difficulty.  Very happy with the results.    4. Fibromyalgia  Somewhat improved since starting amitriptyline.  Sleeping habits have improved.  Overall pain has improved since working with pain management.  Mood improving as well.  We will continue to optimize sleep, mood, chronic pain.        Plan of care reviewed with patient at the conclusion of today's visit. Education was provided regarding diagnosis and management.  Patient verbalizes understanding of and agreement with management plan.    Follow Up:   Return in about 3 months  (around 11/11/2022) for Recheck.        DO LANA Ba RD  [facility] PRIMARY [facility]  [address]  [facility] [address]  Fax [phone]  Phone [phone]

## 2022-08-11 ENCOUNTER — OFFICE VISIT (OUTPATIENT)
Dept: FAMILY MEDICINE CLINIC | Facility: CLINIC | Age: 77
End: 2022-08-11

## 2022-08-11 VITALS
DIASTOLIC BLOOD PRESSURE: 90 MMHG | SYSTOLIC BLOOD PRESSURE: 130 MMHG | OXYGEN SATURATION: 99 % | BODY MASS INDEX: 23.19 KG/M2 | WEIGHT: 139.2 LBS | HEART RATE: 77 BPM | HEIGHT: 65 IN

## 2022-08-11 DIAGNOSIS — M54.40 CHRONIC BILATERAL LOW BACK PAIN WITH SCIATICA, SCIATICA LATERALITY UNSPECIFIED: ICD-10-CM

## 2022-08-11 DIAGNOSIS — K45.8 RECURRENT ABDOMINAL HERNIA WITHOUT OBSTRUCTION OR GANGRENE, UNSPECIFIED HERNIA TYPE: Primary | ICD-10-CM

## 2022-08-11 DIAGNOSIS — M79.7 FIBROMYALGIA: ICD-10-CM

## 2022-08-11 DIAGNOSIS — I10 ESSENTIAL HYPERTENSION: ICD-10-CM

## 2022-08-11 DIAGNOSIS — G89.29 CHRONIC BILATERAL LOW BACK PAIN WITH SCIATICA, SCIATICA LATERALITY UNSPECIFIED: ICD-10-CM

## 2022-08-11 PROCEDURE — 99214 OFFICE O/P EST MOD 30 MIN: CPT | Performed by: STUDENT IN AN ORGANIZED HEALTH CARE EDUCATION/TRAINING PROGRAM

## 2022-08-30 ENCOUNTER — TRANSCRIBE ORDERS (OUTPATIENT)
Dept: ADMINISTRATIVE | Facility: HOSPITAL | Age: 77
End: 2022-08-30

## 2022-08-30 DIAGNOSIS — K40.90 LEFT INGUINAL HERNIA: Primary | ICD-10-CM

## 2022-09-06 ENCOUNTER — TRANSCRIBE ORDERS (OUTPATIENT)
Dept: ADMINISTRATIVE | Facility: HOSPITAL | Age: 77
End: 2022-09-06

## 2022-09-06 DIAGNOSIS — K40.90 INGUINAL HERNIA OF LEFT SIDE WITHOUT OBSTRUCTION OR GANGRENE: Primary | ICD-10-CM

## 2022-09-22 ENCOUNTER — HOSPITAL ENCOUNTER (OUTPATIENT)
Dept: ULTRASOUND IMAGING | Facility: HOSPITAL | Age: 77
Discharge: HOME | End: 2022-09-22
Admitting: SURGERY

## 2022-09-22 DIAGNOSIS — K40.90 INGUINAL HERNIA OF LEFT SIDE WITHOUT OBSTRUCTION OR GANGRENE: ICD-10-CM

## 2022-09-22 PROCEDURE — 76882 US LMTD JT/FCL EVL NVASC XTR: CPT

## 2022-11-16 ENCOUNTER — TELEPHONE (OUTPATIENT)
Dept: FAMILY MEDICINE CLINIC | Facility: CLINIC | Age: 77
End: 2022-11-16

## 2022-11-16 DIAGNOSIS — K57.92 DIVERTICULITIS: Primary | ICD-10-CM

## 2022-11-16 RX ORDER — CIPROFLOXACIN 500 MG/1
500 TABLET, FILM COATED ORAL 2 TIMES DAILY
Qty: 14 TABLET | Refills: 0 | Status: SHIPPED | OUTPATIENT
Start: 2022-11-16 | End: 2022-12-05

## 2022-11-16 RX ORDER — METRONIDAZOLE 500 MG/1
500 TABLET ORAL 2 TIMES DAILY
Qty: 14 TABLET | Refills: 0 | Status: SHIPPED | OUTPATIENT
Start: 2022-11-16 | End: 2022-12-05

## 2022-11-16 NOTE — TELEPHONE ENCOUNTER
Patient is experiencing a abdominal pain on RULQ, this started last night. Previous flare up was on May 2022 she was treated by a previous provider with requesting abx

## 2022-11-16 NOTE — TELEPHONE ENCOUNTER
Caller: Joyce Jones    Relationship: Self    Best call back number:      [phone]      Requested Prescriptions:     CIPROFLOXACIN - 500 MG    METRONIDAZOLE - 500 MG     Pharmacy where request should be sent: [id number] - [place], [place] - [address] AT [address] - [phone]  - [phone] FX     Additional details provided by patient:     PATIENT STATED SHE IS EXPERIENCING A DIVERTICULITIS FLARE UP    PATIENT IS COMPLETELY OUT OF THE MEDICATIONS     Does the patient have less than a 3 day supply:  [x] Yes  [] No    Madeline Cabrera, Marlene Rep   11/16/22 12:14 EST     DR OLSEN

## 2022-12-05 ENCOUNTER — OFFICE VISIT (OUTPATIENT)
Dept: FAMILY MEDICINE CLINIC | Facility: CLINIC | Age: 77
End: 2022-12-05

## 2022-12-05 VITALS
OXYGEN SATURATION: 99 % | HEART RATE: 83 BPM | DIASTOLIC BLOOD PRESSURE: 82 MMHG | TEMPERATURE: 97.7 F | WEIGHT: 149.4 LBS | SYSTOLIC BLOOD PRESSURE: 160 MMHG | BODY MASS INDEX: 24.86 KG/M2

## 2022-12-05 DIAGNOSIS — R07.81 RIB PAIN ON LEFT SIDE: ICD-10-CM

## 2022-12-05 DIAGNOSIS — M15.9 PRIMARY OSTEOARTHRITIS INVOLVING MULTIPLE JOINTS: ICD-10-CM

## 2022-12-05 DIAGNOSIS — R07.81 RIB PAIN ON RIGHT SIDE: ICD-10-CM

## 2022-12-05 DIAGNOSIS — M41.9 SCOLIOSIS OF THORACOLUMBAR SPINE, UNSPECIFIED SCOLIOSIS TYPE: Primary | ICD-10-CM

## 2022-12-05 DIAGNOSIS — I10 PRIMARY HYPERTENSION: ICD-10-CM

## 2022-12-05 PROCEDURE — 99214 OFFICE O/P EST MOD 30 MIN: CPT | Performed by: STUDENT IN AN ORGANIZED HEALTH CARE EDUCATION/TRAINING PROGRAM

## 2022-12-05 RX ORDER — MELOXICAM 7.5 MG/1
7.5 TABLET ORAL DAILY
COMMUNITY
Start: 2022-12-01 | End: 2022-12-29

## 2022-12-05 RX ORDER — LOSARTAN POTASSIUM 100 MG/1
100 TABLET ORAL DAILY
Qty: 30 TABLET | Refills: 5 | Status: SHIPPED | OUTPATIENT
Start: 2022-12-05 | End: 2023-02-16

## 2022-12-05 NOTE — PROGRESS NOTES
Established Office Visit      Patient Name: Joyce Jones  : 1945   MRN: [id number]   Care Team: Patient Care Team:  Gayle Murray DO as PCP - General (Internal Medicine)  Zeeshan Shaw MD (Inactive) as Consulting Physician (Neurosurgery)  Hu Dewey MD as Consulting Physician (Pain Medicine)    Chief Complaint:    Chief Complaint   Patient presents with   • Hip Pain     Pt co pain on entire left side       History of Present Illness: Joyce Jones is a 77 y.o. female with fibromyalgia, connective tissue disease, chronic low back pain with sciatica, HTN who is here today to discuss hip pain.    Reports chronic left shoulder, hip, and knee pain which comes and goes. States she has been diagnosed with bursitis. Follows with Ashley Arellano and was recently seen last week. She has started Meloxicam 7.5mg daily. Over the past few days she has noticed improvement with this.     She reports gradually worsening bilateral anterior rib discomfort. She is worried her ribs are displaced due to her scoliosis. She has rib pain when applying pressure to lower ribs or when bending/sitting. She has scoliosis and reports worsening mid-low back discomfort.she has had T11-S1 posterior fusion of scoliosis 20 years ago. She has history of lumbar spinal stenosis.MR lumbar spine 2022 revealing mod-severe neuroforaminal narrowing at L3-L4. Has established with NS 2022 - no surgical indications. She was referred to pain management.     HTN - elevated today 160/82. She has been taking her losartan 50mg daily. She has been checking at home and reports readings around 150 at home.     Subjective      Review of Systems:   Review of Systems - See HPI    I have reviewed and the following portions of the patient's history were updated as appropriate: past family history, past medical history, past social history, past surgical history and problem list.    Medications:     Current Outpatient  Medications:   •  azelastine (ASTELIN) 0.1 % nasal spray, 2 sprays into the nostril(s) as directed by provider 2 (Two) Times a Day. Use in each nostril as directed, Disp: 30 mL, Rfl: 12  •  losartan (COZAAR) 100 MG tablet, Take 1 tablet by mouth Daily., Disp: 30 tablet, Rfl: 5  •  meloxicam (MOBIC) 7.5 MG tablet, Take 7.5 mg by mouth Daily., Disp: , Rfl:   •  PHARMACY MEDS TO BED CONSULT, Daily., Disp: , Rfl:   •  traMADol-acetaminophen (ULTRACET) 37.5-325 MG per tablet, Take 2 tablets by mouth 2 (Two) Times a Day., Disp: , Rfl:     Allergies:   Allergies   Allergen Reactions   • Overton-2 Inhibitors Anaphylaxis   • Nabumetone Anaphylaxis   • Nsaids GI Intolerance   • Augmentin [Amoxicillin-Pot Clavulanate] Nausea Only   • Celecoxib Hives   • Zithromax [Azithromycin] Dizziness       Objective     Physical Exam:  Vital Signs:   Vitals:    12/05/22 1457   BP: 160/82   BP Location: Left arm   Patient Position: Sitting   Cuff Size: Adult   Pulse: 83   Temp: 97.7 °F (36.5 °C)   TempSrc: Infrared   SpO2: 99%   Weight: 67.8 kg (149 lb 6.4 oz)     Body mass index is 24.86 kg/m².     Physical Exam  Vitals reviewed.   Constitutional:       Appearance: Normal appearance.   Cardiovascular:      Rate and Rhythm: Normal rate.      Pulses: Normal pulses.   Pulmonary:      Effort: Pulmonary effort is normal. No respiratory distress.   Musculoskeletal:      Thoracic back: Scoliosis present.      Lumbar back: Scoliosis present.      Comments: Tenderness to palpation of bilateral anterior lateral lower ribs.   Skin:     General: Skin is warm and dry.   Neurological:      Mental Status: She is alert.   Psychiatric:         Mood and Affect: Mood normal.         Behavior: Behavior normal.         Judgment: Judgment normal.         Assessment / Plan      Assessment/Plan:   Problems Addressed This Visit  Diagnoses and all orders for this visit:    1. Scoliosis of thoracolumbar spine, unspecified scoliosis type (Primary)  -     Ambulatory  Referral to Physical Therapy Evaluate and treat  -     XR Spine Thoracic 2 View; Future  -     XR Spine Lumbar 2 or 3 View; Future  -     XR Ribs Bilateral 3 View (In Office)    2. Primary hypertension  -     losartan (COZAAR) 100 MG tablet; Take 1 tablet by mouth Daily.  Dispense: 30 tablet; Refill: 5    3. Rib pain on left side  -     Ambulatory Referral to Physical Therapy Evaluate and treat  -     XR Ribs Bilateral 3 View (In Office)    4. Rib pain on right side  -     Ambulatory Referral to Physical Therapy Evaluate and treat  -     XR Ribs Bilateral 3 View (In Office)    5. Primary osteoarthritis involving multiple joints  -     Ambulatory Referral to Physical Therapy Evaluate and treat      Following with Rhuematology - taking tramadol to manage fibromyalgia/MSK pain    HTN - uncontrolled - increase losartan to 100mg daily and reassess in 1 month    Chronic mid-low back pain 2/2 lumbar stenosis and scoliosis - will obtain XR T and L spine today along with rib series due to her ongoing rib pain. She is following with NS and pain management but has not been seen in several months. I recommend follow up with pain management and with NS to discuss progressive symptoms.     OA - following with ortho, recently started Mobic and this seems to be helpful. Agree with continuing Mobic.     Plan of care reviewed with patient at the conclusion of today's visit. Education was provided regarding diagnosis and management.  Patient verbalizes understanding of and agreement with management plan.    Follow Up:   Return in about 4 weeks (around 1/2/2023) for 4-6 weeks to follow up on HTN .        DO LANA Ba RD  [facility]  [address]  [facility] [address]  Fax [phone]  Phone [phone]

## 2022-12-06 ENCOUNTER — HOSPITAL ENCOUNTER (OUTPATIENT)
Dept: GENERAL RADIOLOGY | Facility: HOSPITAL | Age: 77
Discharge: HOME | End: 2022-12-06
Admitting: STUDENT IN AN ORGANIZED HEALTH CARE EDUCATION/TRAINING PROGRAM

## 2022-12-06 DIAGNOSIS — M41.9 SCOLIOSIS OF THORACOLUMBAR SPINE, UNSPECIFIED SCOLIOSIS TYPE: ICD-10-CM

## 2022-12-06 PROCEDURE — 72070 X-RAY EXAM THORAC SPINE 2VWS: CPT

## 2022-12-06 PROCEDURE — 72100 X-RAY EXAM L-S SPINE 2/3 VWS: CPT

## 2022-12-14 ENCOUNTER — LAB (OUTPATIENT)
Dept: LAB | Facility: HOSPITAL | Age: 77
End: 2022-12-14

## 2022-12-14 ENCOUNTER — TELEPHONE (OUTPATIENT)
Dept: FAMILY MEDICINE CLINIC | Facility: CLINIC | Age: 77
End: 2022-12-14

## 2022-12-14 DIAGNOSIS — Z09 HOSPITAL DISCHARGE FOLLOW-UP: ICD-10-CM

## 2022-12-14 DIAGNOSIS — K57.92 DIVERTICULITIS: ICD-10-CM

## 2022-12-14 LAB
ALBUMIN SERPL-MCNC: 4.9 G/DL (ref 3.5–5.2)
ALBUMIN/GLOB SERPL: 2 G/DL
ALP SERPL-CCNC: 79 U/L (ref 39–117)
ALT SERPL W P-5'-P-CCNC: 17 U/L (ref 1–33)
ANION GAP SERPL CALCULATED.3IONS-SCNC: 12.7 MMOL/L (ref 5–15)
AST SERPL-CCNC: 23 U/L (ref 1–32)
BILIRUB SERPL-MCNC: 0.3 MG/DL (ref 0–1.2)
BUN SERPL-MCNC: 16 MG/DL (ref 8–23)
BUN/CREAT SERPL: 17.8 (ref 7–25)
CALCIUM SPEC-SCNC: 11.1 MG/DL (ref 8.6–10.5)
CHLORIDE SERPL-SCNC: 99 MMOL/L (ref 98–107)
CO2 SERPL-SCNC: 28.3 MMOL/L (ref 22–29)
CREAT SERPL-MCNC: 0.9 MG/DL (ref 0.57–1)
EGFRCR SERPLBLD CKD-EPI 2021: 66.4 ML/MIN/1.73
GLOBULIN UR ELPH-MCNC: 2.4 GM/DL
GLUCOSE SERPL-MCNC: 75 MG/DL (ref 65–99)
MAGNESIUM SERPL-MCNC: 2.1 MG/DL (ref 1.6–2.4)
POTASSIUM SERPL-SCNC: 4.1 MMOL/L (ref 3.5–5.2)
PROT SERPL-MCNC: 7.3 G/DL (ref 6–8.5)
SODIUM SERPL-SCNC: 140 MMOL/L (ref 136–145)

## 2022-12-14 PROCEDURE — 80053 COMPREHEN METABOLIC PANEL: CPT

## 2022-12-14 PROCEDURE — 36415 COLL VENOUS BLD VENIPUNCTURE: CPT

## 2022-12-14 PROCEDURE — 85025 COMPLETE CBC W/AUTO DIFF WBC: CPT

## 2022-12-14 PROCEDURE — 83735 ASSAY OF MAGNESIUM: CPT

## 2022-12-14 NOTE — TELEPHONE ENCOUNTER
Caller: Deisy Jones    Relationship: Self    Best call back number: [phone]    What is the best time to reach you: ANY TIME    Who are you requesting to speak with (clinical staff, provider,  specific staff member): CLINICAL STAFF    Do you know the name of the person who called: DEISY    What was the call regarding: PATIENT WOULD LIKE A CALL BACK TO DISCUSS CURRENT SYMPTOMS. SHE BELIEVES SHE MAY BE EXPERIENCING SYMPTOMS IN RELATION TO ANEMIA. SHE IS EXPERIENCING SHORTNESS OF BREATH, LIGHTHEADEDNESS, AND DIZZINESS. SHE STATES THAT SHE IS TAKING VITAMIN D BUT WOULD LIKE ADVICE FROM DR OLSEN.    Do you require a callback: YES

## 2022-12-14 NOTE — TELEPHONE ENCOUNTER
She reports she is dizzy, and SOB she has hx of anemia feels faint.     Advised patient to return to lab to have CBC completed for further testing

## 2022-12-15 LAB
BASOPHILS # BLD AUTO: 0.04 10*3/MM3 (ref 0–0.2)
BASOPHILS NFR BLD AUTO: 0.6 % (ref 0–1.5)
DEPRECATED RDW RBC AUTO: 45.4 FL (ref 37–54)
EOSINOPHIL # BLD AUTO: 0.08 10*3/MM3 (ref 0–0.4)
EOSINOPHIL NFR BLD AUTO: 1.3 % (ref 0.3–6.2)
ERYTHROCYTE [DISTWIDTH] IN BLOOD BY AUTOMATED COUNT: 12.4 % (ref 12.3–15.4)
HCT VFR BLD AUTO: 37.2 % (ref 34–46.6)
HGB BLD-MCNC: 12.2 G/DL (ref 12–15.9)
IMM GRANULOCYTES # BLD AUTO: 0.02 10*3/MM3 (ref 0–0.05)
IMM GRANULOCYTES NFR BLD AUTO: 0.3 % (ref 0–0.5)
LYMPHOCYTES # BLD AUTO: 2.01 10*3/MM3 (ref 0.7–3.1)
LYMPHOCYTES NFR BLD AUTO: 31.8 % (ref 19.6–45.3)
MCH RBC QN AUTO: 32.8 PG (ref 26.6–33)
MCHC RBC AUTO-ENTMCNC: 32.8 G/DL (ref 31.5–35.7)
MCV RBC AUTO: 100 FL (ref 79–97)
MONOCYTES # BLD AUTO: 0.65 10*3/MM3 (ref 0.1–0.9)
MONOCYTES NFR BLD AUTO: 10.3 % (ref 5–12)
NEUTROPHILS NFR BLD AUTO: 3.53 10*3/MM3 (ref 1.7–7)
NEUTROPHILS NFR BLD AUTO: 55.7 % (ref 42.7–76)
NRBC BLD AUTO-RTO: 0 /100 WBC (ref 0–0.2)
PLATELET # BLD AUTO: 333 10*3/MM3 (ref 140–450)
PMV BLD AUTO: 9.7 FL (ref 6–12)
RBC # BLD AUTO: 3.72 10*6/MM3 (ref 3.77–5.28)
WBC NRBC COR # BLD: 6.33 10*3/MM3 (ref 3.4–10.8)

## 2022-12-16 ENCOUNTER — TELEPHONE (OUTPATIENT)
Dept: FAMILY MEDICINE CLINIC | Facility: CLINIC | Age: 77
End: 2022-12-16

## 2022-12-16 NOTE — TELEPHONE ENCOUNTER
Caller: Joyce Jones    Relationship: Self    Best call back number: [phone]    What is the medical concern/diagnosis: DIZZINESS AND SHAKING    What specialty or service is being requested: NEUROLOGIST      Any additional details: PATIENT STATED SHE IS STILL HAVING DIZZINESS AND SHAKING AND WANTS TO BE REFERRED TO A NEUROLOGIST AS SOON AS POSSIBLE    D

## 2022-12-16 NOTE — TELEPHONE ENCOUNTER
Caller: Joyce Jones    Relationship: Self    Best call back number: [phone]    What is the best time to reach you: ANYTIME    Who are you requesting to speak with (clinical staff, provider,  specific staff member): CLINICAL STAFF    Do you know the name of the person who called: SELF    What was the call regarding: PATIENT STATES THAT SHE WOULD LIKE A CALL ABOUT HER LAB RESULTS.    Do you require a callback: YES

## 2022-12-16 NOTE — TELEPHONE ENCOUNTER
Spoke with patient to discuss further, advised her that her anemia is improving and if she is having ongoing dizziness to schedule an appt.     Upcoming appt has been scheduled

## 2022-12-27 ENCOUNTER — LAB (OUTPATIENT)
Dept: LAB | Facility: HOSPITAL | Age: 77
End: 2022-12-27

## 2022-12-27 ENCOUNTER — OFFICE VISIT (OUTPATIENT)
Dept: FAMILY MEDICINE CLINIC | Facility: CLINIC | Age: 77
End: 2022-12-27

## 2022-12-27 VITALS
OXYGEN SATURATION: 99 % | HEART RATE: 73 BPM | SYSTOLIC BLOOD PRESSURE: 146 MMHG | DIASTOLIC BLOOD PRESSURE: 86 MMHG | WEIGHT: 145 LBS | HEIGHT: 65 IN | BODY MASS INDEX: 24.16 KG/M2

## 2022-12-27 DIAGNOSIS — R68.89 FORGETFULNESS: ICD-10-CM

## 2022-12-27 DIAGNOSIS — F41.9 ANXIETY: Primary | ICD-10-CM

## 2022-12-27 DIAGNOSIS — R00.2 PALPITATIONS: ICD-10-CM

## 2022-12-27 DIAGNOSIS — R42 POSTURAL DIZZINESS: ICD-10-CM

## 2022-12-27 DIAGNOSIS — M79.7 FIBROMYALGIA: ICD-10-CM

## 2022-12-27 LAB
FOLATE SERPL-MCNC: >20 NG/ML (ref 4.78–24.2)
TSH SERPL DL<=0.05 MIU/L-ACNC: 1.7 UIU/ML (ref 0.27–4.2)
VIT B12 BLD-MCNC: 583 PG/ML (ref 211–946)

## 2022-12-27 PROCEDURE — 99215 OFFICE O/P EST HI 40 MIN: CPT | Performed by: STUDENT IN AN ORGANIZED HEALTH CARE EDUCATION/TRAINING PROGRAM

## 2022-12-27 PROCEDURE — 82746 ASSAY OF FOLIC ACID SERUM: CPT

## 2022-12-27 PROCEDURE — 84443 ASSAY THYROID STIM HORMONE: CPT

## 2022-12-27 PROCEDURE — 82607 VITAMIN B-12: CPT

## 2022-12-27 RX ORDER — DULOXETIN HYDROCHLORIDE 20 MG/1
20 CAPSULE, DELAYED RELEASE ORAL DAILY
Qty: 30 CAPSULE | Refills: 2 | OUTPATIENT
Start: 2022-12-27 | End: 2023-01-02

## 2022-12-27 RX ORDER — BUSPIRONE HYDROCHLORIDE 5 MG/1
5 TABLET ORAL 3 TIMES DAILY PRN
Qty: 90 TABLET | Refills: 2 | OUTPATIENT
Start: 2022-12-27 | End: 2023-01-02
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
## 2022-12-29 ENCOUNTER — HOSPITAL ENCOUNTER (OUTPATIENT)
Dept: CARDIOLOGY | Facility: HOSPITAL | Age: 77
Discharge: HOME | End: 2022-12-29

## 2022-12-29 ENCOUNTER — OFFICE VISIT (OUTPATIENT)
Dept: CARDIOLOGY | Facility: HOSPITAL | Age: 77
End: 2022-12-29

## 2022-12-29 VITALS
HEIGHT: 65 IN | BODY MASS INDEX: 24.03 KG/M2 | SYSTOLIC BLOOD PRESSURE: 124 MMHG | OXYGEN SATURATION: 98 % | DIASTOLIC BLOOD PRESSURE: 72 MMHG | WEIGHT: 144.2 LBS | HEART RATE: 71 BPM | RESPIRATION RATE: 18 BRPM | TEMPERATURE: 97.6 F

## 2022-12-29 DIAGNOSIS — R42 DIZZINESS: ICD-10-CM

## 2022-12-29 DIAGNOSIS — R00.2 PALPITATIONS: ICD-10-CM

## 2022-12-29 DIAGNOSIS — R00.2 PALPITATIONS: Primary | ICD-10-CM

## 2022-12-29 DIAGNOSIS — Z86.59 HISTORY OF RECENT STRESSFUL LIFE EVENT: ICD-10-CM

## 2022-12-29 DIAGNOSIS — I10 ESSENTIAL HYPERTENSION: ICD-10-CM

## 2022-12-29 PROCEDURE — 93246 EXT ECG>7D<15D RECORDING: CPT

## 2022-12-29 PROCEDURE — 99214 OFFICE O/P EST MOD 30 MIN: CPT | Performed by: NURSE PRACTITIONER

## 2022-12-29 NOTE — PROGRESS NOTES
[facility] Heart Monitor Documentation    Joyce Jones  1945  [id number]  12/29/22      [] ZIO XT Patch  Model [id number] Prescribed for  Days    · Serial Number: (N + 9 Digits) N   · Apply-By Date on Box:   · USPS Tracking Number:   · USPS Tracking        [] [employer]ian MINI PLUS Mobile Cardiac Telemetry  Model BGMINIPLUS Prescribed for  Days    · Serial Number: (BGM + 7 Digits) BGM  · Shipped-By Date on Box:   · UPS Tracking Number: [id number]  · UPS Tracking      [x] [employer] MINI Holter Monitor  Model BGMINIEL Prescribed for 14 Days    · Serial Number: (7 Digits) [id number]  · Shipped-By Date on Box: 12/21/2022  · UPS Tracking Number: [id number]  · UPS Tracking        This monitor was applied to the patient's chest and checked for proper functioning.  Ms. Joyce Jones was instructed in the proper use of this monitor.  She was given the opportunity to ask questions and left the office with the device 's instruction manual.    Quyen Chadwick, ALEC, 10:36 EST, 12/29/22                  VIMONITORDOCUMENTATION 8.8.2019

## 2023-01-02 ENCOUNTER — HOSPITAL ENCOUNTER (EMERGENCY)
Facility: HOSPITAL | Age: 78
End: 2023-01-02
Payer: MEDICARE

## 2023-01-02 ENCOUNTER — APPOINTMENT (OUTPATIENT)
Dept: CT IMAGING | Facility: HOSPITAL | Age: 78
End: 2023-01-02
Payer: MEDICARE

## 2023-01-02 ENCOUNTER — APPOINTMENT (OUTPATIENT)
Dept: GENERAL RADIOLOGY | Facility: HOSPITAL | Age: 78
End: 2023-01-02
Payer: MEDICARE

## 2023-01-02 ENCOUNTER — HOSPITAL ENCOUNTER (EMERGENCY)
Facility: HOSPITAL | Age: 78
Discharge: HOME | End: 2023-01-02
Attending: EMERGENCY MEDICINE | Admitting: EMERGENCY MEDICINE
Payer: MEDICARE

## 2023-01-02 VITALS
HEART RATE: 75 BPM | HEIGHT: 63 IN | TEMPERATURE: 98.9 F | BODY MASS INDEX: 22.5 KG/M2 | OXYGEN SATURATION: 97 % | DIASTOLIC BLOOD PRESSURE: 84 MMHG | RESPIRATION RATE: 16 BRPM | WEIGHT: 127 LBS | SYSTOLIC BLOOD PRESSURE: 134 MMHG

## 2023-01-02 DIAGNOSIS — R42 DIZZINESS: Primary | ICD-10-CM

## 2023-01-02 DIAGNOSIS — R07.9 CHEST PAIN, UNSPECIFIED TYPE: ICD-10-CM

## 2023-01-02 DIAGNOSIS — R10.84 GENERALIZED ABDOMINAL PAIN: ICD-10-CM

## 2023-01-02 LAB
ALBUMIN SERPL-MCNC: 4.7 G/DL (ref 3.5–5.2)
ALBUMIN/GLOB SERPL: 1.9 G/DL
ALP SERPL-CCNC: 65 U/L (ref 39–117)
ALT SERPL W P-5'-P-CCNC: 15 U/L (ref 1–33)
ANION GAP SERPL CALCULATED.3IONS-SCNC: 14 MMOL/L (ref 5–15)
AST SERPL-CCNC: 22 U/L (ref 1–32)
BASOPHILS # BLD AUTO: 0.02 10*3/MM3 (ref 0–0.2)
BASOPHILS NFR BLD AUTO: 0.3 % (ref 0–1.5)
BILIRUB SERPL-MCNC: 0.5 MG/DL (ref 0–1.2)
BUN SERPL-MCNC: 18 MG/DL (ref 8–23)
BUN/CREAT SERPL: 20 (ref 7–25)
CALCIUM SPEC-SCNC: 9.9 MG/DL (ref 8.6–10.5)
CHLORIDE SERPL-SCNC: 102 MMOL/L (ref 98–107)
CO2 SERPL-SCNC: 24 MMOL/L (ref 22–29)
CREAT BLDA-MCNC: 1 MG/DL (ref 0.6–1.3)
CREAT SERPL-MCNC: 0.9 MG/DL (ref 0.57–1)
DEPRECATED RDW RBC AUTO: 53.6 FL (ref 37–54)
EGFRCR SERPLBLD CKD-EPI 2021: 66.4 ML/MIN/1.73
EOSINOPHIL # BLD AUTO: 0.02 10*3/MM3 (ref 0–0.4)
EOSINOPHIL NFR BLD AUTO: 0.3 % (ref 0.3–6.2)
ERYTHROCYTE [DISTWIDTH] IN BLOOD BY AUTOMATED COUNT: 14.2 % (ref 12.3–15.4)
GLOBULIN UR ELPH-MCNC: 2.5 GM/DL
GLUCOSE SERPL-MCNC: 97 MG/DL (ref 65–99)
HCT VFR BLD AUTO: 39.1 % (ref 34–46.6)
HGB BLD-MCNC: 13.3 G/DL (ref 12–15.9)
HOLD SPECIMEN: NORMAL
IMM GRANULOCYTES # BLD AUTO: 0.02 10*3/MM3 (ref 0–0.05)
IMM GRANULOCYTES NFR BLD AUTO: 0.3 % (ref 0–0.5)
LIPASE SERPL-CCNC: 58 U/L (ref 13–60)
LYMPHOCYTES # BLD AUTO: 1.12 10*3/MM3 (ref 0.7–3.1)
LYMPHOCYTES NFR BLD AUTO: 17.6 % (ref 19.6–45.3)
MCH RBC QN AUTO: 34.9 PG (ref 26.6–33)
MCHC RBC AUTO-ENTMCNC: 34 G/DL (ref 31.5–35.7)
MCV RBC AUTO: 102.6 FL (ref 79–97)
MONOCYTES # BLD AUTO: 0.47 10*3/MM3 (ref 0.1–0.9)
MONOCYTES NFR BLD AUTO: 7.4 % (ref 5–12)
NEUTROPHILS NFR BLD AUTO: 4.7 10*3/MM3 (ref 1.7–7)
NEUTROPHILS NFR BLD AUTO: 74.1 % (ref 42.7–76)
NRBC BLD AUTO-RTO: 0 /100 WBC (ref 0–0.2)
NT-PROBNP SERPL-MCNC: 84.6 PG/ML (ref 0–1800)
PLATELET # BLD AUTO: 212 10*3/MM3 (ref 140–450)
PMV BLD AUTO: 9.2 FL (ref 6–12)
POTASSIUM SERPL-SCNC: 4 MMOL/L (ref 3.5–5.2)
PROT SERPL-MCNC: 7.2 G/DL (ref 6–8.5)
QT INTERVAL: 378 MS
QT INTERVAL: 398 MS
QTC INTERVAL: 427 MS
QTC INTERVAL: 435 MS
RBC # BLD AUTO: 3.81 10*6/MM3 (ref 3.77–5.28)
SODIUM SERPL-SCNC: 140 MMOL/L (ref 136–145)
TROPONIN T SERPL-MCNC: <0.01 NG/ML (ref 0–0.03)
TROPONIN T SERPL-MCNC: <0.01 NG/ML (ref 0–0.03)
WBC NRBC COR # BLD: 6.35 10*3/MM3 (ref 3.4–10.8)
WHOLE BLOOD HOLD COAG: NORMAL
WHOLE BLOOD HOLD SPECIMEN: NORMAL

## 2023-01-02 PROCEDURE — 84484 ASSAY OF TROPONIN QUANT: CPT

## 2023-01-02 PROCEDURE — 82565 ASSAY OF CREATININE: CPT

## 2023-01-02 PROCEDURE — 85025 COMPLETE CBC W/AUTO DIFF WBC: CPT

## 2023-01-02 PROCEDURE — 99284 EMERGENCY DEPT VISIT MOD MDM: CPT

## 2023-01-02 PROCEDURE — 74177 CT ABD & PELVIS W/CONTRAST: CPT

## 2023-01-02 PROCEDURE — 71045 X-RAY EXAM CHEST 1 VIEW: CPT

## 2023-01-02 PROCEDURE — 71275 CT ANGIOGRAPHY CHEST: CPT

## 2023-01-02 PROCEDURE — 36415 COLL VENOUS BLD VENIPUNCTURE: CPT

## 2023-01-02 PROCEDURE — 83690 ASSAY OF LIPASE: CPT

## 2023-01-02 PROCEDURE — 93005 ELECTROCARDIOGRAM TRACING: CPT

## 2023-01-02 PROCEDURE — 83880 ASSAY OF NATRIURETIC PEPTIDE: CPT

## 2023-01-02 PROCEDURE — 70450 CT HEAD/BRAIN W/O DYE: CPT

## 2023-01-02 PROCEDURE — 0 IOPAMIDOL PER 1 ML: Performed by: EMERGENCY MEDICINE

## 2023-01-02 PROCEDURE — 80053 COMPREHEN METABOLIC PANEL: CPT

## 2023-01-02 RX ORDER — MECLIZINE HYDROCHLORIDE 25 MG/1
25 TABLET ORAL 3 TIMES DAILY PRN
Qty: 15 TABLET | Refills: 0 | Status: SHIPPED | OUTPATIENT
Start: 2023-01-02

## 2023-01-02 RX ORDER — SODIUM CHLORIDE 0.9 % (FLUSH) 0.9 %
10 SYRINGE (ML) INJECTION AS NEEDED
Status: DISCONTINUED | OUTPATIENT
Start: 2023-01-02 | End: 2023-01-02 | Stop reason: HOSPADM

## 2023-01-02 RX ORDER — ONDANSETRON 4 MG/1
4 TABLET, ORALLY DISINTEGRATING ORAL 4 TIMES DAILY PRN
Qty: 15 TABLET | Refills: 0 | Status: SHIPPED | OUTPATIENT
Start: 2023-01-02 | End: 2023-02-16

## 2023-01-02 RX ADMIN — IOPAMIDOL 95 ML: 755 INJECTION, SOLUTION INTRAVENOUS at 14:31

## 2023-01-02 NOTE — ED PROVIDER NOTES
[facility]    EMERGENCY DEPARTMENT ENCOUNTER      Pt Name: Joyce Jones  MRN: [id number]  YOB: 1945  Date of evaluation: 1/2/2023  Provider: Ji Belcher DO    CHIEF COMPLAINT       Chief Complaint   Patient presents with   • Chest Pain         HISTORY OF PRESENT ILLNESS  (Location/Symptom, Timing/Onset, Context/Setting, Quality, Duration, Modifying Factors, Severity.)   Joyce Jones is a 77 y.o. female who presents to the emergency department via EMS from the ED lobby to the urgent treatment center to home back into the emergency department for evaluation of chest pain which is been on and off for the last couple days she has had cough, congestion, intermittent dizziness which she notes started earlier today.  She has had some intermittent abdominal discomfort and periumbilical left lower quadrant which has been waxing and waning over the last 1 month.  She has had intermittent diarrheal illness.  Positive nausea without vomiting.  She denies any chest pain with exertion.  She denies any headache, vision changes, Kylah weakness, numbness or tingling or pain or stiffness in her neck.  She states she has had the symptoms intermittently over the last few weeks, has not seek medical care until today.  She denies any known history of underlying cardiac disease.  Denies any other acute systemic complaints at this time.      Nursing notes were reviewed.          PAST MEDICAL HISTORY     Past Medical History:   Diagnosis Date   • Allergic    • Allergic rhinitis    • Anemia    • Arthritis    • Cholelithiasis    • Deep vein thrombosis (HCC) Clot following left knee replacement   • Diverticulosis    • Heart murmur    • History of bone density study 03/02/2017    [facility]   • History of mammography, screening 2016   • Hyperlipidemia    • Hypertension    • Inguinal hernia 1978    repair   • Irritable bowel syndrome Self   • Kidney stone Embedded   • Low back pain Scoliosis/kyphosis   •  Neuromuscular disorder (HCC) Shoulder pain , arthritic issues, more severe   • Osteoarthritis (arthritis due to wear and tear of joints)    • Osteopenia    • Personal history of DVT (deep vein thrombosis) 2005    right leg- due to surgery   • Pneumonia 5 times   • Positive TB test     Always comes back + but negative further testing   • Pulmonary embolism (HCC) Right  thigh following knee replace   • Renal insufficiency 1999. Has been under control . Function stable   • Scoliosis Joyce         SURGICAL HISTORY       Past Surgical History:   Procedure Laterality Date   • BACK SURGERY  2002    Scoliosis bar. T11-sacrum   • BREAST BIOPSY      over 15 years ago.    • CERVICAL SPINE SURGERY  2000    cage   • CHOLECYSTECTOMY  2014   • COLONOSCOPY  2016   • EYE SURGERY  Detached retina   • FOOT SURGERY  2008   • HERNIA REPAIR  3 both bowl areas   • INGUINAL HERNIA REPAIR Bilateral 1978 2019 right side repair   • INGUINAL HERNIA REPAIR  Both   • JOINT REPLACEMENT  4 knee replacement surgeries    Right knee, 1980 approx. left knee , three replacements appears stable now.   • KNEE SURGERY Bilateral 2017 2005 and x2 in 8/2017(left)   • NASAL SEPTUM SURGERY  1999   • SINUS SURGERY  1986   • SPINE SURGERY  2004   • TOTAL ABDOMINAL HYSTERECTOMY WITH SALPINGO OOPHORECTOMY  1986    Unilateral salpingo-oophorectomy/cervical dysplasia/AUB   • TUBAL ABDOMINAL LIGATION  1982   • URETHRAL DILATION      Multiple times         CURRENT MEDICATIONS       Current Facility-Administered Medications:   •  sodium chloride 0.9 % flush 10 mL, 10 mL, Intravenous, PRN, Emergency, Triage Protocol, MD    Current Outpatient Medications:   •  losartan (COZAAR) 100 MG tablet, Take 1 tablet by mouth Daily., Disp: 30 tablet, Rfl: 5  •  traMADol-acetaminophen (ULTRACET) 37.5-325 MG per tablet, Take 2 tablets by mouth 2 (Two) Times a Day., Disp: , Rfl:   •  azelastine (ASTELIN) 0.1 % nasal spray, 2 sprays into the nostril(s) as directed by provider 2  (Two) Times a Day. Use in each nostril as directed, Disp: 30 mL, Rfl: 12  •  meclizine (ANTIVERT) 25 MG tablet, Take 1 tablet by mouth 3 (Three) Times a Day As Needed for Dizziness., Disp: 15 tablet, Rfl: 0  •  ondansetron ODT (ZOFRAN-ODT) 4 MG disintegrating tablet, Place 1 tablet on the tongue 4 (Four) Times a Day As Needed for Nausea or Vomiting., Disp: 15 tablet, Rfl: 0    ALLERGIES     [place]-2 inhibitors, Nabumetone, Nsaids, Augmentin [amoxicillin-pot clavulanate], Celecoxib, and Zithromax [azithromycin]    FAMILY HISTORY       Family History   Problem Relation Age of Onset   • Uterine cancer Mother         Metastatic to ovaries and colon; possibly bone   • Hypertension Mother    • Kidney disease Mother    • Tuberculosis Mother    • Arthritis Mother    • Thyroid disease Mother    • Cancer Father         Bladder   • Heart failure Father    • Stroke Father    • Hypertension Father    • Heart attack Father    • Colon polyps Father    • Arthritis Father    • Hypertension Brother    • Kidney disease Brother    • Colon polyps Brother    • Cancer Brother    • Heart disease Brother    • Kidney disease Brother    • Hypertension Brother    • Cancer Brother         Cancer   • No Known Problems Brother    • No Known Problems Brother    • Cancer Brother    • Colon cancer Maternal Uncle    • Colon cancer Maternal Uncle    • Breast cancer Paternal Aunt    • Other Daughter         CVID   • Diabetes Daughter           SOCIAL HISTORY       Social History     Socioeconomic History   • Marital status:    Tobacco Use   • Smoking status: Never   • Smokeless tobacco: Never   Vaping Use   • Vaping Use: Never used   Substance and Sexual Activity   • Alcohol use: Not Currently     Alcohol/week: 1.0 standard drink     Types: 1 Glasses of wine per week     Comment: rarely at holidays   • Drug use: Never   • Sexual activity: Not Currently     Partners: Male     Birth control/protection: Post-menopausal, Surgical     Comment:  Hysterectomy         PHYSICAL EXAM    (up to 7 for level 4, 8 or more for level 5)     Vitals:    01/02/23 1322 01/02/23 1400 01/02/23 1500   BP: 132/69 115/66 134/79   BP Location: Left arm     Patient Position: Sitting     Pulse: 79 71 67   Resp: 16     Temp: 98.9 °F (37.2 °C)     TempSrc: Oral     SpO2: 100% 100% 97%   Weight: 57.6 kg (127 lb)     Height: 160 cm (63\")         Physical Exam  General : Patient is awake, alert, oriented, in no acute distress, nontoxic appearing  HEENT: Pupils are equally round, EOMI, conjunctivae clear  Neck: Neck is supple, full range of motion, trachea midline  Cardiac: Heart regular rate, rhythm, no murmurs, rubs, or gallops  Lungs: Lungs are clear to auscultation, there is no wheezing, rhonchi, or rales. There is no use of accessory muscles  Abdomen: Abdomen is soft, nondistended.  Discomfort along the periumbilical and left lower quadrant of the abdomen, no peritoneal signs examination  Musculoskeletal: 5 out of 5 strength in all 4 extremities.  No focal muscle deficits are appreciated  Neuro: Motor intact, sensory intact, level of consciousness is normal, GCS 15, answer questions appropriately  Dermatology: Skin is warm and dry  Psych: Mentation is grossly normal, cognition is grossly normal. Affect is appropriate      DIAGNOSTIC RESULTS     EKG: All EKGs are interpreted by the Emergency Department Physician who either signs or Co-signs this chart in the absence of a cardiologist.    ECG 12 Lead ED Triage Standing Order; Chest Pain   Final Result   Test Reason : ED Triage Standing Order~   Blood Pressure :   */*   mmHG   Vent. Rate :  72 BPM     Atrial Rate :  72 BPM      P-R Int : 194 ms          QRS Dur :  82 ms       QT Int : 398 ms       P-R-T Axes :  36  29  42 degrees      QTc Int : 435 ms      Normal sinus rhythm   Normal ECG   When compared with ECG of 02-JAN-2023 13:10,   No significant change was found   Confirmed by VAL PINEDA MD ([id number]) on 1/2/2023 3:45:59 PM       Referred By: OMAR           Confirmed By: VAL IPNEDA MD      ECG 12 Lead ED Triage Standing Order; Chest Pain   Final Result   Test Reason : ED Triage Standing Order~   Blood Pressure :   */*   mmHG   Vent. Rate :  77 BPM     Atrial Rate :  77 BPM      P-R Int : 176 ms          QRS Dur :  78 ms       QT Int : 378 ms       P-R-T Axes :  39  39  54 degrees      QTc Int : 427 ms      Normal sinus rhythm   Normal ECG   When compared with ECG of 10-TRINO-2022 16:52,   No significant change was found   Confirmed by VAL PINEDA MD ([id number]) on 1/2/2023 1:14:46 PM      Referred By: OMAR           Confirmed By: VAL PINEDA MD          RADIOLOGY:   [] Radiologist's Report Reviewed:  CT Head Without Contrast   Final Result   Age-related changes of the brain as above, otherwise without   evidence of acute intracranial abnormality.       No pulmonary embolus or other acute process in the chest.       No acute findings in the abdomen and pelvis.           This report was finalized on 1/2/2023 2:59 PM by Zeeshan Parkinson.          CT Angiogram Chest Pulmonary Embolism   Final Result   Age-related changes of the brain as above, otherwise without   evidence of acute intracranial abnormality.       No pulmonary embolus or other acute process in the chest.       No acute findings in the abdomen and pelvis.           This report was finalized on 1/2/2023 2:59 PM by Zeeshan Parkinson.          CT Abdomen Pelvis With Contrast   Final Result   Age-related changes of the brain as above, otherwise without   evidence of acute intracranial abnormality.       No pulmonary embolus or other acute process in the chest.       No acute findings in the abdomen and pelvis.           This report was finalized on 1/2/2023 2:59 PM by Zeeshan Parkinson.          XR Chest 1 View   Final Result   No evidence of acute disease in the chest.       This report was finalized on 1/2/2023 3:10 PM by Zeeshan Parkinson.              I ordered and  independently reviewed the above noted radiographic studies.      I viewed images of chest x-ray which showed no acute cardiopulmonary abnormality per my independent interpretation.    See radiologist's dictation for official interpretation.      ED BEDSIDE ULTRASOUND:   Performed by ED Physician - none    LABS:    I have reviewed and interpreted all of the currently available lab results from this visit (if applicable):  Results for orders placed or performed during the hospital encounter of 01/02/23   Troponin    Specimen: Blood   Result Value Ref Range    Troponin T <0.010 0.000 - 0.030 ng/mL   Troponin    Specimen: Blood   Result Value Ref Range    Troponin T <0.010 0.000 - 0.030 ng/mL   Comprehensive Metabolic Panel    Specimen: Blood   Result Value Ref Range    Glucose 97 65 - 99 mg/dL    BUN 18 8 - 23 mg/dL    Creatinine 0.90 0.57 - 1.00 mg/dL    Sodium 140 136 - 145 mmol/L    Potassium 4.0 3.5 - 5.2 mmol/L    Chloride 102 98 - 107 mmol/L    CO2 24.0 22.0 - 29.0 mmol/L    Calcium 9.9 8.6 - 10.5 mg/dL    Total Protein 7.2 6.0 - 8.5 g/dL    Albumin 4.7 3.5 - 5.2 g/dL    ALT (SGPT) 15 1 - 33 U/L    AST (SGOT) 22 1 - 32 U/L    Alkaline Phosphatase 65 39 - 117 U/L    Total Bilirubin 0.5 0.0 - 1.2 mg/dL    Globulin 2.5 gm/dL    A/G Ratio 1.9 g/dL    BUN/Creatinine Ratio 20.0 7.0 - 25.0    Anion Gap 14.0 5.0 - 15.0 mmol/L    eGFR 66.4 >60.0 mL/min/1.73   Lipase    Specimen: Blood   Result Value Ref Range    Lipase 58 13 - 60 U/L   BNP    Specimen: Blood   Result Value Ref Range    proBNP 84.6 0.0 - 1,800.0 pg/mL   CBC Auto Differential    Specimen: Blood   Result Value Ref Range    WBC 6.35 3.40 - 10.80 10*3/mm3    RBC 3.81 3.77 - 5.28 10*6/mm3    Hemoglobin 13.3 12.0 - 15.9 g/dL    Hematocrit 39.1 34.0 - 46.6 %    .6 (H) 79.0 - 97.0 fL    MCH 34.9 (H) 26.6 - 33.0 pg    MCHC 34.0 31.5 - 35.7 g/dL    RDW 14.2 12.3 - 15.4 %    RDW-SD 53.6 37.0 - 54.0 fl    MPV 9.2 6.0 - 12.0 fL    Platelets 212 140 - 450  10*3/mm3    Neutrophil % 74.1 42.7 - 76.0 %    Lymphocyte % 17.6 (L) 19.6 - 45.3 %    Monocyte % 7.4 5.0 - 12.0 %    Eosinophil % 0.3 0.3 - 6.2 %    Basophil % 0.3 0.0 - 1.5 %    Immature Grans % 0.3 0.0 - 0.5 %    Neutrophils, Absolute 4.70 1.70 - 7.00 10*3/mm3    Lymphocytes, Absolute 1.12 0.70 - 3.10 10*3/mm3    Monocytes, Absolute 0.47 0.10 - 0.90 10*3/mm3    Eosinophils, Absolute 0.02 0.00 - 0.40 10*3/mm3    Basophils, Absolute 0.02 0.00 - 0.20 10*3/mm3    Immature Grans, Absolute 0.02 0.00 - 0.05 10*3/mm3    nRBC 0.0 0.0 - 0.2 /100 WBC   POC Creatinine    Specimen: Blood   Result Value Ref Range    Creatinine 1.00 0.60 - 1.30 mg/dL   ECG 12 Lead ED Triage Standing Order; Chest Pain   Result Value Ref Range    QT Interval 378 ms    QTC Interval 427 ms   ECG 12 Lead ED Triage Standing Order; Chest Pain   Result Value Ref Range    QT Interval 398 ms    QTC Interval 435 ms   Green Top (Gel)   Result Value Ref Range    Extra Tube Hold for add-ons.    Lavender Top   Result Value Ref Range    Extra Tube hold for add-on    Gold Top - SST   Result Value Ref Range    Extra Tube Hold for add-ons.    Light Blue Top   Result Value Ref Range    Extra Tube Hold for add-ons.         If labs were ordered, I independently reviewed the results and considered them in treating the patient.      EMERGENCY DEPARTMENT COURSE and DIFFERENTIAL DIAGNOSIS/MDM:   Vitals:  AS OF 17:09 EST    BP - 134/79  HR - 67  TEMP - 98.9 °F (37.2 °C) (Oral)  O2 SATS - 97%      All labs have been independently reviewed by me.  All radiology studies have been reviewed by me and the radiologist dictating the report.  All EKG's have been independently viewed and interpreted by me.      Discussion below represents my analysis of pertinent findings related to patient's condition, differential diagnosis, treatment plan and final disposition.      Differential diagnosis:    ACS, pulmonary embolism, electrolyte abnormality, dehydration, irritable bowel  syndrome, diverticulitis      Additional sources:    - Discussed/ obtained information from independent historians: None    - External (non-ED) record review: Reviewed records from a previous evaluation, cardiology consultation.  Patient does have increased stressors with caring for family numbers at home which seem to exacerbate her symptoms.    - Chronic or social conditions impacting care: Underlying stressors at home which are significant in nature giving a family member full-time care with underlying debilitating disease which seems to be exacerbating factor for the patient    - Shared decision making: We discussed further work-up and evaluation, continued monitoring, reevaluation with cardiology, as well as her PCP for follow-up.  She is agreeable to this.      Orders placed during this visit:  Orders Placed This Encounter   Procedures   • XR Chest 1 View   • CT Head Without Contrast   • CT Angiogram Chest Pulmonary Embolism   • CT Abdomen Pelvis With Contrast   • [place] Draw   • Troponin   • Comprehensive Metabolic Panel   • Lipase   • BNP   • CBC Auto Differential   • Ambulatory Referral to [facility] [place] [facility]   • NPO Diet NPO Type: Strict NPO   • Undress & Gown   • Cardiac Monitoring   • Continuous Pulse Oximetry   • Oxygen Therapy- Nasal Cannula; 2 LPM; Titrate for SPO2: equal to or greater than, 92%   • POC Creatinine   • POC Creatinine   • ECG 12 Lead ED Triage Standing Order; Chest Pain   • ECG 12 Lead ED Triage Standing Order; Chest Pain   • Insert Peripheral IV   • CBC & Differential   • Green Top (Gel)   • Lavender Top   • Gold Top - SST   • Gray Top   • Light Blue Top             MEDICATIONS ADMINISTERED IN ED:  Medications   sodium chloride 0.9 % flush 10 mL (has no administration in time range)   iopamidol (ISOVUE-370) 76 % injection 95 mL (95 mL Intravenous Given 1/2/23 1837)            Patient presents via EMS for various complaints including generalized dizziness,  intermittent chest pain, intermittent abdominal pain she notes some dry the symptoms been present for a few weeks but today her symptoms seemed a little more progressive which is why she seek medical care.  It appears she came into the emergency department was in the waiting room, decided to leave secondary to wait times went to urgent treatment center was struck to come back to the emergency department and she and of calling 911 to be transported to the ED.  On arrival she is awake alert, nontoxic-appearing, no acute ischemic changes on her EKG.  Abdomen is soft, no peritoneal signs.  She not have any focal neurological deficit on examination.  With her various constellation of symptoms we did obtain IV, labs, imaging for further assessment.  Results reviewed as above.  No significant normalities are appreciated on blood work or imaging completed through her stay today.  Repeat troponin, EKG is also unremarkable.  Updated patient results, she is resting comfortably, we discussed continuing with work-up as a outpatient with referral over to our heart valve cardiovascular chest pain clinic, maintain good fluid hydration, treatment for underlying dizziness, vertigo and a close follow-up with her PCP.  Patient is agreeable to this, understands return precautions as discussed.    I had a discussion with the patient/family regarding diagnosis, diagnostic results, treatment plan, and medications.  The patient/family indicated understanding of these instructions.  I spent adequate time at the bedside preceding discharge necessary to personally discuss the aftercare instructions, giving patient education, providing explanations of the results of our evaluations/findings, and my decision making to assure that the patient/family understand the plan of care.  Time was allotted to answer questions at that time and throughout the ED course.  Emphasis was placed on timely follow-up after discharge.  I also discussed the potential  for the development of an acute emergent condition requiring further evaluation, admission, or even surgical intervention. I discussed that we found nothing during the visit today indicating the need for further workup, admission, or the presence of an unstable medical condition.  I encouraged the patient to return to the emergency department immediately for ANY concerns, worsening, new complaints, or if symptoms persist and unable to seek follow-up in a timely fashion.  The patient/family expressed understanding and agreement with this plan.  The patient will follow-up with their PCP in 1-2 days for reevaluation.           PROCEDURES:  Procedures    CRITICAL CARE TIME    Total Critical Care time was 0 minutes, excluding separately reportable procedures.   There was a high probability of clinically significant/life threatening deterioration in the patient's condition which required my urgent intervention.      FINAL IMPRESSION      1. Dizziness    2. Chest pain, unspecified type    3. Generalized abdominal pain          DISPOSITION/PLAN     ED Disposition     ED Disposition   Discharge    Condition   Stable    Comment   --             PATIENT REFERRED TO:  [facility] CARDIOLOGY  [address] [id number] [phone]        Gayle Murray,   [address] [address]  [phone]    In 2 days      [facility] Emergency Department  [address] [facility] [id number] [phone]    If symptoms worsen      DISCHARGE MEDICATIONS:     Medication List      START taking these medications    meclizine 25 MG tablet  Commonly known as: ANTIVERT  Take 1 tablet by mouth 3 (Three) Times a Day As Needed for Dizziness.     ondansetron ODT 4 MG disintegrating tablet  Commonly known as: ZOFRAN-ODT  Place 1 tablet on the tongue 4 (Four) Times a Day As Needed for Nausea or Vomiting.        CHANGE how you take these  medications    losartan 100 MG tablet  Commonly known as: COZAAR  Take 1 tablet by mouth Daily.  What changed: how much to take        CONTINUE taking these medications    azelastine 0.1 % nasal spray  Commonly known as: ASTELIN  2 sprays into the nostril(s) as directed by provider 2 (Two) Times a Day. Use in each nostril as directed     traMADol-acetaminophen 37.5-325 MG per tablet  Commonly known as: ULTRACET        STOP taking these medications    busPIRone 5 MG tablet  Commonly known as: BUSPAR     DULoxetine 20 MG capsule  Commonly known as: Cymbalta           Where to Get Your Medications      These medications were sent to [id number] - [place], [place] - [address] [facility] [address] [address]  [phone] [facility] [phone] [facility] [address], [facility] [address]    Phone: [phone]   · meclizine 25 MG tablet  · ondansetron ODT 4 MG disintegrating tablet             Comment: Please note this report has been produced using speech recognition software.      Ji Belcher DO  Attending Emergency Physician           Ji Belcher DO  01/02/23 4754

## 2023-02-13 PROCEDURE — 93248 EXT ECG>7D<15D REV&INTERPJ: CPT | Performed by: INTERNAL MEDICINE

## 2023-02-16 ENCOUNTER — OFFICE VISIT (OUTPATIENT)
Dept: CARDIOLOGY | Facility: HOSPITAL | Age: 78
End: 2023-02-16
Payer: MEDICARE

## 2023-02-16 VITALS
WEIGHT: 139.6 LBS | BODY MASS INDEX: 24.73 KG/M2 | TEMPERATURE: 96.6 F | HEIGHT: 63 IN | SYSTOLIC BLOOD PRESSURE: 145 MMHG | RESPIRATION RATE: 17 BRPM | HEART RATE: 80 BPM | OXYGEN SATURATION: 99 % | DIASTOLIC BLOOD PRESSURE: 71 MMHG

## 2023-02-16 DIAGNOSIS — R00.2 PALPITATIONS: ICD-10-CM

## 2023-02-16 DIAGNOSIS — R42 DIZZINESS: ICD-10-CM

## 2023-02-16 DIAGNOSIS — R55 NEAR SYNCOPE: ICD-10-CM

## 2023-02-16 DIAGNOSIS — I10 ESSENTIAL HYPERTENSION: ICD-10-CM

## 2023-02-16 PROCEDURE — 99214 OFFICE O/P EST MOD 30 MIN: CPT | Performed by: NURSE PRACTITIONER

## 2023-02-16 RX ORDER — LOSARTAN POTASSIUM 50 MG/1
50 TABLET ORAL DAILY
COMMUNITY
End: 2023-02-16 | Stop reason: SDUPTHER

## 2023-02-16 RX ORDER — PREDNISONE 10 MG/1
TABLET ORAL
COMMUNITY
Start: 2023-02-14 | End: 2023-02-28

## 2023-02-16 RX ORDER — CETIRIZINE HYDROCHLORIDE 10 MG/1
10 TABLET ORAL DAILY
COMMUNITY

## 2023-02-16 RX ORDER — LOSARTAN POTASSIUM 50 MG/1
25 TABLET ORAL 2 TIMES DAILY
Start: 2023-02-16

## 2023-02-16 NOTE — PROGRESS NOTES
"[facility], [facility]    Chief Complaint  Follow-up (Dizziness, palpitations)    Subjective    History of Present Illness {CC  Problem List  Visit  Diagnosis   Encounters  Notes  Medications  Labs  Result Review Imaging  Media :23}     Joyce Jones presents to [facility] CARDIOLOGY for   History of Present Illness     77-year-old female with history of fibromyalgia, connective tissue disorder, chronic low back pain with sciatica, hypertension, anxiety, dizziness.    Follow-up on palpitations, dizziness.  Still present but improved with decreasing BP meds.  Seeing ENT.   She had ED visit with near syncope, but no recurrance.       Patient was placed on Holter monitor 12/29/2022.8-day duration.  Average heart rate 73 bpm.  Brief 5 SVT runs.  Longest duration 8 beats.  PACs and PVCs less than 1%.  Patient reported symptoms primarily associated with sinus.    Objective     Vital Signs:   Vitals:    02/16/23 1542   BP: 145/71   BP Location: Left arm   Patient Position: Sitting   Cuff Size: Adult   Pulse: 80   Resp: 17   Temp: 96.6 °F (35.9 °C)   TempSrc: Temporal   SpO2: 99%   Weight: 63.3 kg (139 lb 9.6 oz)   Height: 160 cm (63\")     Body mass index is 24.73 kg/m².  Physical Exam  Vitals reviewed.   Constitutional:       General: She is not in acute distress.     Appearance: Normal appearance.   Cardiovascular:      Rate and Rhythm: Normal rate and regular rhythm.      Heart sounds: Normal heart sounds.   Pulmonary:      Effort: Pulmonary effort is normal.      Breath sounds: Normal breath sounds.   Musculoskeletal:      Right lower leg: No edema.      Left lower leg: No edema.   Skin:     General: Skin is warm and dry.   Neurological:      Mental Status: She is alert.   Psychiatric:         Mood and Affect: Mood normal.         Behavior: Behavior is cooperative.              Result Review  Data Reviewed:{ Labs  Result Review  Imaging  " Med Tab  Media :23}    Holter monitor 12/29/2022.8-day duration.  Average heart rate 73 bpm.  Brief 5 SVT runs.  Longest duration 8 beats.  PACs and PVCs less than 1%.  Patient reported symptoms primarily associated with sinus.    Echocardiogram 1/9/2022: EF 60%, mild AR, moderate TR with RVSP 35 mmHg      Cardiac stress PET 2019: No ischemia, normal EF              Assessment and Plan {CC Problem List  Visit Diagnosis  ROS  Review (Popup)  Health Maintenance  Quality  BestPractice  Medications  SmartSets  SnapShot Encounters  Media :23}   1. Dizziness  Probable orthostatic hypotension.  Education provided today    - Duplex Carotid Ultrasound CAR; Future    - losartan (COZAAR) 50 MG tablet; Take 0.5 tablets by mouth 2 (Two) Times a Day.    2. Near syncope    - Duplex Carotid Ultrasound CAR; Future    3. Palpitations  Acceptable holter.    4. Essential hypertension  Continue to monitor.           Follow Up {Instructions Charge Capture  Follow-up Communications :23}   Return in about 6 weeks (around 3/30/2023) for Video visit, HTN, near syncope/syncope/dizziness.    Patient was given instructions and counseling regarding her condition or for health maintenance advice. Please see specific information pulled into the AVS if appropriate.  Patient was instructed to call the [facility] with any questions, concerns, or worsening symptoms.

## 2023-02-27 ENCOUNTER — TELEPHONE (OUTPATIENT)
Dept: FAMILY MEDICINE CLINIC | Facility: CLINIC | Age: 78
End: 2023-02-27
Payer: MEDICARE

## 2023-02-27 NOTE — TELEPHONE ENCOUNTER
Contacted pt and informed her that she would need an appt. I offered to scheduled her an appt today, pt declined stating she has a daughter with ALS and she is unable to leave her today. Pt also states that she has a fever. I informed her that she would need an appt for abdominal pain and fever, she is scheduled for tomorrow but wants to know if something can be called in so she doesn't have to come in because she knows its diverticulitis.

## 2023-02-27 NOTE — TELEPHONE ENCOUNTER
Contacted pt & relayed PCP's message, pt states she will wait until tomorrow and she states she's had a cough and her fever was a couple of days ago. Pt plans to come in to see PCP tomorrow.       Gayle Murray, DO  You 5 minutes ago (12:08 PM)       She needs to be seen for an appointment to be treated appropriately. If she is experiencing abdominal pain and true fever >100.4 I strongly recommend being seen today whether it is at our office or an [facility] with more flexible hours

## 2023-02-27 NOTE — TELEPHONE ENCOUNTER
Caller: Joyce Jones    Relationship: Self    Best call back number: [phone]    What medication are you requesting: SOMETHING TO TREAT DIVERTICULITIS    What are your current symptoms: PAIN IN LEFT LOWER ABDOMEN AND NAUSEA    How long have you been experiencing symptoms: 02/26/23      Have you had these symptoms before:    [x] Yes  [] No    Have you been treated for these symptoms before:   [x] Yes  [] No    If a prescription is needed, what is your preferred pharmacy and phone number: [id number] - [place], [place] - [facility] [address] [facility] - [phone] [facility] [phone] FX     Additional notes:

## 2023-02-28 ENCOUNTER — OFFICE VISIT (OUTPATIENT)
Dept: FAMILY MEDICINE CLINIC | Facility: CLINIC | Age: 78
End: 2023-02-28
Payer: MEDICARE

## 2023-02-28 VITALS
HEIGHT: 63 IN | OXYGEN SATURATION: 98 % | HEART RATE: 92 BPM | BODY MASS INDEX: 24.41 KG/M2 | WEIGHT: 137.8 LBS | DIASTOLIC BLOOD PRESSURE: 70 MMHG | TEMPERATURE: 97.1 F | SYSTOLIC BLOOD PRESSURE: 120 MMHG

## 2023-02-28 DIAGNOSIS — K57.92 DIVERTICULITIS: Primary | ICD-10-CM

## 2023-02-28 PROCEDURE — 99213 OFFICE O/P EST LOW 20 MIN: CPT | Performed by: STUDENT IN AN ORGANIZED HEALTH CARE EDUCATION/TRAINING PROGRAM

## 2023-02-28 RX ORDER — METRONIDAZOLE 500 MG/1
500 TABLET ORAL 2 TIMES DAILY
Qty: 14 TABLET | Refills: 0 | Status: SHIPPED | OUTPATIENT
Start: 2023-02-28

## 2023-02-28 RX ORDER — CIPROFLOXACIN 500 MG/1
500 TABLET, FILM COATED ORAL 2 TIMES DAILY
Qty: 14 TABLET | Refills: 0 | Status: SHIPPED | OUTPATIENT
Start: 2023-02-28

## 2023-02-28 NOTE — PROGRESS NOTES
Established Office Visit      Patient Name: Joyce Jones  : 1945   MRN: [id number]   Care Team: Patient Care Team:  Gayle Murray DO as PCP - General (Internal Medicine)  Zeeshan Shaw MD (Inactive) as Consulting Physician (Neurosurgery)  Hu Dewey MD as Consulting Physician (Pain Medicine)    Chief Complaint:    Chief Complaint   Patient presents with   • Abdominal Pain     X 4 days   • Diarrhea   • Fatigue       History of Present Illness: Joyce Jones is a 77 y.o. female who is here today to discuss abdominal pain.    She reports LLQ pain, diarrhea, nausea, weak over the past 1 week. No vomiting or hematochezia. Tolerating PO intake well.  She has been following liquid diet to ease her stomach.   Reports fever of 101 a few days ago about but this has since resolved.   Last diverticulitis flare was about 1 year ago and this feels similar    Subjective      Review of Systems:   Review of Systems - See HPI    I have reviewed and the following portions of the patient's history were updated as appropriate: past family history, past medical history, past social history, past surgical history and problem list.    Medications:     Current Outpatient Medications:   •  azelastine (ASTELIN) 0.1 % nasal spray, 2 sprays into the nostril(s) as directed by provider 2 (Two) Times a Day. Use in each nostril as directed, Disp: 30 mL, Rfl: 12  •  cetirizine (zyrTEC) 10 MG tablet, Take 1 tablet by mouth Daily., Disp: , Rfl:   •  losartan (COZAAR) 50 MG tablet, Take 0.5 tablets by mouth 2 (Two) Times a Day., Disp: , Rfl:   •  MAGNESIUM CITRATE PO, Take 225 mg by mouth Daily., Disp: , Rfl:   •  meclizine (ANTIVERT) 25 MG tablet, Take 1 tablet by mouth 3 (Three) Times a Day As Needed for Dizziness., Disp: 15 tablet, Rfl: 0  •  traMADol-acetaminophen (ULTRACET) 37.5-325 MG per tablet, Take 2 tablets by mouth 2 (Two) Times a Day., Disp: , Rfl:   •  ciprofloxacin (Cipro) 500 MG tablet, Take 1 tablet  "by mouth 2 (Two) Times a Day., Disp: 14 tablet, Rfl: 0  •  metroNIDAZOLE (Flagyl) 500 MG tablet, Take 1 tablet by mouth 2 (Two) Times a Day., Disp: 14 tablet, Rfl: 0    Allergies:   Allergies   Allergen Reactions   • Overton-2 Inhibitors Anaphylaxis   • Nabumetone Anaphylaxis   • Nsaids GI Intolerance   • Augmentin [Amoxicillin-Pot Clavulanate] Nausea Only   • Celecoxib Hives   • Zithromax [Azithromycin] Dizziness       Objective     Physical Exam:  Vital Signs:   Vitals:    02/28/23 0949   BP: 120/70   Pulse: 92   Temp: 97.1 °F (36.2 °C)   SpO2: 98%   Weight: 62.5 kg (137 lb 12.8 oz)   Height: 160 cm (63\")   PainSc:   6   PainLoc: Abdomen     Body mass index is 24.41 kg/m².     Physical Exam  Vitals reviewed.   Constitutional:       General: She is not in acute distress.     Appearance: Normal appearance. She is not ill-appearing or diaphoretic.   Cardiovascular:      Rate and Rhythm: Normal rate.      Pulses: Normal pulses.   Pulmonary:      Effort: Pulmonary effort is normal. No respiratory distress.   Abdominal:      General: Abdomen is flat. There is no distension.      Palpations: Abdomen is soft.      Comments: LLQ tenderness    Skin:     General: Skin is warm and dry.   Neurological:      Mental Status: She is alert.   Psychiatric:         Mood and Affect: Mood normal.         Behavior: Behavior normal.         Judgment: Judgment normal.         Assessment / Plan      Assessment/Plan:   Problems Addressed This Visit  Diagnoses and all orders for this visit:    1. Diverticulitis (Primary)  -     ciprofloxacin (Cipro) 500 MG tablet; Take 1 tablet by mouth 2 (Two) Times a Day.  Dispense: 14 tablet; Refill: 0  -     metroNIDAZOLE (Flagyl) 500 MG tablet; Take 1 tablet by mouth 2 (Two) Times a Day.  Dispense: 14 tablet; Refill: 0      Clinical presentation consistent with diverticulitis flare - will treat empirically with cipro/flagyl. Advised her to let me know if symptoms worsen or do not improve. Would benefit from " inpatient treatment if she develops persistent fever, inability to tolerate PO intake, vomiting. She expressed understanding and agreeable with plan. Recommended hydration and bland diet, advance as tolerated. She typically follows a high fiber diet and is able to avoid constipation.     Plan of care reviewed with patient at the conclusion of today's visit. Education was provided regarding diagnosis and management.  Patient verbalizes understanding of and agreement with management plan.    Follow Up: as needed  No follow-ups on file.        DO LANA Ba RD  [facility]  [address]  [facility] [address]  Fax [phone]  Phone [phone]

## 2023-05-03 ENCOUNTER — TELEPHONE (OUTPATIENT)
Dept: FAMILY MEDICINE CLINIC | Facility: CLINIC | Age: 78
End: 2023-05-03
Payer: MEDICARE

## 2023-05-03 DIAGNOSIS — M79.7 FIBROMYALGIA: Primary | ICD-10-CM

## 2023-05-03 DIAGNOSIS — M25.50 POLYARTHRALGIA: ICD-10-CM

## 2023-05-03 DIAGNOSIS — M15.9 PRIMARY OSTEOARTHRITIS INVOLVING MULTIPLE JOINTS: ICD-10-CM

## 2023-05-03 DIAGNOSIS — M35.3 POLYMYALGIA RHEUMATICA: ICD-10-CM

## 2023-05-03 DIAGNOSIS — M41.9 SCOLIOSIS OF THORACOLUMBAR SPINE, UNSPECIFIED SCOLIOSIS TYPE: ICD-10-CM

## 2023-05-03 DIAGNOSIS — M54.40 CHRONIC BILATERAL LOW BACK PAIN WITH SCIATICA, SCIATICA LATERALITY UNSPECIFIED: ICD-10-CM

## 2023-05-03 DIAGNOSIS — G89.29 CHRONIC BILATERAL LOW BACK PAIN WITH SCIATICA, SCIATICA LATERALITY UNSPECIFIED: ICD-10-CM

## 2023-05-03 DIAGNOSIS — M35.1 CONNECTIVE TISSUE DISEASE OVERLAP SYNDROME: ICD-10-CM

## 2023-05-03 NOTE — TELEPHONE ENCOUNTER
Patient is unhappy with care provided at [facility], she is asking for new referral to another clinic to continue care

## 2023-05-03 NOTE — TELEPHONE ENCOUNTER
----- Message from Gayle Murray DO sent at 5/2/2023  4:14 PM EDT -----  Regarding: FW: pain management  Patient's  asked that our office call his wife. She is also a patient of mine and is apparently unhappy with her current pain management provider. It sounded like she may want a new referral to alternative office? Could someone call to get details on what her concerns are and how we can help?  ----- Message -----  From: Gayle Murray DO  Sent: 5/1/2023   1:23 PM EDT  To: Gayle Murray DO  Subject: pain management                                  Call to discuss pain management

## 2023-05-16 ENCOUNTER — TELEPHONE (OUTPATIENT)
Dept: NEUROLOGY | Facility: OTHER | Age: 78
End: 2023-05-16
Payer: MEDICARE

## 2023-05-16 NOTE — TELEPHONE ENCOUNTER
PT HAS NOT BEEN SEEN BY NEURO . NEEDS REF. SENT. CALLED SYLVESTER AT OFFICE TO INFORM WE WILL CALL AND GET REF STARTED.

## 2023-08-15 ENCOUNTER — TELEPHONE (OUTPATIENT)
Dept: NEUROSURGERY | Facility: CLINIC | Age: 78
End: 2023-08-15

## 2023-08-15 ENCOUNTER — OFFICE VISIT (OUTPATIENT)
Dept: FAMILY MEDICINE CLINIC | Facility: CLINIC | Age: 78
End: 2023-08-15
Payer: MEDICARE

## 2023-08-15 VITALS
SYSTOLIC BLOOD PRESSURE: 118 MMHG | BODY MASS INDEX: 24.41 KG/M2 | WEIGHT: 143 LBS | DIASTOLIC BLOOD PRESSURE: 66 MMHG | HEIGHT: 64 IN | HEART RATE: 87 BPM | OXYGEN SATURATION: 99 %

## 2023-08-15 DIAGNOSIS — M41.9 SCOLIOSIS OF THORACOLUMBAR SPINE, UNSPECIFIED SCOLIOSIS TYPE: ICD-10-CM

## 2023-08-15 DIAGNOSIS — M54.41 CHRONIC MIDLINE LOW BACK PAIN WITH RIGHT-SIDED SCIATICA: Primary | ICD-10-CM

## 2023-08-15 DIAGNOSIS — M48.061 SPINAL STENOSIS OF LUMBAR REGION, UNSPECIFIED WHETHER NEUROGENIC CLAUDICATION PRESENT: ICD-10-CM

## 2023-08-15 DIAGNOSIS — M53.3 COCCYGEAL PAIN: ICD-10-CM

## 2023-08-15 DIAGNOSIS — G89.29 CHRONIC MIDLINE LOW BACK PAIN WITH RIGHT-SIDED SCIATICA: Primary | ICD-10-CM

## 2023-08-15 PROCEDURE — 3078F DIAST BP <80 MM HG: CPT | Performed by: STUDENT IN AN ORGANIZED HEALTH CARE EDUCATION/TRAINING PROGRAM

## 2023-08-15 PROCEDURE — 3074F SYST BP LT 130 MM HG: CPT | Performed by: STUDENT IN AN ORGANIZED HEALTH CARE EDUCATION/TRAINING PROGRAM

## 2023-08-15 PROCEDURE — 1159F MED LIST DOCD IN RCRD: CPT | Performed by: STUDENT IN AN ORGANIZED HEALTH CARE EDUCATION/TRAINING PROGRAM

## 2023-08-15 PROCEDURE — 99214 OFFICE O/P EST MOD 30 MIN: CPT | Performed by: STUDENT IN AN ORGANIZED HEALTH CARE EDUCATION/TRAINING PROGRAM

## 2023-08-15 PROCEDURE — 1160F RVW MEDS BY RX/DR IN RCRD: CPT | Performed by: STUDENT IN AN ORGANIZED HEALTH CARE EDUCATION/TRAINING PROGRAM

## 2023-08-15 RX ORDER — METHYLPREDNISOLONE 4 MG/1
TABLET ORAL
Qty: 21 TABLET | Refills: 0 | Status: SHIPPED | OUTPATIENT
Start: 2023-08-15

## 2023-08-15 RX ORDER — CYCLOBENZAPRINE HCL 10 MG
10 TABLET ORAL 3 TIMES DAILY PRN
Qty: 30 TABLET | Refills: 0 | Status: SHIPPED | OUTPATIENT
Start: 2023-08-15

## 2023-08-15 NOTE — Clinical Note
Please request pain management records (progress notes and imaging reports) from [facility]

## 2023-08-15 NOTE — TELEPHONE ENCOUNTER
Caller: Joyce Jones    Relationship to patient: Self    Best call back number: [phone]   Patient is needing:     PATIENT HAS BEEN HAVING SOME PAIN, WAS SENT TO [facility]IN [facility], AND [facility] DID AN XRAY AT THEIR OFFICE AND TOLD THE PATIENT SHE HAS A DETACHED COCCYX, AND NEEDS IT REMOVED, AFTER A TIMEFRAME OF THERAPY.      PCP, DR OLSEN, ADVISED PATIENT TO GET BACK WITH NS.    ADVISED PATIENT THAT OUR PRACTICE DOES NOT TREAT COCCYX, AND UNDERSTOOD THIS BUT IS ASKING IF [facility] MIGHT HAVE SOMEONE TO RECOMMEND    PATIENT CONCERNED ABOUT THE PAIN IN HIP GOING DOWN INTO RIGHT LEG.    PLEASE CALL TO ADVISE

## 2023-08-15 NOTE — PROGRESS NOTES
Established Office Visit      Patient Name: Joyce Jones  : 1945   MRN: [id number]   Care Team: Patient Care Team:  Gayle Murray DO as PCP - General (Internal Medicine)  Zeeshan Shaw MD (Inactive) as Consulting Physician (Neurosurgery)  Hu Dewey MD as Consulting Physician (Pain Medicine)    Chief Complaint:    Chief Complaint   Patient presents with    Back Pain     Right lower side     Leg Pain     Right side        History of Present Illness: Joyce Jones is a 77 y.o. female with fibromyalgia, connective tissue disease (following with rheumatology), chronic low back pain with sciatica, HTN who is here today to discuss low back pain.    She reports right sided low back pain, coxxyx pain, shooting into her right leg radiating throughout entire RLE and ankle for several months. Gradually worsening and making it difficult to get through her day. Difficult to find comfortable position at night time so she has had difficulty sleeping. Worse when sitting/applying pressure. Improves with donut pillow/standing.   Had been following with pain management - unsure of providers name. Last seen 1-2 months ago. Was told she needs surgical intervention.     Subjective      Review of Systems:   Review of Systems - See HPI    I have reviewed and the following portions of the patient's history were updated as appropriate: past family history, past medical history, past social history, past surgical history and problem list.    Medications:     Current Outpatient Medications:     cyclobenzaprine (FLEXERIL) 10 MG tablet, Take 1 tablet by mouth 3 (Three) Times a Day As Needed for Muscle Spasms., Disp: 30 tablet, Rfl: 0    doxycycline (VIBRAMYCIN) 100 MG capsule, Take 1 capsule by mouth 2 (Two) Times a Day., Disp: 14 capsule, Rfl: 0    fluticasone (FLONASE) 50 MCG/ACT nasal spray, 1 spray into the nostril(s) as directed by provider Daily., Disp: 9.9 mL, Rfl: 11    losartan (COZAAR) 50 MG tablet,  "1 tablet., Disp: , Rfl:     traMADol-acetaminophen (ULTRACET) 37.5-325 MG per tablet, Take 1 tablet by mouth Every 6 (Six) Hours As Needed for Moderate Pain., Disp: , Rfl:     methylPREDNISolone (MEDROL) 4 MG dose pack, Take as directed on package instructions., Disp: 21 tablet, Rfl: 0    Allergies:   Allergies   Allergen Reactions    Overton-2 Inhibitors Anaphylaxis    Nabumetone Anaphylaxis    Nsaids GI Intolerance and Hives    Penicillins Nausea And Vomiting and Dizziness    Celecoxib Hives    Zithromax [Azithromycin] Dizziness       Objective     Physical Exam:  Vital Signs:   Vitals:    08/15/23 1003   BP: 118/66   Pulse: 87   SpO2: 99%   Weight: 64.9 kg (143 lb)   Height: 162.6 cm (64.02\")     Body mass index is 24.53 kg/mý.     Physical Exam    Assessment / Plan      Assessment/Plan:   Problems Addressed This Visit  Diagnoses and all orders for this visit:    1. Chronic midline low back pain with right-sided sciatica (Primary)  -     cyclobenzaprine (FLEXERIL) 10 MG tablet; Take 1 tablet by mouth 3 (Three) Times a Day As Needed for Muscle Spasms.  Dispense: 30 tablet; Refill: 0  -     methylPREDNISolone (MEDROL) 4 MG dose pack; Take as directed on package instructions.  Dispense: 21 tablet; Refill: 0    2. Coccygeal pain    3. Scoliosis status post T11-S1 posterior fusion, 2005- [place]    4. Spinal stenosis of lumbar region, unspecified whether neurogenic claudication present  -     methylPREDNISolone (MEDROL) 4 MG dose pack; Take as directed on package instructions.  Dispense: 21 tablet; Refill: 0        Today she does have lumbar/coccygeal tenderness with right sided siatica  She reports there is a fragment of her coccyx which has detached and has been advised she will need surgical intervention to correct this problem per her pain management provider at [facility] (Dr. Win Horowitz). Records requested.   MR PAULINO spine 5/2022 - thoracolumbar scoliosis with post operative changed T11-sacral fusion. L3 L4 stenosis with " severe spinal canal narrowing,   Will provide steroid burst and refill flexeril for temporary relief  She has already established with [facility] in the past (last seen 7/2022) and I have encouraged her to schedule follow up as this would be the next step if she is hoping to pursue surgical intervention for her ongoing low back/coccygeal pain with radicular symptoms      Plan of care reviewed with patient at the conclusion of today's visit. Education was provided regarding diagnosis and management.  Patient verbalizes understanding of and agreement with management plan.    Follow Up:   No follow-ups on file.        DO LANA Ba RD  [facility]  [address]  [facility] [address]  Fax [phone]  Phone [phone]

## 2023-08-16 ENCOUNTER — TELEPHONE (OUTPATIENT)
Dept: FAMILY MEDICINE CLINIC | Facility: CLINIC | Age: 78
End: 2023-08-16
Payer: MEDICARE

## 2023-08-16 DIAGNOSIS — G89.29 CHRONIC MIDLINE LOW BACK PAIN WITH RIGHT-SIDED SCIATICA: Primary | ICD-10-CM

## 2023-08-16 DIAGNOSIS — M48.061 SPINAL STENOSIS OF LUMBAR REGION, UNSPECIFIED WHETHER NEUROGENIC CLAUDICATION PRESENT: ICD-10-CM

## 2023-08-16 DIAGNOSIS — M54.41 CHRONIC MIDLINE LOW BACK PAIN WITH RIGHT-SIDED SCIATICA: Primary | ICD-10-CM

## 2023-08-16 DIAGNOSIS — M53.3 COCCYGEAL PAIN: ICD-10-CM

## 2023-08-16 NOTE — TELEPHONE ENCOUNTER
Order has been placed for MR L spine - patient is willing to go outside of [facility] if this means she can get it done sooner. Attached referral coordinator on this message as well.

## 2023-08-16 NOTE — TELEPHONE ENCOUNTER
Caller: Joyce Jones    Relationship: Self    Best call back number: [phone]     What orders are you requesting (i.e. lab or imaging): MRI LOWER BACK AREA    In what timeframe would the patient need to come in: ASAP    Where will you receive your lab/imaging services: ANYWHERE THAT CAN DO IT QUICKLY    Additional notes: PATIENT CALLED DR. BARRIENTOS'S OFFICE THEY TOLD HER THAT BEFORE THEY CAN SCHEDULE THEY NEED DR. OLSEN TO DO AN MRI AND SEND IT TO THEM. PLEASE CALL PATIENT WHEN THIS IS ORDERED AND LET HER KNOW WHO TO FOLLOW UP WITH.

## 2023-09-21 ENCOUNTER — HOSPITAL ENCOUNTER (OUTPATIENT)
Dept: MRI IMAGING | Facility: HOSPITAL | Age: 78
Discharge: HOME | End: 2023-09-21
Admitting: STUDENT IN AN ORGANIZED HEALTH CARE EDUCATION/TRAINING PROGRAM
Payer: MEDICARE

## 2023-09-21 DIAGNOSIS — M53.3 COCCYGEAL PAIN: ICD-10-CM

## 2023-09-21 DIAGNOSIS — G89.29 CHRONIC MIDLINE LOW BACK PAIN WITH RIGHT-SIDED SCIATICA: ICD-10-CM

## 2023-09-21 DIAGNOSIS — M54.41 CHRONIC MIDLINE LOW BACK PAIN WITH RIGHT-SIDED SCIATICA: ICD-10-CM

## 2023-09-21 DIAGNOSIS — M48.061 SPINAL STENOSIS OF LUMBAR REGION, UNSPECIFIED WHETHER NEUROGENIC CLAUDICATION PRESENT: ICD-10-CM

## 2023-09-21 PROCEDURE — 72148 MRI LUMBAR SPINE W/O DYE: CPT

## 2023-10-05 NOTE — TELEPHONE ENCOUNTER
Anesthesia Post Evaluation    Patient: Shelly Vásquez    Procedure(s) Performed: Procedure(s) (LRB):  EGD (ESOPHAGOGASTRODUODENOSCOPY) (N/A)    Final Anesthesia Type: general      Patient location during evaluation: [facility]  Patient participation: Yes- Able to Participate  Level of consciousness: awake and alert  Post-procedure vital signs: reviewed and stable  Pain management: adequate  Airway patency: patent    PONV status at discharge: No PONV  Anesthetic complications: no      Cardiovascular status: hemodynamically stable  Respiratory status: unassisted, spontaneous ventilation and room air  Hydration status: euvolemic  Follow-up not needed.          Vitals Value Taken Time   /56 10/05/23 0730   Temp 36.6 °C (97.9 °F) 10/05/23 0730   Pulse 70 10/05/23 0730   Resp 18 10/05/23 0730   SpO2 98 % 10/05/23 0730         Event Time   Out of Recovery 15:36:00         Pain/Oscar Score: Oscar Score: 9 (10/4/2023  3:06 PM)         Letter request has been faxed to [facility] medical records at [phone]

## 2023-10-09 ENCOUNTER — OFFICE VISIT (OUTPATIENT)
Dept: FAMILY MEDICINE CLINIC | Facility: CLINIC | Age: 78
End: 2023-10-09
Payer: MEDICARE

## 2023-10-09 ENCOUNTER — HOSPITAL ENCOUNTER (OUTPATIENT)
Dept: GENERAL RADIOLOGY | Facility: HOSPITAL | Age: 78
Discharge: HOME | End: 2023-10-09
Admitting: INTERNAL MEDICINE
Payer: MEDICARE

## 2023-10-09 VITALS
BODY MASS INDEX: 24.75 KG/M2 | HEIGHT: 64 IN | OXYGEN SATURATION: 98 % | HEART RATE: 78 BPM | WEIGHT: 145 LBS | DIASTOLIC BLOOD PRESSURE: 80 MMHG | SYSTOLIC BLOOD PRESSURE: 130 MMHG

## 2023-10-09 DIAGNOSIS — M54.2 CHRONIC NECK PAIN: ICD-10-CM

## 2023-10-09 DIAGNOSIS — R42 DIZZINESS: ICD-10-CM

## 2023-10-09 DIAGNOSIS — R00.2 PALPITATIONS: ICD-10-CM

## 2023-10-09 DIAGNOSIS — G89.29 CHRONIC NECK PAIN: ICD-10-CM

## 2023-10-09 DIAGNOSIS — G89.29 CHRONIC NECK PAIN: Primary | ICD-10-CM

## 2023-10-09 DIAGNOSIS — M54.2 CHRONIC NECK PAIN: Primary | ICD-10-CM

## 2023-10-09 PROCEDURE — 72040 X-RAY EXAM NECK SPINE 2-3 VW: CPT

## 2023-10-09 PROCEDURE — 1160F RVW MEDS BY RX/DR IN RCRD: CPT | Performed by: INTERNAL MEDICINE

## 2023-10-09 PROCEDURE — 3075F SYST BP GE 130 - 139MM HG: CPT | Performed by: INTERNAL MEDICINE

## 2023-10-09 PROCEDURE — 1159F MED LIST DOCD IN RCRD: CPT | Performed by: INTERNAL MEDICINE

## 2023-10-09 PROCEDURE — 93000 ELECTROCARDIOGRAM COMPLETE: CPT | Performed by: INTERNAL MEDICINE

## 2023-10-09 PROCEDURE — 99214 OFFICE O/P EST MOD 30 MIN: CPT | Performed by: INTERNAL MEDICINE

## 2023-10-09 PROCEDURE — 3079F DIAST BP 80-89 MM HG: CPT | Performed by: INTERNAL MEDICINE

## 2023-10-09 NOTE — PROGRESS NOTES
Chief Complaint   Patient presents with    Dizziness       HPI:  Joyce Jones is a 78 y.o. female who presents today for chronic neck pain, dizziness movements, palpitations.  Symptoms ongoing for the past several months.  Denies any syncope.    ROS:  Constitutional: no fevers, night sweats or unexplained weight loss  Eyes: no vision changes  ENT: no runny nose, ear pain, sore throat  Cardio: no chest pain, +palpitations  Pulm: no shortness of breath, wheezing, or cough  GI: no abdominal pain or changes in bowel movements  : no difficulty urinating  MSK: no difficulty ambulating, no joint pain  Neuro: no weakness, +dizziness or headache  Psych: no trouble sleeping  Endo: no change in appetite      Past Medical History:   Diagnosis Date    Allergic     Allergic rhinitis     Anemia     Arthritis     Cholelithiasis     Deep vein thrombosis Clot following left knee replacement    Diverticulosis     Heart murmur     History of bone density study 03/02/2017    [facility]    History of mammography, screening 2016    Hyperlipidemia     Hypertension     Inguinal hernia 1978    repair    Irritable bowel syndrome Self    Kidney stone Embedded    Low back pain Scoliosis/kyphosis    Neuromuscular disorder Shoulder pain , arthritic issues, more severe    Osteoarthritis (arthritis due to wear and tear of joints)     Osteopenia     Personal history of DVT (deep vein thrombosis) 2005    right leg- due to surgery    Pneumonia 5 times    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 Has been under control . Function stable    Scoliosis Joyce      Family History   Problem Relation Age of Onset    Uterine cancer Mother         Metastatic to ovaries and colon; possibly bone    Hypertension Mother     Kidney disease Mother     Tuberculosis Mother     Arthritis Mother     Thyroid disease Mother     Cancer Father          Bladder    Heart failure Father     Stroke Father     Hypertension Father     Heart attack Father     Colon polyps Father     Arthritis Father     Hypertension Brother     Kidney disease Brother     Colon polyps Brother     Cancer Brother     Heart disease Brother     Kidney disease Brother     Hypertension Brother     Cancer Brother         Cancer    No Known Problems Brother     No Known Problems Brother     Cancer Brother     Colon cancer Maternal Uncle     Colon cancer Maternal Uncle     Breast cancer Paternal Aunt     Other Daughter         CVID    Diabetes Daughter       Social History     Socioeconomic History    Marital status:    Tobacco Use    Smoking status: Never     Passive exposure: Never    Smokeless tobacco: Never   Vaping Use    Vaping Use: Never used   Substance and Sexual Activity    Alcohol use: Not Currently     Alcohol/week: 1.0 standard drink of alcohol     Types: 1 Glasses of wine per week     Comment: rarely at holidays    Drug use: Never    Sexual activity: Not Currently     Partners: Male     Birth control/protection: Post-menopausal, Surgical     Comment: Hysterectomy      Allergies   Allergen Reactions    Overton-2 Inhibitors Anaphylaxis    Nabumetone Anaphylaxis    Nsaids GI Intolerance and Hives    Penicillins Nausea And Vomiting and Dizziness    Celecoxib Hives    Zithromax [Azithromycin] Dizziness      Immunization History   Administered Date(s) Administered    COVID-19 (PFIZER) BIVALENT 12+YRS 09/15/2022    COVID-19 (PFIZER) Purple Cap Monovalent 02/02/2021, 02/27/2021, 09/27/2021    Covid-19 (Pfizer) Gray Cap Monovalent 04/11/2022    FLUAD TRI 65YR+ 08/30/2015    Fluad Quad 65+ 12/07/2021, 09/15/2022    Flublok 18+yrs 10/18/2018    Fluzone High Dose =>65 Years ([employer] ONLY) 10/03/2016, 10/01/2017, 10/23/2017, 11/19/2019, 09/29/2020    Fluzone High-Dose 65+yrs 09/29/2020    Hepatitis A 09/06/2018    Influenza, Unspecified 10/18/2018, 01/27/2020    PEDS-Pneumococcal  Conjugate (PCV7) 01/27/2020    Pneumococcal Conjugate 13-Valent (PCV13) 02/03/2016, 11/14/2017, 01/01/2018    Pneumococcal Polysaccharide (PPSV23) 01/27/2020    Shingrix 04/09/2019, 02/07/2023, 09/02/2023    Tdap 01/01/2012    Zostavax 01/01/2010, 01/12/2010, 06/12/2010        PE:  Vitals:    10/09/23 0950   BP: 130/80   Pulse: 78   SpO2: 98%      Body mass index is 24.87 kg/mý.    Gen Appearance: NAD  HEENT: Normocephalic, PERRLA, no thyromegaly, trache midline  Heart: RRR, normal S1 and S2, no murmur  Lungs: CTA b/l, no wheezing, no crackles  Abdomen: Soft, non-tender, non-distended, no guarding and BSx4  MSK: Moves all extremities well, normal gait, no peripheral edema  Pulses: Palpable and equal b/l  Lymph nodes: No palpable lymphadenopathy   Neuro: No focal deficits      Current Outpatient Medications   Medication Sig Dispense Refill    cyclobenzaprine (FLEXERIL) 10 MG tablet Take 1 tablet by mouth 3 (Three) Times a Day As Needed for Muscle Spasms. 30 tablet 0    doxycycline (VIBRAMYCIN) 100 MG capsule Take 1 capsule by mouth 2 (Two) Times a Day. 14 capsule 0    fluticasone (FLONASE) 50 MCG/ACT nasal spray 1 spray into the nostril(s) as directed by provider Daily. 9.9 mL 11    losartan (COZAAR) 50 MG tablet 1 tablet.      methylPREDNISolone (MEDROL) 4 MG dose pack Take as directed on package instructions. 21 tablet 0    traMADol-acetaminophen (ULTRACET) 37.5-325 MG per tablet Take 1 tablet by mouth Every 6 (Six) Hours As Needed for Moderate Pain.       No current facility-administered medications for this visit.      EKG sinus rhythm. Same as prior earlier this year.  Rate of 70, , QRS 85, QTc 412.     Recommend further evaluation at heart valve clinic.  Patient reports a history of Marfan's syndrome.  Recommend checking blood work and cervical spine x-ray today.    Counseling was given to patient for the following topics: diagnostic results, instructions for management, and impressions . Total time of  the encounter was 30 minutes and 15 minutes was spent face to face counseling.    An additional 5 minutes was spent completing EKG.    Diagnoses and all orders for this visit:    1. Chronic neck pain (Primary)  -     XR Spine Cervical 2 or 3 View; Future  -     CBC & Differential; Future  -     Comprehensive Metabolic Panel; Future  -     Urinalysis With Culture If Indicated - Urine, Clean Catch; Future  -     TSH+Free T4; Future    2. Palpitations  -     ECG 12 Lead  -     CBC & Differential; Future  -     Comprehensive Metabolic Panel; Future  -     Urinalysis With Culture If Indicated - Urine, Clean Catch; Future  -     TSH+Free T4; Future  -     Ambulatory Referral to [facility] - [facility]    3. Dizziness  -     CBC & Differential; Future  -     Comprehensive Metabolic Panel; Future  -     Urinalysis With Culture If Indicated - Urine, Clean Catch; Future  -     TSH+Free T4; Future  -     Ambulatory Referral to [facility] and [facility] - [facility]    Other orders  -     Fluzone High-Dose 65+yrs ([id number])         No follow-ups on file.     Dictated Utilizing Dragon Dictation    Please note that portions of this note were completed with a voice recognition program.    Part of this note may be an electronic transcription/translation of spoken language to printed text using the Dragon Dictation System.

## 2023-10-19 ENCOUNTER — OFFICE VISIT (OUTPATIENT)
Dept: CARDIOLOGY | Facility: HOSPITAL | Age: 78
End: 2023-10-19
Payer: MEDICARE

## 2023-10-19 VITALS
TEMPERATURE: 96.8 F | RESPIRATION RATE: 18 BRPM | HEART RATE: 69 BPM | BODY MASS INDEX: 24.82 KG/M2 | DIASTOLIC BLOOD PRESSURE: 64 MMHG | HEIGHT: 64 IN | OXYGEN SATURATION: 99 % | SYSTOLIC BLOOD PRESSURE: 140 MMHG | WEIGHT: 145.4 LBS

## 2023-10-19 DIAGNOSIS — R00.2 PALPITATIONS: ICD-10-CM

## 2023-10-19 DIAGNOSIS — R42 DIZZINESS: Primary | ICD-10-CM

## 2023-10-19 DIAGNOSIS — I10 ESSENTIAL HYPERTENSION: ICD-10-CM

## 2023-10-19 DIAGNOSIS — J34.89 SINUS PRESSURE: ICD-10-CM

## 2023-10-19 NOTE — PROGRESS NOTES
"[facility], [facility]    Chief Complaint  Palpitations and Dizziness (/)    Subjective    History of Present Illness {CC  Problem List  Visit  Diagnosis   Encounters  Notes  Medications  Labs  Result Review Imaging  Media :23}     Joyce Jones presents to [facility] for   History of Present Illness     78-year-old female with history of fibromyalgia, connective tissue disorder, chronic low back pain with sciatica and neck pain, hypertension, anxiety, dizziness, palpitation.     Patient had been complaining of dizziness and palpitations.    Decrease losartan from 50 mg twice a day to 25 mg twice a day.  A carotid duplex was ordered, not completed.    She is taking losartan 50 mg daily.  Dizziness has improved with standing.  Occassional dizziness with bending or movement of head.  Fullness and pressure in the left side of head wit congestion.     Palpitations have continued but not worsened.  No associated s/s.  Feels them but no concerns.  No CP, pressure, dyspnea, edema.    Daughter passed away recently.  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  Increased stress.     Objective     Vital Signs:   Vitals:    10/19/23 0903 10/19/23 0904 10/19/23 0905   BP: 144/70 141/63 140/64   BP Location: Right arm Left arm Left arm   Patient Position: Sitting Sitting Sitting   Cuff Size: Adult Adult Adult   Pulse: 74 88 69   Resp:   18   Temp: 96.8 °F (36 °C) 96.8 °F (36 °C) 96.8 °F (36 °C)   TempSrc: Temporal Temporal Temporal   SpO2: 96% 100% 99%   Weight:   66 kg (145 lb 6.4 oz)   Height:   162.6 cm (64\")     Body mass index is 24.96 kg/m².  Physical Exam  Vitals reviewed.   Constitutional:       General: She is not in acute distress.  Neck:      Vascular: No carotid bruit.   Cardiovascular:      Rate and Rhythm: Normal rate.   Pulmonary:      Effort: Pulmonary effort is normal.      Breath sounds: Normal " breath sounds.   Musculoskeletal:      Right lower leg: No edema.      Left lower leg: No edema.   Skin:     Coloration: Skin is not pale.   Neurological:      Mental Status: She is alert.   Psychiatric:         Mood and Affect: Mood normal.         Behavior: Behavior normal. Behavior is cooperative.              Result Review  Data Reviewed:{ Labs  Result Review  Imaging  Med Tab  Media :23}  EKG 10/9/2023: Sinus rhythm 70 bpm    Holter monitor 12/29/2022.8-day duration.  Average heart rate 73 bpm.  Brief 5 SVT runs.  Longest duration 8 beats.  PACs and PVCs less than 1%.  Patient reported symptoms primarily associated with sinus.     Echocardiogram 1/9/2022: EF 60%, mild AR, moderate TR with RVSP 35 mmHg        Cardiac stress PET 2019: No ischemia, normal EF     Holter monitor 12/29/2022.8-day duration.  Average heart rate 73 bpm.  Brief 5 SVT runs.  Longest duration 8 beats.  PACs and PVCs less than 1%.  Patient reported symptoms primarily associated with sinus.     Echocardiogram 1/9/2022: EF 60%, mild AR, moderate TR with RVSP 35 mmHg        Cardiac stress PET 2019: No ischemia, normal EF              Assessment and Plan {CC Problem List  Visit Diagnosis  ROS  Review (Popup)  Health Maintenance  Quality  BestPractice  Medications  SmartSets  SnapShot Encounters  Media :23}   1. Dizziness  Orthostatic dizziness has seemed to improved with decrease to losartan    Dizziness with sinus pressure.  Pt would like to return to ENT for evaluation  - Ambulatory Referral to ENT (Otolaryngology)    Pt missed her last scheduled appointment and would like to complete   - Duplex Carotid Ultrasound CAR; Future    2. Essential hypertension  Stable for age.  Continue Losartan    3. Palpitations  Reviewed echo and holter results  No concerning arrhythmias  Continue to monitor    4. Sinus pressure    - Ambulatory Referral to ENT (Otolaryngology)          Follow Up {Instructions Charge Capture  Follow-up  Communications :23}   Return in about 6 weeks (around 11/30/2023), or if symptoms worsen or fail to improve, for near syncope/syncope/dizziness, palpitations.    Patient was given instructions and counseling regarding her condition or for health maintenance advice. Please see specific information pulled into the AVS if appropriate.  Patient was instructed to call the [facility] with any questions, concerns, or worsening symptoms.

## 2023-10-20 ENCOUNTER — OFFICE VISIT (OUTPATIENT)
Dept: FAMILY MEDICINE CLINIC | Facility: CLINIC | Age: 78
End: 2023-10-20
Payer: MEDICARE

## 2023-10-20 ENCOUNTER — HOSPITAL ENCOUNTER (OUTPATIENT)
Dept: GENERAL RADIOLOGY | Facility: HOSPITAL | Age: 78
Discharge: HOME | End: 2023-10-20
Admitting: INTERNAL MEDICINE
Payer: MEDICARE

## 2023-10-20 VITALS
DIASTOLIC BLOOD PRESSURE: 84 MMHG | WEIGHT: 145 LBS | SYSTOLIC BLOOD PRESSURE: 122 MMHG | OXYGEN SATURATION: 98 % | BODY MASS INDEX: 24.75 KG/M2 | HEART RATE: 86 BPM | HEIGHT: 64 IN

## 2023-10-20 DIAGNOSIS — M53.3 COCCYX PAIN: Primary | ICD-10-CM

## 2023-10-20 DIAGNOSIS — M53.3 COCCYX PAIN: ICD-10-CM

## 2023-10-20 PROCEDURE — 3079F DIAST BP 80-89 MM HG: CPT | Performed by: INTERNAL MEDICINE

## 2023-10-20 PROCEDURE — 1159F MED LIST DOCD IN RCRD: CPT | Performed by: INTERNAL MEDICINE

## 2023-10-20 PROCEDURE — 3074F SYST BP LT 130 MM HG: CPT | Performed by: INTERNAL MEDICINE

## 2023-10-20 PROCEDURE — 99214 OFFICE O/P EST MOD 30 MIN: CPT | Performed by: INTERNAL MEDICINE

## 2023-10-20 PROCEDURE — 72220 X-RAY EXAM SACRUM TAILBONE: CPT

## 2023-10-20 PROCEDURE — 1160F RVW MEDS BY RX/DR IN RCRD: CPT | Performed by: INTERNAL MEDICINE

## 2023-10-20 RX ORDER — METHYLPREDNISOLONE 4 MG/1
TABLET ORAL
Qty: 21 TABLET | Refills: 0 | Status: SHIPPED | OUTPATIENT
Start: 2023-10-20

## 2023-10-20 RX ORDER — CYCLOBENZAPRINE HCL 10 MG
10 TABLET ORAL NIGHTLY PRN
Qty: 10 TABLET | Refills: 0 | Status: SHIPPED | OUTPATIENT
Start: 2023-10-20

## 2023-10-20 NOTE — PROGRESS NOTES
Chief Complaint   Patient presents with    Back Pain     Lower end        HPI:  Joyce Jones is a 78 y.o. female who presents today for chronic coccyx pain ongoing for the past 6 months.  Patient reports worsening over the last few weeks although no trauma or injury.  Patient states she has been referred to a spine specialist in the past although they did not manage coccyx pain?  States she is also established with pain management taking tramadol, although this does not help her current pain.    ROS:  Constitutional: no fevers, night sweats or unexplained weight loss  Eyes: no vision changes  ENT: no runny nose, ear pain, sore throat  Cardio: no chest pain, palpitations  Pulm: no shortness of breath, wheezing, or cough  GI: no abdominal pain or changes in bowel movements  : no difficulty urinating  MSK: no difficulty ambulating, + joint pain  Neuro: no weakness, dizziness or headache  Psych: no trouble sleeping  Endo: no change in appetite      Past Medical History:   Diagnosis Date    Allergic     Allergic rhinitis     Anemia     Arthritis     Cholelithiasis     Deep vein thrombosis Clot following left knee replacement    Diverticulosis     Heart murmur     History of bone density [facility]/02/2017    [facility]    History of mammography, screening 2016    Hyperlipidemia     Hypertension     Inguinal hernia 1978    repair    Irritable bowel syndrome Self    Kidney stone Embedded    Low back pain Scoliosis/kyphosis    Neuromuscular disorder Shoulder pain , arthritic issues, more severe    Osteoarthritis (arthritis due to wear and tear of joints)     Osteopenia     Personal history of DVT (deep vein thrombosis) 2005    right leg- due to surgery    Pneumonia 5 times    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 Has been under control . Function stable    Scoliosis Joyce      Family History    Problem Relation Age of Onset    Uterine cancer Mother         Metastatic to ovaries and colon; possibly bone    Hypertension Mother     Kidney disease Mother     Tuberculosis Mother     Arthritis Mother     Thyroid disease Mother     Cancer Father         Bladder    Heart failure Father     Stroke Father     Hypertension Father     Heart attack Father     Colon polyps Father     Arthritis Father     Hypertension Brother     Kidney disease Brother     Colon polyps Brother     Cancer Brother     Heart disease Brother     Kidney disease Brother     Hypertension Brother     Cancer Brother         Cancer    No Known Problems Brother     No Known Problems Brother     Cancer Brother     Colon cancer Maternal Uncle     Colon cancer Maternal Uncle     Breast cancer Paternal Aunt     Other Daughter         CVID    Diabetes Daughter       Social History     Socioeconomic History    Marital status:    Tobacco Use    Smoking status: Never     Passive exposure: Never    Smokeless tobacco: Never   Vaping Use    Vaping Use: Never used   Substance and Sexual Activity    Alcohol use: Not Currently     Alcohol/week: 1.0 standard drink of alcohol     Types: 1 Glasses of wine per week     Comment: rarely at holidays    Drug use: Never    Sexual activity: Not Currently     Partners: Male     Birth control/protection: Post-menopausal, Surgical     Comment: Hysterectomy      Allergies   Allergen Reactions    Overton-2 Inhibitors Anaphylaxis    Nabumetone Anaphylaxis    Nsaids GI Intolerance and Hives    Penicillins Nausea And Vomiting and Dizziness    Celecoxib Hives    Zithromax [Azithromycin] Dizziness      Immunization History   Administered Date(s) Administered    COVID-19 (PFIZER) BIVALENT 12+YRS 09/15/2022    COVID-19 (PFIZER) Purple Cap Monovalent 02/02/2021, 02/27/2021, 09/27/2021    Covid-19 (Pfizer) Gray Cap Monovalent 04/11/2022    FLUAD TRI 65YR+ 08/30/2015    Fluad Quad 65+ 12/07/2021, 09/15/2022    Flublok 18+yrs  10/18/2018    Fluzone High Dose =>65 Years (Vaxcare ONLY) 10/03/2016, 10/01/2017, 10/23/2017, 11/19/2019, 09/29/2020    Fluzone High-Dose 65+yrs 09/29/2020    Hepatitis A 09/06/2018    Influenza, Unspecified 10/18/2018, 01/27/2020    PEDS-Pneumococcal Conjugate (PCV7) 01/27/2020    Pneumococcal Conjugate 13-Valent (PCV13) 02/03/2016, 11/14/2017, 01/01/2018    Pneumococcal Polysaccharide (PPSV23) 01/27/2020    Shingrix 04/09/2019, 02/07/2023, 09/02/2023    Tdap 01/01/2012    Zostavax 01/01/2010, 01/12/2010, 06/12/2010        PE:  Vitals:    10/20/23 0923   BP: 122/84   Pulse: 86   SpO2: 98%      Body mass index is 24.89 kg/m².    Gen Appearance: NAD  HEENT: Normocephalic, PERRLA, no thyromegaly, trache midline  Heart: RRR, normal S1 and S2, no murmur  Lungs: CTA b/l, no wheezing, no crackles  Abdomen: Soft, non-tender, non-distended, no guarding and BSx4  MSK: Moves all extremities well, normal gait, no peripheral edema  Pulses: Palpable and equal b/l  Lymph nodes: No palpable lymphadenopathy   Neuro: No focal deficits      Current Outpatient Medications   Medication Sig Dispense Refill    fluticasone (FLONASE) 50 MCG/ACT nasal spray 1 spray into the nostril(s) as directed by provider Daily. 9.9 mL 11    losartan (COZAAR) 50 MG tablet Take 1 tablet by mouth Daily.      traMADol-acetaminophen (ULTRACET) 37.5-325 MG per tablet Take 1 tablet by mouth Every 6 (Six) Hours As Needed for Moderate Pain.      cyclobenzaprine (FLEXERIL) 10 MG tablet Take 1 tablet by mouth At Night As Needed for Muscle Spasms. 10 tablet 0    methylPREDNISolone (MEDROL) 4 MG dose pack Take as directed on package instructions. 21 tablet 0     No current facility-administered medications for this visit.      Recommend updated imaging of the coccyx.  Start with x-ray today.  Refer to neurosurgery or spine specialist for chronic coccyx pain per patient request.  Trial of steroid burst and muscle relaxer as needed.    Diagnoses and all orders for  this visit:    1. Coccyx pain (Primary)  -     XR Sacrum & Coccyx; Future  -     methylPREDNISolone (MEDROL) 4 MG dose pack; Take as directed on package instructions.  Dispense: 21 tablet; Refill: 0  -     cyclobenzaprine (FLEXERIL) 10 MG tablet; Take 1 tablet by mouth At Night As Needed for Muscle Spasms.  Dispense: 10 tablet; Refill: 0    Other orders  -     Fluzone High-Dose 65+yrs (2857-6550)         No follow-ups on file.     Dictated Utilizing Dragon Dictation    Please note that portions of this note were completed with a voice recognition program.    Part of this note may be an electronic transcription/translation of spoken language to printed text using the Dragon Dictation System.

## 2023-10-27 ENCOUNTER — TELEPHONE (OUTPATIENT)
Dept: CARDIOLOGY | Facility: HOSPITAL | Age: 78
End: 2023-10-27
Payer: MEDICARE

## 2023-10-27 ENCOUNTER — HOSPITAL ENCOUNTER (OUTPATIENT)
Dept: CARDIOLOGY | Facility: HOSPITAL | Age: 78
Discharge: HOME | End: 2023-10-27
Payer: MEDICARE

## 2023-10-27 VITALS — WEIGHT: 145 LBS | HEIGHT: 64 IN | BODY MASS INDEX: 24.75 KG/M2

## 2023-10-27 DIAGNOSIS — R42 DIZZINESS: ICD-10-CM

## 2023-10-27 DIAGNOSIS — I77.9 CAROTID ARTERY DISEASE, UNSPECIFIED LATERALITY, UNSPECIFIED TYPE: Primary | ICD-10-CM

## 2023-10-27 LAB
BH CV VAS STUDY COMMENTS THYROID NODULE LT 1ST MEASURE: 0.79 CM
BH CV VAS STUDY COMMENTS THYROID NODULE LT 2ND MEASUR: 1.1 CM
BH CV VAS STUDY COMMENTS THYROID NODULE RT 1ST MEASURE: 0.95 CM
BH CV VAS STUDY COMMENTS THYROID NODULE RT 2ND MEASUR: 0.42 CM
BH CV XLRA MEAS LEFT DIST CCA EDV: 18.9 CM/SEC
BH CV XLRA MEAS LEFT DIST CCA PSV: 84.1 CM/SEC
BH CV XLRA MEAS LEFT DIST ICA EDV: 32.9 CM/SEC
BH CV XLRA MEAS LEFT DIST ICA PSV: 104 CM/SEC
BH CV XLRA MEAS LEFT ICA/CCA RATIO: 1.28
BH CV XLRA MEAS LEFT MID CCA EDV: 18.9 CM/SEC
BH CV XLRA MEAS LEFT MID CCA PSV: 111 CM/SEC
BH CV XLRA MEAS LEFT MID ICA EDV: 35.7 CM/SEC
BH CV XLRA MEAS LEFT MID ICA PSV: 108 CM/SEC
BH CV XLRA MEAS LEFT PROX CCA EDV: 21.7 CM/SEC
BH CV XLRA MEAS LEFT PROX CCA PSV: 109 CM/SEC
BH CV XLRA MEAS LEFT PROX ECA EDV: 6.3 CM/SEC
BH CV XLRA MEAS LEFT PROX ECA PSV: 114 CM/SEC
BH CV XLRA MEAS LEFT PROX ICA EDV: 14.7 CM/SEC
BH CV XLRA MEAS LEFT PROX ICA PSV: 89.7 CM/SEC
BH CV XLRA MEAS LEFT PROX SCLA PSV: 246 CM/SEC
BH CV XLRA MEAS LEFT VERTEBRAL A EDV: 12.3 CM/SEC
BH CV XLRA MEAS LEFT VERTEBRAL A PSV: 53.1 CM/SEC
BH CV XLRA MEAS RIGHT DIST CCA EDV: 18.2 CM/SEC
BH CV XLRA MEAS RIGHT DIST CCA PSV: 76.4 CM/SEC
BH CV XLRA MEAS RIGHT DIST ICA EDV: 49.4 CM/SEC
BH CV XLRA MEAS RIGHT DIST ICA PSV: 163 CM/SEC
BH CV XLRA MEAS RIGHT ICA/CCA RATIO: 1.68
BH CV XLRA MEAS RIGHT MID CCA EDV: 16.1 CM/SEC
BH CV XLRA MEAS RIGHT MID CCA PSV: 99.5 CM/SEC
BH CV XLRA MEAS RIGHT MID ICA EDV: 35 CM/SEC
BH CV XLRA MEAS RIGHT MID ICA PSV: 128 CM/SEC
BH CV XLRA MEAS RIGHT PROX CCA EDV: 22.3 CM/SEC
BH CV XLRA MEAS RIGHT PROX CCA PSV: 128 CM/SEC
BH CV XLRA MEAS RIGHT PROX ECA EDV: 15.5 CM/SEC
BH CV XLRA MEAS RIGHT PROX ECA PSV: 162 CM/SEC
BH CV XLRA MEAS RIGHT PROX ICA EDV: 26.6 CM/SEC
BH CV XLRA MEAS RIGHT PROX ICA PSV: 117 CM/SEC
BH CV XLRA MEAS RIGHT PROX SCLA PSV: 167 CM/SEC
BH CV XLRA MEAS RIGHT VERTEBRAL A EDV: 9.8 CM/SEC
BH CV XLRA MEAS RIGHT VERTEBRAL A PSV: 53.1 CM/SEC
LEFT ARM BP: NORMAL MMHG
RIGHT ARM BP: NORMAL MMHG

## 2023-10-27 PROCEDURE — 93880 EXTRACRANIAL BILAT STUDY: CPT

## 2023-10-27 RX ORDER — ATORVASTATIN CALCIUM 20 MG/1
20 TABLET, FILM COATED ORAL NIGHTLY
Qty: 30 TABLET | Refills: 3 | Status: SHIPPED | OUTPATIENT
Start: 2023-10-27

## 2023-10-27 RX ORDER — ASPIRIN 81 MG/1
81 TABLET ORAL DAILY
Qty: 30 TABLET | Refills: 3 | Status: SHIPPED | OUTPATIENT
Start: 2023-10-27

## 2023-10-27 NOTE — TELEPHONE ENCOUNTER
Called to review carotid duplex results with patient.     Right internal carotid artery demonstrates a less than 50% stenosis.    Left internal carotid artery demonstrates normal flow without evidence of hemodynamically significant stenosis.    Bilateral antegrade vertebral artery flow    Elevated left subclavian velocity may indicate stenosis      Pt will start statin and asa. Pt has allergies to Overton-2 inhibitors, NSAID, Nabumetone, but reports she has tolerated asa in the past.     F/u with [facility] for continued management.  Referral completed.    Cancel f/u in [facility] at this time.  F/u as needed or as determined by cardiology.

## 2023-10-31 ENCOUNTER — TELEPHONE (OUTPATIENT)
Dept: FAMILY MEDICINE CLINIC | Facility: CLINIC | Age: 78
End: 2023-10-31

## 2023-10-31 NOTE — TELEPHONE ENCOUNTER
Caller: Joyce Jones    Relationship: Self    Best call back number: [phone]     What is the medical concern/diagnosis: SCOLIOSIS     What specialty or service is being requested: SPINE DR     What is the provider, practice or medical service name: [facility]     What is the office location: [facility]     What is the office phone number: ?    Any additional details: SCOLIOSIS BAR OUT OF CASS

## 2023-10-31 NOTE — TELEPHONE ENCOUNTER
Let patient know her PCP is out of office, schedule her with someone so we can send an office note with the referral. She saw Dr. Bautista 10/20/23, did she discuss referral with him?

## 2023-11-07 ENCOUNTER — OFFICE VISIT (OUTPATIENT)
Dept: FAMILY MEDICINE CLINIC | Facility: CLINIC | Age: 78
End: 2023-11-07
Payer: MEDICARE

## 2023-11-07 ENCOUNTER — LAB (OUTPATIENT)
Dept: LAB | Facility: HOSPITAL | Age: 78
End: 2023-11-07
Payer: MEDICARE

## 2023-11-07 VITALS
BODY MASS INDEX: 25.1 KG/M2 | SYSTOLIC BLOOD PRESSURE: 132 MMHG | HEIGHT: 64 IN | WEIGHT: 147 LBS | OXYGEN SATURATION: 99 % | HEART RATE: 89 BPM | DIASTOLIC BLOOD PRESSURE: 82 MMHG

## 2023-11-07 DIAGNOSIS — G89.29 CHRONIC NECK PAIN: ICD-10-CM

## 2023-11-07 DIAGNOSIS — M41.9 SCOLIOSIS OF LUMBAR SPINE, UNSPECIFIED SCOLIOSIS TYPE: ICD-10-CM

## 2023-11-07 DIAGNOSIS — M53.3 COCCYX PAIN: Primary | ICD-10-CM

## 2023-11-07 DIAGNOSIS — R00.2 PALPITATIONS: ICD-10-CM

## 2023-11-07 DIAGNOSIS — Z86.2 HISTORY OF ANEMIA: ICD-10-CM

## 2023-11-07 DIAGNOSIS — R42 DIZZINESS: ICD-10-CM

## 2023-11-07 DIAGNOSIS — G89.29 CHRONIC LOW BACK PAIN, UNSPECIFIED BACK PAIN LATERALITY, UNSPECIFIED WHETHER SCIATICA PRESENT: ICD-10-CM

## 2023-11-07 DIAGNOSIS — G89.29 CHRONIC LOW BACK PAIN, UNSPECIFIED BACK PAIN LATERALITY, UNSPECIFIED WHETHER SCIATICA PRESENT: Primary | ICD-10-CM

## 2023-11-07 DIAGNOSIS — M54.50 CHRONIC LOW BACK PAIN, UNSPECIFIED BACK PAIN LATERALITY, UNSPECIFIED WHETHER SCIATICA PRESENT: Primary | ICD-10-CM

## 2023-11-07 DIAGNOSIS — M54.50 CHRONIC LOW BACK PAIN, UNSPECIFIED BACK PAIN LATERALITY, UNSPECIFIED WHETHER SCIATICA PRESENT: ICD-10-CM

## 2023-11-07 DIAGNOSIS — M54.2 CHRONIC NECK PAIN: ICD-10-CM

## 2023-11-07 LAB
BACTERIA UR QL AUTO: ABNORMAL /HPF
BASOPHILS # BLD AUTO: 0.04 10*3/MM3 (ref 0–0.2)
BASOPHILS NFR BLD AUTO: 0.7 % (ref 0–1.5)
BILIRUB UR QL STRIP: NEGATIVE
CLARITY UR: CLEAR
COLOR UR: YELLOW
DEPRECATED RDW RBC AUTO: 46.6 FL (ref 37–54)
EOSINOPHIL # BLD AUTO: 0.09 10*3/MM3 (ref 0–0.4)
EOSINOPHIL NFR BLD AUTO: 1.5 % (ref 0.3–6.2)
ERYTHROCYTE [DISTWIDTH] IN BLOOD BY AUTOMATED COUNT: 12.5 % (ref 12.3–15.4)
GLUCOSE UR STRIP-MCNC: NEGATIVE MG/DL
HCT VFR BLD AUTO: 38.2 % (ref 34–46.6)
HGB BLD-MCNC: 13.1 G/DL (ref 12–15.9)
HGB UR QL STRIP.AUTO: NEGATIVE
HOLD SPECIMEN: NORMAL
HYALINE CASTS UR QL AUTO: ABNORMAL /LPF
IMM GRANULOCYTES # BLD AUTO: 0.01 10*3/MM3 (ref 0–0.05)
IMM GRANULOCYTES NFR BLD AUTO: 0.2 % (ref 0–0.5)
KETONES UR QL STRIP: NEGATIVE
LEUKOCYTE ESTERASE UR QL STRIP.AUTO: ABNORMAL
LYMPHOCYTES # BLD AUTO: 1.6 10*3/MM3 (ref 0.7–3.1)
LYMPHOCYTES NFR BLD AUTO: 27.1 % (ref 19.6–45.3)
MCH RBC QN AUTO: 34.4 PG (ref 26.6–33)
MCHC RBC AUTO-ENTMCNC: 34.3 G/DL (ref 31.5–35.7)
MCV RBC AUTO: 100.3 FL (ref 79–97)
MONOCYTES # BLD AUTO: 0.56 10*3/MM3 (ref 0.1–0.9)
MONOCYTES NFR BLD AUTO: 9.5 % (ref 5–12)
NEUTROPHILS NFR BLD AUTO: 3.6 10*3/MM3 (ref 1.7–7)
NEUTROPHILS NFR BLD AUTO: 61 % (ref 42.7–76)
NITRITE UR QL STRIP: NEGATIVE
NRBC BLD AUTO-RTO: 0 /100 WBC (ref 0–0.2)
PH UR STRIP.AUTO: 6 [PH] (ref 5–8)
PLATELET # BLD AUTO: 248 10*3/MM3 (ref 140–450)
PMV BLD AUTO: 10 FL (ref 6–12)
PROT UR QL STRIP: NEGATIVE
RBC # BLD AUTO: 3.81 10*6/MM3 (ref 3.77–5.28)
RBC # UR STRIP: ABNORMAL /HPF
REF LAB TEST METHOD: ABNORMAL
SP GR UR STRIP: 1.01 (ref 1–1.03)
SQUAMOUS #/AREA URNS HPF: ABNORMAL /HPF
UROBILINOGEN UR QL STRIP: ABNORMAL
WBC # UR STRIP: ABNORMAL /HPF
WBC NRBC COR # BLD: 5.9 10*3/MM3 (ref 3.4–10.8)

## 2023-11-07 PROCEDURE — 1160F RVW MEDS BY RX/DR IN RCRD: CPT | Performed by: INTERNAL MEDICINE

## 2023-11-07 PROCEDURE — 80053 COMPREHEN METABOLIC PANEL: CPT | Performed by: INTERNAL MEDICINE

## 2023-11-07 PROCEDURE — 99214 OFFICE O/P EST MOD 30 MIN: CPT | Performed by: INTERNAL MEDICINE

## 2023-11-07 PROCEDURE — 84443 ASSAY THYROID STIM HORMONE: CPT | Performed by: INTERNAL MEDICINE

## 2023-11-07 PROCEDURE — 84439 ASSAY OF FREE THYROXINE: CPT | Performed by: INTERNAL MEDICINE

## 2023-11-07 PROCEDURE — 84466 ASSAY OF TRANSFERRIN: CPT | Performed by: INTERNAL MEDICINE

## 2023-11-07 PROCEDURE — 3079F DIAST BP 80-89 MM HG: CPT | Performed by: INTERNAL MEDICINE

## 2023-11-07 PROCEDURE — 81001 URINALYSIS AUTO W/SCOPE: CPT | Performed by: INTERNAL MEDICINE

## 2023-11-07 PROCEDURE — 3075F SYST BP GE 130 - 139MM HG: CPT | Performed by: INTERNAL MEDICINE

## 2023-11-07 PROCEDURE — 83540 ASSAY OF IRON: CPT | Performed by: INTERNAL MEDICINE

## 2023-11-07 PROCEDURE — 82728 ASSAY OF FERRITIN: CPT | Performed by: INTERNAL MEDICINE

## 2023-11-07 PROCEDURE — 85025 COMPLETE CBC W/AUTO DIFF WBC: CPT | Performed by: INTERNAL MEDICINE

## 2023-11-07 PROCEDURE — 1159F MED LIST DOCD IN RCRD: CPT | Performed by: INTERNAL MEDICINE

## 2023-11-07 RX ORDER — LIDOCAINE 50 MG/G
1 PATCH TOPICAL EVERY 24 HOURS
Qty: 30 EACH | Refills: 0 | Status: SHIPPED | OUTPATIENT
Start: 2023-11-07

## 2023-11-07 NOTE — PROGRESS NOTES
Chief Complaint   Patient presents with    Tailbone Pain     Pt stating she is having pain in tailbone, said she has taken medication but no improvement.        HPI:  Joyce Jones is a 78 y.o. female who presents today for chronic low back pain and coccyx pain.    ROS:  Constitutional: no fevers, night sweats or unexplained weight loss  Eyes: no vision changes  ENT: no runny nose, ear pain, sore throat  Cardio: no chest pain, palpitations  Pulm: no shortness of breath, wheezing, or cough  GI: no abdominal pain or changes in bowel movements  : no difficulty urinating  MSK: no difficulty ambulating, no joint pain  Neuro: no weakness, dizziness or headache  Psych: no trouble sleeping  Endo: no change in appetite      Past Medical History:   Diagnosis Date    Allergic     Allergic rhinitis     Anemia     Arthritis     Cholelithiasis     Deep vein thrombosis Clot following left knee replacement    Diverticulosis     Heart murmur     History of bone density study 03/02/2017    [facility]    History of mammography, screening 2016    Hyperlipidemia     Hypertension     Inguinal hernia 1978    repair    Irritable bowel syndrome Self    Kidney stone Embedded    Low back pain Scoliosis/kyphosis    Neuromuscular disorder Shoulder pain , arthritic issues, more severe    Osteoarthritis (arthritis due to wear and tear of joints)     Osteopenia     Personal history of DVT (deep vein thrombosis) 2005    right leg- due to surgery    Pneumonia 5 times    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 Has been under control . Function stable    Scoliosis Joyce      Family History   Problem Relation Age of Onset    Uterine cancer Mother         Metastatic to ovaries and colon; possibly bone    Hypertension Mother     Kidney disease Mother     Tuberculosis Mother     Arthritis Mother     Thyroid disease Mother      Cancer Father         Bladder    Heart failure Father     Stroke Father     Hypertension Father     Heart attack Father     Colon polyps Father     Arthritis Father     Hypertension Brother     Kidney disease Brother     Colon polyps Brother     Cancer Brother     Heart disease Brother     Kidney disease Brother     Hypertension Brother     Cancer Brother         Cancer    No Known Problems Brother     No Known Problems Brother     Cancer Brother     Colon cancer Maternal Uncle     Colon cancer Maternal Uncle     Breast cancer Paternal Aunt     Other Daughter         CVID    Diabetes Daughter       Social History     Socioeconomic History    Marital status:    Tobacco Use    Smoking status: Never     Passive exposure: Never    Smokeless tobacco: Never   Vaping Use    Vaping Use: Never used   Substance and Sexual Activity    Alcohol use: Not Currently     Alcohol/week: 1.0 standard drink of alcohol     Types: 1 Glasses of wine per week     Comment: rarely at holidays    Drug use: Never    Sexual activity: Not Currently     Partners: Male     Birth control/protection: Post-menopausal, Surgical     Comment: Hysterectomy      Allergies   Allergen Reactions    Overton-2 Inhibitors Anaphylaxis    Nabumetone Anaphylaxis    Nsaids GI Intolerance and Hives    Penicillins Nausea And Vomiting and Dizziness    Celecoxib Hives    Zithromax [Azithromycin] Dizziness      Immunization History   Administered Date(s) Administered    COVID-19 (PFIZER) BIVALENT 12+YRS 09/15/2022    COVID-19 (PFIZER) Purple Cap Monovalent 02/02/2021, 02/27/2021, 09/27/2021    Covid-19 (Pfizer) Gray Cap Monovalent 04/11/2022    FLUAD TRI 65YR+ 08/30/2015    Fluad Quad 65+ 12/07/2021, 09/15/2022    Flublok 18+yrs 10/18/2018    Fluzone High Dose =>65 Years (Vaxcare ONLY) 10/03/2016, 10/01/2017, 10/23/2017, 11/19/2019, 09/29/2020    Fluzone High-Dose 65+yrs 09/29/2020    Hepatitis A 09/06/2018    Influenza, Unspecified 10/18/2018, 01/27/2020     PEDS-Pneumococcal Conjugate (PCV7) 01/27/2020    Pneumococcal Conjugate 13-Valent (PCV13) 02/03/2016, 11/14/2017, 01/01/2018    Pneumococcal Polysaccharide (PPSV23) 01/27/2020    Shingrix 04/09/2019, 02/07/2023, 09/02/2023    Tdap 01/01/2012    Zostavax 01/01/2010, 01/12/2010, 06/12/2010        PE:  Vitals:    11/07/23 1416   BP: 132/82   Pulse: 89   SpO2: 99%      Body mass index is 25.22 kg/m².    Gen Appearance: NAD  HEENT: Normocephalic, PERRLA, no thyromegaly, trache midline  Heart: RRR, normal S1 and S2, no murmur  Lungs: CTA b/l, no wheezing, no crackles  Abdomen: Soft, non-tender, non-distended, no guarding and BSx4  MSK: Moves all extremities well, normal gait, no peripheral edema  Pulses: Palpable and equal b/l  Lymph nodes: No palpable lymphadenopathy   Neuro: No focal deficits      Current Outpatient Medications   Medication Sig Dispense Refill    aspirin 81 MG EC tablet Take 1 tablet by mouth Daily. 30 tablet 3    atorvastatin (LIPITOR) 20 MG tablet Take 1 tablet by mouth Every Night. 30 tablet 3    cyclobenzaprine (FLEXERIL) 10 MG tablet Take 1 tablet by mouth At Night As Needed for Muscle Spasms. 10 tablet 0    fluticasone (FLONASE) 50 MCG/ACT nasal spray 1 spray into the nostril(s) as directed by provider Daily. 9.9 mL 11    losartan (COZAAR) 50 MG tablet Take 1 tablet by mouth Daily.      traMADol-acetaminophen (ULTRACET) 37.5-325 MG per tablet Take 1 tablet by mouth Every 6 (Six) Hours As Needed for Moderate Pain.      lidocaine (LIDODERM) 5 % Place 1 patch on the skin as directed by provider Daily. Remove & Discard patch within 12 hours or as directed by MD 30 each 0     No current facility-administered medications for this visit.      Lidocaine patch as needed.  I have referred her to neurosurgery to establish care.  She is requesting [facility].  She is also requesting blood work for anemia.  This has already been ordered at previous visit.    Reviewed sacrum x-ray with patient.    Diagnoses and  all orders for this visit:    1. Coccyx pain (Primary)  -     lidocaine (LIDODERM) 5 %; Place 1 patch on the skin as directed by provider Daily. Remove & Discard patch within 12 hours or as directed by MD  Dispense: 30 each; Refill: 0    2. History of anemia  -     Ferritin; Future  -     Iron Profile; Future  -     Ferritin  -     Iron Profile    3. Chronic low back pain, unspecified back pain laterality, unspecified whether sciatica present    4. Chronic neck pain  -     CBC & Differential  -     Comprehensive Metabolic Panel  -     Urinalysis With Culture If Indicated - Urine, Clean Catch  -     TSH+Free T4    5. Palpitations  -     CBC & Differential  -     Comprehensive Metabolic Panel  -     Urinalysis With Culture If Indicated - Urine, Clean Catch  -     TSH+Free T4    6. Dizziness  -     CBC & Differential  -     Comprehensive Metabolic Panel  -     Urinalysis With Culture If Indicated - Urine, Clean Catch  -     TSH+Free T4         No follow-ups on file.     Dictated Utilizing Dragon Dictation    Please note that portions of this note were completed with a voice recognition program.    Part of this note may be an electronic transcription/translation of spoken language to printed text using the Dragon Dictation System.

## 2023-11-08 ENCOUNTER — OFFICE VISIT (OUTPATIENT)
Dept: CARDIOLOGY | Facility: CLINIC | Age: 78
End: 2023-11-08
Payer: MEDICARE

## 2023-11-08 VITALS
HEART RATE: 82 BPM | DIASTOLIC BLOOD PRESSURE: 80 MMHG | BODY MASS INDEX: 25.48 KG/M2 | WEIGHT: 143.8 LBS | SYSTOLIC BLOOD PRESSURE: 134 MMHG | HEIGHT: 63 IN | OXYGEN SATURATION: 100 %

## 2023-11-08 DIAGNOSIS — I10 PRIMARY HYPERTENSION: ICD-10-CM

## 2023-11-08 DIAGNOSIS — R42 DIZZINESS: Primary | ICD-10-CM

## 2023-11-08 DIAGNOSIS — I70.90 ATHEROSCLEROSIS: ICD-10-CM

## 2023-11-08 LAB
ALBUMIN SERPL-MCNC: 4.5 G/DL (ref 3.5–5.2)
ALBUMIN/GLOB SERPL: 1.8 G/DL
ALP SERPL-CCNC: 82 U/L (ref 39–117)
ALT SERPL W P-5'-P-CCNC: 22 U/L (ref 1–33)
ANION GAP SERPL CALCULATED.3IONS-SCNC: 15.7 MMOL/L (ref 5–15)
AST SERPL-CCNC: 23 U/L (ref 1–32)
BILIRUB SERPL-MCNC: 0.3 MG/DL (ref 0–1.2)
BUN SERPL-MCNC: 16 MG/DL (ref 8–23)
BUN/CREAT SERPL: 16.2 (ref 7–25)
CALCIUM SPEC-SCNC: 10.1 MG/DL (ref 8.6–10.5)
CHLORIDE SERPL-SCNC: 99 MMOL/L (ref 98–107)
CO2 SERPL-SCNC: 23.3 MMOL/L (ref 22–29)
CREAT SERPL-MCNC: 0.99 MG/DL (ref 0.57–1)
EGFRCR SERPLBLD CKD-EPI 2021: 58.8 ML/MIN/1.73
FERRITIN SERPL-MCNC: 187 NG/ML (ref 13–150)
GLOBULIN UR ELPH-MCNC: 2.5 GM/DL
GLUCOSE SERPL-MCNC: 120 MG/DL (ref 65–99)
IRON 24H UR-MRATE: 89 MCG/DL (ref 37–145)
IRON SATN MFR SERPL: 25 % (ref 20–50)
POTASSIUM SERPL-SCNC: 3.9 MMOL/L (ref 3.5–5.2)
PROT SERPL-MCNC: 7 G/DL (ref 6–8.5)
SODIUM SERPL-SCNC: 138 MMOL/L (ref 136–145)
T4 FREE SERPL-MCNC: 1.11 NG/DL (ref 0.93–1.7)
TIBC SERPL-MCNC: 356 MCG/DL (ref 298–536)
TRANSFERRIN SERPL-MCNC: 239 MG/DL (ref 200–360)
TSH SERPL DL<=0.05 MIU/L-ACNC: 0.64 UIU/ML (ref 0.27–4.2)

## 2023-11-08 RX ORDER — PREDNISONE 2.5 MG/1
2.5 TABLET ORAL DAILY
COMMUNITY
Start: 2023-11-02

## 2023-11-08 NOTE — PROGRESS NOTES
[facility] CARDIOLOGY    New Patient Office Visit    Patient Name: Joyce Jones  : 1945   MRN: [id number]   Care Team: Patient Care Team:  Jerzy Bautista DO as PCP - General (Internal Medicine)  Hu Dewey MD as Consulting Physician (Pain Medicine)  Robert Zamudio MD as Cardiologist (Cardiology)    Chief Complaint   Patient presents with    Coronary Artery Disease    Shortness of Breath    Dizziness     Periods of dizziness upon sitting up in morning. Not as severe today.      HPI: Joyce Jones is a 78 y.o. female with a history of hypertension, history of provoked DVT, hyperlipidemia, nonobstructive plaque seen on carotid ultrasound who presents today for further evaluation and management of atherosclerosis as well as dizziness.  She has been following with Pipo Degroot in the heart & valve clinic since late December of last year after being referred there for palpitations and dizziness.  She had had a transthoracic echocardiogram last year that showed no findings that would be a high risk etiology for dizziness and underwent wearable monitor in February of this year which was largely benign.  She has had some adjustment to her blood pressure medications and her dizziness is currently improved however still does have intermittent symptoms with standing.  She denies any chest pain or dyspnea on exertion.  She will have palpitations at times.  She does think that a lot of her recent symptoms of dizziness and palpitations are related to life stressors which include her daughter recently passing away from ALS and her  having cancer.    Subjective   Review of Systems   Neurological:  Positive for dizziness.   Psychiatric/Behavioral:  Positive for depressed mood and stress. The patient is nervous/anxious.        Past Medical History:   Diagnosis Date    Allergic     Allergic rhinitis     Anemia     Arthritis     Cholelithiasis     Deep vein thrombosis Clot  following left knee replacement    Diverticulosis     Heart murmur     History of bone density study 03/02/2017    [facility]    History of mammography, screening 2016    Hyperlipidemia     Hypertension     Inguinal hernia 1978    repair    Irritable bowel syndrome Self    Kidney stone Embedded    Low back pain Scoliosis/kyphosis    Neuromuscular disorder Shoulder pain , arthritic issues, more severe    Osteoarthritis (arthritis due to wear and tear of joints)     Osteopenia     Personal history of DVT (deep vein thrombosis) 2005    right leg- due to surgery    Pneumonia 5 times    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 Has been under control . Function stable    Scoliosis Joyce     Past Surgical History:   Procedure Laterality Date    BACK SURGERY  2002    Scoliosis bar. T11-sacrum    BREAST BIOPSY      over 15 years ago.     CERVICAL SPINE SURGERY  2000    cage    CHOLECYSTECTOMY  2014    COLONOSCOPY  2016    EYE SURGERY  Detached retina    FOOT SURGERY  2008    HERNIA REPAIR  3 both bowl areas    INGUINAL HERNIA REPAIR Bilateral 1978    2019 right side repair    INGUINAL HERNIA REPAIR  Both    JOINT REPLACEMENT  4 knee replacement surgeries    Right knee, 1980 approx. left knee , three replacements appears stable now.    KNEE SURGERY Bilateral 2017 2005 and x2 in 8/2017(left)    NASAL SEPTUM SURGERY  1999    SINUS SURGERY  1986    SPINE SURGERY  2004    TOTAL ABDOMINAL HYSTERECTOMY WITH SALPINGO OOPHORECTOMY  1986    Unilateral salpingo-oophorectomy/cervical dysplasia/AUB    TUBAL ABDOMINAL LIGATION  1982    URETHRAL DILATION      Multiple times     Social History     Socioeconomic History    Marital status:    Tobacco Use    Smoking status: Never     Passive exposure: Never    Smokeless tobacco: Never   Vaping Use    Vaping Use: Never used   Substance and Sexual Activity     Alcohol use: Not Currently     Alcohol/week: 1.0 standard drink of alcohol     Types: 1 Glasses of wine per week     Comment: rarely at holidays    Drug use: Never    Sexual activity: Not Currently     Partners: Male     Birth control/protection: Post-menopausal, Surgical     Comment: Hysterectomy     Family History   Problem Relation Age of Onset    Uterine cancer Mother         Metastatic to ovaries and colon; possibly bone    Hypertension Mother     Kidney disease Mother     Tuberculosis Mother     Arthritis Mother     Thyroid disease Mother     Cancer Father         Bladder    Heart failure Father     Stroke Father     Hypertension Father     Heart attack Father     Colon polyps Father     Arthritis Father     Hypertension Brother     Kidney disease Brother     Colon polyps Brother     Cancer Brother     Heart disease Brother     Kidney disease Brother     Hypertension Brother     Cancer Brother         Cancer    No Known Problems Brother     No Known Problems Brother     Cancer Brother     Colon cancer Maternal Uncle     Colon cancer Maternal Uncle     Breast cancer Paternal Aunt     Other Daughter         CVID    Diabetes Daughter        Current Outpatient Medications:     aspirin 81 MG EC tablet, Take 1 tablet by mouth Daily., Disp: 30 tablet, Rfl: 3    atorvastatin (LIPITOR) 20 MG tablet, Take 1 tablet by mouth Every Night., Disp: 30 tablet, Rfl: 3    cyclobenzaprine (FLEXERIL) 10 MG tablet, Take 1 tablet by mouth At Night As Needed for Muscle Spasms., Disp: 10 tablet, Rfl: 0    fluticasone (FLONASE) 50 MCG/ACT nasal spray, 1 spray into the nostril(s) as directed by provider Daily., Disp: 9.9 mL, Rfl: 11    lidocaine (LIDODERM) 5 %, Place 1 patch on the skin as directed by provider Daily. Remove & Discard patch within 12 hours or as directed by MD, Disp: 30 each, Rfl: 0    losartan (COZAAR) 50 MG tablet, Take 1 tablet by mouth Daily., Disp: , Rfl:     predniSONE (DELTASONE) 2.5 MG tablet, Take 1 tablet by  "mouth Daily., Disp: , Rfl:     traMADol-acetaminophen (ULTRACET) 37.5-325 MG per tablet, Take 1 tablet by mouth Every 6 (Six) Hours As Needed for Moderate Pain., Disp: , Rfl:     Allergies   Allergen Reactions    Overton-2 Inhibitors Anaphylaxis    Nabumetone Anaphylaxis    Nsaids GI Intolerance and Hives    Penicillins Nausea And Vomiting and Dizziness    Celecoxib Hives    Zithromax [Azithromycin] Dizziness     Objective     Vitals:    11/08/23 1034   BP: 134/80   BP Location: Right arm   Patient Position: Sitting   Pulse: 82   SpO2: 100%   Weight: 65.2 kg (143 lb 12.8 oz)   Height: 160 cm (63\")     Body mass index is 25.47 kg/m².  Gen: well developed, sitting up on exam table, comfortable appearing  HEENT: MMM, sclera anicteric, conjunctiva normal, no carotid bruits  CV: regular rate, regular rhythm, no murmurs or rubs, normal S1, S2. 2+ radial and PT pulses  Pulm: RA, normal work of breathing, no wheezes, rales, rhonchi  Abd: soft, non-tender, non-distended  Ext: normal bulk for age, normal tone, no dependent edema  Neuro: alert, oriented, face symmetrical, moving all extremities well  Psych: normal mood, appropriate affect    Most recent PCP note, imaging tests, and labs reviewed.    Labs:  Lab Results   Component Value Date    WBC 5.90 11/07/2023    HGB 13.1 11/07/2023    HCT 38.2 11/07/2023    .3 (H) 11/07/2023     11/07/2023     Lab Results   Component Value Date    GLUCOSE 120 (H) 11/07/2023    BUN 16 11/07/2023    CREATININE 0.99 11/07/2023    EGFRIFNONA 72 01/10/2022    BCR 16.2 11/07/2023    K 3.9 11/07/2023    CO2 23.3 11/07/2023    CALCIUM 10.1 11/07/2023    ALBUMIN 4.5 11/07/2023    AST 23 11/07/2023    ALT 22 11/07/2023     Lab Results   Component Value Date    CHOL 185 04/23/2021    TRIG 129 04/23/2021    HDL 70 (H) 04/23/2021    LDL 93 04/23/2021       ECG 12 Lead    Date/Time: 11/8/2023 10:38 AM  Performed by: Robert Zamudio MD    Authorized by: Robert Zamudio MD  Comparison: " compared with previous ECG from 10/9/2023  Similar to previous ECG  Rhythm: sinus rhythm  Rate: normal  BPM: 77  Conduction: conduction normal  ST Segments: ST segments normal  T Waves: T waves normal  QRS axis: normal  Other: no other findings    Clinical impression: normal ECG          10/27/23 - Carotid Duplex    Right internal carotid artery demonstrates a less than 50% stenosis.    Left internal carotid artery demonstrates normal flow without evidence of hemodynamically significant stenosis.    Bilateral antegrade vertebral artery flow    Elevated left subclavian velocity may indicate stenosis    2/15/23 - Holter Monitor    No significant arrhythmia.  Patient triggered events associated with normal sinus rhythm.     1/9/22 - Transthoracic Echo Complete  · Estimated left ventricular EF = 60% Left ventricular systolic function is normal.  · Left ventricular diastolic function is consistent with (grade I) impaired relaxation.  · Mild aortic valve regurgitation is present.  · Moderate tricuspid valve regurgitation is present.  · Calculated right ventricular systolic pressure from tricuspid regurgitation is 35 mmHg.  · No cardiac source for syncope is seen on this study.       Advance Care Planning   ACP discussion was held with the patient during this visit. Patient has an advance directive (not in EMR), copy requested.       Assessment & Plan       ICD-10-CM ICD-9-CM   1. Dizziness  R42 780.4   2. Atherosclerosis  I70.90 440.9   3. Primary hypertension  I10 401.9      Dizziness   - Symptoms improved currently   - Cardiac work-up today largely benign with no significant structural heart disease which would explain dizziness, no arrhythmia seen on wearable monitor, and no obstructive carotid disease   - Continue current medication regimen, trial of compression socks    Nonobstructive carotid atherosclerosis   - Continue aspirin, statin    Hypertension   - Well-controlled, continue current regimen    Hyperlipidemia    - No recent lipid panel available for review, will recheck after being on statin to evaluate for optimal control for primary prevention of ASCVD    Return in about 6 months (around 5/8/2024).    ZOFIA Zamudio MD  11/08/23    [facility] Card[facility]  [address] [place] [id number] [phone]

## 2023-11-09 ENCOUNTER — TELEPHONE (OUTPATIENT)
Dept: FAMILY MEDICINE CLINIC | Facility: CLINIC | Age: 78
End: 2023-11-09
Payer: MEDICARE

## 2023-11-14 ENCOUNTER — PRIOR AUTHORIZATION (OUTPATIENT)
Dept: FAMILY MEDICINE CLINIC | Facility: CLINIC | Age: 78
End: 2023-11-14
Payer: MEDICARE

## 2023-11-14 NOTE — TELEPHONE ENCOUNTER
Key: [id number] - [id number]  Lidocaine 5% patches  Status: sent to plan  Created: November 10th, 2023 [phone]  Sent: November 14th, 2023

## 2023-11-15 ENCOUNTER — TELEPHONE (OUTPATIENT)
Dept: FAMILY MEDICINE CLINIC | Facility: CLINIC | Age: 78
End: 2023-11-15
Payer: MEDICARE

## 2023-11-15 DIAGNOSIS — R73.9 HYPERGLYCEMIA: Primary | ICD-10-CM

## 2023-11-15 NOTE — TELEPHONE ENCOUNTER
Jerzy Bautista DO [facility] Clinical Pool9 minutes ago (8:57 AM)       A1c is ordered   Patient informed labs were ordered. Given lab hours. No further questions or concerns at this time.

## 2023-11-15 NOTE — TELEPHONE ENCOUNTER
Caller: Joyce Jones    Relationship: Self    Best call back number: [phone]    What orders are you requesting (i.e. lab or imaging): TESTING FOR DIABETES     Additional notes: THE PATIENT STATES THAT SHE HAS BEEN HAVING DIZZY SPELLS AND WEAKNESS FOR MONTHS SHE STATES THAT SHE DID PREVIOUS TESTING AND WAS TOLD THAT SOMETHING WAS HIGH THAT COULD INDICATE DIABETES SHE WOULD LIKE TO BE TESTED FOR DIABETES

## 2023-11-16 ENCOUNTER — LAB (OUTPATIENT)
Dept: LAB | Facility: HOSPITAL | Age: 78
End: 2023-11-16
Payer: MEDICARE

## 2023-11-16 DIAGNOSIS — R73.9 HYPERGLYCEMIA: ICD-10-CM

## 2023-11-16 PROCEDURE — 83036 HEMOGLOBIN GLYCOSYLATED A1C: CPT

## 2023-11-17 LAB — HBA1C MFR BLD: 5.1 % (ref 4.8–5.6)

## 2023-11-17 NOTE — TELEPHONE ENCOUNTER
Deniedon November 14  Request Reference Number: [id number]. LIDOCAINE PAD 5% is denied for not meeting the prior authorization requirement(s). Details of this decision are in the notice attached below or have been faxed to you.

## 2023-12-04 ENCOUNTER — TELEPHONE (OUTPATIENT)
Dept: FAMILY MEDICINE CLINIC | Facility: CLINIC | Age: 78
End: 2023-12-04
Payer: MEDICARE

## 2023-12-04 NOTE — TELEPHONE ENCOUNTER
Caller: Joyce Jones    Relationship: Self    Best call back number: [phone]     What is the medical concern/diagnosis: CONGESTION CAN NOT BE CLEARED UP    What specialty or service is being requested: APPOINTMENT    What is the provider, practice or medical service name: [facility]    What is the office location:

## 2023-12-06 ENCOUNTER — OFFICE VISIT (OUTPATIENT)
Dept: FAMILY MEDICINE CLINIC | Facility: CLINIC | Age: 78
End: 2023-12-06
Payer: MEDICARE

## 2023-12-06 VITALS
DIASTOLIC BLOOD PRESSURE: 84 MMHG | WEIGHT: 142.6 LBS | HEIGHT: 63 IN | SYSTOLIC BLOOD PRESSURE: 120 MMHG | HEART RATE: 77 BPM | BODY MASS INDEX: 25.27 KG/M2 | OXYGEN SATURATION: 99 %

## 2023-12-06 DIAGNOSIS — R42 DIZZINESS: Primary | ICD-10-CM

## 2023-12-06 DIAGNOSIS — R42 DIZZINESS: ICD-10-CM

## 2023-12-06 DIAGNOSIS — R10.32 LLQ PAIN: Primary | ICD-10-CM

## 2023-12-06 PROCEDURE — 1160F RVW MEDS BY RX/DR IN RCRD: CPT | Performed by: INTERNAL MEDICINE

## 2023-12-06 PROCEDURE — 3074F SYST BP LT 130 MM HG: CPT | Performed by: INTERNAL MEDICINE

## 2023-12-06 PROCEDURE — 99214 OFFICE O/P EST MOD 30 MIN: CPT | Performed by: INTERNAL MEDICINE

## 2023-12-06 PROCEDURE — 1159F MED LIST DOCD IN RCRD: CPT | Performed by: INTERNAL MEDICINE

## 2023-12-06 PROCEDURE — 3079F DIAST BP 80-89 MM HG: CPT | Performed by: INTERNAL MEDICINE

## 2023-12-06 RX ORDER — METHYLPREDNISOLONE 4 MG/1
TABLET ORAL
Qty: 21 TABLET | Refills: 0 | Status: SHIPPED | OUTPATIENT
Start: 2023-12-06

## 2023-12-06 NOTE — PROGRESS NOTES
Chief Complaint   Patient presents with    Abdominal Pain     Having pain in left lower abdomen.     Dizziness     Says she gets dizzy when she bends her neck       HPI:  Joyce Jones is a 78 y.o. female who presents today for left lower quadrant abdominal pain ongoing for the last several months, worsening over the past week.  Denies any trauma or injury.  Continues to have dizziness.    ROS:  Constitutional: no fevers, night sweats or unexplained weight loss  Eyes: no vision changes  ENT: no runny nose, ear pain, sore throat  Cardio: no chest pain, palpitations  Pulm: no shortness of breath, wheezing, or cough  GI: no abdominal pain or changes in bowel movements  : no difficulty urinating  MSK: no difficulty ambulating, no joint pain  Neuro: no weakness, +dizziness or headache  Psych: no trouble sleeping  Endo: no change in appetite      Past Medical History:   Diagnosis Date    Allergic     Allergic rhinitis     Anemia     Arthritis     Cholelithiasis     Deep vein thrombosis Clot following left knee replacement    Diverticulosis     Heart murmur     History of bone density study 03/02/2017    [facility]    History of mammography, screening 2016    Hyperlipidemia     Hypertension     Inguinal hernia 1978    repair    Irritable bowel syndrome Self    Kidney stone Embedded    Low back pain Scoliosis/kyphosis    Neuromuscular disorder Shoulder pain , arthritic issues, more severe    Osteoarthritis (arthritis due to wear and tear of joints)     Osteopenia     Personal history of DVT (deep vein thrombosis) 2005    right leg- due to surgery    Pneumonia 5 times    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 Has been under control . Function stable    Scoliosis Joyce      Family History   Problem Relation Age of Onset    Uterine cancer Mother         Metastatic to ovaries and colon; possibly  bone    Hypertension Mother     Kidney disease Mother     Tuberculosis Mother     Arthritis Mother     Thyroid disease Mother     Cancer Father         Bladder    Heart failure Father     Stroke Father     Hypertension Father     Heart attack Father     Colon polyps Father     Arthritis Father     Hypertension Brother     Kidney disease Brother     Colon polyps Brother     Cancer Brother     Heart disease Brother     Kidney disease Brother     Hypertension Brother     Cancer Brother         Cancer    No Known Problems Brother     No Known Problems Brother     Cancer Brother     Colon cancer Maternal Uncle     Colon cancer Maternal Uncle     Breast cancer Paternal Aunt     Other Daughter         CVID    Diabetes Daughter       Social History     Socioeconomic History    Marital status:    Tobacco Use    Smoking status: Never     Passive exposure: Never    Smokeless tobacco: Never   Vaping Use    Vaping Use: Never used   Substance and Sexual Activity    Alcohol use: Not Currently     Alcohol/week: 1.0 standard drink of alcohol     Types: 1 Glasses of wine per week     Comment: rarely at holidays    Drug use: Never    Sexual activity: Not Currently     Partners: Male     Birth control/protection: Post-menopausal, Surgical     Comment: Hysterectomy      Allergies   Allergen Reactions    Overton-2 Inhibitors Anaphylaxis    Nabumetone Anaphylaxis    Nsaids GI Intolerance and Hives    Penicillins Nausea And Vomiting and Dizziness    Celecoxib Hives    Zithromax [Azithromycin] Dizziness      Immunization History   Administered Date(s) Administered    COVID-19 (PFIZER) BIVALENT 12+YRS 09/15/2022    COVID-19 (PFIZER) Purple Cap Monovalent 02/02/2021, 02/27/2021, 09/27/2021    COVID-19 F23 (PFIZER) 12YRS+ (COMIRNATY) 11/18/2023    Covid-19 (Pfizer) Gray Cap Monovalent 04/11/2022    FLUAD TRI 65YR+ 08/30/2015    Fluad Quad 65+ 12/07/2021, 09/15/2022    Flublok 18+yrs 10/18/2018    Fluzone High Dose =>65 Years ([employer] ONLY)  10/03/2016, 10/01/2017, 10/23/2017, 11/19/2019, 09/29/2020    Fluzone High-Dose 65+yrs 09/29/2020    Hepatitis A 09/06/2018    Influenza, Unspecified 10/18/2018, 01/27/2020    PEDS-Pneumococcal Conjugate (PCV7) 01/27/2020    Pneumococcal Conjugate 13-Valent (PCV13) 02/03/2016, 11/14/2017, 01/01/2018    Pneumococcal Polysaccharide (PPSV23) 01/27/2020    Shingrix 04/09/2019, 02/07/2023, 09/02/2023    Tdap 01/01/2012    Zostavax 01/01/2010, 01/12/2010, 06/12/2010        PE:  Vitals:    12/06/23 0954   BP: 120/84   Pulse: 77   SpO2: 99%      Body mass index is 25.27 kg/m².    Gen Appearance: NAD  HEENT: Normocephalic, PERRLA, no thyromegaly, trache midline  Heart: RRR, normal S1 and S2, no murmur  Lungs: CTA b/l, no wheezing, no crackles  Abdomen: Soft, non-tender, non-distended, no guarding and BSx4  MSK: Moves all extremities well, normal gait, no peripheral edema  Pulses: Palpable and equal b/l  Lymph nodes: No palpable lymphadenopathy   Neuro: No focal deficits      Current Outpatient Medications   Medication Sig Dispense Refill    aspirin 81 MG EC tablet Take 1 tablet by mouth Daily. 30 tablet 3    atorvastatin (LIPITOR) 20 MG tablet Take 1 tablet by mouth Every Night. 30 tablet 3    cyclobenzaprine (FLEXERIL) 10 MG tablet Take 1 tablet by mouth At Night As Needed for Muscle Spasms. 10 tablet 0    fluticasone (FLONASE) 50 MCG/ACT nasal spray 1 spray into the nostril(s) as directed by provider Daily. 9.9 mL 11    lidocaine (LIDODERM) 5 % Place 1 patch on the skin as directed by provider Daily. Remove & Discard patch within 12 hours or as directed by MD 30 each 0    losartan (COZAAR) 50 MG tablet Take 1 tablet by mouth Daily.      traMADol-acetaminophen (ULTRACET) 37.5-325 MG per tablet Take 1 tablet by mouth Every 6 (Six) Hours As Needed for Moderate Pain.      methylPREDNISolone (MEDROL) 4 MG dose pack Take as directed on package instructions. 21 tablet 0     No current facility-administered medications for this  visit.      Recommend CT scan for further evaluation, suspect inguinal hernia.  Medrol pack for sinus congestion and ear fullness.  May be contributing to dizziness.  Call or return to clinic if no improvement over the next week.    Diagnoses and all orders for this visit:    1. LLQ pain (Primary)  -     CT Pelvis Without Contrast; Future    2. Dizziness  -     methylPREDNISolone (MEDROL) 4 MG dose pack; Take as directed on package instructions.  Dispense: 21 tablet; Refill: 0         No follow-ups on file.     Dictated Utilizing Dragon Dictation    Please note that portions of this note were completed with a voice recognition program.    Part of this note may be an electronic transcription/translation of spoken language to printed text using the Dragon Dictation System.

## 2024-01-04 ENCOUNTER — HOSPITAL ENCOUNTER (OUTPATIENT)
Dept: CT IMAGING | Facility: HOSPITAL | Age: 79
Discharge: HOME | End: 2024-01-04
Admitting: INTERNAL MEDICINE
Payer: MEDICARE

## 2024-01-04 DIAGNOSIS — R10.32 LLQ PAIN: ICD-10-CM

## 2024-01-04 PROCEDURE — 72192 CT PELVIS W/O DYE: CPT

## 2024-01-25 ENCOUNTER — OFFICE VISIT (OUTPATIENT)
Dept: FAMILY MEDICINE CLINIC | Facility: CLINIC | Age: 79
End: 2024-01-25
Payer: MEDICARE

## 2024-01-25 VITALS
WEIGHT: 140 LBS | OXYGEN SATURATION: 99 % | HEIGHT: 63 IN | DIASTOLIC BLOOD PRESSURE: 72 MMHG | SYSTOLIC BLOOD PRESSURE: 110 MMHG | BODY MASS INDEX: 24.8 KG/M2 | HEART RATE: 71 BPM

## 2024-01-25 DIAGNOSIS — H81.11 BENIGN PAROXYSMAL POSITIONAL VERTIGO OF RIGHT EAR: Primary | ICD-10-CM

## 2024-01-25 PROCEDURE — 3074F SYST BP LT 130 MM HG: CPT | Performed by: STUDENT IN AN ORGANIZED HEALTH CARE EDUCATION/TRAINING PROGRAM

## 2024-01-25 PROCEDURE — 99213 OFFICE O/P EST LOW 20 MIN: CPT | Performed by: STUDENT IN AN ORGANIZED HEALTH CARE EDUCATION/TRAINING PROGRAM

## 2024-01-25 PROCEDURE — 3078F DIAST BP <80 MM HG: CPT | Performed by: STUDENT IN AN ORGANIZED HEALTH CARE EDUCATION/TRAINING PROGRAM

## 2024-01-25 RX ORDER — PREDNISONE 10 MG/1
TABLET ORAL
Qty: 7 TABLET | Refills: 0 | Status: SHIPPED | OUTPATIENT
Start: 2024-01-25 | End: 2024-01-31

## 2024-02-05 ENCOUNTER — HOSPITAL ENCOUNTER (INPATIENT)
Facility: HOSPITAL | Age: 79
LOS: 2 days | Discharge: HOME | End: 2024-02-08
Attending: EMERGENCY MEDICINE | Admitting: STUDENT IN AN ORGANIZED HEALTH CARE EDUCATION/TRAINING PROGRAM
Payer: COMMERCIAL

## 2024-02-05 ENCOUNTER — APPOINTMENT (OUTPATIENT)
Dept: CT IMAGING | Facility: HOSPITAL | Age: 79
End: 2024-02-05
Payer: COMMERCIAL

## 2024-02-05 DIAGNOSIS — R55 POSTURAL DIZZINESS WITH PRESYNCOPE: ICD-10-CM

## 2024-02-05 DIAGNOSIS — R19.7 DIARRHEA, UNSPECIFIED TYPE: ICD-10-CM

## 2024-02-05 DIAGNOSIS — M79.7 FIBROMYALGIA: ICD-10-CM

## 2024-02-05 DIAGNOSIS — M75.52 SUBACROMIAL BURSITIS OF LEFT SHOULDER JOINT: ICD-10-CM

## 2024-02-05 DIAGNOSIS — K57.92 ACUTE DIVERTICULITIS: Primary | ICD-10-CM

## 2024-02-05 DIAGNOSIS — M75.82 TENDONITIS OF LEFT ROTATOR CUFF: ICD-10-CM

## 2024-02-05 DIAGNOSIS — E87.1 HYPONATREMIA: ICD-10-CM

## 2024-02-05 DIAGNOSIS — M15.9 PRIMARY OSTEOARTHRITIS INVOLVING MULTIPLE JOINTS: ICD-10-CM

## 2024-02-05 DIAGNOSIS — R11.2 NAUSEA AND VOMITING, UNSPECIFIED VOMITING TYPE: ICD-10-CM

## 2024-02-05 DIAGNOSIS — M50.30 BULGING OF CERVICAL INTERVERTEBRAL DISC: ICD-10-CM

## 2024-02-05 DIAGNOSIS — R42 POSTURAL DIZZINESS WITH PRESYNCOPE: ICD-10-CM

## 2024-02-05 LAB
ALBUMIN SERPL-MCNC: 4.4 G/DL (ref 3.5–5.2)
ALBUMIN/GLOB SERPL: 1.6 G/DL
ALP SERPL-CCNC: 79 U/L (ref 39–117)
ALT SERPL W P-5'-P-CCNC: 17 U/L (ref 1–33)
ANION GAP SERPL CALCULATED.3IONS-SCNC: 13 MMOL/L (ref 5–15)
AST SERPL-CCNC: 24 U/L (ref 1–32)
BACTERIA UR QL AUTO: NORMAL /HPF
BASOPHILS # BLD AUTO: 0.03 10*3/MM3 (ref 0–0.2)
BASOPHILS NFR BLD AUTO: 0.4 % (ref 0–1.5)
BILIRUB SERPL-MCNC: 0.6 MG/DL (ref 0–1.2)
BILIRUB UR QL STRIP: NEGATIVE
BUN SERPL-MCNC: 15 MG/DL (ref 8–23)
BUN/CREAT SERPL: 20 (ref 7–25)
CALCIUM SPEC-SCNC: 9.2 MG/DL (ref 8.6–10.5)
CHLORIDE SERPL-SCNC: 86 MMOL/L (ref 98–107)
CLARITY UR: CLEAR
CO2 SERPL-SCNC: 23 MMOL/L (ref 22–29)
COLOR UR: YELLOW
CREAT BLDA-MCNC: 0.8 MG/DL (ref 0.6–1.3)
CREAT SERPL-MCNC: 0.75 MG/DL (ref 0.57–1)
D-LACTATE SERPL-SCNC: 1.9 MMOL/L (ref 0.5–2)
DEPRECATED RDW RBC AUTO: 47 FL (ref 37–54)
EGFRCR SERPLBLD CKD-EPI 2021: 82.1 ML/MIN/1.73
EOSINOPHIL # BLD AUTO: 0.04 10*3/MM3 (ref 0–0.4)
EOSINOPHIL NFR BLD AUTO: 0.5 % (ref 0.3–6.2)
ERYTHROCYTE [DISTWIDTH] IN BLOOD BY AUTOMATED COUNT: 13 % (ref 12.3–15.4)
GLOBULIN UR ELPH-MCNC: 2.8 GM/DL
GLUCOSE SERPL-MCNC: 133 MG/DL (ref 65–99)
GLUCOSE UR STRIP-MCNC: NEGATIVE MG/DL
HCT VFR BLD AUTO: 35.8 % (ref 34–46.6)
HGB BLD-MCNC: 12.5 G/DL (ref 12–15.9)
HGB UR QL STRIP.AUTO: NEGATIVE
HOLD SPECIMEN: NORMAL
HYALINE CASTS UR QL AUTO: NORMAL /LPF
IMM GRANULOCYTES # BLD AUTO: 0.05 10*3/MM3 (ref 0–0.05)
IMM GRANULOCYTES NFR BLD AUTO: 0.6 % (ref 0–0.5)
KETONES UR QL STRIP: ABNORMAL
LEUKOCYTE ESTERASE UR QL STRIP.AUTO: ABNORMAL
LIPASE SERPL-CCNC: 36 U/L (ref 13–60)
LYMPHOCYTES # BLD AUTO: 1.44 10*3/MM3 (ref 0.7–3.1)
LYMPHOCYTES NFR BLD AUTO: 17.3 % (ref 19.6–45.3)
MCH RBC QN AUTO: 34.7 PG (ref 26.6–33)
MCHC RBC AUTO-ENTMCNC: 34.9 G/DL (ref 31.5–35.7)
MCV RBC AUTO: 99.4 FL (ref 79–97)
MONOCYTES # BLD AUTO: 0.55 10*3/MM3 (ref 0.1–0.9)
MONOCYTES NFR BLD AUTO: 6.6 % (ref 5–12)
NEUTROPHILS NFR BLD AUTO: 6.23 10*3/MM3 (ref 1.7–7)
NEUTROPHILS NFR BLD AUTO: 74.6 % (ref 42.7–76)
NITRITE UR QL STRIP: NEGATIVE
NRBC BLD AUTO-RTO: 0 /100 WBC (ref 0–0.2)
PH UR STRIP.AUTO: 7 [PH] (ref 5–8)
PLATELET # BLD AUTO: 258 10*3/MM3 (ref 140–450)
PMV BLD AUTO: 9.6 FL (ref 6–12)
POTASSIUM SERPL-SCNC: 3.5 MMOL/L (ref 3.5–5.2)
PROT SERPL-MCNC: 7.2 G/DL (ref 6–8.5)
PROT UR QL STRIP: NEGATIVE
RBC # BLD AUTO: 3.6 10*6/MM3 (ref 3.77–5.28)
RBC # UR STRIP: NORMAL /HPF
REF LAB TEST METHOD: NORMAL
SODIUM SERPL-SCNC: 122 MMOL/L (ref 136–145)
SP GR UR STRIP: 1.01 (ref 1–1.03)
SQUAMOUS #/AREA URNS HPF: NORMAL /HPF
UROBILINOGEN UR QL STRIP: ABNORMAL
WBC # UR STRIP: NORMAL /HPF
WBC NRBC COR # BLD AUTO: 8.34 10*3/MM3 (ref 3.4–10.8)
WHOLE BLOOD HOLD COAG: NORMAL
WHOLE BLOOD HOLD SPECIMEN: NORMAL

## 2024-02-05 PROCEDURE — 83605 ASSAY OF LACTIC ACID: CPT

## 2024-02-05 PROCEDURE — 83935 ASSAY OF URINE OSMOLALITY: CPT | Performed by: INTERNAL MEDICINE

## 2024-02-05 PROCEDURE — 74177 CT ABD & PELVIS W/CONTRAST: CPT

## 2024-02-05 PROCEDURE — 99223 1ST HOSP IP/OBS HIGH 75: CPT | Performed by: INTERNAL MEDICINE

## 2024-02-05 PROCEDURE — G0378 HOSPITAL OBSERVATION PER HR: HCPCS

## 2024-02-05 PROCEDURE — 83930 ASSAY OF BLOOD OSMOLALITY: CPT | Performed by: INTERNAL MEDICINE

## 2024-02-05 PROCEDURE — 25810000003 SODIUM CHLORIDE 0.9 % SOLUTION: Performed by: PHYSICIAN ASSISTANT

## 2024-02-05 PROCEDURE — 83690 ASSAY OF LIPASE: CPT

## 2024-02-05 PROCEDURE — 82565 ASSAY OF CREATININE: CPT

## 2024-02-05 PROCEDURE — 99285 EMERGENCY DEPT VISIT HI MDM: CPT

## 2024-02-05 PROCEDURE — 87040 BLOOD CULTURE FOR BACTERIA: CPT | Performed by: PHYSICIAN ASSISTANT

## 2024-02-05 PROCEDURE — 36415 COLL VENOUS BLD VENIPUNCTURE: CPT

## 2024-02-05 PROCEDURE — 72125 CT NECK SPINE W/O DYE: CPT

## 2024-02-05 PROCEDURE — 84300 ASSAY OF URINE SODIUM: CPT | Performed by: INTERNAL MEDICINE

## 2024-02-05 PROCEDURE — 70487 CT MAXILLOFACIAL W/DYE: CPT

## 2024-02-05 PROCEDURE — 25010000002 ONDANSETRON PER 1 MG: Performed by: PHYSICIAN ASSISTANT

## 2024-02-05 PROCEDURE — 80053 COMPREHEN METABOLIC PANEL: CPT

## 2024-02-05 PROCEDURE — 81001 URINALYSIS AUTO W/SCOPE: CPT

## 2024-02-05 PROCEDURE — 85025 COMPLETE CBC W/AUTO DIFF WBC: CPT

## 2024-02-05 PROCEDURE — 25510000001 IOPAMIDOL PER 1 ML: Performed by: EMERGENCY MEDICINE

## 2024-02-05 RX ORDER — ACETAMINOPHEN 325 MG/1
650 TABLET ORAL EVERY 4 HOURS PRN
Status: DISCONTINUED | OUTPATIENT
Start: 2024-02-05 | End: 2024-02-08 | Stop reason: HOSPADM

## 2024-02-05 RX ORDER — ENOXAPARIN SODIUM 100 MG/ML
40 INJECTION SUBCUTANEOUS DAILY
Status: DISCONTINUED | OUTPATIENT
Start: 2024-02-06 | End: 2024-02-08 | Stop reason: HOSPADM

## 2024-02-05 RX ORDER — POLYETHYLENE GLYCOL 3350 17 G/17G
17 POWDER, FOR SOLUTION ORAL DAILY PRN
Status: DISCONTINUED | OUTPATIENT
Start: 2024-02-05 | End: 2024-02-08 | Stop reason: HOSPADM

## 2024-02-05 RX ORDER — FLUTICASONE PROPIONATE 50 MCG
1 SPRAY, SUSPENSION (ML) NASAL DAILY
Status: DISCONTINUED | OUTPATIENT
Start: 2024-02-06 | End: 2024-02-08 | Stop reason: HOSPADM

## 2024-02-05 RX ORDER — ONDANSETRON 2 MG/ML
4 INJECTION INTRAMUSCULAR; INTRAVENOUS ONCE
Status: COMPLETED | OUTPATIENT
Start: 2024-02-05 | End: 2024-02-05

## 2024-02-05 RX ORDER — METRONIDAZOLE 500 MG/100ML
500 INJECTION, SOLUTION INTRAVENOUS EVERY 8 HOURS
Status: DISCONTINUED | OUTPATIENT
Start: 2024-02-06 | End: 2024-02-08 | Stop reason: HOSPADM

## 2024-02-05 RX ORDER — POTASSIUM CHLORIDE 20 MEQ/1
40 TABLET, EXTENDED RELEASE ORAL EVERY 4 HOURS
Status: COMPLETED | OUTPATIENT
Start: 2024-02-06 | End: 2024-02-06

## 2024-02-05 RX ORDER — SODIUM CHLORIDE 0.9 % (FLUSH) 0.9 %
10 SYRINGE (ML) INJECTION EVERY 12 HOURS SCHEDULED
Status: DISCONTINUED | OUTPATIENT
Start: 2024-02-06 | End: 2024-02-08 | Stop reason: HOSPADM

## 2024-02-05 RX ORDER — ATORVASTATIN CALCIUM 20 MG/1
20 TABLET, FILM COATED ORAL NIGHTLY
Status: DISCONTINUED | OUTPATIENT
Start: 2024-02-06 | End: 2024-02-08 | Stop reason: HOSPADM

## 2024-02-05 RX ORDER — SODIUM CHLORIDE AND POTASSIUM CHLORIDE 150; 900 MG/100ML; MG/100ML
50 INJECTION, SOLUTION INTRAVENOUS CONTINUOUS
Status: DISCONTINUED | OUTPATIENT
Start: 2024-02-05 | End: 2024-02-06

## 2024-02-05 RX ORDER — BISACODYL 10 MG
10 SUPPOSITORY, RECTAL RECTAL DAILY PRN
Status: DISCONTINUED | OUTPATIENT
Start: 2024-02-05 | End: 2024-02-08 | Stop reason: HOSPADM

## 2024-02-05 RX ORDER — ONDANSETRON 2 MG/ML
4 INJECTION INTRAMUSCULAR; INTRAVENOUS EVERY 6 HOURS PRN
Status: DISCONTINUED | OUTPATIENT
Start: 2024-02-05 | End: 2024-02-08 | Stop reason: HOSPADM

## 2024-02-05 RX ORDER — AMOXICILLIN 250 MG
2 CAPSULE ORAL 2 TIMES DAILY
Status: DISCONTINUED | OUTPATIENT
Start: 2024-02-06 | End: 2024-02-08 | Stop reason: HOSPADM

## 2024-02-05 RX ORDER — SODIUM CHLORIDE 9 MG/ML
10 INJECTION, SOLUTION INTRAMUSCULAR; INTRAVENOUS; SUBCUTANEOUS AS NEEDED
Status: DISCONTINUED | OUTPATIENT
Start: 2024-02-05 | End: 2024-02-08 | Stop reason: HOSPADM

## 2024-02-05 RX ORDER — ONDANSETRON 4 MG/1
4 TABLET, ORALLY DISINTEGRATING ORAL EVERY 6 HOURS PRN
Status: DISCONTINUED | OUTPATIENT
Start: 2024-02-05 | End: 2024-02-08 | Stop reason: HOSPADM

## 2024-02-05 RX ORDER — SODIUM CHLORIDE 9 MG/ML
40 INJECTION, SOLUTION INTRAVENOUS AS NEEDED
Status: DISCONTINUED | OUTPATIENT
Start: 2024-02-05 | End: 2024-02-08 | Stop reason: HOSPADM

## 2024-02-05 RX ORDER — SODIUM CHLORIDE 0.9 % (FLUSH) 0.9 %
10 SYRINGE (ML) INJECTION AS NEEDED
Status: DISCONTINUED | OUTPATIENT
Start: 2024-02-05 | End: 2024-02-08 | Stop reason: HOSPADM

## 2024-02-05 RX ORDER — BISACODYL 5 MG/1
5 TABLET, DELAYED RELEASE ORAL DAILY PRN
Status: DISCONTINUED | OUTPATIENT
Start: 2024-02-05 | End: 2024-02-08 | Stop reason: HOSPADM

## 2024-02-05 RX ADMIN — ONDANSETRON 4 MG: 2 INJECTION INTRAMUSCULAR; INTRAVENOUS at 20:54

## 2024-02-05 RX ADMIN — IOPAMIDOL 100 ML: 755 INJECTION, SOLUTION INTRAVENOUS at 20:37

## 2024-02-05 RX ADMIN — SODIUM CHLORIDE 1000 ML: 9 INJECTION, SOLUTION INTRAVENOUS at 20:54

## 2024-02-06 LAB
ADV 40+41 DNA STL QL NAA+NON-PROBE: NOT DETECTED
ANION GAP SERPL CALCULATED.3IONS-SCNC: 10 MMOL/L (ref 5–15)
ANION GAP SERPL CALCULATED.3IONS-SCNC: 14 MMOL/L (ref 5–15)
ASTRO TYP 1-8 RNA STL QL NAA+NON-PROBE: NOT DETECTED
BUN SERPL-MCNC: 10 MG/DL (ref 8–23)
BUN SERPL-MCNC: 10 MG/DL (ref 8–23)
BUN/CREAT SERPL: 12.5 (ref 7–25)
BUN/CREAT SERPL: 13.2 (ref 7–25)
C CAYETANENSIS DNA STL QL NAA+NON-PROBE: NOT DETECTED
C COLI+JEJ+UPSA DNA STL QL NAA+NON-PROBE: NOT DETECTED
CA-I SERPL ISE-MCNC: 1.33 MMOL/L (ref 1.12–1.32)
CALCIUM SPEC-SCNC: 9.4 MG/DL (ref 8.6–10.5)
CALCIUM SPEC-SCNC: 9.5 MG/DL (ref 8.6–10.5)
CHLORIDE SERPL-SCNC: 105 MMOL/L (ref 98–107)
CHLORIDE SERPL-SCNC: 107 MMOL/L (ref 98–107)
CO2 SERPL-SCNC: 23 MMOL/L (ref 22–29)
CO2 SERPL-SCNC: 24 MMOL/L (ref 22–29)
CREAT SERPL-MCNC: 0.76 MG/DL (ref 0.57–1)
CREAT SERPL-MCNC: 0.8 MG/DL (ref 0.57–1)
CRYPTOSP DNA STL QL NAA+NON-PROBE: NOT DETECTED
DEPRECATED RDW RBC AUTO: 50 FL (ref 37–54)
E HISTOLYT DNA STL QL NAA+NON-PROBE: NOT DETECTED
EAEC PAA PLAS AGGR+AATA ST NAA+NON-PRB: NOT DETECTED
EC STX1+STX2 GENES STL QL NAA+NON-PROBE: NOT DETECTED
EGFRCR SERPLBLD CKD-EPI 2021: 76 ML/MIN/1.73
EGFRCR SERPLBLD CKD-EPI 2021: 80.8 ML/MIN/1.73
EPEC EAE GENE STL QL NAA+NON-PROBE: NOT DETECTED
ERYTHROCYTE [DISTWIDTH] IN BLOOD BY AUTOMATED COUNT: 13.4 % (ref 12.3–15.4)
ETEC LTA+ST1A+ST1B TOX ST NAA+NON-PROBE: NOT DETECTED
G LAMBLIA DNA STL QL NAA+NON-PROBE: NOT DETECTED
GLUCOSE SERPL-MCNC: 139 MG/DL (ref 65–99)
GLUCOSE SERPL-MCNC: 90 MG/DL (ref 65–99)
HCT VFR BLD AUTO: 38.9 % (ref 34–46.6)
HGB BLD-MCNC: 13.1 G/DL (ref 12–15.9)
MAGNESIUM SERPL-MCNC: 2.1 MG/DL (ref 1.6–2.4)
MCH RBC QN AUTO: 34.4 PG (ref 26.6–33)
MCHC RBC AUTO-ENTMCNC: 33.7 G/DL (ref 31.5–35.7)
MCV RBC AUTO: 102.1 FL (ref 79–97)
NOROVIRUS GI+II RNA STL QL NAA+NON-PROBE: NOT DETECTED
OSMOLALITY SERPL: 265 MOSM/KG (ref 275–295)
OSMOLALITY UR: 228 MOSM/KG (ref 300–1100)
P SHIGELLOIDES DNA STL QL NAA+NON-PROBE: NOT DETECTED
PHOSPHATE SERPL-MCNC: 2.9 MG/DL (ref 2.5–4.5)
PLATELET # BLD AUTO: 259 10*3/MM3 (ref 140–450)
PMV BLD AUTO: 9.7 FL (ref 6–12)
POTASSIUM SERPL-SCNC: 4.1 MMOL/L (ref 3.5–5.2)
POTASSIUM SERPL-SCNC: 5.5 MMOL/L (ref 3.5–5.2)
RBC # BLD AUTO: 3.81 10*6/MM3 (ref 3.77–5.28)
RVA RNA STL QL NAA+NON-PROBE: NOT DETECTED
S ENT+BONG DNA STL QL NAA+NON-PROBE: NOT DETECTED
SAPO I+II+IV+V RNA STL QL NAA+NON-PROBE: NOT DETECTED
SHIGELLA SP+EIEC IPAH ST NAA+NON-PROBE: NOT DETECTED
SODIUM SERPL-SCNC: 130 MMOL/L (ref 136–145)
SODIUM SERPL-SCNC: 139 MMOL/L (ref 136–145)
SODIUM SERPL-SCNC: 139 MMOL/L (ref 136–145)
SODIUM SERPL-SCNC: 144 MMOL/L (ref 136–145)
SODIUM UR-SCNC: 44 MMOL/L
V CHOL+PARA+VUL DNA STL QL NAA+NON-PROBE: NOT DETECTED
V CHOLERAE DNA STL QL NAA+NON-PROBE: NOT DETECTED
WBC NRBC COR # BLD AUTO: 6.08 10*3/MM3 (ref 3.4–10.8)
Y ENTEROCOL DNA STL QL NAA+NON-PROBE: NOT DETECTED

## 2024-02-06 PROCEDURE — 87507 IADNA-DNA/RNA PROBE TQ 12-25: CPT | Performed by: INTERNAL MEDICINE

## 2024-02-06 PROCEDURE — 0 DEXTROSE 5 % SOLUTION: Performed by: STUDENT IN AN ORGANIZED HEALTH CARE EDUCATION/TRAINING PROGRAM

## 2024-02-06 PROCEDURE — 99232 SBSQ HOSP IP/OBS MODERATE 35: CPT | Performed by: STUDENT IN AN ORGANIZED HEALTH CARE EDUCATION/TRAINING PROGRAM

## 2024-02-06 PROCEDURE — 25010000002 ONDANSETRON PER 1 MG: Performed by: INTERNAL MEDICINE

## 2024-02-06 PROCEDURE — 80048 BASIC METABOLIC PNL TOTAL CA: CPT | Performed by: INTERNAL MEDICINE

## 2024-02-06 PROCEDURE — 85027 COMPLETE CBC AUTOMATED: CPT | Performed by: INTERNAL MEDICINE

## 2024-02-06 PROCEDURE — 84295 ASSAY OF SERUM SODIUM: CPT | Performed by: INTERNAL MEDICINE

## 2024-02-06 PROCEDURE — 84100 ASSAY OF PHOSPHORUS: CPT | Performed by: INTERNAL MEDICINE

## 2024-02-06 PROCEDURE — 25010000002 ENOXAPARIN PER 10 MG: Performed by: INTERNAL MEDICINE

## 2024-02-06 PROCEDURE — 25010000002 SODIUM CHLORIDE 0.9 % WITH KCL 20 MEQ 20-0.9 MEQ/L-% SOLUTION: Performed by: INTERNAL MEDICINE

## 2024-02-06 PROCEDURE — 25010000002 DESMOPRESSIN ACETATE PF 4 MCG/ML SOLUTION: Performed by: INTERNAL MEDICINE

## 2024-02-06 PROCEDURE — 83735 ASSAY OF MAGNESIUM: CPT | Performed by: INTERNAL MEDICINE

## 2024-02-06 PROCEDURE — 25010000002 METRONIDAZOLE 500 MG/100ML SOLUTION: Performed by: INTERNAL MEDICINE

## 2024-02-06 PROCEDURE — 0 DEXTROSE 5 % SOLUTION: Performed by: INTERNAL MEDICINE

## 2024-02-06 PROCEDURE — 25010000002 CEFTRIAXONE PER 250 MG: Performed by: INTERNAL MEDICINE

## 2024-02-06 PROCEDURE — 82330 ASSAY OF CALCIUM: CPT | Performed by: INTERNAL MEDICINE

## 2024-02-06 RX ORDER — DEXTROSE MONOHYDRATE 50 MG/ML
250 INJECTION, SOLUTION INTRAVENOUS CONTINUOUS
Status: ACTIVE | OUTPATIENT
Start: 2024-02-06 | End: 2024-02-06

## 2024-02-06 RX ORDER — HYDROXYZINE HYDROCHLORIDE 25 MG/1
25 TABLET, FILM COATED ORAL 3 TIMES DAILY PRN
Status: DISCONTINUED | OUTPATIENT
Start: 2024-02-06 | End: 2024-02-08 | Stop reason: HOSPADM

## 2024-02-06 RX ORDER — DESMOPRESSIN ACETATE 4 UG/ML
2 INJECTION, SOLUTION INTRAVENOUS; SUBCUTANEOUS EVERY 6 HOURS
Status: DISCONTINUED | OUTPATIENT
Start: 2024-02-06 | End: 2024-02-06

## 2024-02-06 RX ORDER — ASPIRIN 81 MG/1
81 TABLET ORAL DAILY
Status: DISCONTINUED | OUTPATIENT
Start: 2024-02-06 | End: 2024-02-06

## 2024-02-06 RX ORDER — DESMOPRESSIN ACETATE 4 UG/ML
2 INJECTION, SOLUTION INTRAVENOUS; SUBCUTANEOUS EVERY 6 HOURS SCHEDULED
Status: DISCONTINUED | OUTPATIENT
Start: 2024-02-06 | End: 2024-02-07

## 2024-02-06 RX ORDER — DESMOPRESSIN ACETATE 4 UG/ML
2 INJECTION, SOLUTION INTRAVENOUS; SUBCUTANEOUS ONCE
Status: DISCONTINUED | OUTPATIENT
Start: 2024-02-06 | End: 2024-02-06

## 2024-02-06 RX ORDER — DEXTROSE MONOHYDRATE 50 MG/ML
500 INJECTION, SOLUTION INTRAVENOUS CONTINUOUS
Status: DISCONTINUED | OUTPATIENT
Start: 2024-02-06 | End: 2024-02-06

## 2024-02-06 RX ORDER — DEXTROSE MONOHYDRATE 50 MG/ML
500 INJECTION, SOLUTION INTRAVENOUS CONTINUOUS
Status: ACTIVE | OUTPATIENT
Start: 2024-02-06 | End: 2024-02-06

## 2024-02-06 RX ORDER — CETIRIZINE HYDROCHLORIDE 10 MG/1
10 TABLET ORAL DAILY
Status: DISCONTINUED | OUTPATIENT
Start: 2024-02-06 | End: 2024-02-08 | Stop reason: HOSPADM

## 2024-02-06 RX ADMIN — POTASSIUM CHLORIDE 40 MEQ: 1500 TABLET, EXTENDED RELEASE ORAL at 05:33

## 2024-02-06 RX ADMIN — DEXTROSE MONOHYDRATE 250 ML/HR: 50 INJECTION, SOLUTION INTRAVENOUS at 12:54

## 2024-02-06 RX ADMIN — DESMOPRESSIN ACETATE 2 MCG: 4 INJECTION INTRAVENOUS; SUBCUTANEOUS at 13:27

## 2024-02-06 RX ADMIN — ONDANSETRON 4 MG: 2 INJECTION INTRAMUSCULAR; INTRAVENOUS at 00:00

## 2024-02-06 RX ADMIN — POTASSIUM CHLORIDE 40 MEQ: 1500 TABLET, EXTENDED RELEASE ORAL at 00:00

## 2024-02-06 RX ADMIN — HYDROXYZINE HYDROCHLORIDE 25 MG: 25 TABLET, FILM COATED ORAL at 12:47

## 2024-02-06 RX ADMIN — DEXTROSE MONOHYDRATE 500 ML: 50 INJECTION, SOLUTION INTRAVENOUS at 16:39

## 2024-02-06 RX ADMIN — METRONIDAZOLE 500 MG: 500 INJECTION, SOLUTION INTRAVENOUS at 00:01

## 2024-02-06 RX ADMIN — METRONIDAZOLE 500 MG: 500 INJECTION, SOLUTION INTRAVENOUS at 15:04

## 2024-02-06 RX ADMIN — TRAMADOL HYDROCHLORIDE: 50 TABLET, COATED ORAL at 15:49

## 2024-02-06 RX ADMIN — HYDROXYZINE HYDROCHLORIDE 25 MG: 25 TABLET, FILM COATED ORAL at 22:16

## 2024-02-06 RX ADMIN — Medication 10 ML: at 08:18

## 2024-02-06 RX ADMIN — CEFTRIAXONE 2000 MG: 2 INJECTION, POWDER, FOR SOLUTION INTRAMUSCULAR; INTRAVENOUS at 00:00

## 2024-02-06 RX ADMIN — Medication 10 ML: at 00:00

## 2024-02-06 RX ADMIN — TRAMADOL HYDROCHLORIDE: 50 TABLET, COATED ORAL at 22:16

## 2024-02-06 RX ADMIN — ENOXAPARIN SODIUM 40 MG: 100 INJECTION SUBCUTANEOUS at 00:00

## 2024-02-06 RX ADMIN — CETIRIZINE HYDROCHLORIDE 10 MG: 10 TABLET, FILM COATED ORAL at 12:47

## 2024-02-06 RX ADMIN — DOCUSATE SODIUM 50 MG AND SENNOSIDES 8.6 MG 2 TABLET: 8.6; 5 TABLET, FILM COATED ORAL at 22:16

## 2024-02-06 RX ADMIN — DESMOPRESSIN ACETATE 2 MCG: 4 INJECTION INTRAVENOUS; SUBCUTANEOUS at 18:02

## 2024-02-06 RX ADMIN — METRONIDAZOLE 500 MG: 500 INJECTION, SOLUTION INTRAVENOUS at 08:17

## 2024-02-06 RX ADMIN — POTASSIUM CHLORIDE AND SODIUM CHLORIDE 50 ML/HR: 900; 150 INJECTION, SOLUTION INTRAVENOUS at 00:01

## 2024-02-06 RX ADMIN — Medication 10 ML: at 22:17

## 2024-02-06 RX ADMIN — FLUTICASONE PROPIONATE 1 SPRAY: 50 SPRAY, METERED NASAL at 08:16

## 2024-02-06 NOTE — ED NOTES
Joyce Jones    Nursing Report ED to Floor:  Mental status: a&ox4  Ambulatory status: assist x1  Oxygen Therapy:  room air  Cardiac Rhythm: NSR  Admitted from: home/ed  Safety Concerns:  fall risk  Social Issues: none  ED Room #:  19    ED Nurse Phone Extension - [phone] or may call [phone].      HPI:   Chief Complaint   Patient presents with    Nausea    Vomiting       Past Medical History:  Past Medical History:   Diagnosis Date    Allergic     Allergic rhinitis     Anemia     Arthritis     Cholelithiasis     Deep vein thrombosis Clot following left knee replacement    Diverticulosis     Heart murmur     History of bone density study 03/02/2017    [facility]    History of mammography, screening 2016    Hyperlipidemia     Hypertension     Inguinal hernia 1978    repair    Irritable bowel syndrome Self    Kidney stone Embedded    Low back pain Scoliosis/kyphosis    Neuromuscular disorder Shoulder pain , arthritic issues, more severe    Osteoarthritis (arthritis due to wear and tear of joints)     Osteopenia     Personal history of DVT (deep vein thrombosis) 2005    right leg- due to surgery    Pneumonia 5 times    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 Has been under control . Function stable    Scoliosis Joyce        Past Surgical History:  Past Surgical History:   Procedure Laterality Date    BACK SURGERY  2002    Scoliosis bar. T11-sacrum    BREAST BIOPSY      over 15 years ago.     CERVICAL SPINE SURGERY  2000    cage    CHOLECYSTECTOMY  2014    COLONOSCOPY  2016    EYE SURGERY  Detached retina    FOOT SURGERY  2008    HERNIA REPAIR  3 both bowl areas    INGUINAL HERNIA REPAIR Bilateral 1978 2019 right side repair    INGUINAL HERNIA REPAIR  Both    JOINT REPLACEMENT  4 knee replacement surgeries    Right knee, 1980 approx. left knee , three replacements appears stable now.    KNEE  "SURGERY Bilateral 2017 2005 and x2 in 8/2017(left)    NASAL SEPTUM SURGERY  1999    SINUS SURGERY  1986    SPINE SURGERY  2004    TOTAL ABDOMINAL HYSTERECTOMY WITH SALPINGO OOPHORECTOMY  1986    Unilateral salpingo-oophorectomy/cervical dysplasia/AUB    TUBAL ABDOMINAL LIGATION  1982    URETHRAL DILATION      Multiple times        Admitting Doctor:   Shimon Woo MD    Consulting Provider(s):  Consults       No orders found from 1/7/2024 to 2/6/2024.             Admitting Diagnosis:   The primary encounter diagnosis was Acute diverticulitis. Diagnoses of Nausea and vomiting, unspecified vomiting type and Diarrhea, unspecified type were also pertinent to this visit.    Most Recent Vitals:   Vitals:    02/05/24 1914 02/05/24 2100   BP: 146/81 132/98   BP Location: Left arm    Patient Position: Sitting    Pulse: 83 69   Resp: 18    Temp: 97.7 °F (36.5 °C)    TempSrc: Oral    SpO2: 99% 98%   Weight: 63.5 kg (140 lb)    Height: 162.6 cm (64\")        Active LDAs/IV Access:   Lines, Drains & Airways       Active LDAs       Name Placement date Placement time Site Days    Peripheral IV 02/05/24 1950 Right Antecubital 02/05/24  1950  Antecubital  less than 1                    Labs (abnormal labs have a star):   Labs Reviewed   COMPREHENSIVE METABOLIC PANEL - Abnormal; Notable for the following components:       Result Value    Glucose 133 (*)     Sodium 122 (*)     Chloride 86 (*)     All other components within normal limits    Narrative:     GFR Normal >60  Chronic Kidney Disease <60  Kidney Failure <15    The GFR formula is only valid for adults with stable renal function between ages 18 and 70.   URINALYSIS W/ MICROSCOPIC IF INDICATED (NO CULTURE) - Abnormal; Notable for the following components:    Ketones, UA Trace (*)     Leuk Esterase, UA Small (1+) (*)     All other components within normal limits   CBC WITH AUTO DIFFERENTIAL - Abnormal; Notable for the following components:    RBC 3.60 (*)     MCV 99.4 (*)  "    MCH 34.7 (*)     Lymphocyte % 17.3 (*)     Immature Grans % 0.6 (*)     All other components within normal limits   LIPASE - Normal   LACTIC ACID, PLASMA - Normal   POCT CREATININE - Normal   BLOOD CULTURE   BLOOD CULTURE   GASTROINTESTINAL PANEL, PCR (PREFERRED) DOES NOT INCLUDE CDIFF   URINALYSIS, MICROSCOPIC ONLY   RAINBOW DRAW    Narrative:     The following orders were created for panel order [place] Draw.  Procedure                               Abnormality         Status                     ---------                               -----------         ------                     Green Top (Gel)[[id number]]                                  Final result               Lavender Top[[id number]]                                     Final result               Gold Top - SST[[id number]]                                   Final result               Gray Top[[id number]]                                         In process                 Light Blue Top[[id number]]                                   Final result                 Please view results for these tests on the individual orders.   CBC (NO DIFF)   BASIC METABOLIC PANEL   MAGNESIUM   PHOSPHORUS   CALCIUM, IONIZED   CBC AND DIFFERENTIAL    Narrative:     The following orders were created for panel order CBC & Differential.  Procedure                               Abnormality         Status                     ---------                               -----------         ------                     CBC Auto Differential[[id number]]        Abnormal            Final result                 Please view results for these tests on the individual orders.   GREEN TOP   LAVENDER TOP   GOLD TOP - SST   LIGHT BLUE TOP   GRAY TOP       Meds Given in ED:   Medications   Sodium Chloride (PF) 0.9 % 10 mL (has no administration in time range)   sodium chloride 0.9 % with KCl 20 mEq/L infusion (has no administration in time range)   sodium chloride 0.9 % bolus 1,000 mL (1,000 mL Intravenous New  Bag 2/5/24 2054)   ondansetron (ZOFRAN) injection 4 mg (4 mg Intravenous Given 2/5/24 2054)   iopamidol (ISOVUE-370) 76 % injection 100 mL (100 mL Intravenous Given 2/5/24 2037)     sodium chloride 0.9 % with KCl 20 mEq, 50 mL/hr

## 2024-02-06 NOTE — PROGRESS NOTES
[facility] Medicine Services  PROGRESS NOTE    Patient Name: Joyce Jones  : 1945  MRN: [id number]    Date of Admission: 2024  Primary Care Physician: Gayle Low DO    Subjective   Subjective     CC:  Nausea, vomiting, diarrhea     HPI:  Denies nausea, vomiting, diarrhea this AM. Feels like she may be having an allergic reaction to something in the room - has been sneezing, has itchy/watery eyes this morning. Wearing a mask now. Otherwise feels well. Discussed hyponatremia with overcorrection with patient and . Explained that I will discuss with Nephrology and they will evaluate her.      Objective   Objective     Vital Signs:   Temp:  [97.7 °F (36.5 °C)-98.8 °F (37.1 °C)] 98.1 °F (36.7 °C)  Heart Rate:  [68-83] 77  Resp:  [17-18] 18  BP: (119-146)/(63-98) 130/82     Physical Exam:  Constitutional: Awake, alert, resting comfortably  HENT: NCAT, mucous membranes moist, mask in place  Respiratory: Clear to auscultation bilaterally, respiratory effort normal   Cardiovascular: RRR, no murmurs, rubs, or gallops  Gastrointestinal: Positive bowel sounds, soft, nontender, nondistended  Musculoskeletal: No bilateral ankle edema  Psychiatric: Appropriate affect, cooperative  Neurologic: Alert and oriented x 3, no focal deficits, speech clear      Results Reviewed:  LAB RESULTS:      Lab 24  07524   WBC 6.08 8.34   HEMOGLOBIN 13.1 12.5   HEMATOCRIT 38.9 35.8   PLATELETS 259 258   NEUTROS ABS  --  6.23   IMMATURE GRANS (ABS)  --  0.05   LYMPHS ABS  --  1.44   MONOS ABS  --  0.55   EOS ABS  --  0.04   .1* 99.4*   LACTATE  --  1.9         Lab 24  0759 24  1950 24   SODIUM 144  --  122*   POTASSIUM 5.5*  --  3.5   CHLORIDE 107  --  86*   CO2 23.0  --  23.0   ANION GAP 14.0  --  13.0   BUN 10  --  15   CREATININE 0.76 0.80 0.75   EGFR 80.8  --  82.1   GLUCOSE 90  --  133*   CALCIUM 9.4  --  9.2   IONIZED CALCIUM 1.33*  --    --    MAGNESIUM 2.1  --   --    PHOSPHORUS 2.9  --   --          Lab 02/05/24 1944   TOTAL PROTEIN 7.2   ALBUMIN 4.4   GLOBULIN 2.8   ALT (SGPT) 17   AST (SGOT) 24   BILIRUBIN 0.6   ALK PHOS 79   LIPASE 36                     Brief Urine Lab Results  (Last result in the past 365 days)        Color   Clarity   Blood   Leuk Est   Nitrite   Protein   CREAT   Urine HCG        02/05/24 1945 Yellow   Clear   Negative   Small (1+)   Negative   Negative                   Microbiology Results Abnormal       Procedure Component Value - Date/Time    Gastrointestinal Panel, PCR - Stool, Per Rectum [[id number]]  (Normal) Collected: 02/06/24 0850    Lab Status: Final result Specimen: Stool from Per Rectum Updated: 02/06/24 1056     Campylobacter Not Detected     Plesiomonas shigelloides Not Detected     Salmonella Not Detected     Vibrio Not Detected     Vibrio cholerae Not Detected     Yersinia enterocolitica Not Detected     Enteroaggregative E. coli (EAEC) Not Detected     Enteropathogenic E. coli (EPEC) Not Detected     Enterotoxigenic E. coli (ETEC) lt/st Not Detected     Shiga-like toxin-producing E. coli (STEC) stx1/stx2 Not Detected     Shigella/Enteroinvasive E. coli (EIEC) Not Detected     Cryptosporidium Not Detected     Cyclospora cayetanensis Not Detected     Entamoeba histolytica Not Detected     Giardia lamblia Not Detected     Adenovirus F40/41 Not Detected     Astrovirus Not Detected     Norovirus GI/GII Not Detected     Rotavirus A Not Detected     Sapovirus (I, II, IV or V) Not Detected            CT Abdomen Pelvis With Contrast    Result Date: 2/5/2024  CT ABDOMEN PELVIS W CONTRAST Date of Exam: 2/5/2024 8:17 PM EST Indication: Abdominal pain, nausea, vomiting, diarrhea. Comparison: CT pelvis 1/4/2024, CT abdomen pelvis 1/2/2023. Technique: Axial CT images were obtained of the abdomen and pelvis following the uneventful intravenous administration of 100 mL Isovue-370. Reconstructed coronal and sagittal images  were also obtained. Automated exposure control and iterative construction methods were used. Findings: Lower thorax: No focal consolidation. Liver: No focal hepatic lesion. Normal morphology of the liver. Gallbladder and bile ducts: Cholecystectomy. No biliary ductal dilatation. Pancreas: No pancreatic duct dilation. No surrounding inflammation. Spleen: Normal in size. Adrenal glands: No discrete adrenal nodule. Kidneys: Symmetric in size and enhancement. No hydronephrosis. Urinary bladder: Mildly distended. Reproductive organs: Hysterectomy. Stomach and bowel: Diverticulosis with peridiverticular fat stranding along the proximal sigmoid colon. No ceferino pneumoperitoneum. No organized fluid collection. Findings not apparent on yesterday's CT. No evidence of bowel obstruction. Lymph nodes: No pathologically enlarged lymph nodes. Vessels: No abdominal aortic aneurysm. Major vasculature is patent. Soft tissues: Unremarkable. Osseous structures: No acute or suspicious osseous lesions. Thoracolumbar sacral fusion hardware with surrounding artifact. Degenerative changes of the hips. Bones appear demineralized.     Impression: Impression: Acute mild sigmoid diverticulitis without evidence of complication. Electronically Signed: Homar Peres MD  2/5/2024 8:45 PM EST  Workstation ID: [id number]    CT Maxillofacial With Contrast    Result Date: 2/5/2024  CT MAXILLOFACIAL W CONT, CT CERVICAL SPINE WO CONTRAST Date of Exam: 2/5/2024 8:17 PM EST Indication: History of recurrent sinusitis with known obstruction of sinus cavity. Comparison: 6/30/2023 Technique: Axial CT images were obtained from the inferior aspect of the mandible through the frontal sinuses after the uneventful intravenous administration of 100 cc Isovue-370.  Reconstructed coronal and sagittal images were also obtained. Automated exposure control and iterative construction methods were used. The mandible appears intact. The zygomatic arches are intact. The nasal  bones are intact. Prior right scleral banding. Previous lens replacements. No intracranial mass. No abnormal intracranial enhancement. No intracranial hemorrhage. The frontal sinuses  are clear. The ethmoid air cells are aerated. The sphenoid sinuses are clear. The maxillary sinuses are clear. The mastoid air cells are aerated. The ostiomeatal units are patent. The sphenoethmoidal ethmoidal recesses and frontoethmoidal recesses are aerated. The clivus and visualized posterior fossa are normal. Degenerative changes of the anterior atlantoaxial articulation and cervical spine. C1 and the odontoid process appear intact. No adenopathy is appreciated within the upper neck. No collections or masses. Cervical vertebral body height and alignment are normal. Fusion of C5-C7. Prominent bridging osteophytes at C3-C4 and C4-C5. The spinous processes are intact. Severe degenerative changes at the anterior glenoid axial articulation. The skull base is intact. No epidural hematoma. C2-C3: Uncovertebral joint hypertrophic changes. Mild foraminal narrowing. No canal stenosis. C3-C4: Facet disease and uncovertebral joint hypertrophic changes. Severe left sided foraminal narrowing. Mild canal stenosis. C4-C5: Facet disease and uncovertebral joint hypertrophic changes. Mild foraminal narrowing. No canal stenosis. C5-C6: No significant spinal canal or foraminal narrowing. C6-C7: Uncovertebral joint hypertrophic changes with mild right-sided foraminal narrowing. No canal stenosis. C7-T1: No significant spinal canal or foraminal narrowing.     Impression: Multilevel degenerative changes of the cervical spine. No masses or collections within the neck. No sinusitis. Electronically Signed: Carlos Manuel Juarez MD  2/5/2024 8:44 PM EST  Workstation ID: [id number]    CT Cervical Spine Without Contrast    Result Date: 2/5/2024  CT MAXILLOFACIAL W CONT, CT CERVICAL SPINE WO CONTRAST Date of Exam: 2/5/2024 8:17 PM EST Indication: History of recurrent  sinusitis with known obstruction of sinus cavity. Comparison: 6/30/2023 Technique: Axial CT images were obtained from the inferior aspect of the mandible through the frontal sinuses after the uneventful intravenous administration of 100 cc Isovue-370.  Reconstructed coronal and sagittal images were also obtained. Automated exposure control and iterative construction methods were used. The mandible appears intact. The zygomatic arches are intact. The nasal bones are intact. Prior right scleral banding. Previous lens replacements. No intracranial mass. No abnormal intracranial enhancement. No intracranial hemorrhage. The frontal sinuses  are clear. The ethmoid air cells are aerated. The sphenoid sinuses are clear. The maxillary sinuses are clear. The mastoid air cells are aerated. The ostiomeatal units are patent. The sphenoethmoidal ethmoidal recesses and frontoethmoidal recesses are aerated. The clivus and visualized posterior fossa are normal. Degenerative changes of the anterior atlantoaxial articulation and cervical spine. C1 and the odontoid process appear intact. No adenopathy is appreciated within the upper neck. No collections or masses. Cervical vertebral body height and alignment are normal. Fusion of C5-C7. Prominent bridging osteophytes at C3-C4 and C4-C5. The spinous processes are intact. Severe degenerative changes at the anterior glenoid axial articulation. The skull base is intact. No epidural hematoma. C2-C3: Uncovertebral joint hypertrophic changes. Mild foraminal narrowing. No canal stenosis. C3-C4: Facet disease and uncovertebral joint hypertrophic changes. Severe left sided foraminal narrowing. Mild canal stenosis. C4-C5: Facet disease and uncovertebral joint hypertrophic changes. Mild foraminal narrowing. No canal stenosis. C5-C6: No significant spinal canal or foraminal narrowing. C6-C7: Uncovertebral joint hypertrophic changes with mild right-sided foraminal narrowing. No canal stenosis.  C7-T1: No significant spinal canal or foraminal narrowing.     Impression: Multilevel degenerative changes of the cervical spine. No masses or collections within the neck. No sinusitis. Electronically Signed: Carlos Manuel Juarez MD  2/5/2024 8:44 PM EST  Workstation ID: [id number]     Results for orders placed during the hospital encounter of 01/08/22    Adult Transthoracic Echo Complete W/ Cont if Necessary Per Protocol    Interpretation Summary  · Estimated left ventricular EF = 60% Left ventricular systolic function is normal.  · Left ventricular diastolic function is consistent with (grade I) impaired relaxation.  · Mild aortic valve regurgitation is present.  · Moderate tricuspid valve regurgitation is present.  · Calculated right ventricular systolic pressure from tricuspid regurgitation is 35 mmHg.  · No cardiac source for syncope is seen on this study.      Current medications:  Scheduled Meds:atorvastatin, 20 mg, Oral, Nightly  cefTRIAXone, 2,000 mg, Intravenous, Q24H  cetirizine, 10 mg, Oral, Daily  desmopressin, 2 mcg, Subcutaneous, Q6H  enoxaparin, 40 mg, Subcutaneous, Daily  fluticasone, 1 spray, Nasal, Daily  metroNIDAZOLE, 500 mg, Intravenous, Q8H  senna-docusate sodium, 2 tablet, Oral, BID  sodium chloride, 10 mL, Intravenous, Q12H      Continuous Infusions:dextrose, 250 mL/hr, Last Rate: 250 mL/hr (02/06/24 1254)      PRN Meds:.  acetaminophen    senna-docusate sodium **AND** polyethylene glycol **AND** bisacodyl **AND** bisacodyl    Calcium Replacement - Follow Nurse / BPA Driven Protocol    hydrOXYzine    Magnesium Standard Dose Replacement - Follow Nurse / BPA Driven Protocol    ondansetron ODT **OR** ondansetron    Phosphorus Replacement - Follow Nurse / BPA Driven Protocol    Potassium Replacement - Follow Nurse / BPA Driven Protocol    Sodium Chloride (PF)    sodium chloride    sodium chloride    traMADol-acetaminophen (ULTRACET)  mg combo dose    Assessment & Plan   Assessment & Plan     Active  Hospital Problems    Diagnosis  POA    Diverticulitis [K57.92]  Yes    Nausea and vomiting [R11.2]  Yes    Diarrhea [R19.7]  Yes    Hyponatremia [E87.1]  Yes      Resolved Hospital Problems   No resolved problems to display.        Brief Hospital Course to date:  Joyce Jones is a 78 y.o. female with PMHx HTN, HLD, GERD, CKD, INGRIS who presented with N/V and diarrhea that started 2/5 AM. CT abd/pelvis showed acute mild sigmoid diverticulitis. Was also found to have hyponatremia on admission.     This patient's problems and plans were partially entered by my partner and updated as appropriate by me 02/06/24.    Acute mild sigmoid diverticulitis  Nausea, vomiting, diarrhea   -CT A/P with mild sigmoid diverticulitis. GI PCR negative on admission.   -Continue empiric coverage with IV ceftriaxone and metronidazole.  -Continue pain and nausea control as needed.      Hyponatremia, unclear chronicity, with overcorrection  -Unclear etiology, not on thiazide diuretic, likely due to large volume water intake at home in setting of low sodium diet. No EtOH use.   -Na 122 to 144 after 1L NS and mIVF overnight 2/5-2/6. Serum osm 265.   -Discussed with Nephrology. Ordered D5W 500 mL bolus over 2 hours, DDAVP 2 mcg every 6 hours. Will recheck sodium in 2 hours, then monitor every 2 hours. Goal Na 130 today.    Itching/watering of eyes with sneezing  -likely allergic reaction to something in room  -Ordered cetirizine 10mg daily, PRN hydroxyzine.    Mild hyperkalemia  -Will recheck BMP in AM.    Cervical DDD  -Continue home PRN tramadol and Tylenol.      HTN  -Holding home losartan. Will monitor and add back in as needed.     Hyperlipidemia  -Continue atorvastatin.     INGRIS  -Continue CPAP nightly.       Expected Discharge Location and Transportation: Home  Expected Discharge   Expected Discharge Date: 2/9/2024; Expected Discharge Time:      DVT prophylaxis:  Medical DVT prophylaxis orders are present.         AM-PAC 6 Clicks Score  (PT): 18 (02/05/24 0734)    CODE STATUS:   Code Status and Medical Interventions:   Ordered at: 02/05/24 9477     Medical Intervention Limits:    NO intubation (DNI)     Level Of Support Discussed With:    Patient     Code Status (Patient has no pulse and is not breathing):    No CPR (Do Not Attempt to Resuscitate)     Medical Interventions (Patient has pulse or is breathing):    Limited Support     Comments:    d/w patient.  No cpr or vent treatment       Chelsi Gaitan, DO  02/06/24

## 2024-02-06 NOTE — CONSULTS
[facility]  Renal Consult Note    Joyce Jones  1945  [id number]    Date of Admit:  2/5/2024    Date of Consult: 2/6/2024    Requesting Provider: No Known Provider    Evaluating Physician: Carlos Manuel Perez MD    Chief Complaint: GI upset    Reason for Consultation: Hyponatremia    History of present illness:    Patient is a 78 y.o.  Yr old female presented to the ER with nausea vomiting diarrhea.  During workup found to have low sodium 122.  Felt to be dehydrated.  Patient was admitted and started on IV fluids.  Repeat sodium today up to 144.  Nephrology consulted to assist with hyponatremia management.  Patient reports feeling a little better today.  Sitting up in bed does still have some mild nausea.  Reports increased weakness over the past few days.  Denies any rash or edema.  No headache.  Denies NSAIDs.    Past Medical History  Past Medical History:   Diagnosis Date    Allergic     Allergic rhinitis     Anemia     Arthritis     Cholelithiasis     Deep vein thrombosis Clot following left knee replacement    Diverticulosis     Heart murmur     History of bone density study 03/02/2017    [facility]    History of mammography, screening 2016    Hyperlipidemia     Hypertension     Inguinal hernia 1978    repair    Irritable bowel syndrome Self    Kidney stone Embedded    Low back pain Scoliosis/kyphosis    Neuromuscular disorder Shoulder pain , arthritic issues, more severe    Osteoarthritis (arthritis due to wear and tear of joints)     Osteopenia     Personal history of DVT (deep vein thrombosis) 2005    right leg- due to surgery    Pneumonia 5 times    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 Has been under control . Function stable    Scoliosis Joyce       Past Surgical History:   Procedure Laterality Date    BACK SURGERY  2002    Scoliosis  bar. T11-sacrum    BREAST BIOPSY      over 15 years ago.     CERVICAL SPINE SURGERY  2000    cage    CHOLECYSTECTOMY  2014    COLONOSCOPY  2016    EYE SURGERY  Detached retina    FOOT SURGERY  2008    HERNIA REPAIR  3 both bowl areas    INGUINAL HERNIA REPAIR Bilateral 1978    2019 right side repair    INGUINAL HERNIA REPAIR  Both    JOINT REPLACEMENT  4 knee replacement surgeries    Right knee, 1980 approx. left knee , three replacements appears stable now.    KNEE SURGERY Bilateral 2017    2005 and x2 in 8/2017(left)    NASAL SEPTUM SURGERY  1999    SINUS SURGERY  1986    SPINE SURGERY  2004    TOTAL ABDOMINAL HYSTERECTOMY WITH SALPINGO OOPHORECTOMY  1986    Unilateral salpingo-oophorectomy/cervical dysplasia/AUB    TUBAL ABDOMINAL LIGATION  1982    URETHRAL DILATION      Multiple times       Allergies:  Allergies   Allergen Reactions    Overton-2 Inhibitors Anaphylaxis    Nabumetone Anaphylaxis    Nsaids GI Intolerance and Hives    Sulfa Antibiotics Shortness Of Breath    Celecoxib Hives    Penicillins Nausea And Vomiting and Dizziness    Zithromax [Azithromycin] Dizziness       Medication:   See electronic record    Soc Hx:   Social History     Socioeconomic History    Marital status:    Tobacco Use    Smoking status: Never     Passive exposure: Never    Smokeless tobacco: Never   Vaping Use    Vaping Use: Never used   Substance and Sexual Activity    Alcohol use: Not Currently     Alcohol/week: 1.0 standard drink of alcohol     Types: 1 Glasses of wine per week     Comment: rarely at holidays    Drug use: Never    Sexual activity: Defer     Partners: Male     Birth control/protection: Post-menopausal, Surgical     Comment: Hysterectomy       Fam Hx:  No congenital renal disease      Review of Systems:  Full review of systems reviewed and are as above  In HPI or per admitting H&P,otherwise negative for acute complaints    Physical Exam:   Vital Signs   Blood pressure 130/82, pulse 77, temperature 98.1 °F  "(36.7 °C), temperature source Axillary, resp. rate 18, height 162.6 cm (64\"), weight 65.7 kg (144 lb 12.8 oz), SpO2 98%, not currently breastfeeding.  02/05 0701 - 02/06 0700  In: -   Out: 2600 [Urine:2600]    GENERAL: WD WF NAD  NEURO: Awake and alert, oriented. No focal deficit  PSYCHIATRIC: NMA. Cooperative with PE  EYE: PE, no icterus, no conjunctivitis  ENT: ommm, dentition intact,  Hearing intact  NECK:  No JVD discernable,  Trachea midline  CV: No edema, RRR  LUNGS:  Quiet,  Nonlabored resp.  Symmetrical expansion  ABDOMEN: Nondistended, soft nontender.  : No Brady, no palp bladder  SKIN: Warm and dry without rash    Laboratory Data  Results from last 7 days   Lab Units 02/06/24  0759 02/05/24 1944   HEMOGLOBIN g/dL 13.1 12.5   HEMATOCRIT % 38.9 35.8     Results from last 7 days   Lab Units 02/06/24  0759 02/05/24  1950 02/05/24 1944   SODIUM mmol/L 144  --  122*   POTASSIUM mmol/L 5.5*  --  3.5   CHLORIDE mmol/L 107  --  86*   CO2 mmol/L 23.0  --  23.0   BUN mg/dL 10  --  15   CREATININE mg/dL 0.76 0.80 0.75   CALCIUM mg/dL 9.4  --  9.2   PHOSPHORUS mg/dL 2.9  --   --    MAGNESIUM mg/dL 2.1  --   --    ALBUMIN g/dL  --   --  4.4     Results from last 7 days   Lab Units 02/05/24 1945   COLOR UA  Yellow   CLARITY UA  Clear   PH, URINE  7.0   SPECIFIC GRAVITY, URINE  1.008   GLUCOSE UA  Negative   KETONES UA  Trace*   BILIRUBIN UA  Negative   PROTEIN UA  Negative   BLOOD UA  Negative   LEUKOCYTES UA  Small (1+)*   NITRITE UA  Negative     Results from last 7 days   Lab Units 02/05/24 1944   ALK PHOS U/L 79   BILIRUBIN mg/dL 0.6   ALT (SGPT) U/L 17   AST (SGOT) U/L 24         Estimated Creatinine Clearance: 57.8 mL/min (by C-G formula based on SCr of 0.76 mg/dL).  No results found for: \"CREATININEUR\", \"PROTEINUR\", \"NAUR\", \"UREAUR\"    A/P:      Hyponatremia: Possibly due to underlying dehydration with poor solute intake.  Unsure of additional U/B osmolality.  Sodium increased from 122-> 144 overnight on " isotonic fluids with robust increase in urine output.  Due to rapid rise in sodium, isotonic fluids were held and nephrology was consulted.  Discussed with primary team.  Agree with DDAVP and free water bolus.  Give DDAVP subcu 2 mcg every 6 for now.  Bolus D5W 500 mL over 2 hours.  Get stat repeat sodium level..  Check every 2 hours for now until stabilized.  Rebolus D5W as needed.  Goal sodium at this time <130 until tomorrow.  Once sodium back down to desired level, would allow to rise slowly at a rate of <0.5 mmol/h to minimize risk of ODS..    Hyperkalemia: Potassium 5.5 this morning post p.o. potassium replacement overnight.  Hold further potassium for now.  Recheck potassium level.  Give Lokelma as needed for K >5.7.  No evidence of metformin.    GI upset: Possibly due to hyponatremia.  Primary team evaluating.     Thank you consulting us on Joyce Jones who is of high risk and complexity.  We will follow along closely    Carlos Manuel Perez MD  2/6/2024  12:21 EST

## 2024-02-06 NOTE — H&P
[facility]  HISTORY AND PHYSICAL    Patient Name: Joyce Jones  : 1945  MRN: [id number]  Primary Care Physician: Gayle Low DO  Date of admission: 2024      Subjective   Subjective     Chief Complaint:  N/v/diarrhea    HPI:  Joyce Jones is a 78 y.o. female with h/o HTN, HL, GERD, CKD, INGRIS with n/v and diarrhea that started this AM.  Denies any abdominal pain or fever.  CT abd/pelvis shows mild sigmoid diverticulitis.  Reports diverticulitis before.  Also has been complaining of neck pain for the past 3 days.  Denies any radiation down the arms or numbness and tingling.  CT neck showed diffuse degenerative changes.        Personal History     Past Medical History:   Diagnosis Date    Allergic     Allergic rhinitis     Anemia     Arthritis     Cholelithiasis     Deep vein thrombosis Clot following left knee replacement    Diverticulosis     Heart murmur     History of bone density study 2017    [facility]    History of mammography, screening     Hyperlipidemia     Hypertension     Inguinal hernia 1978    repair    Irritable bowel syndrome Self    Kidney stone Embedded    Low back pain Scoliosis/kyphosis    Neuromuscular disorder Shoulder pain , arthritic issues, more severe    Osteoarthritis (arthritis due to wear and tear of joints)     Osteopenia     Personal history of DVT (deep vein thrombosis)     right leg- due to surgery    Pneumonia 5 times    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 Has been under control . Function stable    Scoliosis Joyce           Past Surgical History:   Procedure Laterality Date    BACK SURGERY      Scoliosis bar. T11-sacrum    BREAST BIOPSY      over 15 years ago.     CERVICAL SPINE SURGERY      cage    CHOLECYSTECTOMY  2014    COLONOSCOPY  2016    EYE SURGERY   Detached retina    FOOT SURGERY  2008    HERNIA REPAIR  3 both bowl areas    INGUINAL HERNIA REPAIR Bilateral 1978 2019 right side repair    INGUINAL HERNIA REPAIR  Both    JOINT REPLACEMENT  4 knee replacement surgeries    Right knee, 1980 approx. left knee , three replacements appears stable now.    KNEE SURGERY Bilateral 2017 2005 and x2 in 8/2017(left)    NASAL SEPTUM SURGERY  1999    SINUS SURGERY  1986    SPINE SURGERY  2004    TOTAL ABDOMINAL HYSTERECTOMY WITH SALPINGO OOPHORECTOMY  1986    Unilateral salpingo-oophorectomy/cervical dysplasia/AUB    TUBAL ABDOMINAL LIGATION  1982    URETHRAL DILATION      Multiple times       Family History: family history includes Arthritis in her father and mother; Breast cancer in her paternal aunt; Cancer in her brother, brother, brother, and father; Colon cancer in her maternal uncle and maternal uncle; Colon polyps in her brother and father; Diabetes in her daughter; Heart attack in her father; Heart disease in her brother; Heart failure in her father; Hypertension in her brother, brother, father, and mother; Kidney disease in her brother, brother, and mother; No Known Problems in her brother and brother; Other in her daughter; Stroke in her father; Thyroid disease in her mother; Tuberculosis in her mother; Uterine cancer in her mother.     Social History:  reports that she has never smoked. She has never been exposed to tobacco smoke. She has never used smokeless tobacco. She reports that she does not currently use alcohol after a past usage of about 1.0 standard drink of alcohol per week. She reports that she does not use drugs.  Social History     Social History Narrative    Not on file       Medications:  Available home medication information reviewed.  aspirin, atorvastatin, cyclobenzaprine, fluticasone, lidocaine, losartan, and traMADol-acetaminophen    Allergies   Allergen Reactions    Overton-2 Inhibitors Anaphylaxis    Nabumetone Anaphylaxis    Nsaids GI  Intolerance and Hives    Sulfa Antibiotics Shortness Of Breath    Celecoxib Hives    Penicillins Nausea And Vomiting and Dizziness    Zithromax [Azithromycin] Dizziness       Objective   Objective     Vital Signs:   Temp:  [97.7 °F (36.5 °C)] 97.7 °F (36.5 °C)  Heart Rate:  [69-83] 69  Resp:  [18] 18  BP: (132-146)/(81-98) 132/98       Physical Exam   Constitutional: Awake, alert,  at bedside  Eyes: PERRLA, sclerae anicteric, no conjunctival injection  HENT: NCAT, mucous membranes moist  Neck: Supple, no thyromegaly, no lymphadenopathy, trachea midline  Respiratory: Clear to auscultation bilaterally, nonlabored respirations   Cardiovascular: RRR, no murmurs, rubs, or gallops, palpable pedal pulses bilaterally  Gastrointestinal: Positive bowel sounds, soft, nontender, nondistended  Musculoskeletal: No bilateral ankle edema, no clubbing or cyanosis to extremities  Psychiatric: Appropriate affect, cooperative  Neurologic: Oriented x 3, strength symmetric in all extremities, Cranial Nerves grossly intact to confrontation, speech clear  Skin: No rashes      Result Review:  I have personally reviewed the results from the time of this admission to 2/5/2024 21:59 EST and agree with these findings:  [x]  Laboratory list / accordion  [x]  Microbiology  [x]  Radiology  []  EKG/Telemetry   [x]  Cardiology/Vascular   []  Pathology  []  Old records  []  Other:  Most notable findings include:       LAB RESULTS:      Lab 02/05/24 1944   WBC 8.34   HEMOGLOBIN 12.5   HEMATOCRIT 35.8   PLATELETS 258   NEUTROS ABS 6.23   IMMATURE GRANS (ABS) 0.05   LYMPHS ABS 1.44   MONOS ABS 0.55   EOS ABS 0.04   MCV 99.4*   LACTATE 1.9         Lab 02/05/24  1950 02/05/24 1944   SODIUM  --  122*   POTASSIUM  --  3.5   CHLORIDE  --  86*   CO2  --  23.0   ANION GAP  --  13.0   BUN  --  15   CREATININE 0.80 0.75   EGFR  --  82.1   GLUCOSE  --  133*   CALCIUM  --  9.2         Lab 02/05/24 1944   TOTAL PROTEIN 7.2   ALBUMIN 4.4   GLOBULIN 2.8    ALT (SGPT) 17   AST (SGOT) 24   BILIRUBIN 0.6   ALK PHOS 79   LIPASE 36                     UA          6/30/2023    09:13 11/7/2023    14:44 2/5/2024    19:45   Urinalysis   Squamous Epithelial Cells, UA  0-2  0-2    Specific Gravity, UA 1.007  1.009  1.008    Ketones, UA Negative  Negative  Trace    Blood, UA Negative  Negative  Negative    Leukocytes, UA Negative  Trace  Small (1+)    Nitrite, UA Negative  Negative  Negative    RBC, UA  0-2  0-2    WBC, UA  3-5  0-2    Bacteria, UA  None Seen  None Seen        Microbiology Results (last 10 days)       ** No results found for the last 240 hours. **            CT Abdomen Pelvis With Contrast    Result Date: 2/5/2024  CT ABDOMEN PELVIS W CONTRAST Date of Exam: 2/5/2024 8:17 PM EST Indication: Abdominal pain, nausea, vomiting, diarrhea. Comparison: CT pelvis 1/4/2024, CT abdomen pelvis 1/2/2023. Technique: Axial CT images were obtained of the abdomen and pelvis following the uneventful intravenous administration of 100 mL Isovue-370. Reconstructed coronal and sagittal images were also obtained. Automated exposure control and iterative construction methods were used. Findings: Lower thorax: No focal consolidation. Liver: No focal hepatic lesion. Normal morphology of the liver. Gallbladder and bile ducts: Cholecystectomy. No biliary ductal dilatation. Pancreas: No pancreatic duct dilation. No surrounding inflammation. Spleen: Normal in size. Adrenal glands: No discrete adrenal nodule. Kidneys: Symmetric in size and enhancement. No hydronephrosis. Urinary bladder: Mildly distended. Reproductive organs: Hysterectomy. Stomach and bowel: Diverticulosis with peridiverticular fat stranding along the proximal sigmoid colon. No ceferino pneumoperitoneum. No organized fluid collection. Findings not apparent on yesterday's CT. No evidence of bowel obstruction. Lymph nodes: No pathologically enlarged lymph nodes. Vessels: No abdominal aortic aneurysm. Major vasculature is patent.  Soft tissues: Unremarkable. Osseous structures: No acute or suspicious osseous lesions. Thoracolumbar sacral fusion hardware with surrounding artifact. Degenerative changes of the hips. Bones appear demineralized.     Impression: Impression: Acute mild sigmoid diverticulitis without evidence of complication. Electronically Signed: Homar Peres MD  2/5/2024 8:45 PM EST  Workstation ID: [id number]    CT Maxillofacial With Contrast    Result Date: 2/5/2024  CT MAXILLOFACIAL W CONT, CT CERVICAL SPINE WO CONTRAST Date of Exam: 2/5/2024 8:17 PM EST Indication: History of recurrent sinusitis with known obstruction of sinus cavity. Comparison: 6/30/2023 Technique: Axial CT images were obtained from the inferior aspect of the mandible through the frontal sinuses after the uneventful intravenous administration of 100 cc Isovue-370.  Reconstructed coronal and sagittal images were also obtained. Automated exposure control and iterative construction methods were used. The mandible appears intact. The zygomatic arches are intact. The nasal bones are intact. Prior right scleral banding. Previous lens replacements. No intracranial mass. No abnormal intracranial enhancement. No intracranial hemorrhage. The frontal sinuses  are clear. The ethmoid air cells are aerated. The sphenoid sinuses are clear. The maxillary sinuses are clear. The mastoid air cells are aerated. The ostiomeatal units are patent. The sphenoethmoidal ethmoidal recesses and frontoethmoidal recesses are aerated. The clivus and visualized posterior fossa are normal. Degenerative changes of the anterior atlantoaxial articulation and cervical spine. C1 and the odontoid process appear intact. No adenopathy is appreciated within the upper neck. No collections or masses. Cervical vertebral body height and alignment are normal. Fusion of C5-C7. Prominent bridging osteophytes at C3-C4 and C4-C5. The spinous processes are intact. Severe degenerative changes at the anterior  glenoid axial articulation. The skull base is intact. No epidural hematoma. C2-C3: Uncovertebral joint hypertrophic changes. Mild foraminal narrowing. No canal stenosis. C3-C4: Facet disease and uncovertebral joint hypertrophic changes. Severe left sided foraminal narrowing. Mild canal stenosis. C4-C5: Facet disease and uncovertebral joint hypertrophic changes. Mild foraminal narrowing. No canal stenosis. C5-C6: No significant spinal canal or foraminal narrowing. C6-C7: Uncovertebral joint hypertrophic changes with mild right-sided foraminal narrowing. No canal stenosis. C7-T1: No significant spinal canal or foraminal narrowing.     Impression: Multilevel degenerative changes of the cervical spine. No masses or collections within the neck. No sinusitis. Electronically Signed: Carlos Manuel Juarez MD  2/5/2024 8:44 PM EST  Workstation ID: [id number]    CT Cervical Spine Without Contrast    Result Date: 2/5/2024  CT MAXILLOFACIAL W CONT, CT CERVICAL SPINE WO CONTRAST Date of Exam: 2/5/2024 8:17 PM EST Indication: History of recurrent sinusitis with known obstruction of sinus cavity. Comparison: 6/30/2023 Technique: Axial CT images were obtained from the inferior aspect of the mandible through the frontal sinuses after the uneventful intravenous administration of 100 cc Isovue-370.  Reconstructed coronal and sagittal images were also obtained. Automated exposure control and iterative construction methods were used. The mandible appears intact. The zygomatic arches are intact. The nasal bones are intact. Prior right scleral banding. Previous lens replacements. No intracranial mass. No abnormal intracranial enhancement. No intracranial hemorrhage. The frontal sinuses  are clear. The ethmoid air cells are aerated. The sphenoid sinuses are clear. The maxillary sinuses are clear. The mastoid air cells are aerated. The ostiomeatal units are patent. The sphenoethmoidal ethmoidal recesses and frontoethmoidal recesses are aerated. The  clivus and visualized posterior fossa are normal. Degenerative changes of the anterior atlantoaxial articulation and cervical spine. C1 and the odontoid process appear intact. No adenopathy is appreciated within the upper neck. No collections or masses. Cervical vertebral body height and alignment are normal. Fusion of C5-C7. Prominent bridging osteophytes at C3-C4 and C4-C5. The spinous processes are intact. Severe degenerative changes at the anterior glenoid axial articulation. The skull base is intact. No epidural hematoma. C2-C3: Uncovertebral joint hypertrophic changes. Mild foraminal narrowing. No canal stenosis. C3-C4: Facet disease and uncovertebral joint hypertrophic changes. Severe left sided foraminal narrowing. Mild canal stenosis. C4-C5: Facet disease and uncovertebral joint hypertrophic changes. Mild foraminal narrowing. No canal stenosis. C5-C6: No significant spinal canal or foraminal narrowing. C6-C7: Uncovertebral joint hypertrophic changes with mild right-sided foraminal narrowing. No canal stenosis. C7-T1: No significant spinal canal or foraminal narrowing.     Impression: Multilevel degenerative changes of the cervical spine. No masses or collections within the neck. No sinusitis. Electronically Signed: Carlos Manuel Juarez MD  2/5/2024 8:44 PM EST  Workstation ID: [id number]     Results for orders placed during the hospital encounter of 01/08/22    Adult Transthoracic Echo Complete W/ Cont if Necessary Per Protocol    Interpretation Summary  · Estimated left ventricular EF = 60% Left ventricular systolic function is normal.  · Left ventricular diastolic function is consistent with (grade I) impaired relaxation.  · Mild aortic valve regurgitation is present.  · Moderate tricuspid valve regurgitation is present.  · Calculated right ventricular systolic pressure from tricuspid regurgitation is 35 mmHg.  · No cardiac source for syncope is seen on this study.      Assessment & Plan   Assessment & Plan        Diverticulitis    Nausea and vomiting    Diarrhea    Joyce Jones is a 78 y.o. female with h/o HTN, HL, GERD, CKD, INGRIS with n/v and diarrhea that started this AM, found to have Mild Sigmoid diverticulitis    Mild Sigmoid Diverticulitis  N/v/D  --treat with rocephin and flagyl  --NPO for now.  If better then consider clear liquids in AM  --follow electrolytes    Hyponatremia  --likely d/t dehydration  --IVF    HTN  --hold home losartan for now with dehydration and BP ok right now    INGRIS  --hold on CPAP for tonight with n/v          DVT prophylaxis:  Medical DVT prophylaxis orders are signed and held.            CODE STATUS:    Code Status and Medical Interventions:   Ordered at: 02/05/24 3025     Medical Intervention Limits:    NO intubation (DNI)     Level Of Support Discussed With:    Patient     Code Status (Patient has no pulse and is not breathing):    No CPR (Do Not Attempt to Resuscitate)     Medical Interventions (Patient has pulse or is breathing):    Limited Support     Comments:    d/w patient.  No cpr or vent treatment       Expected Discharge   Expected discharge date/ time has not been documented.     Shimon Woo MD  02/05/24

## 2024-02-06 NOTE — CASE MANAGEMENT/SOCIAL WORK
Discharge Planning Assessment  [facility]     Patient Name: Joyce Jones  MRN: [id number]  Today's Date: 2/6/2024    Admit Date: 2/5/2024    Plan: home   Discharge Needs Assessment       Row Name 02/06/24 1003       Living Environment    People in Home spouse    Current Living Arrangements home    Potentially Unsafe Housing Conditions none    In the past 12 months has the electric, gas, oil, or water company threatened to shut off services in your home? No    Primary Care Provided by self    Provides Primary Care For no one    Family Caregiver if Needed none    Quality of Family Relationships supportive    Able to Return to Prior Arrangements yes       Resource/Environmental Concerns    Resource/Environmental Concerns none    Transportation Concerns none       Transportation Needs    In the past 12 months, has lack of transportation kept you from medical appointments or from getting medications? no    In the past 12 months, has lack of transportation kept you from meetings, work, or from getting things needed for daily living? No       Food Insecurity    Within the past 12 months, you worried that your food would run out before you got the money to buy more. Never true    Within the past 12 months, the food you bought just didn't last and you didn't have money to get more. Never true       Transition Planning    Patient/Family Anticipates Transition to home with family    Patient/Family Anticipated Services at Transition none    Transportation Anticipated family or friend will provide       Discharge Needs Assessment    Equipment Currently Used at Home none    Concerns to be Addressed discharge planning    Anticipated Changes Related to Illness none    Equipment Needed After Discharge none                   Discharge Plan       Row Name 02/06/24 1007       Plan    Plan home    Patient/Family in Agreement with Plan yes    Plan Comments Met with Mrs. Jones at the bedside to initiate discharge plan. Mrs. Jones  shares a home in [facility] with her . She is independent with activities of daily living. She has a chair lift at home due to multiple knee injuries, but she reports she does not use or need any other DME. She is not current with home health or outpatient services. Her primary care provider is Dr. Gayle Low. She denies problems accessing medical care or obtaining medications. She anticipates home with her  at discharge, and he will transport. No discharge needs were identified today, but  will continue to follow plan of care and assist as needed.    Final Discharge Disposition Code 01 - home or self-care                  Continued Care and Services - Admitted Since 2/5/2024    Coordination has not been started for this encounter.          Demographic Summary       Row Name 02/06/24 1001       General Information    Admission Type observation    Arrived From home    Referral Source admission list    Reason for Consult discharge planning    Preferred Language English       Contact Information    Contact Information Comments Williams Jones    Power of     [phone]                   Functional Status       Row Name 02/06/24 1003       Functional Status    Usual Activity Tolerance good    Current Activity Tolerance other (see comments)  alan       Physical Activity    On average, how many days per week do you engage in moderate to strenuous exercise (like a brisk walk)? 3 days    On average, how many minutes do you engage in exercise at this level? 30 min    Number of minutes of exercise per week 90       Assessment of Health Literacy    How often do you have someone help you read hospital materials? Never    How often do you have problems learning about your medical condition because of difficulty understanding written information? Never    How often do you have a problem understanding what is told to you about your medical condition? Never    How confident are you filling  out medical forms by yourself? Extremely    Health Literacy Good       Functional Status, IADL    Medications independent    Meal Preparation independent    Housekeeping independent    Laundry independent    Shopping independent       Mental Status Summary    Recent Changes in Mental Status/Cognitive Functioning no changes                   Psychosocial    No documentation.                  Abuse/Neglect    No documentation.                  Legal    No documentation.                  Substance Abuse    No documentation.                  Patient Forms    No documentation.                     Melisa Eng RN

## 2024-02-06 NOTE — ED PROVIDER NOTES
EMERGENCY DEPARTMENT ENCOUNTER    Pt Name: Joyce Jones  MRN: [id number]  Pt :   1945  Room Number:  S565/1  Date of encounter:  2024  PCP: Gayle Low DO  ED Provider: MARIE Arredondo    Historian: Patient    HPI:  Chief Complaint: Nausea, vomiting, diarrhea    Context: Joyce Jones is a 78 y.o. female who presents to the ED c/o nausea, vomiting and diarrhea.  Patient reports that her symptoms started this morning after initiating gentamicin and budesonide nasal irrigation rinses.  Patient states that she started developing pain in her sinuses.  She reports history of a blockage.  She states that she had medication leftover from when she previously had similar type symptoms and started taking the medication this morning as directed.  Since this time she has had several episodes of nausea, vomiting and diarrhea.  She denies any fever.  She reports no urinary complaints.  She has had decreased oral intake.  Denies any additional complaints on interview and exam.  HPI     REVIEW OF SYSTEMS  A chief complaint appropriate review of systems was completed and is negative except as noted in the HPI.     PAST MEDICAL HISTORY  Past Medical History:   Diagnosis Date    Allergic     Allergic rhinitis     Anemia     Arthritis     Cholelithiasis     Deep vein thrombosis Clot following left knee replacement    Diverticulosis     Heart murmur     History of bone density study 2017    [facility]    History of mammography, screening     Hyperlipidemia     Hypertension     Inguinal hernia 1978    repair    Irritable bowel syndrome Self    Kidney stone Embedded    Low back pain Scoliosis/kyphosis    Neuromuscular disorder Shoulder pain , arthritic issues, more severe    Osteoarthritis (arthritis due to wear and tear of joints)     Osteopenia     Personal history of DVT (deep vein thrombosis)     right leg- due to surgery    Pneumonia 5 times    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 Has been under control . Function stable    Scoliosis Joyce       PAST SURGICAL HISTORY  Past Surgical History:   Procedure Laterality Date    BACK SURGERY  2002    Scoliosis bar. T11-sacrum    BREAST BIOPSY      over 15 years ago.     CERVICAL SPINE SURGERY  2000    cage    CHOLECYSTECTOMY  2014    COLONOSCOPY  2016    EYE SURGERY  Detached retina    FOOT SURGERY  2008    HERNIA REPAIR  3 both bowl areas    INGUINAL HERNIA REPAIR Bilateral 1978    2019 right side repair    INGUINAL HERNIA REPAIR  Both    JOINT REPLACEMENT  4 knee replacement surgeries    Right knee, 1980 approx. left knee , three replacements appears stable now.    KNEE SURGERY Bilateral 2017 2005 and x2 in 8/2017(left)    NASAL SEPTUM SURGERY  1999    SINUS SURGERY  1986    SPINE SURGERY  2004    TOTAL ABDOMINAL HYSTERECTOMY WITH SALPINGO OOPHORECTOMY  1986    Unilateral salpingo-oophorectomy/cervical dysplasia/AUB    TUBAL ABDOMINAL LIGATION  1982    URETHRAL DILATION      Multiple times       FAMILY HISTORY  Family History   Problem Relation Age of Onset    Uterine cancer Mother         Metastatic to ovaries and colon; possibly bone    Hypertension Mother     Kidney disease Mother     Tuberculosis Mother     Arthritis Mother     Thyroid disease Mother     Cancer Father         Bladder    Heart failure Father     Stroke Father     Hypertension Father     Heart attack Father     Colon polyps Father     Arthritis Father     Hypertension Brother     Kidney disease Brother     Colon polyps Brother     Cancer Brother     Heart disease Brother     Kidney disease Brother     Hypertension Brother     Cancer Brother         Cancer    No Known Problems Brother     No Known Problems Brother     Cancer Brother     Colon cancer Maternal Uncle     Colon cancer Maternal Uncle     Breast cancer Paternal Aunt     Other Daughter          CVID    Diabetes Daughter        SOCIAL HISTORY  Social History     Socioeconomic History    Marital status:    Tobacco Use    Smoking status: Never     Passive exposure: Never    Smokeless tobacco: Never   Vaping Use    Vaping Use: Never used   Substance and Sexual Activity    Alcohol use: Not Currently     Alcohol/week: 1.0 standard drink of alcohol     Types: 1 Glasses of wine per week     Comment: rarely at holidays    Drug use: Never    Sexual activity: Defer     Partners: Male     Birth control/protection: Post-menopausal, Surgical     Comment: Hysterectomy       ALLERGIES  Overton-2 inhibitors, Nabumetone, Nsaids, Sulfa antibiotics, Celecoxib, Penicillins, and Zithromax [azithromycin]    PHYSICAL EXAM  Physical Exam  Vitals and nursing note reviewed.   Constitutional:       General: She is not in acute distress.     Appearance: Normal appearance. She is not ill-appearing.   HENT:      Head: Normocephalic and atraumatic.      Nose: Nose normal.      Mouth/Throat:      Mouth: Mucous membranes are moist.   Eyes:      Extraocular Movements: Extraocular movements intact.   Cardiovascular:      Rate and Rhythm: Normal rate.   Pulmonary:      Effort: Pulmonary effort is normal.   Abdominal:      General: Bowel sounds are normal. There is no distension.      Palpations: Abdomen is soft.      Tenderness: There is generalized abdominal tenderness. There is no guarding or rebound.   Musculoskeletal:         General: Normal range of motion.      Cervical back: Normal range of motion and neck supple.   Skin:     General: Skin is warm and dry.   Neurological:      General: No focal deficit present.      Mental Status: She is alert.   Psychiatric:         Mood and Affect: Mood normal.         Behavior: Behavior normal.       LAB RESULTS  Results for orders placed or performed during the hospital encounter of 02/05/24   Comprehensive Metabolic Panel    Specimen: Blood   Result Value Ref Range    Glucose 133 (H) 65 - 99  mg/dL    BUN 15 8 - 23 mg/dL    Creatinine 0.75 0.57 - 1.00 mg/dL    Sodium 122 (L) 136 - 145 mmol/L    Potassium 3.5 3.5 - 5.2 mmol/L    Chloride 86 (L) 98 - 107 mmol/L    CO2 23.0 22.0 - 29.0 mmol/L    Calcium 9.2 8.6 - 10.5 mg/dL    Total Protein 7.2 6.0 - 8.5 g/dL    Albumin 4.4 3.5 - 5.2 g/dL    ALT (SGPT) 17 1 - 33 U/L    AST (SGOT) 24 1 - 32 U/L    Alkaline Phosphatase 79 39 - 117 U/L    Total Bilirubin 0.6 0.0 - 1.2 mg/dL    Globulin 2.8 gm/dL    A/G Ratio 1.6 g/dL    BUN/Creatinine Ratio 20.0 7.0 - 25.0    Anion Gap 13.0 5.0 - 15.0 mmol/L    eGFR 82.1 >60.0 mL/min/1.73   Lipase    Specimen: Blood   Result Value Ref Range    Lipase 36 13 - 60 U/L   Urinalysis With Microscopic If Indicated (No Culture) - Urine, Clean Catch    Specimen: Urine, Clean Catch   Result Value Ref Range    Color, UA Yellow Yellow, Straw    Appearance, UA Clear Clear    pH, UA 7.0 5.0 - 8.0    Specific Gravity, UA 1.008 1.001 - 1.030    Glucose, UA Negative Negative    Ketones, UA Trace (A) Negative    Bilirubin, UA Negative Negative    Blood, UA Negative Negative    Protein, UA Negative Negative    Leuk Esterase, UA Small (1+) (A) Negative    Nitrite, UA Negative Negative    Urobilinogen, UA 0.2 E.U./dL 0.2 - 1.0 E.U./dL   Lactic Acid, Plasma    Specimen: Blood   Result Value Ref Range    Lactate 1.9 0.5 - 2.0 mmol/L   CBC Auto Differential    Specimen: Blood   Result Value Ref Range    WBC 8.34 3.40 - 10.80 10*3/mm3    RBC 3.60 (L) 3.77 - 5.28 10*6/mm3    Hemoglobin 12.5 12.0 - 15.9 g/dL    Hematocrit 35.8 34.0 - 46.6 %    MCV 99.4 (H) 79.0 - 97.0 fL    MCH 34.7 (H) 26.6 - 33.0 pg    MCHC 34.9 31.5 - 35.7 g/dL    RDW 13.0 12.3 - 15.4 %    RDW-SD 47.0 37.0 - 54.0 fl    MPV 9.6 6.0 - 12.0 fL    Platelets 258 140 - 450 10*3/mm3    Neutrophil % 74.6 42.7 - 76.0 %    Lymphocyte % 17.3 (L) 19.6 - 45.3 %    Monocyte % 6.6 5.0 - 12.0 %    Eosinophil % 0.5 0.3 - 6.2 %    Basophil % 0.4 0.0 - 1.5 %    Immature Grans % 0.6 (H) 0.0 - 0.5 %     Neutrophils, Absolute 6.23 1.70 - 7.00 10*3/mm3    Lymphocytes, Absolute 1.44 0.70 - 3.10 10*3/mm3    Monocytes, Absolute 0.55 0.10 - 0.90 10*3/mm3    Eosinophils, Absolute 0.04 0.00 - 0.40 10*3/mm3    Basophils, Absolute 0.03 0.00 - 0.20 10*3/mm3    Immature Grans, Absolute 0.05 0.00 - 0.05 10*3/mm3    nRBC 0.0 0.0 - 0.2 /100 WBC   Urinalysis, Microscopic Only - Urine, Clean Catch    Specimen: Urine, Clean Catch   Result Value Ref Range    RBC, UA 0-2 None Seen, 0-2 /HPF    WBC, UA 0-2 None Seen, 0-2 /HPF    Bacteria, UA None Seen None Seen, Trace /HPF    Squamous Epithelial Cells, UA 0-2 None Seen, 0-2 /HPF    Hyaline Casts, UA 0-6 0 - 6 /LPF    Methodology Automated Microscopy    POC Creatinine    Specimen: Blood   Result Value Ref Range    Creatinine 0.80 0.60 - 1.30 mg/dL   Green Top (Gel)   Result Value Ref Range    Extra Tube Hold for add-ons.    Lavender Top   Result Value Ref Range    Extra Tube hold for add-on    Gold Top - SST   Result Value Ref Range    Extra Tube Hold for add-ons.    Gray Top   Result Value Ref Range    Extra Tube Hold for add-ons.    Light Blue Top   Result Value Ref Range    Extra Tube Hold for add-ons.        If labs were ordered, I independently reviewed the results and considered them in treating the patient.    RADIOLOGY  CT Abdomen Pelvis With Contrast   Final Result   Impression:      Acute mild sigmoid diverticulitis without evidence of complication.         Electronically Signed: Homar Peres MD     2/5/2024 8:45 PM EST     Workstation ID: [id number]      CT Maxillofacial With Contrast   Final Result   Multilevel degenerative changes of the cervical spine. No masses or collections within the neck. No sinusitis.         Electronically Signed: Carlos Manuel Juarez MD     2/5/2024 8:44 PM EST     Workstation ID: [id number]      CT Cervical Spine Without Contrast   Final Result   Multilevel degenerative changes of the cervical spine. No masses or collections within the neck. No sinusitis.          Electronically Signed: Carlos Manuel Juarez MD     2/5/2024 8:44 PM EST     Workstation ID: [id number]        [x] Radiologist's Report Reviewed:  I ordered and independently interpreted the above noted radiographic studies.  See radiologist's dictation for official interpretation.      PROCEDURES    Procedures    No orders to display       MEDICATIONS GIVEN IN ER    Medications   Sodium Chloride (PF) 0.9 % 10 mL (has no administration in time range)   atorvastatin (LIPITOR) tablet 20 mg (20 mg Oral Not Given 2/6/24 0001)   fluticasone (FLONASE) 50 MCG/ACT nasal spray 1 spray (has no administration in time range)   traMADol-acetaminophen (ULTRACET)  mg combo dose (has no administration in time range)   sodium chloride 0.9 % flush 10 mL (10 mL Intravenous Given 2/6/24 0000)   sodium chloride 0.9 % flush 10 mL (has no administration in time range)   sodium chloride 0.9 % infusion 40 mL (has no administration in time range)   sennosides-docusate (PERICOLACE) 8.6-50 MG per tablet 2 tablet (2 tablets Oral Not Given 2/6/24 0002)     And   polyethylene glycol (MIRALAX) packet 17 g (has no administration in time range)     And   bisacodyl (DULCOLAX) EC tablet 5 mg (has no administration in time range)     And   bisacodyl (DULCOLAX) suppository 10 mg (has no administration in time range)   Enoxaparin Sodium (LOVENOX) syringe 40 mg (40 mg Subcutaneous Given 2/6/24 0000)   Potassium Replacement - Follow Nurse / BPA Driven Protocol (has no administration in time range)   Magnesium Standard Dose Replacement - Follow Nurse / BPA Driven Protocol (has no administration in time range)   Phosphorus Replacement - Follow Nurse / BPA Driven Protocol (has no administration in time range)   Calcium Replacement - Follow Nurse / BPA Driven Protocol (has no administration in time range)   acetaminophen (TYLENOL) tablet 650 mg (has no administration in time range)   ondansetron ODT (ZOFRAN-ODT) disintegrating tablet 4 mg ( Oral Not Given:   See Alt 2/6/24 0000)     Or   ondansetron (ZOFRAN) injection 4 mg (4 mg Intravenous Given 2/6/24 0000)   cefTRIAXone (ROCEPHIN) 2,000 mg in sodium chloride 0.9 % 100 mL IVPB (2,000 mg Intravenous New Bag 2/6/24 0000)   metroNIDAZOLE (FLAGYL) IVPB 500 mg (500 mg Intravenous New Bag 2/6/24 0001)   sodium chloride 0.9 % with KCl 20 mEq/L infusion (50 mL/hr Intravenous New Bag 2/6/24 0001)   potassium chloride (K-DUR,KLOR-CON) CR tablet 40 mEq (40 mEq Oral Given 2/6/24 0000)   sodium chloride 0.9 % bolus 1,000 mL (1,000 mL Intravenous New Bag 2/5/24 2054)   ondansetron (ZOFRAN) injection 4 mg (4 mg Intravenous Given 2/5/24 2054)   iopamidol (ISOVUE-370) 76 % injection 100 mL (100 mL Intravenous Given 2/5/24 2037)       MEDICAL DECISION MAKING, PROGRESS, and CONSULTS   Medical Decision Making  Problems Addressed:  Acute diverticulitis: complicated acute illness or injury  Diarrhea, unspecified type: complicated acute illness or injury  Nausea and vomiting, unspecified vomiting type: complicated acute illness or injury    Amount and/or Complexity of Data Reviewed  Labs: ordered. Decision-making details documented in ED Course.  Radiology: ordered.    Risk  Prescription drug management.  Decision regarding hospitalization.        All labs have been independently reviewed by me.  All radiology studies have been interpreted by me and the radiologist dictating the report.  All EKG's have been independently interpreted by me as well as and overseeing attending physician.    [] Discussed with radiology regarding test interpretation:    Discussion below represents my analysis of pertinent findings related to patient's condition, differential diagnosis, treatment plan and final disposition.    Differential diagnosis:  The differential diagnosis associated with the patient's presentation includes: Dyspepsia, viral gastroenteritis, constipation, medication side effects, psychiatric illness, irritable bowel syndrome, abdominal wall  pain, gallbladder disease, pancreatitis, diverticulitis, appendicitis, kidney stone, UTI, hernia, gastroparesis, food poisoning, DKA, hepatitis, bowel obstruction, colitis and others.      Additional sources  Discussed/ obtained information from independent historians:   [x] Spouse  [] Parent  [] Family member  [] Friend  [] EMS   [] Other:  External (non-ED) record review:   [] Inpatient record:   [] Office record:   [] Outpatient record:   [] Prior Outpatient labs:   [] Prior Outpatient radiology:   [x] Primary Care record: Personally reviewed most recent visit to [facility] where patient was diagnosed with benign paroxysmal positional vertigo   [] Outside ED record:   [] Other:   Patient's care impacted by:   [] Diabetes  [x] Hypertension  [] Hyperlipidemia  [] Hypothyroidism   [] Coronary Artery Disease   [] COPD   [] Cancer   [] Obesity  [x] GERD   [] Tobacco Abuse   [] Substance Abuse    [x] Anxiety   [x] Depression   [x] Other: Chronic kidney disease, fibromyalgia  Care significantly affected by Social Determinants of Health (housing and economic circumstances, unemployment)    [] Yes     [x] No   If yes, Patient's care significantly limited by  Social Determinants of Health including:   [] Inadequate housing   [] Low income   [] Alcoholism and drug addiction in family   [] Problems related to primary support group   [] Unemployment   [] Problems related to employment   [] Other Social Determinants of Health:     Shared decision making:  I reviewed workup performed in ED including labs and imaging. Based on findings, recommendation made for admission. Patient is agreeable to plan of care and hospital admission.      Orders placed during this visit:  Orders Placed This Encounter   Procedures    Blood Culture - Blood,    Blood Culture - Blood,    Gastrointestinal Panel, PCR - Stool, Per Rectum    CT Abdomen Pelvis With Contrast    CT Maxillofacial With Contrast    CT Cervical Spine Without Contrast     [place] Draw    Comprehensive Metabolic Panel    Lipase    Urinalysis With Microscopic If Indicated (No Culture) - Urine, Clean Catch    Lactic Acid, Plasma    CBC Auto Differential    Urinalysis, Microscopic Only - Urine, Clean Catch    CBC (No Diff)    Basic Metabolic Panel    Magnesium    Phosphorus    Calcium, Ionized    Potassium    NPO Diet NPO Type: Strict NPO    Undress & Gown    Vital Signs    Intake & Output    Weigh Patient    Oral Care    Saline Lock & Maintain IV Access    Activity - Ad Jennifer    Code Status and Medical Interventions:    POC Creatinine    Insert Peripheral IV    Insert Peripheral IV    Initiate Observation Status    CBC & Differential    Green Top (Gel)    Lavender Top    Gold Top - SST    Gray Top    Light Blue Top       ED Course:    ED Course as of 02/06/24 0353   Mon Feb 05, 2024 2022 Labs studies were reviewed and directly interpreted by myself. [JG]   2022 Sodium(!): 122  Hyponatremia with sodium of 122 [JG]   2027 Most recent sodium 3 months prior of 138 [JG]   2040 Vitals and Telemetry tracing was reviewed and directly interpreted by myself. [JG]   2049 CT scan of abdomen and pelvis personally interpreted by myself with official read provided by radiology demonstrates acute sigmoid diverticulitis. [JG]   2057 Personally reviewed ED workup with patient and family at bedside.  Patient has acute diverticulitis.  Also with evidence of hyponatremia secondary to nausea, vomiting and diarrhea.  Will admit to hospitalist service for further evaluation and treatment. [JG]   2059 Hospitalist messaged for admission [JG]   e Feb 06, 2024   0351 Patient presents with nausea, vomiting and diarrhea.  Fuhs abdominal pain.  Sodium of 122.  Evidence of acute uncomplicated diverticulitis on CT scan abdomen and pelvis.  Responded well to IV fluids and antiemetics.  Admitted to hospital medicine for further evaluation and treatment. [JG]      ED Course User Index  [JG] Emmett Donovan, PA             DIAGNOSIS  Final diagnoses:   Acute diverticulitis   Nausea and vomiting, unspecified vomiting type   Diarrhea, unspecified type   Hyponatremia       DISPOSITION    ED Disposition       ED Disposition   Decision to Admit    Condition   --    Comment   Level of Care: Telemetry [5]   Diagnosis: Diverticulitis [301483]                 Please note that portions of this document were completed with voice recognition software.        Emmett Donovan, PA  02/06/24 0353

## 2024-02-07 LAB
SODIUM SERPL-SCNC: 133 MMOL/L (ref 136–145)
SODIUM SERPL-SCNC: 133 MMOL/L (ref 136–145)
SODIUM SERPL-SCNC: 134 MMOL/L (ref 136–145)
SODIUM SERPL-SCNC: 134 MMOL/L (ref 136–145)

## 2024-02-07 PROCEDURE — 84295 ASSAY OF SERUM SODIUM: CPT | Performed by: INTERNAL MEDICINE

## 2024-02-07 PROCEDURE — 97161 PT EVAL LOW COMPLEX 20 MIN: CPT

## 2024-02-07 PROCEDURE — 97165 OT EVAL LOW COMPLEX 30 MIN: CPT

## 2024-02-07 PROCEDURE — 99232 SBSQ HOSP IP/OBS MODERATE 35: CPT | Performed by: STUDENT IN AN ORGANIZED HEALTH CARE EDUCATION/TRAINING PROGRAM

## 2024-02-07 PROCEDURE — 25010000002 DESMOPRESSIN ACETATE PF 4 MCG/ML SOLUTION: Performed by: INTERNAL MEDICINE

## 2024-02-07 PROCEDURE — 25010000002 ENOXAPARIN PER 10 MG: Performed by: INTERNAL MEDICINE

## 2024-02-07 PROCEDURE — 25010000002 ONDANSETRON PER 1 MG: Performed by: INTERNAL MEDICINE

## 2024-02-07 PROCEDURE — 25010000002 METRONIDAZOLE 500 MG/100ML SOLUTION: Performed by: INTERNAL MEDICINE

## 2024-02-07 PROCEDURE — 25010000002 CEFTRIAXONE PER 250 MG: Performed by: INTERNAL MEDICINE

## 2024-02-07 RX ADMIN — Medication 10 ML: at 20:05

## 2024-02-07 RX ADMIN — CETIRIZINE HYDROCHLORIDE 10 MG: 10 TABLET, FILM COATED ORAL at 08:41

## 2024-02-07 RX ADMIN — ONDANSETRON 4 MG: 2 INJECTION INTRAMUSCULAR; INTRAVENOUS at 20:04

## 2024-02-07 RX ADMIN — ONDANSETRON 4 MG: 2 INJECTION INTRAMUSCULAR; INTRAVENOUS at 07:46

## 2024-02-07 RX ADMIN — METRONIDAZOLE 500 MG: 500 INJECTION, SOLUTION INTRAVENOUS at 15:50

## 2024-02-07 RX ADMIN — METRONIDAZOLE 500 MG: 500 INJECTION, SOLUTION INTRAVENOUS at 08:40

## 2024-02-07 RX ADMIN — METRONIDAZOLE 500 MG: 500 INJECTION, SOLUTION INTRAVENOUS at 00:14

## 2024-02-07 RX ADMIN — FLUTICASONE PROPIONATE 1 SPRAY: 50 SPRAY, METERED NASAL at 09:29

## 2024-02-07 RX ADMIN — DESMOPRESSIN ACETATE 2 MCG: 4 INJECTION INTRAVENOUS; SUBCUTANEOUS at 00:15

## 2024-02-07 RX ADMIN — ONDANSETRON 4 MG: 2 INJECTION INTRAMUSCULAR; INTRAVENOUS at 13:24

## 2024-02-07 RX ADMIN — Medication 10 ML: at 08:43

## 2024-02-07 RX ADMIN — CEFTRIAXONE 2000 MG: 2 INJECTION, POWDER, FOR SOLUTION INTRAMUSCULAR; INTRAVENOUS at 00:14

## 2024-02-07 RX ADMIN — ATORVASTATIN CALCIUM 20 MG: 20 TABLET, FILM COATED ORAL at 20:03

## 2024-02-07 RX ADMIN — ENOXAPARIN SODIUM 40 MG: 100 INJECTION SUBCUTANEOUS at 08:41

## 2024-02-07 RX ADMIN — DESMOPRESSIN ACETATE 2 MCG: 4 INJECTION INTRAVENOUS; SUBCUTANEOUS at 06:13

## 2024-02-07 RX ADMIN — HYDROXYZINE HYDROCHLORIDE 25 MG: 25 TABLET, FILM COATED ORAL at 20:03

## 2024-02-07 RX ADMIN — CEFTRIAXONE 2000 MG: 2 INJECTION, POWDER, FOR SOLUTION INTRAMUSCULAR; INTRAVENOUS at 20:02

## 2024-02-07 NOTE — PLAN OF CARE
Goal Outcome Evaluation:  Plan of Care Reviewed With: patient           Outcome Evaluation: Pt ind LBD, ind self feeding, ind to ambulate without AD.  Pt is at baseline with self care.  No further skilled OT indicated.  Recommend home upon d/c.      Anticipated Discharge Disposition (OT): home

## 2024-02-07 NOTE — PROGRESS NOTES
[facility] Medicine Services  PROGRESS NOTE    Patient Name: Joyce Jones  : 1945  MRN: [id number]    Date of Admission: 2024  Primary Care Physician: Gayle Low DO    Subjective   Subjective     CC:  Nausea, vomiting, diarrhea     HPI:  Felt a little dizzy this morning, with chronic neck ache. No nausea or vomiting.      Objective   Objective     Vital Signs:   Temp:  [98.3 °F (36.8 °C)-98.6 °F (37 °C)] 98.3 °F (36.8 °C)  Heart Rate:  [64-86] 73  Resp:  [16-18] 18  BP: (120-149)/(60-77) 149/76     Physical Exam:  Constitutional: Awake, alert, resting comfortably  HENT: NCAT, mucous membranes moist, mask in place  Respiratory: Clear to auscultation bilaterally, respiratory effort normal   Cardiovascular: RRR, no murmurs, rubs, or gallops  Gastrointestinal: Positive bowel sounds, soft, nontender, nondistended  Musculoskeletal: No bilateral ankle edema  Psychiatric: Appropriate affect, cooperative  Neurologic: Alert and oriented x 3, no focal deficits, speech clear      Results Reviewed:  LAB RESULTS:      Lab 24  0759 24   WBC 6.08 8.34   HEMOGLOBIN 13.1 12.5   HEMATOCRIT 38.9 35.8   PLATELETS 259 258   NEUTROS ABS  --  6.23   IMMATURE GRANS (ABS)  --  0.05   LYMPHS ABS  --  1.44   MONOS ABS  --  0.55   EOS ABS  --  0.04   .1* 99.4*   LACTATE  --  1.9         Lab 24  0814 24  0627 24  0405 24  0036 24  2033 24  1614 24  1353 24  0759 24  1950 24   SODIUM 134* 133* 133* 134* 130*   < > 139 144  --  122*   POTASSIUM  --   --   --   --   --   --  4.1 5.5*  --  3.5   CHLORIDE  --   --   --   --   --   --  105 107  --  86*   CO2  --   --   --   --   --   --  24.0 23.0  --  23.0   ANION GAP  --   --   --   --   --   --  10.0 14.0  --  13.0   BUN  --   --   --   --   --   --  10 10  --  15   CREATININE  --   --   --   --   --   --  0.80 0.76 0.80 0.75   EGFR  --   --   --   --   --    --  76.0 80.8  --  82.1   GLUCOSE  --   --   --   --   --   --  139* 90  --  133*   CALCIUM  --   --   --   --   --   --  9.5 9.4  --  9.2   IONIZED CALCIUM  --   --   --   --   --   --   --  1.33*  --   --    MAGNESIUM  --   --   --   --   --   --   --  2.1  --   --    PHOSPHORUS  --   --   --   --   --   --   --  2.9  --   --     < > = values in this interval not displayed.         Lab 02/05/24 1944   TOTAL PROTEIN 7.2   ALBUMIN 4.4   GLOBULIN 2.8   ALT (SGPT) 17   AST (SGOT) 24   BILIRUBIN 0.6   ALK PHOS 79   LIPASE 36                     Brief Urine Lab Results  (Last result in the past 365 days)        Color   Clarity   Blood   Leuk Est   Nitrite   Protein   CREAT   Urine HCG        02/05/24 1945 Yellow   Clear   Negative   Small (1+)   Negative   Negative                   Microbiology Results Abnormal       Procedure Component Value - Date/Time    Blood Culture - Blood, Arm, Right [[id number]]  (Normal) Collected: 02/05/24 2145    Lab Status: Preliminary result Specimen: Blood from Arm, Right Updated: 02/06/24 2300     Blood Culture No growth at 24 hours    Blood Culture - Blood, Arm, Left [[id number]]  (Normal) Collected: 02/05/24 2136    Lab Status: Preliminary result Specimen: Blood from Arm, Left Updated: 02/06/24 2215     Blood Culture No growth at 24 hours    Gastrointestinal Panel, PCR - Stool, Per Rectum [[id number]]  (Normal) Collected: 02/06/24 0850    Lab Status: Final result Specimen: Stool from Per Rectum Updated: 02/06/24 1056     Campylobacter Not Detected     Plesiomonas shigelloides Not Detected     Salmonella Not Detected     Vibrio Not Detected     Vibrio cholerae Not Detected     Yersinia enterocolitica Not Detected     Enteroaggregative E. coli (EAEC) Not Detected     Enteropathogenic E. coli (EPEC) Not Detected     Enterotoxigenic E. coli (ETEC) lt/st Not Detected     Shiga-like toxin-producing E. coli (STEC) stx1/stx2 Not Detected     Shigella/Enteroinvasive E. coli (EIEC) Not Detected      Cryptosporidium Not Detected     Cyclospora cayetanensis Not Detected     Entamoeba histolytica Not Detected     Giardia lamblia Not Detected     Adenovirus F40/41 Not Detected     Astrovirus Not Detected     Norovirus GI/GII Not Detected     Rotavirus A Not Detected     Sapovirus (I, II, IV or V) Not Detected            CT Abdomen Pelvis With Contrast    Result Date: 2/5/2024  CT ABDOMEN PELVIS W CONTRAST Date of Exam: 2/5/2024 8:17 PM EST Indication: Abdominal pain, nausea, vomiting, diarrhea. Comparison: CT pelvis 1/4/2024, CT abdomen pelvis 1/2/2023. Technique: Axial CT images were obtained of the abdomen and pelvis following the uneventful intravenous administration of 100 mL Isovue-370. Reconstructed coronal and sagittal images were also obtained. Automated exposure control and iterative construction methods were used. Findings: Lower thorax: No focal consolidation. Liver: No focal hepatic lesion. Normal morphology of the liver. Gallbladder and bile ducts: Cholecystectomy. No biliary ductal dilatation. Pancreas: No pancreatic duct dilation. No surrounding inflammation. Spleen: Normal in size. Adrenal glands: No discrete adrenal nodule. Kidneys: Symmetric in size and enhancement. No hydronephrosis. Urinary bladder: Mildly distended. Reproductive organs: Hysterectomy. Stomach and bowel: Diverticulosis with peridiverticular fat stranding along the proximal sigmoid colon. No ceferino pneumoperitoneum. No organized fluid collection. Findings not apparent on yesterday's CT. No evidence of bowel obstruction. Lymph nodes: No pathologically enlarged lymph nodes. Vessels: No abdominal aortic aneurysm. Major vasculature is patent. Soft tissues: Unremarkable. Osseous structures: No acute or suspicious osseous lesions. Thoracolumbar sacral fusion hardware with surrounding artifact. Degenerative changes of the hips. Bones appear demineralized.     Impression: Impression: Acute mild sigmoid diverticulitis without evidence  of complication. Electronically Signed: Homar Peres MD  2/5/2024 8:45 PM EST  Workstation ID: [id number]    CT Maxillofacial With Contrast    Result Date: 2/5/2024  CT MAXILLOFACIAL W CONT, CT CERVICAL SPINE WO CONTRAST Date of Exam: 2/5/2024 8:17 PM EST Indication: History of recurrent sinusitis with known obstruction of sinus cavity. Comparison: 6/30/2023 Technique: Axial CT images were obtained from the inferior aspect of the mandible through the frontal sinuses after the uneventful intravenous administration of 100 cc Isovue-370.  Reconstructed coronal and sagittal images were also obtained. Automated exposure control and iterative construction methods were used. The mandible appears intact. The zygomatic arches are intact. The nasal bones are intact. Prior right scleral banding. Previous lens replacements. No intracranial mass. No abnormal intracranial enhancement. No intracranial hemorrhage. The frontal sinuses  are clear. The ethmoid air cells are aerated. The sphenoid sinuses are clear. The maxillary sinuses are clear. The mastoid air cells are aerated. The ostiomeatal units are patent. The sphenoethmoidal ethmoidal recesses and frontoethmoidal recesses are aerated. The clivus and visualized posterior fossa are normal. Degenerative changes of the anterior atlantoaxial articulation and cervical spine. C1 and the odontoid process appear intact. No adenopathy is appreciated within the upper neck. No collections or masses. Cervical vertebral body height and alignment are normal. Fusion of C5-C7. Prominent bridging osteophytes at C3-C4 and C4-C5. The spinous processes are intact. Severe degenerative changes at the anterior glenoid axial articulation. The skull base is intact. No epidural hematoma. C2-C3: Uncovertebral joint hypertrophic changes. Mild foraminal narrowing. No canal stenosis. C3-C4: Facet disease and uncovertebral joint hypertrophic changes. Severe left sided foraminal narrowing. Mild canal  stenosis. C4-C5: Facet disease and uncovertebral joint hypertrophic changes. Mild foraminal narrowing. No canal stenosis. C5-C6: No significant spinal canal or foraminal narrowing. C6-C7: Uncovertebral joint hypertrophic changes with mild right-sided foraminal narrowing. No canal stenosis. C7-T1: No significant spinal canal or foraminal narrowing.     Impression: Multilevel degenerative changes of the cervical spine. No masses or collections within the neck. No sinusitis. Electronically Signed: Carlos Manuel Juarez MD  2/5/2024 8:44 PM EST  Workstation ID: [id number]    CT Cervical Spine Without Contrast    Result Date: 2/5/2024  CT MAXILLOFACIAL W CONT, CT CERVICAL SPINE WO CONTRAST Date of Exam: 2/5/2024 8:17 PM EST Indication: History of recurrent sinusitis with known obstruction of sinus cavity. Comparison: 6/30/2023 Technique: Axial CT images were obtained from the inferior aspect of the mandible through the frontal sinuses after the uneventful intravenous administration of 100 cc Isovue-370.  Reconstructed coronal and sagittal images were also obtained. Automated exposure control and iterative construction methods were used. The mandible appears intact. The zygomatic arches are intact. The nasal bones are intact. Prior right scleral banding. Previous lens replacements. No intracranial mass. No abnormal intracranial enhancement. No intracranial hemorrhage. The frontal sinuses  are clear. The ethmoid air cells are aerated. The sphenoid sinuses are clear. The maxillary sinuses are clear. The mastoid air cells are aerated. The ostiomeatal units are patent. The sphenoethmoidal ethmoidal recesses and frontoethmoidal recesses are aerated. The clivus and visualized posterior fossa are normal. Degenerative changes of the anterior atlantoaxial articulation and cervical spine. C1 and the odontoid process appear intact. No adenopathy is appreciated within the upper neck. No collections or masses. Cervical vertebral body height and  alignment are normal. Fusion of C5-C7. Prominent bridging osteophytes at C3-C4 and C4-C5. The spinous processes are intact. Severe degenerative changes at the anterior glenoid axial articulation. The skull base is intact. No epidural hematoma. C2-C3: Uncovertebral joint hypertrophic changes. Mild foraminal narrowing. No canal stenosis. C3-C4: Facet disease and uncovertebral joint hypertrophic changes. Severe left sided foraminal narrowing. Mild canal stenosis. C4-C5: Facet disease and uncovertebral joint hypertrophic changes. Mild foraminal narrowing. No canal stenosis. C5-C6: No significant spinal canal or foraminal narrowing. C6-C7: Uncovertebral joint hypertrophic changes with mild right-sided foraminal narrowing. No canal stenosis. C7-T1: No significant spinal canal or foraminal narrowing.     Impression: Multilevel degenerative changes of the cervical spine. No masses or collections within the neck. No sinusitis. Electronically Signed: Carlos Manuel Juarez MD  2/5/2024 8:44 PM EST  Workstation ID: [id number]     Results for orders placed during the hospital encounter of 01/08/22    Adult Transthoracic Echo Complete W/ Cont if Necessary Per Protocol    Interpretation Summary  · Estimated left ventricular EF = 60% Left ventricular systolic function is normal.  · Left ventricular diastolic function is consistent with (grade I) impaired relaxation.  · Mild aortic valve regurgitation is present.  · Moderate tricuspid valve regurgitation is present.  · Calculated right ventricular systolic pressure from tricuspid regurgitation is 35 mmHg.  · No cardiac source for syncope is seen on this study.      Current medications:  Scheduled Meds:atorvastatin, 20 mg, Oral, Nightly  cefTRIAXone, 2,000 mg, Intravenous, Q24H  cetirizine, 10 mg, Oral, Daily  enoxaparin, 40 mg, Subcutaneous, Daily  fluticasone, 1 spray, Nasal, Daily  metroNIDAZOLE, 500 mg, Intravenous, Q8H  senna-docusate sodium, 2 tablet, Oral, BID  sodium chloride, 10 mL,  Intravenous, Q12H      Continuous Infusions:     PRN Meds:.  acetaminophen    senna-docusate sodium **AND** polyethylene glycol **AND** bisacodyl **AND** bisacodyl    Calcium Replacement - Follow Nurse / BPA Driven Protocol    hydrOXYzine    Magnesium Standard Dose Replacement - Follow Nurse / BPA Driven Protocol    ondansetron ODT **OR** ondansetron    Phosphorus Replacement - Follow Nurse / BPA Driven Protocol    Potassium Replacement - Follow Nurse / BPA Driven Protocol    Sodium Chloride (PF)    sodium chloride    sodium chloride    traMADol-acetaminophen (ULTRACET)  mg combo dose    Assessment & Plan   Assessment & Plan     Active Hospital Problems    Diagnosis  POA    Diverticulitis [K57.92]  Yes    Nausea and vomiting [R11.2]  Yes    Diarrhea [R19.7]  Yes    Hyponatremia [E87.1]  Yes      Resolved Hospital Problems   No resolved problems to display.        Brief Hospital Course to date:  Joyce Jones is a 78 y.o. female with PMHx HTN, HLD, GERD, CKD, INGRIS who presented with N/V and diarrhea that started 2/5 AM. CT abd/pelvis showed acute mild sigmoid diverticulitis. Was also found to have hyponatremia on admission.     This patient's problems and plans were partially entered by my partner and updated as appropriate by me 02/07/24.    Acute mild sigmoid diverticulitis  Nausea, vomiting, diarrhea   -CT A/P with mild sigmoid diverticulitis. GI PCR negative on admission.   -Continue empiric coverage with IV ceftriaxone and metronidazole.  -Continue pain and nausea control as needed.      Hyponatremia, unclear chronicity, with overcorrection  -Unclear etiology, not on thiazide diuretic, likely due to large volume water intake at home in setting of low sodium diet. No EtOH use.   -Na 122 to 144 after 1L NS and mIVF overnight 2/5-2/6. Serum osm 265, urine osm 228, urina Na 44 after fluids.   -Discussed with Nephrology. S/p D5W 500 mL bolus x2 and several doses of DDAVP to slow down correction.   -Started  1500 mL fluid restriction. Discontinued DDAVP. Repeat BMP in AM. Likely discharge home if Na stable.     Itching/watering of eyes with sneezing  -likely allergic reaction to something in room  -Continue cetirizine 10mg daily, PRN hydroxyzine.    Mild hyperkalemia  -resolved.    Cervical DDD  -Continue home PRN tramadol and Tylenol.   -Patient ending to follow-up further with PCP on discharge.     HTN  -Holding home losartan. Will monitor and add back in as needed.     Hyperlipidemia  -Continue atorvastatin.     INGRIS  -Continue CPAP nightly.       Expected Discharge Location and Transportation: Home  Expected Discharge   Expected Discharge Date: 2/8/2024; Expected Discharge Time:      DVT prophylaxis:  Medical DVT prophylaxis orders are present.         AM-PAC 6 Clicks Score (PT): 24 (02/07/24 1987)    CODE STATUS:   Code Status and Medical Interventions:   Ordered at: 02/05/24 2186     Medical Intervention Limits:    NO intubation (DNI)     Level Of Support Discussed With:    Patient     Code Status (Patient has no pulse and is not breathing):    No CPR (Do Not Attempt to Resuscitate)     Medical Interventions (Patient has pulse or is breathing):    Limited Support     Comments:    d/w patient.  No cpr or vent treatment       Chelsi Gaitan, DO  02/07/24

## 2024-02-07 NOTE — CASE MANAGEMENT/SOCIAL WORK
Continued Stay Note  [facility]     Patient Name: Joyce Jones  MRN: [id number]  Today's Date: 2/7/2024    Admit Date: 2/5/2024    Plan: home   Discharge Plan       Row Name 02/07/24 0956       Plan    Plan home    Patient/Family in Agreement with Plan yes    Plan Comments Met with Mrs. Jones at the bedside to discuss discharge plan. Her plan is home with her . Therapy recommended outpatient PT at discharge.  will send order for outpatient therapy with patient.  will continue to follow plan of care and assist with discharge planning needs as indicated.    Final Discharge Disposition Code 01 - home or self-care                   Discharge Codes    No documentation.                 Expected Discharge Date and Time       Expected Discharge Date Expected Discharge Time    Feb 9, 2024               Melisa Eng RN

## 2024-02-07 NOTE — PLAN OF CARE
Goal Outcome Evaluation:  Plan of Care Reviewed With: patient           Outcome Evaluation: Pt. presents at baseline w/functional mobility. She performed bed mobility, transfers and ambulated w/independence. At this time, no problems have been identified which required acute skilled intervention. Pt. would benefit from OPPT to address L knee edema/pain.      Anticipated Discharge Disposition (PT): home with assist, home with outpatient therapy services

## 2024-02-07 NOTE — THERAPY DISCHARGE NOTE
Acute Care - Occupational Therapy Discharge  [facility]    Patient Name: Joyce Jones  : 1945    MRN: [id number]                              Today's Date: 2024       Admit Date: 2024    Visit Dx:     ICD-10-CM ICD-9-CM   1. Acute diverticulitis  K57.92 562.11   2. Nausea and vomiting, unspecified vomiting type  R11.2 787.01   3. Diarrhea, unspecified type  R19.7 787.91   4. Hyponatremia  E87.1 276.1     Patient Active Problem List   Diagnosis    Hyperlipidemia    Primary osteoarthritis involving multiple joints    Gastroesophageal reflux disease without esophagitis    Heart valve disorder    Allergic rhinitis    Degenerative disc disease, cervical    Bulging of cervical intervertebral disc    History of fusion of cervical spine    Tendonitis of left rotator cuff    Subacromial bursitis of left shoulder joint    Essential hypertension    Asthenia    Chronic kidney disease    Chronic low back pain    Connective tissue disease overlap syndrome    Diverticulosis    Dyspnea    History of deep vein thrombosis    Hypercalcemia    Loose total knee arthroplasty    Megaloblastic anemia due to vitamin B12 deficiency    Osteopenia    Scoliosis    Supraventricular tachycardia    Tricuspid regurgitation    Postural dizziness with presyncope    INGRIS (obstructive sleep apnea)    Fibromyalgia    Anxiety and depression    Diverticulitis    Nausea and vomiting    Diarrhea    Hyponatremia     Past Medical History:   Diagnosis Date    Allergic     Allergic rhinitis     Anemia     Arthritis     Cholelithiasis     Deep vein thrombosis Clot following left knee replacement    Diverticulosis     Heart murmur     History of bone density study 2017    [facility]    History of mammography, screening 2016    Hyperlipidemia     Hypertension     Inguinal hernia 1978    repair    Irritable bowel syndrome Self    Kidney stone Embedded    Low back pain Scoliosis/kyphosis    Neuromuscular disorder Shoulder pain ,  arthritic issues, more severe    Osteoarthritis (arthritis due to wear and tear of joints)     Osteopenia     Personal history of DVT (deep vein thrombosis) 2005    right leg- due to surgery    Pneumonia 5 times    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 Has been under control . Function stable    Scoliosis Joyce     Past Surgical History:   Procedure Laterality Date    BACK SURGERY  2002    Scoliosis bar. T11-sacrum    BREAST BIOPSY      over 15 years ago.     CERVICAL SPINE SURGERY  2000    cage    CHOLECYSTECTOMY  2014    COLONOSCOPY  2016    EYE SURGERY  Detached retina    FOOT SURGERY  2008    HERNIA REPAIR  3 both bowl areas    INGUINAL HERNIA REPAIR Bilateral 1978    2019 right side repair    INGUINAL HERNIA REPAIR  Both    JOINT REPLACEMENT  4 knee replacement surgeries    Right knee, 1980 approx. left knee , three replacements appears stable now.    KNEE SURGERY Bilateral 2017 2005 and x2 in 8/2017(left)    NASAL SEPTUM SURGERY  1999    SINUS SURGERY  1986    SPINE SURGERY  2004    TOTAL ABDOMINAL HYSTERECTOMY WITH SALPINGO OOPHORECTOMY  1986    Unilateral salpingo-oophorectomy/cervical dysplasia/AUB    TUBAL ABDOMINAL LIGATION  1982    URETHRAL DILATION      Multiple times      General Information       Row Name 02/07/24 0735          OT Time and Intention    Document Type discharge evaluation/summary  -AC     Mode of Treatment occupational therapy  -AC       Row Name 02/07/24 0735          General Information    Patient Profile Reviewed yes  -AC     Prior Level of Function independent:;all household mobility;community mobility;home management;cooking;cleaning;driving  has RW and SPC, but did not need to use,  her MD most recently told her to use chair lift to 2nd level d/t L knee inflammation  -AC     Existing Precautions/Restrictions no known precautions/restrictions  -AC     Barriers  to Rehab none identified  -AC       Row Name 02/07/24 0735          Occupational Profile    Environmental Supports and Barriers (Occupational Profile) walk in shower; has shower seat, but did not need to use  -       Row Name 02/07/24 0735          Living Environment    People in Home spouse  -       Row Name 02/07/24 0735          Home Main Entrance    Number of Stairs, Main Entrance one  -AC     Stair Railings, Main Entrance none  -       Row Name 02/07/24 0735          Stairs Within Home, Primary    Stairs, Within Home, Primary has chair lift to 2nd level  -AC     Number of Stairs, Within Home, Primary twelve  -AC     Stair Railings, Within Home, Primary railing on right side (ascending)  -AC       Row Name 02/07/24 0735          Cognition    Orientation Status (Cognition) oriented x 4  -       Row Name 02/07/24 0735          Safety Issues, Functional Mobility    Impairments Affecting Function (Mobility) pain  -               User Key  (r) = Recorded By, (t) = Taken By, (c) = Cosigned By      Initials Name Provider Type    AC Tiara Bolivar OT Occupational Therapist                   Mobility/ADL's       Row Name 02/07/24 0808          Bed Mobility    Bed Mobility supine-sit;sit-supine  -     Supine-Sit [place] (Bed Mobility) modified independence  -AC     Sit-Supine [place] (Bed Mobility) modified independence  -     Assistive Device (Bed Mobility) head of bed elevated  -       Row Name 02/07/24 0808          Transfers    Transfers sit-stand transfer  -       Row Name 02/07/24 0808          Sit-Stand Transfer    Sit-Stand [place] (Transfers) independent  -       Row Name 02/07/24 0808          Functional Mobility    Functional Mobility- Ind. Level independent  -     Functional Mobility-Distance (Feet) 60  -       Row Name 02/07/24 0808          Activities of Daily Living    BADL Assessment/Intervention lower body dressing;feeding  -       Row Name 02/07/24 0808           Lower Body Dressing Assessment/Training    [place] Level (Lower Body Dressing) don;doff;socks;independent  -AC     Position (Lower Body Dressing) edge of bed sitting  -       Row Name 02/07/24 0808          Self-Feeding Assessment/Training    [place] Level (Feeding) feeding skills;independent  -AC     Position (Self-Feeding) sitting up in bed  -               User Key  (r) = Recorded By, (t) = Taken By, (c) = Cosigned By      Initials Name Provider Type    AC Tiara Bolivar, OT Occupational Therapist                   Obj/Interventions       Row Name 02/07/24 0809          Sensory Assessment (Somatosensory)    Sensory Assessment (Somatosensory) UE sensation intact  -       Row Name 02/07/24 0809          Vision Assessment/Intervention    Visual Impairment/Limitations corrective lenses for reading  -       Row Name 02/07/24 0809          Range of Motion Comprehensive    General Range of Motion bilateral upper extremity ROM WNL  -[facility] Name 02/07/24 0809          Strength Comprehensive (MMT)    Comment, General Manual Muscle Testing (MMT) Assessment BUE grossly 4/5  -[facility] Name 02/07/24 0809          Balance    Balance Assessment sitting static balance;sitting dynamic balance;standing static balance;standing dynamic balance  -AC     Static Sitting Balance independent  -AC     Dynamic Sitting Balance independent  -AC     Position, Sitting Balance unsupported  -AC     Static Standing Balance independent  -AC     Dynamic Standing Balance independent  -AC     Position/Device Used, Standing Balance unsupported  -AC               User Key  (r) = Recorded By, (t) = Taken By, (c) = Cosigned By      Initials Name Provider Type    AC Tiara Bolivar, OT Occupational Therapist                   Goals/Plan    No documentation.                  Clinical Impression       Row Name 02/07/24 0809          Pain Assessment    Pretreatment Pain Rating 4/10  -     Posttreatment Pain Rating 4/10  -      Pain Location - Side/Orientation Left  -AC     Pain Location - knee  -AC     Pre/Posttreatment Pain Comment pt reporting arthritis in L knee  -AC     Pain Intervention(s) Repositioned  -AC       Row Name 02/07/24 0809          Plan of Care Review    Plan of Care Reviewed With patient  -AC     Outcome Evaluation Pt ind LBD, ind self feeding, ind to ambulate without AD.  Pt is at baseline with self care.  No further skilled OT indicated.  Recommend home upon d/c.  -AC       Row Name 02/07/24 0809          Therapy Assessment/Plan (OT)    Criteria for Skilled Therapeutic Interventions Met (OT) no;no problems identified which require skilled intervention  -AC     Therapy Frequency (OT) evaluation only  -AC       Row Name 02/07/24 0809          Therapy Plan Review/Discharge Plan (OT)    Anticipated Discharge Disposition (OT) home  -AC       Row Name 02/07/24 0809          Vital Signs    Pre Systolic BP Rehab 126  -AC     Pre Treatment Diastolic BP 69  -AC     Post Systolic BP Rehab 151  -AC     Post Treatment Diastolic BP 82  -AC     Pre Patient Position Supine  -AC     Post Patient Position Supine  -AC       Row Name 02/07/24 0809          Positioning and Restraints    Pre-Treatment Position in bed  -AC     Post Treatment Position bed  -AC     In Bed notified nsg;ellewdonna;call light within reach  -AC               User Key  (r) = Recorded By, (t) = Taken By, (c) = Cosigned By      Initials Name Provider Type    AC Tiara Bolivar, OT Occupational Therapist                   Outcome Measures       Row Name 02/07/24 0812          How much help from another is currently needed...    Putting on and taking off regular lower body clothing? 4  -AC     Bathing (including washing, rinsing, and drying) 4  -AC     Toileting (which includes using toilet bed pan or urinal) 4  -AC     Putting on and taking off regular upper body clothing 4  -AC     Taking care of personal grooming (such as brushing teeth) 4  -AC     Eating meals 4  -AC      -[facility] 6 Clicks Score (OT) 24  -       Row Name 02/07/24 0812          Functional Assessment    Outcome Measure Options -[facility] 6 Clicks Daily Activity (OT)  -               User Key  (r) = Recorded By, (t) = Taken By, (c) = Cosigned By      Initials Name Provider Type    AC Tiara Bolivar, OT Occupational Therapist                  Occupational Therapy Education       Title: PT OT SLP Therapies (In Progress)       Topic: Occupational Therapy (In Progress)       Point: ADL training (Done)       Description:   Instruct learner(s) on proper safety adaptation and remediation techniques during self care or transfers.   Instruct in proper use of assistive devices.                  Learning Progress Summary             Patient Acceptance, E, VU by  at 2/7/2024 0812                         Point: Home exercise program (Not Started)       Description:   Instruct learner(s) on appropriate technique for monitoring, assisting and/or progressing therapeutic exercises/activities.                  Learner Progress:  Not documented in this visit.              Point: Precautions (Not Started)       Description:   Instruct learner(s) on prescribed precautions during self-care and functional transfers.                  Learner Progress:  Not documented in this visit.              Point: Body mechanics (Not Started)       Description:   Instruct learner(s) on proper positioning and spine alignment during self-care, functional mobility activities and/or exercises.                  Learner Progress:  Not documented in this visit.                              User Key       Initials Effective Dates Name Provider Type [facility] 02/03/23 -  Tiara Bolivar OT Occupational Therapist OT                  OT Recommendation and Plan  Therapy Frequency (OT): evaluation only  Plan of Care Review  Plan of Care Reviewed With: patient  Outcome Evaluation: Pt ind LBD, ind self feeding, ind to ambulate without AD.  Pt is at baseline  with self care.  No further skilled OT indicated.  Recommend home upon d/c.  Plan of Care Reviewed With: patient  Outcome Evaluation: Pt ind LBD, ind self feeding, ind to ambulate without AD.  Pt is at baseline with self care.  No further skilled OT indicated.  Recommend home upon d/c.     Time Calculation:   Evaluation Complexity (OT)  Review Occupational Profile/Medical/Therapy History Complexity: brief/low complexity  Assessment, Occupational Performance/Identification of Deficit Complexity: 1-3 performance deficits  Clinical Decision Making Complexity (OT): problem focused assessment/low complexity  Overall Complexity of Evaluation (OT): low complexity     Time Calculation- OT       Row Name 02/07/24 0735             Time Calculation- OT    OT Start Time 0735  -AC      OT Received On 02/07/24  -AC      OT Goal Re-Cert Due Date 02/17/24  -AC         Untimed Charges    OT Eval/Re-eval Minutes 42  -AC         Total Minutes    Untimed Charges Total Minutes 42  -AC       Total Minutes 42  -AC                User Key  (r) = Recorded By, (t) = Taken By, (c) = Cosigned By      Initials Name Provider Type    AC Tiara Bolivar OT Occupational Therapist                  Therapy Charges for Today       Code Description Service Date Service Provider Modifiers Qty    [id number]  OT EVAL LOW COMPLEXITY 3 2/7/2024 Tiara Bolivar OT GO 1               OT Discharge Summary  Anticipated Discharge Disposition (OT): home    Tiara Bolivar OT  2/7/2024

## 2024-02-07 NOTE — THERAPY EVALUATION
Patient Name: Joyce Jones  : 1945    MRN: [id number]                              Today's Date: 2024       Admit Date: 2024    Visit Dx:     ICD-10-CM ICD-9-CM   1. Acute diverticulitis  K57.92 562.11   2. Nausea and vomiting, unspecified vomiting type  R11.2 787.01   3. Diarrhea, unspecified type  R19.7 787.91   4. Hyponatremia  E87.1 276.1     Patient Active Problem List   Diagnosis    Hyperlipidemia    Primary osteoarthritis involving multiple joints    Gastroesophageal reflux disease without esophagitis    Heart valve disorder    Allergic rhinitis    Degenerative disc disease, cervical    Bulging of cervical intervertebral disc    History of fusion of cervical spine    Tendonitis of left rotator cuff    Subacromial bursitis of left shoulder joint    Essential hypertension    Asthenia    Chronic kidney disease    Chronic low back pain    Connective tissue disease overlap syndrome    Diverticulosis    Dyspnea    History of deep vein thrombosis    Hypercalcemia    Loose total knee arthroplasty    Megaloblastic anemia due to vitamin B12 deficiency    Osteopenia    Scoliosis    Supraventricular tachycardia    Tricuspid regurgitation    Postural dizziness with presyncope    INGRIS (obstructive sleep apnea)    Fibromyalgia    Anxiety and depression    Diverticulitis    Nausea and vomiting    Diarrhea    Hyponatremia     Past Medical History:   Diagnosis Date    Allergic     Allergic rhinitis     Anemia     Arthritis     Cholelithiasis     Deep vein thrombosis Clot following left knee replacement    Diverticulosis     Heart murmur     History of bone density study 2017    [facility]    History of mammography, screening 2016    Hyperlipidemia     Hypertension     Inguinal hernia 1978    repair    Irritable bowel syndrome Self    Kidney stone Embedded    Low back pain Scoliosis/kyphosis    Neuromuscular disorder Shoulder pain , arthritic issues, more severe    Osteoarthritis (arthritis due  to wear and tear of joints)     Osteopenia     Personal history of DVT (deep vein thrombosis) 2005    right leg- due to surgery    Pneumonia 5 times    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 Has been under control . Function stable    Scoliosis Joyce     Past Surgical History:   Procedure Laterality Date    BACK SURGERY  2002    Scoliosis bar. T11-sacrum    BREAST BIOPSY      over 15 years ago.     CERVICAL SPINE SURGERY  2000    cage    CHOLECYSTECTOMY  2014    COLONOSCOPY  2016    EYE SURGERY  Detached retina    FOOT SURGERY  2008    HERNIA REPAIR  3 both bowl areas    INGUINAL HERNIA REPAIR Bilateral 1978    2019 right side repair    INGUINAL HERNIA REPAIR  Both    JOINT REPLACEMENT  4 knee replacement surgeries    Right knee, 1980 approx. left knee , three replacements appears stable now.    KNEE SURGERY Bilateral 2017 2005 and x2 in 8/2017(left)    NASAL SEPTUM SURGERY  1999    SINUS SURGERY  1986    SPINE SURGERY  2004    TOTAL ABDOMINAL HYSTERECTOMY WITH SALPINGO OOPHORECTOMY  1986    Unilateral salpingo-oophorectomy/cervical dysplasia/AUB    TUBAL ABDOMINAL LIGATION  1982    URETHRAL DILATION      Multiple times      General Information       Row Name 02/07/24 0924          Physical Therapy Time and Intention    Document Type discharge evaluation/summary  -SS     Mode of Treatment physical therapy  -       Row Name 02/07/24 0924          General Information    Patient Profile Reviewed yes  -SS     Prior Level of Function independent:;all household mobility;community mobility;gait;transfer;bed mobility;driving;using stairs  -SS     Existing Precautions/Restrictions no known precautions/restrictions  -     Barriers to Rehab none identified  -SS       Row Name 02/07/24 0924          Living Environment    People in Home spouse  -SS       Row Name 02/07/24 0924          Home Main Entrance     Number of Stairs, Main Entrance none  -SS       Row Name 02/07/24 0924          Stairs Within Home, Primary    Stairs, Within Home, Primary has chair lift  -     Number of Stairs, Within Home, Primary twelve  -SS     Stair Railings, Within Home, Primary railing on right side (ascending)  -       Row Name 02/07/24 0924          Cognition    Orientation Status (Cognition) oriented x 4  -       Row Name 02/07/24 0924          Safety Issues, Functional Mobility    Safety Issues Affecting Function (Mobility) insight into deficits/self-awareness;safety precaution awareness;safety precautions follow-through/compliance  -     Impairments Affecting Function (Mobility) pain;strength  -               User Key  (r) = Recorded By, (t) = Taken By, (c) = Cosigned By      Initials Name Provider Type    SS Shyann Cruz, PT Physical Therapist                   Mobility       Row Name 02/07/24 0926          Bed Mobility    Bed Mobility supine-sit  -SS     Supine-Sit [place] (Bed Mobility) modified independence  -     Assistive Device (Bed Mobility) head of bed elevated  -     Comment, (Bed Mobility) pt demonstrates appropriate sequencing and safety awareness  -       Row Name 02/07/24 0926          Sit-Stand Transfer    Sit-Stand [place] (Transfers) independent  -     Assistive Device (Sit-Stand Transfers) other (see comments)  none  -     Comment, (Sit-Stand Transfer) pt. demonstrates appropriate sequencing and safety awareness  -       Row Name 02/07/24 0926          Gait/Stairs (Locomotion)    [place] Level (Gait) standby assist;verbal cues  -     Assistive Device (Gait) other (see comments)  IV pole  -     Distance in Feet (Gait) 80  -     Comment, (Gait/Stairs) Pt. ambulated with a step through gait pattern w/no significant deviations appreciated. VC for pole management/safety awareness. No limitations to activity.  -               User Key  (r) = Recorded By, (t) = Taken By, (c)  = Cosigned By      Initials Name Provider Type    SS Shyann Cruz PT Physical Therapist                   Obj/Interventions       Row Name 02/07/24 0928          Range of Motion Comprehensive    General Range of Motion bilateral lower extremity ROM WFL  -SS       Row Name 02/07/24 0928          Strength Comprehensive (MMT)    Comment, General Manual Muscle Testing (MMT) Assessment BLE gross 4-/5  -SS       Row Name 02/07/24 0928          Balance    Balance Assessment sitting static balance;sitting dynamic balance;sit to stand dynamic balance;standing static balance;standing dynamic balance  -SS     Static Sitting Balance independent  -SS     Dynamic Sitting Balance independent  -SS     Position, Sitting Balance unsupported;sitting edge of bed  -SS     Sit to Stand Dynamic Balance independent  -SS     Static Standing Balance independent  -SS     Dynamic Standing Balance standby assist  -SS     Position/Device Used, Standing Balance supported;other (see comments)  IV pole  -SS     Balance Interventions sitting;standing;sit to stand;supported;static;dynamic  -SS       Row Name 02/07/24 0928          Sensory Assessment (Somatosensory)    Sensory Assessment (Somatosensory) LE sensation intact  -               User Key  (r) = Recorded By, (t) = Taken By, (c) = Cosigned By      Initials Name Provider Type    SS Shyann Cruz, SUMAYA Physical Therapist                   Goals/Plan    No documentation.                  Clinical Impression       Row Name 02/07/24 0930          Pain    Pretreatment Pain Rating 4/10  -SS     Posttreatment Pain Rating 4/10  -SS     Pain Location - Side/Orientation Left  -SS     Pain Location generalized  -SS     Pain Location - knee  -SS     Pre/Posttreatment Pain Comment preexisting  -SS     Pain Intervention(s) Repositioned;Ambulation/increased activity;Elevated  -SS     Additional Documentation Pain Scale: Numbers Pre/Post-Treatment (Group)  -SS       Row Name 02/07/24 0930          Plan of  Care Review    Plan of Care Reviewed With patient  -SS     Outcome Evaluation Pt. presents at baseline w/functional mobility. She performed bed mobility, transfers and ambulated w/independence. At this time, no problems have been identified which required acute skilled intervention. Pt. would benefit from OPPT to address L knee edema/pain.  -       Row Name 02/07/24 0930          Therapy Assessment/Plan (PT)    Criteria for Skilled Interventions Met (PT) no problems identified which require skilled intervention  -     Therapy Frequency (PT) evaluation only  -       Row Name 02/07/24 0930          Vital Signs    Pre Systolic BP Rehab 121  -SS     Pre Treatment Diastolic BP 54  -SS     Pretreatment Heart Rate (beats/min) 77  -SS     Pre SpO2 (%) 97  -SS     O2 Delivery Pre Treatment room air  -SS     Pre Patient Position Supine  -       Row Name 02/07/24 0930          Positioning and Restraints    Pre-Treatment Position in bed  -SS     Post Treatment Position chair  -SS     In Chair notified nsg;sitting;call light within reach;encouraged to call for assist  -SS               User Key  (r) = Recorded By, (t) = Taken By, (c) = Cosigned By      Initials Name Provider Type    SS Shyann Cruz, PT Physical Therapist                   Outcome Measures       Row Name 02/07/24 0934          How much help from another person do you currently need...    Turning from your back to your side while in flat bed without using bedrails? 4  -SS     Moving from lying on back to sitting on the side of a flat bed without bedrails? 4  -SS     Moving to and from a bed to a chair (including a wheelchair)? 4  -SS     Standing up from a chair using your arms (e.g., wheelchair, bedside chair)? 4  -SS     Climbing 3-5 steps with a railing? 4  -SS     To walk in hospital room? 4  -SS     AM-PAC 6 Clicks Score (PT) 24  -SS     Highest Level of Mobility Goal 8 --> Walked 250 feet or more  -       Row Name 02/07/24 0934 02/07/24 0812        Functional Assessment    Outcome Measure Options AM-PAC 6 Clicks Basic Mobility (PT)  - AM-PAC 6 Clicks Daily Activity (OT)  -AC              User Key  (r) = Recorded By, (t) = Taken By, (c) = Cosigned By      Initials Name Provider Type    AC Tiara Bolivar, OT Occupational Therapist    SS Shyann Cruz, PT Physical Therapist                                 Physical Therapy Education       Title: PT OT SLP Therapies (In Progress)       Topic: Physical Therapy (Done)       Point: Mobility training (Done)       Learning Progress Summary             Patient Eager, E, VU,DU by  at 2/7/2024 0934    Comment: Educated pt. safety/technique w/bed mobility, transfers, ambulation, PT POC                         Point: Home exercise program (Done)       Learning Progress Summary             Patient Eager, E, VU,DU by  at 2/7/2024 0934    Comment: Educated pt. safety/technique w/bed mobility, transfers, ambulation, PT POC                         Point: Body mechanics (Done)       Learning Progress Summary             Patient Eager, E, VU,DU by  at 2/7/2024 0934    Comment: Educated pt. safety/technique w/bed mobility, transfers, ambulation, PT POC                         Point: Precautions (Done)       Learning Progress Summary             Patient Eager, E, VU,DU by  at 2/7/2024 0934    Comment: Educated pt. safety/technique w/bed mobility, transfers, ambulation, PT POC                                         User Key       Initials Effective Dates Name Provider Type Discipline     06/01/21 -  Shyann Cruz, PT Physical Therapist PT                  PT Recommendation and Plan     Plan of Care Reviewed With: patient  Outcome Evaluation: Pt. presents at baseline w/functional mobility. She performed bed mobility, transfers and ambulated w/independence. At this time, no problems have been identified which required acute skilled intervention. Pt. would benefit from OPPT to address L knee edema/pain.     Time  Calculation:   PT Evaluation Complexity  History, PT Evaluation Complexity: 3 or more personal factors and/or comorbidities  Examination of Body Systems (PT Eval Complexity): total of 4 or more elements  Clinical Presentation (PT Evaluation Complexity): evolving  Clinical Decision Making (PT Evaluation Complexity): low complexity  Overall Complexity (PT Evaluation Complexity): low complexity     PT Charges       Row Name 02/07/24 0935             Time Calculation    Start Time 0907  -SS      PT Received On 02/07/24  -      PT Goal Re-Cert Due Date 02/17/24  -SS         Untimed Charges    PT Eval/Re-eval Minutes 50  -SS         Total Minutes    Untimed Charges Total Minutes 50  -SS       Total Minutes 50  -SS                User Key  (r) = Recorded By, (t) = Taken By, (c) = Cosigned By      Initials Name Provider Type    Shyann Walters, SUMAYA Physical Therapist                  Therapy Charges for Today       Code Description Service Date Service Provider Modifiers Qty    63899564310 HC PT EVAL LOW COMPLEXITY 4 2/7/2024 Shyann Cruz, PT GP 1            PT G-Codes  Outcome Measure Options: AM-PAC 6 Clicks Basic Mobility (PT)  AM-PAC 6 Clicks Score (PT): 24  AM-PAC 6 Clicks Score (OT): 24  PT Discharge Summary  Anticipated Discharge Disposition (PT): home with assist, home with outpatient therapy services    Shyann Cruz PT  2/7/2024

## 2024-02-07 NOTE — PROGRESS NOTES
"   LOS: 1 day    Patient Care Team:  Gayle Low DO as PCP - General (Internal Medicine)  Hu Dewey MD as Consulting Physician (Pain Medicine)    Subjective     Stable overnight.  Feeling better.    Objective     Vital Signs:  Blood pressure 126/69, pulse 74, temperature 98.3 °F (36.8 °C), temperature source Oral, resp. rate 16, height 162.6 cm (64\"), weight 65.7 kg (144 lb 12.8 oz), SpO2 98%, not currently breastfeeding.      Intake/Output Summary (Last 24 hours) at 2/7/2024 1023  Last data filed at 2/7/2024 0800  Gross per 24 hour   Intake 300 ml   Output 750 ml   Net -450 ml        02/06 0701 - 02/07 0700  In: 250 [P.O.:50]  Out: 950 [Urine:950]    Physical Exam:            General Appearance: WD WF NAD  Neuro:   awake alert   Psych:  Normal mood and affect, cooperative  CV:   No edema  Lungs: respirations regular and unlabored  Abdomen: not distended  :  No hebert  Skin: No rash, Warm and dry          Labs:  Results from last 7 days   Lab Units 02/06/24  0759 02/05/24  1944   WBC 10*3/mm3 6.08 8.34   HEMOGLOBIN g/dL 13.1 12.5   PLATELETS 10*3/mm3 259 258     Results from last 7 days   Lab Units 02/07/24  0814 02/07/24  0627 02/07/24  0405 02/07/24  0036 02/06/24  1614 02/06/24  1353 02/06/24  0759 02/05/24  1950 02/05/24  1944   SODIUM mmol/L 134* 133* 133* 134*   < > 139 144  --  122*   POTASSIUM mmol/L  --   --   --   --   --  4.1 5.5*  --  3.5   CHLORIDE mmol/L  --   --   --   --   --  105 107  --  86*   CO2 mmol/L  --   --   --   --   --  24.0 23.0  --  23.0   BUN mg/dL  --   --   --   --   --  10 10  --  15   CREATININE mg/dL  --   --   --   --   --  0.80 0.76 0.80 0.75   CALCIUM mg/dL  --   --   --   --   --  9.5 9.4  --  9.2   PHOSPHORUS mg/dL  --   --   --   --   --   --  2.9  --   --    MAGNESIUM mg/dL  --   --   --   --   --   --  2.1  --   --    ALBUMIN g/dL  --   --   --   --   --   --   --   --  4.4    < > = values in this interval not displayed.     Results from last 7 days   Lab " Units 02/05/24 1944   ALK PHOS U/L 79   BILIRUBIN mg/dL 0.6   ALT (SGPT) U/L 17   AST (SGOT) U/L 24                   Estimated Creatinine Clearance: 54.9 mL/min (by C-G formula based on SCr of 0.8 mg/dL).         A/P:    Hyponatremia: Possibly due to underlying dehydration with poor solute intake.  Unsure of additional U/B osmolality.  Sodium increased from 122-> 144 overnight on isotonic fluids with robust increase in urine output.  Due to rapid rise in sodium, isotonic fluids were held and nephrology was consulted.  Patient was given DDAVP and D5W boluses to get sodium back down to desired level.  Stable at 134 today.  Will stop serial sodium checks.  Stop DDAVP.  Restrict free water to 1500.  Can give isotonic saline if needed for volume.    Hyperkalemia: Resolved.  Potassium 5.5 yesterday post p.o. potassium replacement .  Improved on recheck.    GI upset: Possibly due to hyponatremia.  Primary team evaluating.     Carlos Manuel Perez MD  02/07/24  10:23 EST

## 2024-02-08 ENCOUNTER — READMISSION MANAGEMENT (OUTPATIENT)
Dept: CALL CENTER | Facility: HOSPITAL | Age: 79
End: 2024-02-08
Payer: COMMERCIAL

## 2024-02-08 ENCOUNTER — TELEPHONE (OUTPATIENT)
Dept: PEDIATRICS | Facility: OTHER | Age: 79
End: 2024-02-08
Payer: COMMERCIAL

## 2024-02-08 VITALS
OXYGEN SATURATION: 98 % | HEART RATE: 88 BPM | SYSTOLIC BLOOD PRESSURE: 152 MMHG | BODY MASS INDEX: 24.72 KG/M2 | RESPIRATION RATE: 18 BRPM | DIASTOLIC BLOOD PRESSURE: 94 MMHG | HEIGHT: 64 IN | WEIGHT: 144.8 LBS | TEMPERATURE: 98.4 F

## 2024-02-08 PROBLEM — R19.7 DIARRHEA: Status: RESOLVED | Noted: 2024-02-05 | Resolved: 2024-02-08

## 2024-02-08 PROBLEM — R11.2 NAUSEA AND VOMITING: Status: RESOLVED | Noted: 2024-02-05 | Resolved: 2024-02-08

## 2024-02-08 PROBLEM — E87.1 HYPONATREMIA: Status: RESOLVED | Noted: 2024-02-05 | Resolved: 2024-02-08

## 2024-02-08 LAB
ANION GAP SERPL CALCULATED.3IONS-SCNC: 13 MMOL/L (ref 5–15)
BUN SERPL-MCNC: 13 MG/DL (ref 8–23)
BUN/CREAT SERPL: 18.1 (ref 7–25)
CALCIUM SPEC-SCNC: 9 MG/DL (ref 8.6–10.5)
CHLORIDE SERPL-SCNC: 98 MMOL/L (ref 98–107)
CO2 SERPL-SCNC: 23 MMOL/L (ref 22–29)
CREAT SERPL-MCNC: 0.72 MG/DL (ref 0.57–1)
EGFRCR SERPLBLD CKD-EPI 2021: 86.2 ML/MIN/1.73
GLUCOSE SERPL-MCNC: 87 MG/DL (ref 65–99)
POTASSIUM SERPL-SCNC: 3.5 MMOL/L (ref 3.5–5.2)
SODIUM SERPL-SCNC: 134 MMOL/L (ref 136–145)

## 2024-02-08 PROCEDURE — 99239 HOSP IP/OBS DSCHRG MGMT >30: CPT | Performed by: STUDENT IN AN ORGANIZED HEALTH CARE EDUCATION/TRAINING PROGRAM

## 2024-02-08 PROCEDURE — 25010000002 ENOXAPARIN PER 10 MG: Performed by: INTERNAL MEDICINE

## 2024-02-08 PROCEDURE — 80048 BASIC METABOLIC PNL TOTAL CA: CPT | Performed by: STUDENT IN AN ORGANIZED HEALTH CARE EDUCATION/TRAINING PROGRAM

## 2024-02-08 PROCEDURE — 25010000002 CEFTRIAXONE PER 250 MG: Performed by: STUDENT IN AN ORGANIZED HEALTH CARE EDUCATION/TRAINING PROGRAM

## 2024-02-08 PROCEDURE — 25010000002 METRONIDAZOLE 500 MG/100ML SOLUTION: Performed by: INTERNAL MEDICINE

## 2024-02-08 PROCEDURE — 25010000002 ONDANSETRON PER 1 MG: Performed by: INTERNAL MEDICINE

## 2024-02-08 RX ORDER — AMOXICILLIN AND CLAVULANATE POTASSIUM 875; 125 MG/1; MG/1
1 TABLET, FILM COATED ORAL 2 TIMES DAILY
Qty: 10 TABLET | Refills: 0 | Status: SHIPPED | OUTPATIENT
Start: 2024-02-08 | End: 2024-02-10

## 2024-02-08 RX ORDER — POTASSIUM CHLORIDE 20 MEQ/1
40 TABLET, EXTENDED RELEASE ORAL EVERY 4 HOURS
Status: DISCONTINUED | OUTPATIENT
Start: 2024-02-08 | End: 2024-02-08 | Stop reason: HOSPADM

## 2024-02-08 RX ADMIN — POTASSIUM CHLORIDE 40 MEQ: 1500 TABLET, EXTENDED RELEASE ORAL at 08:13

## 2024-02-08 RX ADMIN — CEFTRIAXONE 2000 MG: 2 INJECTION, POWDER, FOR SOLUTION INTRAMUSCULAR; INTRAVENOUS at 08:14

## 2024-02-08 RX ADMIN — ONDANSETRON 4 MG: 2 INJECTION INTRAMUSCULAR; INTRAVENOUS at 08:14

## 2024-02-08 RX ADMIN — ENOXAPARIN SODIUM 40 MG: 100 INJECTION SUBCUTANEOUS at 08:14

## 2024-02-08 RX ADMIN — CETIRIZINE HYDROCHLORIDE 10 MG: 10 TABLET, FILM COATED ORAL at 08:14

## 2024-02-08 RX ADMIN — METRONIDAZOLE 500 MG: 500 INJECTION, SOLUTION INTRAVENOUS at 00:07

## 2024-02-08 RX ADMIN — METRONIDAZOLE 500 MG: 500 INJECTION, SOLUTION INTRAVENOUS at 08:14

## 2024-02-08 RX ADMIN — Medication 10 ML: at 08:15

## 2024-02-08 NOTE — TELEPHONE ENCOUNTER
Caller: Joyce Jones    Relationship: Self    Best call back number: [phone]     What is the medical concern/diagnosis: PATIENT CONTINUES TO HAVE INFECTION BEHIND RIGHT EYE AND PAIN INTO EAR GOING INTO THROAT    What specialty or service is being requested: EAR NOSE AND THROAT    What is the provider, practice or medical service name: DR NIR RUSS    What is the office location: [address] FLO[address],    What is the office phone number: [phone]    Any additional details:

## 2024-02-08 NOTE — CASE MANAGEMENT/SOCIAL WORK
Case Management Discharge Note      Final Note: Plan is home with spouse. Spouse will transport. No discharge needs identified         Selected Continued Care - Discharged on 2/8/2024 Admission date: 2/5/2024 - Discharge disposition: Home or Self Care      Destination    No services have been selected for the patient.                Durable Medical Equipment    No services have been selected for the patient.                Dialysis/Infusion    No services have been selected for the patient.                Home Medical Care    No services have been selected for the patient.                Therapy    No services have been selected for the patient.                Community Resources    No services have been selected for the patient.                Community & DME    No services have been selected for the patient.                         Final Discharge Disposition Code: 01 - home or self-care

## 2024-02-08 NOTE — DISCHARGE SUMMARY
[facility] Medicine Services  DISCHARGE SUMMARY    Patient Name: Joyce Jones  : 1945  MRN: [id number]    Date of Admission: 2024  8:26 PM  Date of Discharge:  24  Primary Care Physician: Gayle Low DO    Consults       Date and Time Order Name Status Description    2024 11:55 AM Inpatient Nephrology Consult Completed             Hospital Course     Presenting Problem:     Active Hospital Problems    Diagnosis  POA    Diverticulitis [K57.92]  Yes      Resolved Hospital Problems    Diagnosis Date Resolved POA    Nausea and vomiting [R11.2] 2024 Yes    Diarrhea [R19.7] 2024 Yes    Hyponatremia [E87.1] 2024 Yes          Hospital Course:  Joyce Jones is a 78 y.o. female with PMHx HTN, HLD, GERD, CKD, INGRIS who presented with N/V and diarrhea that started 2/5 AM. CT abd/pelvis showed acute mild sigmoid diverticulitis. Patient was also found to be hyponatremic on admission which was treated with IV fluids and rapidly overcorrected. Nephrology was consulted to assist with management. Patient required D5W boluses and several doses of DDAVP to slow down correction. Patient's sodium level ultimately stabilized. Patient was insistent upon discharge on 24 AM due to a last-minute procedure that was scheduled for her  that morning. Advised patient to closely follow-up with her PCP next week for repeat BMP.    Acute mild sigmoid diverticulitis  Nausea, vomiting, diarrhea   -CT A/P with mild sigmoid diverticulitis. GI PCR negative on admission.   -s/p IV ceftriaxone and metronidazole during admission.   -Plan to complete 5 more days of Augmentin on discharge.     Hyponatremia, unclear chronicity, with overcorrection  -Unclear etiology, not on thiazide diuretic, likely due to large volume water intake at home in setting of low sodium diet. No EtOH use.   -Na 122 to 144 after 1L NS and mIVF overnight -. Serum osm 265, urine osm 228,  urina Na 44 after fluids.   -Discussed with Nephrology. S/p D5W 500 mL bolus x2 and several doses of DDAVP to slow down correction.   -Close follow-up with PCP, repeat BMP during follow-up next week.     Itching/watering of eyes with sneezing, resolved  -likely allergic reaction to something in room  -Treated with cetirizine and PRN hydroxyzine, improved.      Mild hyperkalemia  -resolved.     Cervical DDD  Chronic neck pain  -CT cervical spine showed multilevel degenerative changes of the cervical spine. No masses or collections within the neck.  -Continue home PRN tramadol and Tylenol.   -Recommended close follow-up with PCP on discharge for further evaluation.     Hypertension  -Resume losartan on discharge.      Hyperlipidemia  -Continue atorvastatin.     INGRIS  -Continue CPAP nightly.       Discharge Follow Up Recommendations for outpatient labs/diagnostics:  -Close follow-up with PCP in 1 week for repeat BMP.  -Augmentin x5 days on discharge     Day of Discharge     HPI:   Evaluated patient this morning. Patient was very anxious to leave due to a last-minute procedure that had been planned for her . Reported that she was still having some mild neck pain, reports this has been chronic for 6 months. Discussed plan for close follow-up with PCP with patient and . Discussed plan to complete course of oral antibiotics on discharge.      Vital Signs:   Temp:  [97.5 °F (36.4 °C)-98.4 °F (36.9 °C)] 98.4 °F (36.9 °C)  Heart Rate:  [71-88] 88  Resp:  [18] 18  BP: (140-152)/(78-94) 152/94      Physical Exam:  Constitutional: Awake, alert, resting comfortably  HENT: NCAT, mucous membranes moist, mask in place  Respiratory: Clear to auscultation bilaterally, respiratory effort normal   Cardiovascular: RRR, no murmurs, rubs, or gallops  Gastrointestinal: Positive bowel sounds, soft, nontender, nondistended  Musculoskeletal: No bilateral ankle edema  Psychiatric: Anxious to be discharged as soon as  possible  Neurologic: Alert and oriented x3, no focal deficits, speech clear      Pertinent  and/or Most Recent Results     LAB RESULTS:      Lab 02/06/24  0759 02/05/24 1944   WBC 6.08 8.34   HEMOGLOBIN 13.1 12.5   HEMATOCRIT 38.9 35.8   PLATELETS 259 258   NEUTROS ABS  --  6.23   IMMATURE GRANS (ABS)  --  0.05   LYMPHS ABS  --  1.44   MONOS ABS  --  0.55   EOS ABS  --  0.04   .1* 99.4*   LACTATE  --  1.9         Lab 02/08/24  0410 02/07/24  0814 02/07/24  0627 02/07/24  0405 02/07/24  0036 02/06/24  1614 02/06/24  1353 02/06/24  0759 02/05/24  1950 02/05/24 1944   SODIUM 134* 134* 133* 133* 134*   < > 139 144  --  122*   POTASSIUM 3.5  --   --   --   --   --  4.1 5.5*  --  3.5   CHLORIDE 98  --   --   --   --   --  105 107  --  86*   CO2 23.0  --   --   --   --   --  24.0 23.0  --  23.0   ANION GAP 13.0  --   --   --   --   --  10.0 14.0  --  13.0   BUN 13  --   --   --   --   --  10 10  --  15   CREATININE 0.72  --   --   --   --   --  0.80 0.76 0.80 0.75   EGFR 86.2  --   --   --   --   --  76.0 80.8  --  82.1   GLUCOSE 87  --   --   --   --   --  139* 90  --  133*   CALCIUM 9.0  --   --   --   --   --  9.5 9.4  --  9.2   IONIZED CALCIUM  --   --   --   --   --   --   --  1.33*  --   --    MAGNESIUM  --   --   --   --   --   --   --  2.1  --   --    PHOSPHORUS  --   --   --   --   --   --   --  2.9  --   --     < > = values in this interval not displayed.         Lab 02/05/24 1944   TOTAL PROTEIN 7.2   ALBUMIN 4.4   GLOBULIN 2.8   ALT (SGPT) 17   AST (SGOT) 24   BILIRUBIN 0.6   ALK PHOS 79   LIPASE 36                     Brief Urine Lab Results  (Last result in the past 365 days)        Color   Clarity   Blood   Leuk Est   Nitrite   Protein   CREAT   Urine HCG        02/05/24 1945 Yellow   Clear   Negative   Small (1+)   Negative   Negative                 Microbiology Results (last 10 days)       Procedure Component Value - Date/Time    Gastrointestinal Panel, PCR - Stool, Per Rectum [[id number]]   (Normal) Collected: 02/06/24 0850    Lab Status: Final result Specimen: Stool from Per Rectum Updated: 02/06/24 1056     Campylobacter Not Detected     Plesiomonas shigelloides Not Detected     Salmonella Not Detected     Vibrio Not Detected     Vibrio cholerae Not Detected     Yersinia enterocolitica Not Detected     Enteroaggregative E. coli (EAEC) Not Detected     Enteropathogenic E. coli (EPEC) Not Detected     Enterotoxigenic E. coli (ETEC) lt/st Not Detected     Shiga-like toxin-producing E. coli (STEC) stx1/stx2 Not Detected     Shigella/Enteroinvasive E. coli (EIEC) Not Detected     Cryptosporidium Not Detected     Cyclospora cayetanensis Not Detected     Entamoeba histolytica Not Detected     Giardia lamblia Not Detected     Adenovirus F40/41 Not Detected     Astrovirus Not Detected     Norovirus GI/GII Not Detected     Rotavirus A Not Detected     Sapovirus (I, II, IV or V) Not Detected    Blood Culture - Blood, Arm, Right [[id number]]  (Normal) Collected: 02/05/24 2145    Lab Status: Preliminary result Specimen: Blood from Arm, Right Updated: 02/07/24 2300     Blood Culture No growth at 2 days    Blood Culture - Blood, Arm, Left [[id number]]  (Normal) Collected: 02/05/24 2136    Lab Status: Preliminary result Specimen: Blood from Arm, Left Updated: 02/07/24 2215     Blood Culture No growth at 2 days            CT Abdomen Pelvis With Contrast    Result Date: 2/5/2024  CT ABDOMEN PELVIS W CONTRAST Date of Exam: 2/5/2024 8:17 PM EST Indication: Abdominal pain, nausea, vomiting, diarrhea. Comparison: CT pelvis 1/4/2024, CT abdomen pelvis 1/2/2023. Technique: Axial CT images were obtained of the abdomen and pelvis following the uneventful intravenous administration of 100 mL Isovue-370. Reconstructed coronal and sagittal images were also obtained. Automated exposure control and iterative construction methods were used. Findings: Lower thorax: No focal consolidation. Liver: No focal hepatic lesion. Normal  morphology of the liver. Gallbladder and bile ducts: Cholecystectomy. No biliary ductal dilatation. Pancreas: No pancreatic duct dilation. No surrounding inflammation. Spleen: Normal in size. Adrenal glands: No discrete adrenal nodule. Kidneys: Symmetric in size and enhancement. No hydronephrosis. Urinary bladder: Mildly distended. Reproductive organs: Hysterectomy. Stomach and bowel: Diverticulosis with peridiverticular fat stranding along the proximal sigmoid colon. No ceferino pneumoperitoneum. No organized fluid collection. Findings not apparent on yesterday's CT. No evidence of bowel obstruction. Lymph nodes: No pathologically enlarged lymph nodes. Vessels: No abdominal aortic aneurysm. Major vasculature is patent. Soft tissues: Unremarkable. Osseous structures: No acute or suspicious osseous lesions. Thoracolumbar sacral fusion hardware with surrounding artifact. Degenerative changes of the hips. Bones appear demineralized.     Impression: Acute mild sigmoid diverticulitis without evidence of complication. Electronically Signed: Homar Peres MD  2/5/2024 8:45 PM EST  Workstation ID: [id number]    CT Maxillofacial With Contrast    Result Date: 2/5/2024  CT MAXILLOFACIAL W CONT, CT CERVICAL SPINE WO CONTRAST Date of Exam: 2/5/2024 8:17 PM EST Indication: History of recurrent sinusitis with known obstruction of sinus cavity. Comparison: 6/30/2023 Technique: Axial CT images were obtained from the inferior aspect of the mandible through the frontal sinuses after the uneventful intravenous administration of 100 cc Isovue-370.  Reconstructed coronal and sagittal images were also obtained. Automated exposure control and iterative construction methods were used. The mandible appears intact. The zygomatic arches are intact. The nasal bones are intact. Prior right scleral banding. Previous lens replacements. No intracranial mass. No abnormal intracranial enhancement. No intracranial hemorrhage. The frontal sinuses  are  clear. The ethmoid air cells are aerated. The sphenoid sinuses are clear. The maxillary sinuses are clear. The mastoid air cells are aerated. The ostiomeatal units are patent. The sphenoethmoidal ethmoidal recesses and frontoethmoidal recesses are aerated. The clivus and visualized posterior fossa are normal. Degenerative changes of the anterior atlantoaxial articulation and cervical spine. C1 and the odontoid process appear intact. No adenopathy is appreciated within the upper neck. No collections or masses. Cervical vertebral body height and alignment are normal. Fusion of C5-C7. Prominent bridging osteophytes at C3-C4 and C4-C5. The spinous processes are intact. Severe degenerative changes at the anterior glenoid axial articulation. The skull base is intact. No epidural hematoma. C2-C3: Uncovertebral joint hypertrophic changes. Mild foraminal narrowing. No canal stenosis. C3-C4: Facet disease and uncovertebral joint hypertrophic changes. Severe left sided foraminal narrowing. Mild canal stenosis. C4-C5: Facet disease and uncovertebral joint hypertrophic changes. Mild foraminal narrowing. No canal stenosis. C5-C6: No significant spinal canal or foraminal narrowing. C6-C7: Uncovertebral joint hypertrophic changes with mild right-sided foraminal narrowing. No canal stenosis. C7-T1: No significant spinal canal or foraminal narrowing.     Multilevel degenerative changes of the cervical spine. No masses or collections within the neck. No sinusitis. Electronically Signed: Carlos Manuel Juarez MD  2/5/2024 8:44 PM EST  Workstation ID: [id number]    CT Cervical Spine Without Contrast    Result Date: 2/5/2024  CT MAXILLOFACIAL W CONT, CT CERVICAL SPINE WO CONTRAST Date of Exam: 2/5/2024 8:17 PM EST Indication: History of recurrent sinusitis with known obstruction of sinus cavity. Comparison: 6/30/2023 Technique: Axial CT images were obtained from the inferior aspect of the mandible through the frontal sinuses after the uneventful  intravenous administration of 100 cc Isovue-370.  Reconstructed coronal and sagittal images were also obtained. Automated exposure control and iterative construction methods were used. The mandible appears intact. The zygomatic arches are intact. The nasal bones are intact. Prior right scleral banding. Previous lens replacements. No intracranial mass. No abnormal intracranial enhancement. No intracranial hemorrhage. The frontal sinuses  are clear. The ethmoid air cells are aerated. The sphenoid sinuses are clear. The maxillary sinuses are clear. The mastoid air cells are aerated. The ostiomeatal units are patent. The sphenoethmoidal ethmoidal recesses and frontoethmoidal recesses are aerated. The clivus and visualized posterior fossa are normal. Degenerative changes of the anterior atlantoaxial articulation and cervical spine. C1 and the odontoid process appear intact. No adenopathy is appreciated within the upper neck. No collections or masses. Cervical vertebral body height and alignment are normal. Fusion of C5-C7. Prominent bridging osteophytes at C3-C4 and C4-C5. The spinous processes are intact. Severe degenerative changes at the anterior glenoid axial articulation. The skull base is intact. No epidural hematoma. C2-C3: Uncovertebral joint hypertrophic changes. Mild foraminal narrowing. No canal stenosis. C3-C4: Facet disease and uncovertebral joint hypertrophic changes. Severe left sided foraminal narrowing. Mild canal stenosis. C4-C5: Facet disease and uncovertebral joint hypertrophic changes. Mild foraminal narrowing. No canal stenosis. C5-C6: No significant spinal canal or foraminal narrowing. C6-C7: Uncovertebral joint hypertrophic changes with mild right-sided foraminal narrowing. No canal stenosis. C7-T1: No significant spinal canal or foraminal narrowing.     Multilevel degenerative changes of the cervical spine. No masses or collections within the neck. No sinusitis. Electronically Signed: Carlos Manuel  MD Ann  2/5/2024 8:44 PM EST  Workstation ID: [id number]     Results for orders placed during the hospital encounter of 10/27/23    Duplex Carotid Ultrasound CAR    Interpretation Summary    Right internal carotid artery demonstrates a less than 50% stenosis.    Left internal carotid artery demonstrates normal flow without evidence of hemodynamically significant stenosis.    Bilateral antegrade vertebral artery flow    Elevated left subclavian velocity may indicate stenosis      Results for orders placed during the hospital encounter of 10/27/23    Duplex Carotid Ultrasound CAR    Interpretation Summary    Right internal carotid artery demonstrates a less than 50% stenosis.    Left internal carotid artery demonstrates normal flow without evidence of hemodynamically significant stenosis.    Bilateral antegrade vertebral artery flow    Elevated left subclavian velocity may indicate stenosis      Results for orders placed during the hospital encounter of 01/08/22    Adult Transthoracic Echo Complete W/ Cont if Necessary Per Protocol    Interpretation Summary  · Estimated left ventricular EF = 60% Left ventricular systolic function is normal.  · Left ventricular diastolic function is consistent with (grade I) impaired relaxation.  · Mild aortic valve regurgitation is present.  · Moderate tricuspid valve regurgitation is present.  · Calculated right ventricular systolic pressure from tricuspid regurgitation is 35 mmHg.  · No cardiac source for syncope is seen on this study.      Plan for Follow-up of Pending Labs/Results: Hospitalist to follow  Pending Labs       Order Current Status    Blood Culture - Blood, Arm, Left Preliminary result    Blood Culture - Blood, Arm, Right Preliminary result          Discharge Details        Discharge Medications        New Medications        Instructions Start Date   amoxicillin-clavulanate 875-125 MG per tablet  Commonly known as: AUGMENTIN   1 tablet, Oral, 2 Times Daily              Continue These Medications        Instructions Start Date   atorvastatin 20 MG tablet  Commonly known as: LIPITOR   20 mg, Oral, Nightly      cyclobenzaprine 10 MG tablet  Commonly known as: FLEXERIL   10 mg, Oral, Nightly PRN      fluticasone 50 MCG/ACT nasal spray  Commonly known as: FLONASE   1 spray, Nasal, Daily      lidocaine 5 %  Commonly known as: LIDODERM   1 patch, Transdermal, Every 24 Hours, Remove & Discard patch within 12 hours or as directed by MD      losartan 50 MG tablet  Commonly known as: COZAAR   50 mg, Oral, Daily      traMADol-acetaminophen 37.5-325 MG per tablet  Commonly known as: ULTRACET   1 tablet, Oral, Every 6 Hours PRN               Allergies   Allergen Reactions    Overton-2 Inhibitors Anaphylaxis    Nabumetone Anaphylaxis    Nsaids GI Intolerance and Hives    Sulfa Antibiotics Shortness Of Breath    Celecoxib Hives    Penicillins Nausea And Vomiting and Dizziness    Zithromax [Azithromycin] Dizziness         Discharge Disposition:  Home or Self Care    Diet:  Hospital:No active diet order           Activity:      Restrictions or Other Recommendations:  N/A       CODE STATUS:    Code Status and Medical Interventions:   Ordered at: 02/05/24 2154     Medical Intervention Limits:    NO intubation (DNI)     Level Of Support Discussed With:    Patient     Code Status (Patient has no pulse and is not breathing):    No CPR (Do Not Attempt to Resuscitate)     Medical Interventions (Patient has pulse or is breathing):    Limited Support     Comments:    d/w patient.  No cpr or vent treatment       Future Appointments   Date Time Provider Department Center   5/23/2024  1:40 PM Alvin Payne MD [facility]   11/20/2024  2:30 PM Robert Zamudio MD [facility]       Additional Instructions for the Follow-ups that You Need to Schedule       Ambulatory Referral to Physical Therapy Evaluate and treat; Stretching, Strengthening; Full weight bearing   As directed      Specialty needed:  Evaluate and treat   Exercises: Stretching Strengthening   Weight Bearing Status: Full weight bearing   Follow-up needed: Yes        Discharge Follow-up with PCP   As directed       Currently Documented PCP:    Gayle Low DO    PCP Phone Number:    [phone]     Follow Up Details: in 1 week                      Chelsi Gaitan DO  02/08/24      Time Spent on Discharge:  I spent 45 minutes on this discharge activity which included: face-to-face encounter with the patient, reviewing the data in the system, coordination of the care with the nursing staff as well as consultants, documentation, and entering orders.

## 2024-02-08 NOTE — OUTREACH NOTE
Prep Survey      Flowsheet Row Responses   [facility] facility patient discharged from? [place]   Is LACE score < 7 ? No   Eligibility [facility]   Date of Admission 02/05/24   Date of Discharge 02/08/24   Discharge Disposition Home or Self Care   Discharge diagnosis Diverticulitis   Does the patient have one of the following disease processes/diagnoses(primary or secondary)? Other   Does the patient have Home health ordered? No   Is there a DME ordered? No   Prep survey completed? Yes            KAYLA SEXTON - Registered Nurse

## 2024-02-09 ENCOUNTER — TELEPHONE (OUTPATIENT)
Dept: FAMILY MEDICINE CLINIC | Facility: CLINIC | Age: 79
End: 2024-02-09
Payer: COMMERCIAL

## 2024-02-09 ENCOUNTER — TRANSITIONAL CARE MANAGEMENT TELEPHONE ENCOUNTER (OUTPATIENT)
Dept: CALL CENTER | Facility: HOSPITAL | Age: 79
End: 2024-02-09
Payer: COMMERCIAL

## 2024-02-09 LAB — CREAT BLDA-MCNC: 0.8 MG/DL (ref 0.6–1.3)

## 2024-02-09 NOTE — TELEPHONE ENCOUNTER
Unable to reach Joyce Roy Aprilcharlie by phone or leave message.    Hub may relay message and document.    Gayle Low, DO16 minutes ago (3:54 PM)       Per this message it looks like she is requesting treatment for sinus infection but I reviewed the hospital records and it says she is on the antibiotic for diverticulitis (colon infection). Can we clarify?

## 2024-02-09 NOTE — TELEPHONE ENCOUNTER
Caller: Joyce Jones    Relationship: Self    Best call back number:[phone]     Which medication are you concerned about: amoxicillin-clavulanate (AUGMENTIN) 875-125 MG per tablet     Who prescribed you this medication: [facility]        What are your concerns: PATIENT WAS GIVEN THE ABOVE MEDICATION AT THE HOSPITAL FOR A SINUS INFECTION AND SHE CAN NOT TAKE IT. MAKES HER THROW UP. HOSPITAL ADVISED HER TO CALL PCP TO SEE IF SOMETHING ELSE CAN BE CALLED IN. CALL PATIENT TO DISCUSS

## 2024-02-09 NOTE — TELEPHONE ENCOUNTER
Per this message it looks like she is requesting treatment for sinus infection but I reviewed the hospital records and it says she is on the antibiotic for diverticulitis (colon infection). Can we clarify?

## 2024-02-10 ENCOUNTER — OFFICE VISIT (OUTPATIENT)
Dept: FAMILY MEDICINE CLINIC | Facility: CLINIC | Age: 79
End: 2024-02-10
Payer: COMMERCIAL

## 2024-02-10 VITALS
DIASTOLIC BLOOD PRESSURE: 80 MMHG | BODY MASS INDEX: 24.24 KG/M2 | WEIGHT: 142 LBS | HEIGHT: 64 IN | SYSTOLIC BLOOD PRESSURE: 124 MMHG

## 2024-02-10 DIAGNOSIS — H53.8 BLURRED VISION, RIGHT EYE: ICD-10-CM

## 2024-02-10 DIAGNOSIS — H92.01 RIGHT EAR PAIN: ICD-10-CM

## 2024-02-10 DIAGNOSIS — K57.92 DIVERTICULITIS: ICD-10-CM

## 2024-02-10 DIAGNOSIS — J34.89 PAIN OF MAXILLARY SINUS: ICD-10-CM

## 2024-02-10 DIAGNOSIS — J32.9 CHRONIC SINUSITIS, UNSPECIFIED LOCATION: Primary | ICD-10-CM

## 2024-02-10 LAB
BACTERIA SPEC AEROBE CULT: NORMAL
BACTERIA SPEC AEROBE CULT: NORMAL

## 2024-02-10 PROCEDURE — 99214 OFFICE O/P EST MOD 30 MIN: CPT | Performed by: PHYSICIAN ASSISTANT

## 2024-02-10 RX ORDER — SACCHAROMYCES BOULARDII 250 MG
250 CAPSULE ORAL 2 TIMES DAILY
Qty: 28 CAPSULE | Refills: 0 | Status: SHIPPED | OUTPATIENT
Start: 2024-02-10 | End: 2024-02-24

## 2024-02-10 RX ORDER — METRONIDAZOLE 250 MG/1
250 TABLET ORAL 3 TIMES DAILY
Qty: 15 TABLET | Refills: 0 | Status: SHIPPED | OUTPATIENT
Start: 2024-02-10 | End: 2024-02-15

## 2024-02-10 RX ORDER — LEVOFLOXACIN 750 MG/1
750 TABLET, FILM COATED ORAL DAILY
Qty: 7 TABLET | Refills: 0 | Status: SHIPPED | OUTPATIENT
Start: 2024-02-10 | End: 2024-02-17

## 2024-02-10 NOTE — ASSESSMENT & PLAN NOTE
Did not tolerate Augmentin. Also has allergy to Sulfa drugs.   Will try levaquin and flagyl. Discussed taking abx with food, risks fluoroquinolones.   Recommended probiotic due to recent treatment with multiple antibiotics.   Follow-up as scheduled with PCP next week for TCM visit.

## 2024-02-10 NOTE — TELEPHONE ENCOUNTER
The patient called stating she couldn't take the penicillin that was prescribed to her at the Emergency Room because it makes her throw up. She had previously stated it was for a sinus infection. In the previous telephone call message Dr. Low was questioning if it was for a sinus infection or a colon infection as the ER note stated. I asked the patient and she said it was for both. Said the sinus infection has been going on for months and asked if there was anyone else she could see. I added her to Jose Daniel Arana's schedule today, Saturday, 02/10/24.  FYI...

## 2024-02-10 NOTE — ASSESSMENT & PLAN NOTE
CT maxillofacial w/IV contrast on 1/5 showed sinuses were clear.   Quite tender overlying maxillary sinus today and reported nasal discharge/postnasal drip.   Patient notes improvement recently on abx. Will trial levaquin for 1 week.   I advised follow-up with her ENT if she has ongoing symptoms.   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   Counseled patient to schedule an appointment with her ophthalmologist next week for change in vision, chronic.   Check ESR to evaluate for GCA.

## 2024-02-10 NOTE — PROGRESS NOTES
"    Chief Complaint   Patient presents with    Fatigue    Earache     Right ear pain    URI     Pt states she has head congestion and she states that its making her nausea and very sick she states its only on the right side. Ongoing for 4 months.       HPI     Joyce Jones is a pleasant 78 y.o. female who presents for evaluation of \"chief complaint.\"     Patient c/o right-sided frontal and maxillary facial pain and tenderness over her sinuses for at least 6 months. It has actually mildly improved which she attributes to treatment with antibiotics during recent admission 2/5-2/8 at [facility] for diverticulitis. She has a hard time breathing through her right nostril. Some postnasal drainage. Once in a while she has yellow nasal discharge. She is using neti pot. She is sure this is infection. She did see ENT in August. CT of the sinuses  8/22/23 showed sinuses were clear, postsurgical septoplasty change, severe right and moderate left degenerative TMJ changes, subluxation of mandibular head bilaterally. Has been trying to get in to see different ENT her  sees but this appointment is not for 3 months. She notes blurred vision in her right eye. No jaw claudication, headache, numbness/tingling. Has not had eye evaluation. She was discharged on Augmentin but only took 1/2 tab and had n/v. She has GI intolerance to PCNs.     Past Medical History:   Diagnosis Date    Allergic     Allergic rhinitis     Anemia     Arthritis     Cholelithiasis     Deep vein thrombosis Clot following left knee replacement    Diverticulosis     Heart murmur     History of bone density study 03/02/2017    [facility]    History of mammography, screening 2016    Hyperlipidemia     Hypertension     Inguinal hernia 1978    repair    Irritable bowel syndrome Self    Kidney stone Embedded    Low back pain Scoliosis/kyphosis    Neuromuscular disorder Shoulder pain , arthritic issues, more severe    Osteoarthritis (arthritis due to wear and " tear of joints)     Osteopenia     Personal history of DVT (deep vein thrombosis) 2005    right leg- due to surgery    Pneumonia 5 times    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 Has been under control . Function stable    Scoliosis Joyce       Past Surgical History:   Procedure Laterality Date    BACK SURGERY  2002    Scoliosis bar. T11-sacrum    BREAST BIOPSY      over 15 years ago.     CERVICAL SPINE SURGERY  2000    cage    CHOLECYSTECTOMY  2014    COLONOSCOPY  2016    EYE SURGERY  Detached retina    FOOT SURGERY  2008    HERNIA REPAIR  3 both bowl areas    INGUINAL HERNIA REPAIR Bilateral 1978    2019 right side repair    INGUINAL HERNIA REPAIR  Both    JOINT REPLACEMENT  4 knee replacement surgeries    Right knee, 1980 approx. left knee , three replacements appears stable now.    KNEE SURGERY Bilateral 2017 2005 and x2 in 8/2017(left)    NASAL SEPTUM SURGERY  1999    SINUS SURGERY  1986    SPINE SURGERY  2004    TOTAL ABDOMINAL HYSTERECTOMY WITH SALPINGO OOPHORECTOMY  1986    Unilateral salpingo-oophorectomy/cervical dysplasia/AUB    TUBAL ABDOMINAL LIGATION  1982    URETHRAL DILATION      Multiple times       Family History   Problem Relation Age of Onset    Uterine cancer Mother         Metastatic to ovaries and colon; possibly bone    Hypertension Mother     Kidney disease Mother     Tuberculosis Mother     Arthritis Mother     Thyroid disease Mother     Cancer Father         Bladder    Heart failure Father     Stroke Father     Hypertension Father     Heart attack Father     Colon polyps Father     Arthritis Father     Hypertension Brother     Kidney disease Brother     Colon polyps Brother     Cancer Brother     Heart disease Brother     Kidney disease Brother     Hypertension Brother     Cancer Brother         Cancer    No Known Problems Brother     No Known Problems Brother      Cancer Brother     Colon cancer Maternal Uncle     Colon cancer Maternal Uncle     Breast cancer Paternal Aunt     Other Daughter         CVID    Diabetes Daughter        Social History     Socioeconomic History    Marital status:    Tobacco Use    Smoking status: Never     Passive exposure: Never    Smokeless tobacco: Never   Vaping Use    Vaping Use: Never used   Substance and Sexual Activity    Alcohol use: Not Currently     Alcohol/week: 1.0 standard drink of alcohol     Types: 1 Glasses of wine per week     Comment: rarely at holidays    Drug use: Never    Sexual activity: Defer     Partners: Male     Birth control/protection: Post-menopausal, Surgical     Comment: Hysterectomy       Allergies   Allergen Reactions    Overton-2 Inhibitors Anaphylaxis    Nabumetone Anaphylaxis    Nsaids GI Intolerance and Hives    Sulfa Antibiotics Shortness Of Breath    Celecoxib Hives    Penicillins Nausea And Vomiting and Dizziness    Zithromax [Azithromycin] Dizziness       ROS    Review of Systems   Constitutional:  Negative for chills, diaphoresis and fever.   HENT:  Positive for congestion, postnasal drip and rhinorrhea. Negative for ear pain and sore throat.    Respiratory:  Positive for cough. Negative for shortness of breath and wheezing.    Gastrointestinal:  Positive for diarrhea. Negative for abdominal pain.   Neurological:  Negative for numbness.       Vitals:    02/10/24 1059   BP: 124/80     Body mass index is 24.36 kg/m².      Current Outpatient Medications:     atorvastatin (LIPITOR) 20 MG tablet, Take 1 tablet by mouth Every Night., Disp: 30 tablet, Rfl: 3    cyclobenzaprine (FLEXERIL) 10 MG tablet, Take 1 tablet by mouth At Night As Needed for Muscle Spasms., Disp: 10 tablet, Rfl: 0    fluticasone (FLONASE) 50 MCG/ACT nasal spray, 1 spray into the nostril(s) as directed by provider Daily., Disp: 9.9 mL, Rfl: 11    lidocaine (LIDODERM) 5 %, Place 1 patch on the skin as directed by provider Daily. Remove &  Discard patch within 12 hours or as directed by MD, Disp: 30 each, Rfl: 0    losartan (COZAAR) 50 MG tablet, Take 1 tablet by mouth Daily., Disp: , Rfl:     traMADol-acetaminophen (ULTRACET) 37.5-325 MG per tablet, Take 1 tablet by mouth Every 6 (Six) Hours As Needed for Moderate Pain., Disp: , Rfl:     levoFLOXacin (Levaquin) 750 MG tablet, Take 1 tablet by mouth Daily for 7 days., Disp: 7 tablet, Rfl: 0    metroNIDAZOLE (FLAGYL) 250 MG tablet, Take 1 tablet by mouth 3 (Three) Times a Day for 5 days., Disp: 15 tablet, Rfl: 0    saccharomyces boulardii (Florastor) 250 MG capsule, Take 1 capsule by mouth 2 (Two) Times a Day for 14 days., Disp: 28 capsule, Rfl: 0    PE    Physical Exam  Vitals reviewed.   Constitutional:       General: She is not in acute distress.     Appearance: She is well-developed.   HENT:      Head: Normocephalic.      Right Ear: Tympanic membrane and ear canal normal. Tympanic membrane is not erythematous.      Left Ear: Tympanic membrane and ear canal normal. Tympanic membrane is not erythematous.      Nose: No congestion.      Right Turbinates: Not swollen.      Left Turbinates: Not swollen.      Right Sinus: Maxillary sinus tenderness and frontal sinus tenderness present.      Left Sinus: No maxillary sinus tenderness or frontal sinus tenderness.      Mouth/Throat:      Mouth: Mucous membranes are moist.      Pharynx: Oropharynx is clear. Uvula midline.      Tonsils: No tonsillar exudate.      Comments: No tenderness overlying temporal artery.   Eyes:      General:         Right eye: No discharge.         Left eye: No discharge.      Extraocular Movements: Extraocular movements intact.      Conjunctiva/sclera: Conjunctivae normal.   Cardiovascular:      Rate and Rhythm: Normal rate and regular rhythm.      Heart sounds: Normal heart sounds.   Pulmonary:      Effort: Pulmonary effort is normal. No respiratory distress.      Breath sounds: Normal breath sounds. No wheezing, rhonchi or rales.    Abdominal:      General: Bowel sounds are normal.      Palpations: Abdomen is soft.      Tenderness: There is abdominal tenderness (mild) in the left lower quadrant. There is no guarding or rebound.   Musculoskeletal:      Cervical back: Normal range of motion and neck supple.   Lymphadenopathy:      Cervical: No cervical adenopathy.      Upper Body:      Right upper body: No supraclavicular adenopathy.      Left upper body: No supraclavicular adenopathy.   Skin:     General: Skin is warm and dry.   Neurological:      Mental Status: She is alert.   Psychiatric:         Behavior: Behavior normal.          A/P    Problem List Items Addressed This Visit          ENT    Chronic sinusitis - Primary    Current Assessment & Plan     CT maxillofacial w/IV contrast on 1/5 showed sinuses were clear.   Quite tender overlying maxillary sinus today and reported nasal discharge/postnasal drip.   Patient notes improvement recently on abx. Will trial levaquin for 1 week.   I advised follow-up with her ENT if she has ongoing symptoms.   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   Counseled patient to schedule an appointment with her ophthalmologist next week for change in vision, chronic.   Check ESR to evaluate for GCA.              Gastrointestinal Abdominal     Diverticulitis    Current Assessment & Plan     Did not tolerate Augmentin. Also has allergy to Sulfa drugs.   Will try levaquin and flagyl. Discussed taking abx with food, risks fluoroquinolones.   Recommended probiotic due to recent treatment with multiple antibiotics.   Follow-up as scheduled with PCP next week for TCM visit.           Other Visit Diagnoses       Pain of maxillary sinus        Relevant Orders    Sedimentation rate, automated    Right ear pain        Blurred vision, right eye        Relevant Orders    Sedimentation rate, automated            Plan of care was reviewed with patient at the  conclusion of today's visit. Education was provided regarding diagnoses, management, prescribed or recommended OTC products, and the importance of compliance with follow-up appointments. The patient was counseled regarding the risks, benefits, and possible side-effects of treatment. I advised the patient to keep me informed of any acute changes in their status including new, worsening, or persistent symptoms. Patient expresses understanding and agreement with the management plan.        Jose Daniel Arana PA-C

## 2024-02-10 NOTE — PROGRESS NOTES
"[facility], [facility]    Chief Complaint  Palpitations and Dizziness    Subjective    History of Present Illness {CC  Problem List  Visit  Diagnosis   Encounters  Notes  Medications  Labs  Result Review Imaging  Media :23}     Joyce Jones presents to [facility] CARDIOLOG[facility] for   History of Present Illness     [age]-year-old female with history of fibromyalgia, connective tissue disease, chronic low back pain with sciatica, hypertension, anxiety, dizziness.    Patient has had increased anxiety, forgetfulness and fatigue over the last several months.  Reports word finding difficulties during emotional situations.  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  She has had decreased socialization since pandemic.    Increased dizziness and palpitations occurring during positional changes.  Symptoms resolve within a few seconds.  No near syncope, syncope, falls.  Palpitations can occur at rest.    Dizziness improved with stopping coffee. STill with sitting to standings.  NO falls, near syncope syncope. Pt is only taking Losartan 50 mg, instead of Losartan 100 mg.  Pt has only been on the losartan for 3 days.          Currently taking losartan for hypertensive management.  BuSpar for anxiety.  Duloxetine for fibromyalgia.    No dyspnea, CP, pressure.      Objective     Vital Signs:   Vitals:    12/29/22 1002 12/29/22 1004 12/29/22 1005 12/29/22 1032   BP: 145/71 131/69 170/70 124/72   BP Location: Right arm Left arm Left arm    Patient Position: Sitting Standing Sitting    Cuff Size: Adult Adult Adult    Pulse: 72 81 71    Resp:   18    Temp: 97.6 °F (36.4 °C) 97.6 °F (36.4 °C) 97.6 °F (36.4 °C)    TempSrc: Temporal Temporal Temporal    SpO2: 99% 96% 98%    Weight:   65.4 kg (144 lb 3.2 oz)    Height:   165.1 cm (65\")      Body mass index is 24 kg/m².  Physical Exam  Vitals reviewed.   Constitutional:       General: She is not in acute " independent distress.     Appearance: Normal appearance.   Cardiovascular:      Rate and Rhythm: Normal rate and regular rhythm.      Pulses:           Radial pulses are 2+ on the right side.        Dorsalis pedis pulses are 2+ on the right side.        Posterior tibial pulses are 2+ on the right side.      Heart sounds: Normal heart sounds.   Pulmonary:      Effort: Pulmonary effort is normal.      Breath sounds: Normal breath sounds.   Musculoskeletal:      Right lower leg: No edema.      Left lower leg: No edema.   Skin:     General: Skin is warm and dry.   Neurological:      Mental Status: She is alert.   Psychiatric:         Mood and Affect: Mood normal.         Behavior: Behavior is cooperative.              Result Review  Data Reviewed:{ Labs  Result Review  Imaging  Med Tab  Media :23}     EKG 1/11/2022: Sinus rhythm 81 bpm    History of echocardiogram 2019: EF 65%, diastolic dysfunction consistent with age.  Mild pulmonary hypertension, mild MR      Cardiac stress PET 2019: No ischemia, normal EF              Assessment and Plan {CC Problem List  Visit Diagnosis  ROS  Review (Popup)  Health Maintenance  Quality  BestPractice  Medications  SmartSets  SnapShot Encounters  Media :23}   1. Palpitations  - Holter Monitor - 72 Hour Up To 15 Days; Future    2. Dizziness    - Holter Monitor - 72 Hour Up To 15 Days; Future    3. Essential hypertension  BP initially elevated.  Repeat improved.  Patient currently on losartan 50.  Has only been on medication for 3 days.  Continue to monitor.  Discussed home blood pressure log and proper blood pressure monitoring.    4. History of recent stressful life event  Patient with increased stress with caring for spouse and daughter as well as other stressors.  Currently on BuSpar and Cymbalta.  Continue to monitor.  Blood pressures been well controlled prior to increase stress.        Follow Up {Instructions Charge Capture  Follow-up Communications :23}   Return in about  6 weeks (around 2/9/2023) for Office visit, palpitations, monitor results, HTN, dizziness.    Patient was given instructions and counseling regarding her condition or for health maintenance advice. Please see specific information pulled into the AVS if appropriate.  Patient was instructed to call the [facility] with any questions, concerns, or worsening symptoms.

## 2024-02-12 ENCOUNTER — TELEPHONE (OUTPATIENT)
Dept: FAMILY MEDICINE CLINIC | Facility: CLINIC | Age: 79
End: 2024-02-12
Payer: COMMERCIAL

## 2024-02-14 ENCOUNTER — TELEPHONE (OUTPATIENT)
Dept: FAMILY MEDICINE CLINIC | Facility: CLINIC | Age: 79
End: 2024-02-14
Payer: COMMERCIAL

## 2024-02-23 ENCOUNTER — OFFICE VISIT (OUTPATIENT)
Dept: FAMILY MEDICINE CLINIC | Facility: CLINIC | Age: 79
End: 2024-02-23
Payer: COMMERCIAL

## 2024-02-23 VITALS
SYSTOLIC BLOOD PRESSURE: 120 MMHG | HEART RATE: 83 BPM | WEIGHT: 142 LBS | OXYGEN SATURATION: 99 % | BODY MASS INDEX: 24.24 KG/M2 | DIASTOLIC BLOOD PRESSURE: 74 MMHG | HEIGHT: 64 IN

## 2024-02-23 DIAGNOSIS — H81.11 BENIGN PAROXYSMAL POSITIONAL VERTIGO OF RIGHT EAR: Primary | ICD-10-CM

## 2024-02-23 PROCEDURE — 99213 OFFICE O/P EST LOW 20 MIN: CPT | Performed by: STUDENT IN AN ORGANIZED HEALTH CARE EDUCATION/TRAINING PROGRAM

## 2024-02-23 NOTE — PROGRESS NOTES
Established Office Visit      Patient Name: Joyce Jones  : 1945   MRN: [id number]   Care Team: Patient Care Team:  Gayle Low DO as PCP - General (Internal Medicine)  Hu Dewey MD as Consulting Physician (Pain Medicine)    Chief Complaint:    Chief Complaint   Patient presents with    Dizziness    Fatigue       History of Present Illness: Joyce Jones is a 78 y.o. female with fibromyalgia, PMR (following with rheumatology), chronic low back pain with sciatica, HTN who is here today to follow up with vertigo.    She was recently hospitalized for acute diverticulitis flare. She did not complete abx for this but reports she is no longer experiencing diarrhea, abdominal pain, nausea, vomiting.    She is frustrated with persistent weakness and vertigo symptoms, unchanged from prior. Last seen for this 2024 and symptoms continue to interfere with her life. Describes nausea and generalized weakness, dizziness. Worse with bending/moving her head and resolves with rest. She has been referred to ENT, saw them in 2023 but has not followed up. She has received steroids in the past which helps symptoms but does not wish to repetitively use these. Compliant with flonase daily.       Subjective      Review of Systems:   Review of Systems - See HPI    I have reviewed and the following portions of the patient's history were updated as appropriate: past family history, past medical history, past social history, past surgical history and problem list.    Medications:     Current Outpatient Medications:     atorvastatin (LIPITOR) 20 MG tablet, Take 1 tablet by mouth Every Night., Disp: 30 tablet, Rfl: 3    cyclobenzaprine (FLEXERIL) 10 MG tablet, Take 1 tablet by mouth At Night As Needed for Muscle Spasms., Disp: 10 tablet, Rfl: 0    fluticasone (FLONASE) 50 MCG/ACT nasal spray, 1 spray into the nostril(s) as directed by provider Daily., Disp: 9.9 mL, Rfl: 11    lidocaine (LIDODERM) 5 %,  "Place 1 patch on the skin as directed by provider Daily. Remove & Discard patch within 12 hours or as directed by MD, Disp: 30 each, Rfl: 0    losartan (COZAAR) 50 MG tablet, Take 1 tablet by mouth Daily., Disp: , Rfl:     saccharomyces boulardii (Florastor) 250 MG capsule, Take 1 capsule by mouth 2 (Two) Times a Day for 14 days., Disp: 28 capsule, Rfl: 0    traMADol-acetaminophen (ULTRACET) 37.5-325 MG per tablet, Take 1 tablet by mouth Every 6 (Six) Hours As Needed for Moderate Pain., Disp: , Rfl:     Allergies:   Allergies   Allergen Reactions    Overton-2 Inhibitors Anaphylaxis    Nabumetone Anaphylaxis    Nsaids GI Intolerance and Hives    Sulfa Antibiotics Shortness Of Breath    Celecoxib Hives    Penicillins Nausea And Vomiting and Dizziness    Zithromax [Azithromycin] Dizziness       Objective     Physical Exam:  Vital Signs:   Vitals:    02/23/24 1239   BP: 120/74   Pulse: 83   SpO2: 99%   Weight: 64.4 kg (142 lb)   Height: 162.6 cm (64.02\")     Body mass index is 24.36 kg/m².       Assessment / Plan      Assessment/Plan:   Problems Addressed This Visit  Diagnoses and all orders for this visit:    1. Benign paroxysmal positional vertigo of right ear (Primary)  -     Ambulatory Referral to Physical Therapy Vestibular      Symptoms recurrent, unchanged, has been dealing with this on and off for several months. Responsive to brief steroid bursts but would like to avoid repetitive use.   She has established with ENT and I strongly encourage her to follow up - provided with office number today. She also has chronic right sided sinusitis and plans to follow up with ENT for this as well.   Referred to vestibular rehab  Continue flonase once daily       Plan of care reviewed with patient at the conclusion of today's visit. Education was provided regarding diagnosis and management.  Patient verbalizes understanding of and agreement with management plan.    Follow Up:   No follow-ups on file.        Gayle Low, " DO  [address]  [facility]  [address]  [facility] [address]  Fax [phone]  Phone [phone]

## 2024-02-29 DIAGNOSIS — I77.9 CAROTID ARTERY DISEASE, UNSPECIFIED LATERALITY, UNSPECIFIED TYPE: ICD-10-CM

## 2024-03-01 ENCOUNTER — TELEPHONE (OUTPATIENT)
Dept: FAMILY MEDICINE CLINIC | Facility: CLINIC | Age: 79
End: 2024-03-01
Payer: COMMERCIAL

## 2024-03-01 RX ORDER — ATORVASTATIN CALCIUM 20 MG/1
20 TABLET, FILM COATED ORAL NIGHTLY
Qty: 30 TABLET | Refills: 3 | OUTPATIENT
Start: 2024-03-01

## 2024-03-01 NOTE — TELEPHONE ENCOUNTER
Failed protocol, please review. Last seen 10/19/23, atorvastatin prescribed 10/27/23 after carotid duplex. Please review and refill if appropriate.    Kenney Tapia, PharmD, BCPS  3/1/2024  07:58 EST

## 2024-03-01 NOTE — TELEPHONE ENCOUNTER
Patient currently does not have a follow-up in the heart valve center.  Patient follows with [facility].  Will route to [facility] for review.

## 2024-03-01 NOTE — TELEPHONE ENCOUNTER
Unable to reach Joyce Jones by phone or leave message.    Hub may relay message and document.    Patient would need an appointment to evaluate her symptoms. She can schedule with any provider today or see .

## 2024-03-01 NOTE — TELEPHONE ENCOUNTER
Caller: Joyce Jones    Relationship: Self    Best call back number:      What medication are you requesting: DOESN'T KNOW WHICH SHE NEEDS    What are your current symptoms: BURNING IN BLADDER AREA    How long have you been experiencing symptoms: PATIENT HAS HAD SX SINCE LATE YESTERDAY    Have you had these symptoms before:    [x] Yes  [] No    Have you been treated for these symptoms before:   [x] Yes  [] No    If a prescription is needed, what is your preferred pharmacy and phone number: [id number] - [place], [place] - [address] - [phone] [facility] [phone] FX     Additional notes: PLEASE CALL TO ADVISE

## 2024-04-15 ENCOUNTER — LAB (OUTPATIENT)
Dept: LAB | Facility: HOSPITAL | Age: 79
End: 2024-04-15
Payer: MEDICARE

## 2024-04-15 ENCOUNTER — OFFICE VISIT (OUTPATIENT)
Dept: FAMILY MEDICINE CLINIC | Facility: CLINIC | Age: 79
End: 2024-04-15
Payer: MEDICARE

## 2024-04-15 VITALS
HEIGHT: 64 IN | OXYGEN SATURATION: 97 % | DIASTOLIC BLOOD PRESSURE: 80 MMHG | SYSTOLIC BLOOD PRESSURE: 128 MMHG | WEIGHT: 147.6 LBS | HEART RATE: 70 BPM | BODY MASS INDEX: 25.2 KG/M2

## 2024-04-15 DIAGNOSIS — D53.1 MEGALOBLASTIC ANEMIA DUE TO VITAMIN B12 DEFICIENCY: ICD-10-CM

## 2024-04-15 DIAGNOSIS — E55.9 VITAMIN D DEFICIENCY: ICD-10-CM

## 2024-04-15 DIAGNOSIS — R41.89 BRAIN FOG: ICD-10-CM

## 2024-04-15 DIAGNOSIS — R42 DIZZINESS: Primary | ICD-10-CM

## 2024-04-15 DIAGNOSIS — H53.8 BLURRED VISION, RIGHT EYE: ICD-10-CM

## 2024-04-15 DIAGNOSIS — I77.9 CAROTID ARTERY DISEASE, UNSPECIFIED LATERALITY, UNSPECIFIED TYPE: ICD-10-CM

## 2024-04-15 DIAGNOSIS — J34.89 PAIN OF MAXILLARY SINUS: ICD-10-CM

## 2024-04-15 PROBLEM — N18.9 CHRONIC KIDNEY DISEASE: Status: RESOLVED | Noted: 2017-03-27 | Resolved: 2024-04-15

## 2024-04-15 PROCEDURE — 3079F DIAST BP 80-89 MM HG: CPT | Performed by: NURSE PRACTITIONER

## 2024-04-15 PROCEDURE — 80053 COMPREHEN METABOLIC PANEL: CPT

## 2024-04-15 PROCEDURE — 3074F SYST BP LT 130 MM HG: CPT | Performed by: NURSE PRACTITIONER

## 2024-04-15 PROCEDURE — 85652 RBC SED RATE AUTOMATED: CPT

## 2024-04-15 PROCEDURE — 99214 OFFICE O/P EST MOD 30 MIN: CPT | Performed by: NURSE PRACTITIONER

## 2024-04-15 PROCEDURE — 86340 INTRINSIC FACTOR ANTIBODY: CPT

## 2024-04-15 PROCEDURE — 82607 VITAMIN B-12: CPT

## 2024-04-15 PROCEDURE — 85025 COMPLETE CBC W/AUTO DIFF WBC: CPT

## 2024-04-15 PROCEDURE — 82306 VITAMIN D 25 HYDROXY: CPT

## 2024-04-15 PROCEDURE — 82746 ASSAY OF FOLIC ACID SERUM: CPT

## 2024-04-15 RX ORDER — CYANOCOBALAMIN 1000 UG/ML
1000 INJECTION, SOLUTION INTRAMUSCULAR; SUBCUTANEOUS
Status: SHIPPED | OUTPATIENT
Start: 2024-04-15

## 2024-04-15 RX ORDER — ATORVASTATIN CALCIUM 20 MG/1
20 TABLET, FILM COATED ORAL NIGHTLY
Qty: 30 TABLET | Refills: 3 | Status: SHIPPED | OUTPATIENT
Start: 2024-04-15

## 2024-04-15 NOTE — PROGRESS NOTES
"Sallie Jones is a 78 y.o. female.   Chief Complaint   Patient presents with    Dizziness     X 1 Month    Fatigue       History of Present Illness   Patient of [place], here with complaint of dizziness x 1 month  More fatigue than usual;, thinking is \"dull\"; treated for a sinus infection 2 months ago. She has macrocytic anemia due to B12 deficiency. Denies alcohol use.  She has been referred to ENT  The following portions of the patient's history were reviewed and updated as appropriate: allergies, current medications, past family history, past medical history, past social history, past surgical history, and problem list.    Review of Systems   Constitutional:  Positive for fatigue. Negative for chills and fever.   HENT:  Positive for sinus pressure (pressure).    Eyes:  Positive for visual disturbance (blurry).   Respiratory:  Negative for shortness of breath.    Cardiovascular:  Negative for chest pain.   Gastrointestinal:  Negative for blood in stool, constipation and diarrhea.   Genitourinary:  Negative for difficulty urinating and dysuria.   Musculoskeletal:  Positive for arthralgias and back pain.   Neurological:  Positive for dizziness.   Psychiatric/Behavioral:  Negative for dysphoric mood, self-injury, sleep disturbance and suicidal ideas. The patient is not nervous/anxious.        Objective   Physical Exam  Vitals reviewed.   Constitutional:       Appearance: Normal appearance.   HENT:      Head: Normocephalic and atraumatic.      Right Ear: There is no impacted cerumen.      Left Ear: There is no impacted cerumen.      Ears:      Comments: Right TM opaque  Neck:      Vascular: No carotid bruit.   Cardiovascular:      Rate and Rhythm: Normal rate and regular rhythm.      Heart sounds: Normal heart sounds. No murmur heard.  Pulmonary:      Effort: Pulmonary effort is normal. No respiratory distress.      Breath sounds: Normal breath sounds.   Neurological:      Mental Status: She is alert " "and oriented to person, place, and time.   Psychiatric:         Thought Content: Thought content normal.         Judgment: Judgment normal.       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    Relevant Medications    cyanocobalamin injection 1,000 mcg    Other Relevant Orders    CBC Auto Differential (Completed)    Comprehensive Metabolic Panel (Completed)    Vitamin B12 (Completed)    Folate (Completed)    Intrinsic Factor Ab     Other Visit Diagnoses       Dizziness    -  Primary    Vitamin D deficiency        Relevant Orders    Vitamin D,25-Hydroxy (Completed)    Brain fog              Diagnoses         Codes Comments    Dizziness    -  Primary ICD-10-CM: R42  ICD-9-CM: 780.4     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 I will contact patient regarding test results and provide instructions regarding any necessary changes in plan of care.  After blood draw, can get B12 injection to see if this helps with fatigue and brain fog.  Follow up with Dr. Low, PCP and with ENT for dizziness  Patient was encouraged to keep me informed of any acute changes, lack of improvement, or any new concerning symptoms.             "

## 2024-04-16 ENCOUNTER — CLINICAL SUPPORT (OUTPATIENT)
Dept: FAMILY MEDICINE CLINIC | Facility: CLINIC | Age: 79
End: 2024-04-16
Payer: MEDICARE

## 2024-04-16 LAB
25(OH)D3 SERPL-MCNC: 47.9 NG/ML (ref 30–100)
ALBUMIN SERPL-MCNC: 4.9 G/DL (ref 3.5–5.2)
ALBUMIN/GLOB SERPL: 1.8 G/DL
ALP SERPL-CCNC: 86 U/L (ref 39–117)
ALT SERPL W P-5'-P-CCNC: 35 U/L (ref 1–33)
ANION GAP SERPL CALCULATED.3IONS-SCNC: 12 MMOL/L (ref 5–15)
AST SERPL-CCNC: 30 U/L (ref 1–32)
BASOPHILS # BLD AUTO: 0.02 10*3/MM3 (ref 0–0.2)
BASOPHILS NFR BLD AUTO: 0.3 % (ref 0–1.5)
BILIRUB SERPL-MCNC: 0.3 MG/DL (ref 0–1.2)
BUN SERPL-MCNC: 29 MG/DL (ref 8–23)
BUN/CREAT SERPL: 27.1 (ref 7–25)
CALCIUM SPEC-SCNC: 11 MG/DL (ref 8.6–10.5)
CHLORIDE SERPL-SCNC: 100 MMOL/L (ref 98–107)
CO2 SERPL-SCNC: 25 MMOL/L (ref 22–29)
CREAT SERPL-MCNC: 1.07 MG/DL (ref 0.57–1)
DEPRECATED RDW RBC AUTO: 47.7 FL (ref 37–54)
EGFRCR SERPLBLD CKD-EPI 2021: 53.6 ML/MIN/1.73
EOSINOPHIL # BLD AUTO: 0.07 10*3/MM3 (ref 0–0.4)
EOSINOPHIL NFR BLD AUTO: 1.2 % (ref 0.3–6.2)
ERYTHROCYTE [DISTWIDTH] IN BLOOD BY AUTOMATED COUNT: 12.8 % (ref 12.3–15.4)
ERYTHROCYTE [SEDIMENTATION RATE] IN BLOOD: 19 MM/HR (ref 0–30)
FOLATE SERPL-MCNC: >20 NG/ML (ref 4.78–24.2)
GLOBULIN UR ELPH-MCNC: 2.7 GM/DL
GLUCOSE SERPL-MCNC: 84 MG/DL (ref 65–99)
HCT VFR BLD AUTO: 40.2 % (ref 34–46.6)
HGB BLD-MCNC: 13.4 G/DL (ref 12–15.9)
IMM GRANULOCYTES # BLD AUTO: 0.02 10*3/MM3 (ref 0–0.05)
IMM GRANULOCYTES NFR BLD AUTO: 0.3 % (ref 0–0.5)
LYMPHOCYTES # BLD AUTO: 1.99 10*3/MM3 (ref 0.7–3.1)
LYMPHOCYTES NFR BLD AUTO: 33 % (ref 19.6–45.3)
MCH RBC QN AUTO: 33.8 PG (ref 26.6–33)
MCHC RBC AUTO-ENTMCNC: 33.3 G/DL (ref 31.5–35.7)
MCV RBC AUTO: 101.3 FL (ref 79–97)
MONOCYTES # BLD AUTO: 0.57 10*3/MM3 (ref 0.1–0.9)
MONOCYTES NFR BLD AUTO: 9.5 % (ref 5–12)
NEUTROPHILS NFR BLD AUTO: 3.36 10*3/MM3 (ref 1.7–7)
NEUTROPHILS NFR BLD AUTO: 55.7 % (ref 42.7–76)
NRBC BLD AUTO-RTO: 0 /100 WBC (ref 0–0.2)
PLATELET # BLD AUTO: 225 10*3/MM3 (ref 140–450)
PMV BLD AUTO: 10 FL (ref 6–12)
POTASSIUM SERPL-SCNC: 4.7 MMOL/L (ref 3.5–5.2)
PROT SERPL-MCNC: 7.6 G/DL (ref 6–8.5)
RBC # BLD AUTO: 3.97 10*6/MM3 (ref 3.77–5.28)
SODIUM SERPL-SCNC: 137 MMOL/L (ref 136–145)
VIT B12 BLD-MCNC: 1208 PG/ML (ref 211–946)
WBC NRBC COR # BLD AUTO: 6.03 10*3/MM3 (ref 3.4–10.8)

## 2024-04-17 ENCOUNTER — TELEPHONE (OUTPATIENT)
Dept: FAMILY MEDICINE CLINIC | Facility: CLINIC | Age: 79
End: 2024-04-17
Payer: MEDICARE

## 2024-04-17 NOTE — TELEPHONE ENCOUNTER
Patient did blood work and stated her labs do not show she is vitamin deficient she still does not feel well she is weak, fatigue, has a foggy brain she wants to know what is wrong with her

## 2024-04-17 NOTE — TELEPHONE ENCOUNTER
Caller: Joyce Jones    Relationship to patient: Self    Best call back number: [phone]    PATIENT WOULD LIKE TO SPEAK WITH CLINICAL    PLEASE ADVISE

## 2024-04-19 LAB — IF BLOCK AB SER-ACNC: 1.1 AU/ML (ref 0–1.1)

## 2024-04-24 ENCOUNTER — PATIENT MESSAGE (OUTPATIENT)
Dept: FAMILY MEDICINE CLINIC | Facility: CLINIC | Age: 79
End: 2024-04-24
Payer: MEDICARE

## 2024-04-24 DIAGNOSIS — M53.3 COCCYX PAIN: ICD-10-CM

## 2024-04-24 DIAGNOSIS — M54.50 CHRONIC LOW BACK PAIN, UNSPECIFIED BACK PAIN LATERALITY, UNSPECIFIED WHETHER SCIATICA PRESENT: Primary | ICD-10-CM

## 2024-04-24 DIAGNOSIS — G89.29 CHRONIC LOW BACK PAIN, UNSPECIFIED BACK PAIN LATERALITY, UNSPECIFIED WHETHER SCIATICA PRESENT: Primary | ICD-10-CM

## 2024-04-24 DIAGNOSIS — M41.9 SCOLIOSIS OF LUMBAR SPINE, UNSPECIFIED SCOLIOSIS TYPE: ICD-10-CM

## 2024-04-26 ENCOUNTER — APPOINTMENT (OUTPATIENT)
Dept: GENERAL RADIOLOGY | Facility: HOSPITAL | Age: 79
End: 2024-04-26
Payer: MEDICARE

## 2024-04-26 ENCOUNTER — HOSPITAL ENCOUNTER (EMERGENCY)
Facility: HOSPITAL | Age: 79
Discharge: HOME | End: 2024-04-26
Attending: EMERGENCY MEDICINE
Payer: MEDICARE

## 2024-04-26 ENCOUNTER — APPOINTMENT (OUTPATIENT)
Dept: CT IMAGING | Facility: HOSPITAL | Age: 79
End: 2024-04-26
Payer: MEDICARE

## 2024-04-26 VITALS
SYSTOLIC BLOOD PRESSURE: 139 MMHG | HEART RATE: 75 BPM | TEMPERATURE: 98.5 F | RESPIRATION RATE: 16 BRPM | HEIGHT: 64 IN | BODY MASS INDEX: 24.41 KG/M2 | OXYGEN SATURATION: 93 % | WEIGHT: 143 LBS | DIASTOLIC BLOOD PRESSURE: 86 MMHG

## 2024-04-26 DIAGNOSIS — R42 DIZZINESS: Primary | ICD-10-CM

## 2024-04-26 DIAGNOSIS — E86.9 VOLUME DEPLETION: ICD-10-CM

## 2024-04-26 LAB
ALBUMIN SERPL-MCNC: 4.2 G/DL (ref 3.5–5.2)
ALBUMIN/GLOB SERPL: 1.6 G/DL
ALP SERPL-CCNC: 69 U/L (ref 39–117)
ALT SERPL W P-5'-P-CCNC: 21 U/L (ref 1–33)
ANION GAP SERPL CALCULATED.3IONS-SCNC: 10 MMOL/L (ref 5–15)
AST SERPL-CCNC: 24 U/L (ref 1–32)
BACTERIA UR QL AUTO: ABNORMAL /HPF
BASOPHILS # BLD AUTO: 0.02 10*3/MM3 (ref 0–0.2)
BASOPHILS NFR BLD AUTO: 0.3 % (ref 0–1.5)
BILIRUB SERPL-MCNC: 0.4 MG/DL (ref 0–1.2)
BILIRUB UR QL STRIP: NEGATIVE
BUN SERPL-MCNC: 29 MG/DL (ref 8–23)
BUN/CREAT SERPL: 38.2 (ref 7–25)
CALCIUM SPEC-SCNC: 9.6 MG/DL (ref 8.6–10.5)
CHLORIDE SERPL-SCNC: 104 MMOL/L (ref 98–107)
CLARITY UR: ABNORMAL
CO2 SERPL-SCNC: 28 MMOL/L (ref 22–29)
COLOR UR: YELLOW
CREAT SERPL-MCNC: 0.76 MG/DL (ref 0.57–1)
D DIMER PPP FEU-MCNC: 0.35 MCGFEU/ML (ref 0–0.77)
DEPRECATED RDW RBC AUTO: 54.1 FL (ref 37–54)
EGFRCR SERPLBLD CKD-EPI 2021: 80.8 ML/MIN/1.73
EOSINOPHIL # BLD AUTO: 0.03 10*3/MM3 (ref 0–0.4)
EOSINOPHIL NFR BLD AUTO: 0.5 % (ref 0.3–6.2)
ERYTHROCYTE [DISTWIDTH] IN BLOOD BY AUTOMATED COUNT: 13.8 % (ref 12.3–15.4)
GLOBULIN UR ELPH-MCNC: 2.6 GM/DL
GLUCOSE SERPL-MCNC: 75 MG/DL (ref 65–99)
GLUCOSE UR STRIP-MCNC: NEGATIVE MG/DL
HCT VFR BLD AUTO: 39.1 % (ref 34–46.6)
HGB BLD-MCNC: 12.8 G/DL (ref 12–15.9)
HGB UR QL STRIP.AUTO: NEGATIVE
HOLD SPECIMEN: NORMAL
HYALINE CASTS UR QL AUTO: ABNORMAL /LPF
IMM GRANULOCYTES # BLD AUTO: 0.03 10*3/MM3 (ref 0–0.05)
IMM GRANULOCYTES NFR BLD AUTO: 0.5 % (ref 0–0.5)
KETONES UR QL STRIP: NEGATIVE
LEUKOCYTE ESTERASE UR QL STRIP.AUTO: ABNORMAL
LYMPHOCYTES # BLD AUTO: 1.27 10*3/MM3 (ref 0.7–3.1)
LYMPHOCYTES NFR BLD AUTO: 21.1 % (ref 19.6–45.3)
MAGNESIUM SERPL-MCNC: 2.1 MG/DL (ref 1.6–2.4)
MCH RBC QN AUTO: 34.7 PG (ref 26.6–33)
MCHC RBC AUTO-ENTMCNC: 32.7 G/DL (ref 31.5–35.7)
MCV RBC AUTO: 106 FL (ref 79–97)
MONOCYTES # BLD AUTO: 0.5 10*3/MM3 (ref 0.1–0.9)
MONOCYTES NFR BLD AUTO: 8.3 % (ref 5–12)
NEUTROPHILS NFR BLD AUTO: 4.18 10*3/MM3 (ref 1.7–7)
NEUTROPHILS NFR BLD AUTO: 69.3 % (ref 42.7–76)
NITRITE UR QL STRIP: NEGATIVE
NRBC BLD AUTO-RTO: 0 /100 WBC (ref 0–0.2)
PH UR STRIP.AUTO: 6.5 [PH] (ref 5–8)
PLATELET # BLD AUTO: 216 10*3/MM3 (ref 140–450)
PMV BLD AUTO: 9.5 FL (ref 6–12)
POTASSIUM SERPL-SCNC: 3.7 MMOL/L (ref 3.5–5.2)
PROT SERPL-MCNC: 6.8 G/DL (ref 6–8.5)
PROT UR QL STRIP: NEGATIVE
QT INTERVAL: 390 MS
QTC INTERVAL: 412 MS
RBC # BLD AUTO: 3.69 10*6/MM3 (ref 3.77–5.28)
RBC # UR STRIP: ABNORMAL /HPF
REF LAB TEST METHOD: ABNORMAL
SODIUM SERPL-SCNC: 142 MMOL/L (ref 136–145)
SP GR UR STRIP: 1.01 (ref 1–1.03)
SQUAMOUS #/AREA URNS HPF: ABNORMAL /HPF
TROPONIN T SERPL HS-MCNC: 12 NG/L
UROBILINOGEN UR QL STRIP: ABNORMAL
WBC # UR STRIP: ABNORMAL /HPF
WBC NRBC COR # BLD AUTO: 6.03 10*3/MM3 (ref 3.4–10.8)
WHOLE BLOOD HOLD COAG: NORMAL
WHOLE BLOOD HOLD SPECIMEN: NORMAL

## 2024-04-26 PROCEDURE — 99284 EMERGENCY DEPT VISIT MOD MDM: CPT

## 2024-04-26 PROCEDURE — 80053 COMPREHEN METABOLIC PANEL: CPT

## 2024-04-26 PROCEDURE — 70450 CT HEAD/BRAIN W/O DYE: CPT

## 2024-04-26 PROCEDURE — 85025 COMPLETE CBC W/AUTO DIFF WBC: CPT

## 2024-04-26 PROCEDURE — 71045 X-RAY EXAM CHEST 1 VIEW: CPT

## 2024-04-26 PROCEDURE — 25810000003 SODIUM CHLORIDE 0.9 % SOLUTION: Performed by: EMERGENCY MEDICINE

## 2024-04-26 PROCEDURE — 83735 ASSAY OF MAGNESIUM: CPT

## 2024-04-26 PROCEDURE — 81001 URINALYSIS AUTO W/SCOPE: CPT

## 2024-04-26 PROCEDURE — 84484 ASSAY OF TROPONIN QUANT: CPT

## 2024-04-26 PROCEDURE — 93005 ELECTROCARDIOGRAM TRACING: CPT

## 2024-04-26 PROCEDURE — 85379 FIBRIN DEGRADATION QUANT: CPT | Performed by: EMERGENCY MEDICINE

## 2024-04-26 RX ORDER — SODIUM CHLORIDE 0.9 % (FLUSH) 0.9 %
10 SYRINGE (ML) INJECTION AS NEEDED
Status: DISCONTINUED | OUTPATIENT
Start: 2024-04-26 | End: 2024-04-26 | Stop reason: HOSPADM

## 2024-04-26 RX ADMIN — SODIUM CHLORIDE 1000 ML: 9 INJECTION, SOLUTION INTRAVENOUS at 10:52

## 2024-04-26 NOTE — DISCHARGE INSTRUCTIONS
Continue to work with your primary care provider.  Call the [facility] neurology clinic to arrange follow-up for further evaluation.  Do not drive if you are dizzy.  If you are dizzy while you are walking I would recommend sitting down or lying down.  Return if any concerns.

## 2024-04-26 NOTE — ED PROVIDER NOTES
Subjective   History of Present Illness  Mrs Jones presents with dizziness and near syncope.  She tells me this happens whether she is up walking or lying down.  She denies vertigo.  She indicates it is a lightheaded feeling and a feeling as if she was going to pass out.  It has been present for several months.  It is gradually worsening.  She reports shortness of breath and palpitations associated with it.  She tells me she is short of breath during my exam.         She indicates she has sleep apnea but cannot tolerate CPAP.  She tells me her daughter  in September of last year and has been undergoing stress related to that.      Review of Systems    Past Medical History:   Diagnosis Date    Allergic     Allergic rhinitis     Anemia     Arthritis     Cholelithiasis     Deep vein thrombosis Clot following left knee replacement    Diverticulosis     Heart murmur     History of bone density study 2017    [facility]    History of mammography, screening 2016    Hyperlipidemia     Hypertension     Inguinal hernia 1978    repair    Irritable bowel syndrome Self    Kidney stone Embedded    Low back pain Scoliosis/kyphosis    Neuromuscular disorder Shoulder pain , arthritic issues, more severe    Osteoarthritis (arthritis due to wear and tear of joints)     Osteopenia     Personal history of DVT (deep vein thrombosis)     right leg- due to surgery    Pneumonia 5 times    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 Has been under control . Function stable    Scoliosis Joyce       Allergies   Allergen Reactions    [place]-2 Inhibitors Anaphylaxis    Nabumetone Anaphylaxis    Nsaids GI Intolerance and Hives    Sulfa Antibiotics Shortness Of Breath    Celecoxib Hives    Penicillins Nausea And Vomiting and Dizziness    Zithromax [Azithromycin] Dizziness       Past Surgical History:   Procedure  Laterality Date    BACK SURGERY  2002    Scoliosis bar. T11-sacrum    BREAST BIOPSY      over 15 years ago.     CERVICAL SPINE SURGERY  2000    cage    CHOLECYSTECTOMY  2014    COLONOSCOPY  2016    EYE SURGERY  Detached retina    FOOT SURGERY  2008    HERNIA REPAIR  3 both bowl areas    INGUINAL HERNIA REPAIR Bilateral 1978    2019 right side repair    INGUINAL HERNIA REPAIR  Both    JOINT REPLACEMENT  4 knee replacement surgeries    Right knee, 1980 approx. left knee , three replacements appears stable now.    KNEE SURGERY Bilateral 2017    2005 and x2 in 8/2017(left)    NASAL SEPTUM SURGERY  1999    SINUS SURGERY  1986    SPINE SURGERY  2004    TOTAL ABDOMINAL HYSTERECTOMY WITH SALPINGO OOPHORECTOMY  1986    Unilateral salpingo-oophorectomy/cervical dysplasia/AUB    TUBAL ABDOMINAL LIGATION  1982    URETHRAL DILATION      Multiple times       Family History   Problem Relation Age of Onset    Uterine cancer Mother         Metastatic to ovaries and colon; possibly bone    Hypertension Mother     Kidney disease Mother     Tuberculosis Mother     Arthritis Mother     Thyroid disease Mother     Cancer Father         Bladder    Heart failure Father     Stroke Father     Hypertension Father     Heart attack Father     Colon polyps Father     Arthritis Father     Hypertension Brother     Kidney disease Brother     Colon polyps Brother     Cancer Brother     Heart disease Brother     Kidney disease Brother     Hypertension Brother     Cancer Brother         Cancer    No Known Problems Brother     No Known Problems Brother     Cancer Brother     Colon cancer Maternal Uncle     Colon cancer Maternal Uncle     Breast cancer Paternal Aunt     Other Daughter         CVID    Diabetes Daughter        Social History     Socioeconomic History    Marital status:    Tobacco Use    Smoking status: Never     Passive exposure: Never    Smokeless tobacco: Never   Vaping Use    Vaping status: Never Used   Substance and Sexual  Activity    Alcohol use: Not Currently     Alcohol/week: 1.0 standard drink of alcohol     Types: 1 Glasses of wine per week     Comment: rarely at holidays    Drug use: Never    Sexual activity: Defer     Partners: Male     Birth control/protection: Post-menopausal, Surgical     Comment: Hysterectomy           Objective   Physical Exam  Vitals and nursing note reviewed.   Constitutional:       General: She is not in acute distress.     Appearance: Normal appearance.   HENT:      Head: Normocephalic and atraumatic.      Nose: Nose normal. No congestion or rhinorrhea.   Eyes:      General: No scleral icterus.     Conjunctiva/sclera: Conjunctivae normal.   Neck:      Comments: No JVD   Cardiovascular:      Rate and Rhythm: Normal rate and regular rhythm.      Heart sounds: No murmur heard.     No friction rub.   Pulmonary:      Effort: Pulmonary effort is normal.      Breath sounds: Normal breath sounds. No wheezing or rales.   Abdominal:      General: Bowel sounds are normal.      Palpations: Abdomen is soft.      Tenderness: There is no abdominal tenderness. There is no guarding or rebound.   Musculoskeletal:         General: No tenderness.      Cervical back: Normal range of motion and neck supple.      Right lower leg: No edema.      Left lower leg: No edema.   Skin:     General: Skin is warm and dry.      Coloration: Skin is not pale.      Findings: No erythema.   Neurological:      General: No focal deficit present.      Mental Status: She is alert and oriented to person, place, and time.      Motor: No weakness.      Coordination: Coordination normal.   Psychiatric:         Behavior: Behavior normal.         Thought Content: Thought content normal.         Procedures           ED Course  ED Course as of 04/26/24 1541   Fri Apr 26, 2024   1039 I stood her up and she complained of blurry vision but did not get short of breath or dizzy.  Saturations were 100% on room air.  Blood pressure did not change when she  stood up.  D-dimer is low enough to rule out pulmonary embolism. [DT]   1146 CT scan head is normal. [DT]   1217 Reviewed echo from 2022 and was unremarkable.  No significant cardiomegaly or cardiomyopathy.  Looks like this was obtained for workup of syncope.  She had a Holter monitor at the same time to which was normal.  The study indicates she triggered multiple events of syncope or near syncope which were associated with normal sinus rhythm.  Will refer her to neurology. [DT]   1218 I spoke with her about findings and neurology referral and she is in agreement [DT]      ED Course User Index  [DT] Florencio Kwok MD                                             Medical Decision Making  Please see course notes.  I evaluated multiple complaints.  Ordered and interpreted CT scan of her head as well as multiple labs.  Gave IV fluids.  Reviewed and interpreted prior records    Problems Addressed:  Dizziness: chronic illness or injury with exacerbation, progression, or side effects of treatment  Volume depletion: complicated acute illness or injury    Amount and/or Complexity of Data Reviewed  External Data Reviewed: notes.  Labs: ordered. Decision-making details documented in ED Course.  Radiology: ordered. Decision-making details documented in ED Course.  ECG/medicine tests: ordered. Decision-making details documented in ED Course.        Final diagnoses:   Volume depletion   Dizziness       ED Disposition  ED Disposition       ED Disposition   Discharge    Condition   Stable    Comment   --               Gayle Low DO  [address] [id number]  [phone]          Katey Ruiz MD  [address] [facility] [address]  formerly [facility] [address] [phone]               Medication List      No changes were made to your prescriptions during this visit.            Florencio Kwok MD  04/26/24 4313

## 2024-04-29 DIAGNOSIS — I10 PRIMARY HYPERTENSION: ICD-10-CM

## 2024-04-29 RX ORDER — LOSARTAN POTASSIUM 100 MG/1
100 TABLET ORAL DAILY
Qty: 30 TABLET | Refills: 5 | OUTPATIENT
Start: 2024-04-29

## 2024-05-01 LAB
QT INTERVAL: 390 MS
QTC INTERVAL: 412 MS

## 2024-05-07 ENCOUNTER — TELEPHONE (OUTPATIENT)
Dept: NEUROLOGY | Facility: CLINIC | Age: 79
End: 2024-05-07

## 2024-05-23 ENCOUNTER — OFFICE VISIT (OUTPATIENT)
Dept: OBSTETRICS AND GYNECOLOGY | Facility: CLINIC | Age: 79
End: 2024-05-23
Payer: MEDICARE

## 2024-05-23 VITALS
DIASTOLIC BLOOD PRESSURE: 76 MMHG | SYSTOLIC BLOOD PRESSURE: 122 MMHG | BODY MASS INDEX: 25.61 KG/M2 | WEIGHT: 150 LBS | HEIGHT: 64 IN

## 2024-05-23 DIAGNOSIS — N99.3 PELVIC RELAXATION DUE TO VAGINAL VAULT PROLAPSE, POSTHYSTERECTOMY: Primary | ICD-10-CM

## 2024-05-23 NOTE — PROGRESS NOTES
Subjective   Chief Complaint   Patient presents with    Gynecologic Exam     New patient here regarding her bladder. Bladder dropping.  Patient states she has to stand to urinate.  Started about 8 months ago got worse in the last 8 months      Joyce Jones is a 78 y.o. year old .  No LMP recorded. Patient has had a hysterectomy.  She presents due to symptoms of pelvic relation.  Duration 1-2 years  Progressive in nature  Pelvic pressure and palp bulge at introitus  Has to stand and lean forward in order to void. She is able to completely empty her bladder with digital vaginal support  Some post void dribbling, but with the above has normal urinary stream  Voids 5-6 times per day. Up at night about 3 times  She has some JOE  She has no issues with urgency or urge incontinence  Normal sensation to void.  No dysuria   No recent nor frequent UTI's  Typically she is sexually active and wishes to remain so.  She had three vaginal deliveries  She had a FREDRICK-BSO 30+ years ago for DUB  She has never used ERT  She has normal bowel function.        The following portions of the patient's history were reviewed and updated as appropriate:She  has a past medical history of Allergic, Allergic rhinitis, Anemia, Arthritis, Cholelithiasis, Deep vein thrombosis (Clot following left knee replacement), Diverticulosis, Heart murmur, History of bone density study (2017), History of mammography, screening (), Hyperlipidemia, Hypertension, Inguinal hernia (), Irritable bowel syndrome (Self), Kidney stone (Embedded), Low back pain (Scoliosis/kyphosis), Neuromuscular disorder (Shoulder pain , arthritic issues, more severe), Osteoarthritis (arthritis due to wear and tear of joints), Osteopenia, Personal history of DVT (deep vein thrombosis) (), Pneumonia (5 times), Positive TB test, Pulmonary embolism (Right  thigh following knee replace), Renal insufficiency (. Has been under control . Function stable),  Scoliosis (Joyce), and Urinary tract infection.  She does not have any pertinent problems on file.  She  has a past surgical history that includes Nasal septum surgery (1999); Foot surgery (2008); Knee surgery (Bilateral, 2017); Cholecystectomy (2014); Back surgery (2002); Colonoscopy (2016); Cervical spine surgery (2000); Total abdominal hysterectomy w/ bilateral salpingoophorectomy (1986); Urethral dilation; Inguinal Hernia Repair (Bilateral, 1978); Breast biopsy; Eye surgery (Detached retina); Hernia repair (3 both bowl areas); Inguinal hernia repair (Both); Sinus surgery (1986); Spine surgery (2004); Tubal ligation (1982); and Joint replacement (4 knee replacement surgeries).  Her family history includes Arthritis in her father and mother; Breast cancer in her paternal aunt; Cancer in her brother, brother, brother, and father; Colon cancer in her maternal uncle and maternal uncle; Colon polyps in her brother and father; Diabetes in her daughter; Heart attack in her father; Heart disease in her brother; Heart failure in her father; Hypertension in her brother, brother, father, and mother; Kidney disease in her brother, brother, and mother; No Known Problems in her brother and brother; Other in her daughter; Stroke in her father; Thyroid disease in her mother; Tuberculosis in her mother; Uterine cancer in her mother.  She  reports that she has never smoked. She has never been exposed to tobacco smoke. She has never used smokeless tobacco. She reports that she does not currently use alcohol after a past usage of about 1.0 standard drink of alcohol per week. She reports that she does not use drugs.  Current Outpatient Medications   Medication Sig Dispense Refill    traMADol-acetaminophen (ULTRACET) 37.5-325 MG per tablet Take 1 tablet by mouth Every 6 (Six) Hours As Needed for Moderate Pain.       Current Facility-Administered Medications   Medication Dose Route Frequency Provider Last Rate Last Admin     "cyanocobalamin injection 1,000 mcg  1,000 mcg Intramuscular Q28 Days Tawanna Beasley APRN         Current Outpatient Medications on File Prior to Visit   Medication Sig    traMADol-acetaminophen (ULTRACET) 37.5-325 MG per tablet Take 1 tablet by mouth Every 6 (Six) Hours As Needed for Moderate Pain.    [DISCONTINUED] atorvastatin (LIPITOR) 20 MG tablet Take 1 tablet by mouth Every Night.    [DISCONTINUED] cyclobenzaprine (FLEXERIL) 10 MG tablet Take 1 tablet by mouth At Night As Needed for Muscle Spasms.    [DISCONTINUED] fluticasone (FLONASE) 50 MCG/ACT nasal spray 1 spray into the nostril(s) as directed by provider Daily.    [DISCONTINUED] lidocaine (LIDODERM) 5 % Place 1 patch on the skin as directed by provider Daily. Remove & Discard patch within 12 hours or as directed by MD    [DISCONTINUED] losartan (COZAAR) 50 MG tablet Take 1 tablet by mouth Daily.     Current Facility-Administered Medications on File Prior to Visit   Medication    cyanocobalamin injection 1,000 mcg     She is allergic to [place]-2 inhibitors, nabumetone, nsaids, sulfa antibiotics, celecoxib, penicillins, and zithromax [azithromycin].    Social History    Tobacco Use      Smoking status: Never        Passive exposure: Never      Smokeless tobacco: Never    Review of Systems  Constitutional POS: nothing reported    NEG: anorexia or night sweats   Genitourinary POS: see HPI    NEG: incontinence is present but it IS NOT effecting her ADL's   Gastointestinal POS: nothing reported    NEG: bloating, change in bowel habits, melena, or reflux symptoms   Integument POS: nothing reported    NEG: moles that are changing in size, shape, color or rashes   Breast POS: nothing reported    NEG: persistent breast lump, skin dimpling, or nipple discharge         Objective   /76   Ht 162.6 cm (64\")   Wt 68 kg (150 lb)   Breastfeeding No   BMI 25.75 kg/m²     General:  well developed; well nourished  no acute distress   Skin:  No suspicious lesions " seen   Thyroid: normal to inspection and palpation   Lungs:  breathing is unlabored  clear to auscultation bilaterally   Heart:  regular rate and rhythm, S1, S2 normal, no murmur, click, rub or gallop   Breasts:  Not performed.   Abdomen: soft, non-tender; no masses  no umbilical or inguinal hernias are present  no hepato-splenomegaly   Pelvis: Clinical staff was present for exam  External genitalia:  normal appearance of the external genitalia including Bartholin's and [place]'s glands.  :  urethral meatus normal; urethra hypermobile;  Vaginal:  atrophic mucosal changes are present;  Cervix:  absent.  Uterus:  absent.  Adnexa:  normal bimanual exam of the adnexa.  Cystocele GRADE 4  Rectocele GRADE 0  Vaginal vault prolapse GRADE 2  Pelvic floor pain: pelvic floor is nontender to palpation in 4 quadrants.     Lab Review   No data reviewed    Imaging   No data reviewed        Assessment   Symptomatic cystocele. Vaginal Vault prolapse post hysterectomy. Discussed options of continued observation, non-surgical management and surgical repair. Discussed surgical management in general and my recommendation of referral to GYN Surgeon specifically trained in evaluation and treatment of pelvic floor disorders.      Plan   Will make appropriate referral   The importance of keeping all planned follow-up and taking all medications as prescribed was emphasized.  Follow up as needed.    No orders of the defined types were placed in this encounter.       Orders Placed This Encounter   Procedures    Ambulatory Referral to Gynecology     Referral Priority:   Routine     Referral Type:   Consultation     Referral Reason:   Specialty Services Required     Referred to Provider:   Seun Vieira MD     Requested Specialty:   Gynecology     Number of Visits Requested:   1     I spent 60 minutes caring for Joyce on this date of service. This time includes time spent by me in the following activities: obtaining and/or  reviewing a separately obtained history, performing a medically appropriate examination and/or evaluation, counseling and educating the patient/family/caregiver, referring and communicating with other health care professionals, documenting information in the medical record, and care coordination.       This note was electronically signed.    Alvin Payne MD  May 23, 2024    Part of this note may be an electronic transcription/translation of spoken language to printed text using the Dragon Dictation System.

## 2024-08-05 ENCOUNTER — OFFICE VISIT (OUTPATIENT)
Dept: FAMILY MEDICINE CLINIC | Facility: CLINIC | Age: 79
End: 2024-08-05
Payer: MEDICARE

## 2024-08-05 VITALS
BODY MASS INDEX: 24.32 KG/M2 | SYSTOLIC BLOOD PRESSURE: 146 MMHG | DIASTOLIC BLOOD PRESSURE: 86 MMHG | TEMPERATURE: 98.4 F | WEIGHT: 146 LBS | HEIGHT: 65 IN

## 2024-08-05 DIAGNOSIS — M15.9 PRIMARY OSTEOARTHRITIS INVOLVING MULTIPLE JOINTS: ICD-10-CM

## 2024-08-05 DIAGNOSIS — R73.09 BLOOD GLUCOSE ABNORMAL: ICD-10-CM

## 2024-08-05 DIAGNOSIS — R53.1 WEAKNESS: Primary | ICD-10-CM

## 2024-08-05 DIAGNOSIS — F43.21 GRIEF REACTION: ICD-10-CM

## 2024-08-05 DIAGNOSIS — L74.9 SWEATING ABNORMALITY: ICD-10-CM

## 2024-08-05 DIAGNOSIS — F41.8 DEPRESSION WITH ANXIETY: ICD-10-CM

## 2024-08-05 DIAGNOSIS — F51.01 PRIMARY INSOMNIA: ICD-10-CM

## 2024-08-05 DIAGNOSIS — R53.83 OTHER FATIGUE: ICD-10-CM

## 2024-08-05 PROBLEM — N81.10 POP-Q STAGE 3 CYSTOCELE: Status: ACTIVE | Noted: 2024-06-25

## 2024-08-05 PROBLEM — Z86.11 HISTORY OF TUBERCULOSIS: Status: ACTIVE | Noted: 2018-09-06

## 2024-08-05 LAB
BILIRUB BLD-MCNC: NEGATIVE MG/DL
CLARITY, POC: CLEAR
COLOR UR: YELLOW
EXPIRATION DATE: ABNORMAL
GLUCOSE UR STRIP-MCNC: NEGATIVE MG/DL
KETONES UR QL: NEGATIVE
LEUKOCYTE EST, POC: NEGATIVE
Lab: ABNORMAL
NITRITE UR-MCNC: NEGATIVE MG/ML
PH UR: 6 [PH] (ref 5–8)
PROT UR STRIP-MCNC: ABNORMAL MG/DL
RBC # UR STRIP: NEGATIVE /UL
SP GR UR: 1.01 (ref 1–1.03)
UROBILINOGEN UR QL: NORMAL

## 2024-08-05 PROCEDURE — 3077F SYST BP >= 140 MM HG: CPT | Performed by: PHYSICIAN ASSISTANT

## 2024-08-05 PROCEDURE — 1160F RVW MEDS BY RX/DR IN RCRD: CPT | Performed by: PHYSICIAN ASSISTANT

## 2024-08-05 PROCEDURE — 3079F DIAST BP 80-89 MM HG: CPT | Performed by: PHYSICIAN ASSISTANT

## 2024-08-05 PROCEDURE — 1126F AMNT PAIN NOTED NONE PRSNT: CPT | Performed by: PHYSICIAN ASSISTANT

## 2024-08-05 PROCEDURE — 81003 URINALYSIS AUTO W/O SCOPE: CPT | Performed by: PHYSICIAN ASSISTANT

## 2024-08-05 PROCEDURE — 1159F MED LIST DOCD IN RCRD: CPT | Performed by: PHYSICIAN ASSISTANT

## 2024-08-05 PROCEDURE — 99214 OFFICE O/P EST MOD 30 MIN: CPT | Performed by: PHYSICIAN ASSISTANT

## 2024-08-05 RX ORDER — HYDROXYZINE PAMOATE 25 MG/1
25 CAPSULE ORAL 3 TIMES DAILY PRN
Qty: 30 CAPSULE | Refills: 2 | Status: SHIPPED | OUTPATIENT
Start: 2024-08-05

## 2024-08-05 RX ORDER — CITALOPRAM HYDROBROMIDE 10 MG/1
10 TABLET ORAL EVERY MORNING
Qty: 30 TABLET | Refills: 2 | Status: SHIPPED | OUTPATIENT
Start: 2024-08-05

## 2024-08-05 NOTE — PROGRESS NOTES
Chief Complaint   Patient presents with    Arthritis     Full body pain (all extremities), constant throughout the day and night, currently taking tramadol but patient states only last for about 4 hours, would like to discuss other medications or possibly raising the dosage strength    Excessive Sweating     Patient states that she has been experiencing excessive sweating all throughout the day, makes her feel weak for about 6-7 months    Stress     Recent loss of daughter, has taken on her children, under an extreme amount of stress for about 3-4weeks    Palpitations     Patient states it has been ongoing for about 3 weeks       Joyce Jones is a pleasant 78 y.o. female who is here for fatigue, weakness, sweating, arthritis, heart palpitations, stress, anger and depression with anxiety.    Patient recently lost her daughter to ALS.  She cared for her for 8-9 months.  Didn't sleep much during that time and is still experiencing issues with falling and staying asleep.  She is helping care for her daughters 2 sons, that are in their 20s.  Admits to feeling sad and depressed.    The only medication she takes is tramadol-acetaminophen for her arthritis.  She reports worsening pain recently.  She is established with a rheumatologist and will follow-up with them regarding this.    Has noticed sweating recently.  Had completed hysterectomy in her 20s.  Has never been on hormonal supplementation.  Sweating just started recently.  No unintentional weight loss.    Reports episodic palpitations for the last few weeks.  No chest pain or palpitations today.    Past Medical History:   Diagnosis Date    Allergic     Allergic rhinitis     Anemia     Arthritis     Cholelithiasis     Deep vein thrombosis Clot following left knee replacement    Diverticulosis     Heart murmur     History of bone density study 03/02/2017    [facility]    History of mammography, screening 2016    Hyperlipidemia     Hypertension     Inguinal  hernia 1978    repair    Irritable bowel syndrome Self    Kidney stone Embedded    Low back pain Scoliosis/kyphosis    Neuromuscular disorder Shoulder pain , arthritic issues, more severe    Osteoarthritis (arthritis due to wear and tear of joints)     Osteopenia     Personal history of DVT (deep vein thrombosis) 2005    right leg- due to surgery    Pneumonia 5 times    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 Has been under control . Function stable    Scoliosis Joyce    Urinary tract infection        Past Surgical History:   Procedure Laterality Date    BACK SURGERY  2002    Scoliosis bar. T11-sacrum    BREAST BIOPSY      over 15 years ago.     CERVICAL SPINE SURGERY  2000    cage    CHOLECYSTECTOMY  2014    COLONOSCOPY  2016    EYE SURGERY  Detached retina    FOOT SURGERY  2008    HERNIA REPAIR  3 both bowl areas    INGUINAL HERNIA REPAIR Bilateral 1978 2019 right side repair    INGUINAL HERNIA REPAIR  Both    JOINT REPLACEMENT  4 knee replacement surgeries    Right knee, 1980 approx. left knee , three replacements appears stable now.    KNEE SURGERY Bilateral 2017 2005 and x2 in 8/2017(left)    NASAL SEPTUM SURGERY  1999    SINUS SURGERY  1986    SPINE SURGERY  2004    TOTAL ABDOMINAL HYSTERECTOMY WITH SALPINGO OOPHORECTOMY  1986    Unilateral salpingo-oophorectomy/cervical dysplasia/AUB    TUBAL ABDOMINAL LIGATION  1982    URETHRAL DILATION      Multiple times       Family History   Problem Relation Age of Onset    Uterine cancer Mother         Metastatic to ovaries and colon; possibly bone    Hypertension Mother     Kidney disease Mother     Tuberculosis Mother     Arthritis Mother     Thyroid disease Mother     Cancer Father         Bladder    Heart failure Father     Stroke Father     Hypertension Father     Heart attack Father     Colon polyps Father     Arthritis Father      Hypertension Brother     Kidney disease Brother     Colon polyps Brother     Cancer Brother     Heart disease Brother     Kidney disease Brother     Hypertension Brother     Cancer Brother         Cancer    No Known Problems Brother     No Known Problems Brother     Cancer Brother     Colon cancer Maternal Uncle     Colon cancer Maternal Uncle     Breast cancer Paternal Aunt     Other Daughter         CVID    Diabetes Daughter        Social History     Socioeconomic History    Marital status:    Tobacco Use    Smoking status: Never     Passive exposure: Never    Smokeless tobacco: Never   Vaping Use    Vaping status: Never Used   Substance and Sexual Activity    Alcohol use: Not Currently     Alcohol/week: 1.0 standard drink of alcohol     Types: 1 Glasses of wine per week     Comment: rarely at holidays    Drug use: Never    Sexual activity: Defer     Partners: Male     Birth control/protection: Post-menopausal, Surgical     Comment: Hysterectomy       Allergies   Allergen Reactions    Overton-2 Inhibitors Anaphylaxis    Nabumetone Anaphylaxis    Nsaids GI Intolerance and Hives    Sulfa Antibiotics Shortness Of Breath    Celecoxib Hives    Penicillins Nausea And Vomiting and Dizziness    Zithromax [Azithromycin] Dizziness       ROS  Review of Systems   Constitutional:  Positive for fatigue. Negative for chills, fever and unexpected weight loss.   Respiratory:  Negative for cough and shortness of breath.    Cardiovascular:  Positive for palpitations. Negative for chest pain and leg swelling.   Endocrine:        Sweating   Neurological:  Negative for dizziness and headache.   Psychiatric/Behavioral:  Positive for sleep disturbance, depressed mood and stress. Negative for self-injury and suicidal ideas. The patient is nervous/anxious.        Vitals:    08/05/24 1053   BP: 146/86   BP Location: Left arm   Patient Position: Sitting   Cuff Size: Adult   Temp: 98.4 °F (36.9 °C)   TempSrc: Oral   Weight: 66.2 kg (146  "lb)   Height: 165.1 cm (65\")  Comment: Patient reported     Body mass index is 24.3 kg/m².    BMI is within normal parameters. No other follow-up for BMI required.      Current Outpatient Medications on File Prior to Visit   Medication Sig Dispense Refill    traMADol-acetaminophen (ULTRACET) 37.5-325 MG per tablet Take 1 tablet by mouth Every 6 (Six) Hours As Needed for Moderate Pain.      [DISCONTINUED] celecoxib (CeleBREX) 200 MG capsule Take 1 capsule by mouth Daily.       Current Facility-Administered Medications on File Prior to Visit   Medication Dose Route Frequency Provider Last Rate Last Admin    [DISCONTINUED] cyanocobalamin injection 1,000 mcg  1,000 mcg Intramuscular Q28 Days Ramos, Tawanna, LOVE           Results for orders placed or performed in visit on 08/05/24   POC Urinalysis Dipstick, Automated    Specimen: Urine   Result Value Ref Range    Color Yellow Yellow, Straw, Dark Yellow, Ana Maria    Clarity, UA Clear Clear    Specific Gravity  1.010 1.005 - 1.030    pH, Urine 6.0 5.0 - 8.0    Leukocytes Negative Negative    Nitrite, UA Negative Negative    Protein, POC Trace (A) Negative mg/dL    Glucose, UA Negative Negative mg/dL    Ketones, UA Negative Negative    Urobilinogen, UA Normal Normal, 0.2 E.U./dL    Bilirubin Negative Negative    Blood, UA Negative Negative    Lot Number [id number]     Expiration Date 03/03/26        PE    Physical Exam  Vitals reviewed.   Constitutional:       General: She is not in acute distress.     Appearance: Normal appearance. She is well-developed and normal weight. She is not ill-appearing or diaphoretic.   HENT:      Head: Normocephalic and atraumatic.   Eyes:      Extraocular Movements: Extraocular movements intact.      Conjunctiva/sclera: Conjunctivae normal.   Cardiovascular:      Rate and Rhythm: Normal rate and regular rhythm.      Heart sounds: Normal heart sounds.   Pulmonary:      Effort: No respiratory distress.      Breath sounds: Normal breath " sounds.   Musculoskeletal:         General: Normal range of motion.      Cervical back: Normal range of motion.      Right lower leg: No edema.      Left lower leg: No edema.   Skin:     General: Skin is warm.      Findings: No erythema or rash.   Neurological:      General: No focal deficit present.      Mental Status: She is alert.   Psychiatric:         Attention and Perception: Attention and perception normal. She is attentive.         Mood and Affect: Mood and affect normal.         Speech: Speech normal.         Behavior: Behavior normal. Behavior is cooperative.         Thought Content: Thought content normal.         Cognition and Memory: Cognition and memory normal.         Judgment: Judgment normal.           A/P    Diagnoses and all orders for this visit:    1. Weakness (Primary)  -     TSH Rfx On Abnormal To Free T4; Future  -     Hemoglobin A1c; Future  -     Comprehensive Metabolic Panel; Future  -     CBC Auto Differential; Future  -     POC Urinalysis Dipstick, Automated    2. Other fatigue  -     TSH Rfx On Abnormal To Free T4; Future  -     Hemoglobin A1c; Future  -     Comprehensive Metabolic Panel; Future  -     CBC Auto Differential; Future  -     POC Urinalysis Dipstick, Automated    3. Blood glucose abnormal  -     Hemoglobin A1c; Future    4. Sweating abnormality  -     Hemoglobin A1c; Future    5. Primary insomnia  -     hydrOXYzine pamoate (Vistaril) 25 MG capsule; Take 1 capsule by mouth 3 (Three) Times a Day As Needed for Anxiety.  Dispense: 30 capsule; Refill: 2    6. Depression with anxiety  -     Ambulatory Referral to Behavioral Health  -     citalopram (CeleXA) 10 MG tablet; Take 1 tablet by mouth Every Morning.  Dispense: 30 tablet; Refill: 2  -     hydrOXYzine pamoate (Vistaril) 25 MG capsule; Take 1 capsule by mouth 3 (Three) Times a Day As Needed for Anxiety.  Dispense: 30 capsule; Refill: 2    7. Grief reaction  -     Ambulatory Referral to Behavioral Health    8. Primary  osteoarthritis involving multiple joints  Follow-up with rheumatology.     New and worsening symptoms.  Will check labs.  Suspect that majority of symptoms are related to stress, grief and lack of sleep.  Will start referral to counseling.  Will initiate Celexa 10 mg daily.  Discussed risks, benefits, adverse effects and duration of action with patient.  Call if she has any concerns.  Return in 1 month.  Handout on grief, depression and insomnia included with note.    Plan of care reviewed with patient at the conclusion of today's visit. Education was provided regarding diagnosis, management and any prescribed or recommended OTC medications.  Patient verbalizes understanding of and agreement with management plan.    Dictated Utilizing Dragon Dictation     Please note that portions of this note were completed with a voice recognition program.     Part of this note may be an electronic transcription/translation of spoken language to printed text using the Dragon Dictation System.    Return in about 4 weeks (around 9/2/2024) for Recheck, depression w/anxiety.     Natalie Bright PA-C

## 2024-08-27 ENCOUNTER — OFFICE VISIT (OUTPATIENT)
Dept: FAMILY MEDICINE CLINIC | Facility: CLINIC | Age: 79
End: 2024-08-27
Payer: MEDICARE

## 2024-08-27 VITALS
BODY MASS INDEX: 24.43 KG/M2 | TEMPERATURE: 98.1 F | HEIGHT: 65 IN | OXYGEN SATURATION: 98 % | SYSTOLIC BLOOD PRESSURE: 122 MMHG | DIASTOLIC BLOOD PRESSURE: 76 MMHG | WEIGHT: 146.6 LBS | HEART RATE: 67 BPM

## 2024-08-27 DIAGNOSIS — F32.A ANXIETY AND DEPRESSION: ICD-10-CM

## 2024-08-27 DIAGNOSIS — G47.33 OBSTRUCTIVE SLEEP APNEA: ICD-10-CM

## 2024-08-27 DIAGNOSIS — R00.2 PALPITATIONS: Primary | ICD-10-CM

## 2024-08-27 DIAGNOSIS — F41.9 ANXIETY AND DEPRESSION: ICD-10-CM

## 2024-08-27 PROBLEM — J32.9 CHRONIC SINUSITIS: Status: RESOLVED | Noted: 2024-02-10 | Resolved: 2024-08-27

## 2024-08-27 PROBLEM — K57.92 DIVERTICULITIS: Status: RESOLVED | Noted: 2024-02-05 | Resolved: 2024-08-27

## 2024-08-27 LAB
EXPIRATION DATE: NORMAL
FLUAV AG UPPER RESP QL IA.RAPID: NOT DETECTED
FLUBV AG UPPER RESP QL IA.RAPID: NOT DETECTED
INTERNAL CONTROL: NORMAL
Lab: NORMAL
SARS-COV-2 AG UPPER RESP QL IA.RAPID: NOT DETECTED

## 2024-08-27 PROCEDURE — 1126F AMNT PAIN NOTED NONE PRSNT: CPT | Performed by: FAMILY MEDICINE

## 2024-08-27 PROCEDURE — 93000 ELECTROCARDIOGRAM COMPLETE: CPT | Performed by: FAMILY MEDICINE

## 2024-08-27 PROCEDURE — 99214 OFFICE O/P EST MOD 30 MIN: CPT | Performed by: FAMILY MEDICINE

## 2024-08-27 PROCEDURE — 3078F DIAST BP <80 MM HG: CPT | Performed by: FAMILY MEDICINE

## 2024-08-27 PROCEDURE — 1160F RVW MEDS BY RX/DR IN RCRD: CPT | Performed by: FAMILY MEDICINE

## 2024-08-27 PROCEDURE — 3074F SYST BP LT 130 MM HG: CPT | Performed by: FAMILY MEDICINE

## 2024-08-27 PROCEDURE — 87428 SARSCOV & INF VIR A&B AG IA: CPT | Performed by: FAMILY MEDICINE

## 2024-08-27 PROCEDURE — 1159F MED LIST DOCD IN RCRD: CPT | Performed by: FAMILY MEDICINE

## 2024-08-27 RX ORDER — PREDNISONE 2.5 MG/1
1 TABLET ORAL 2 TIMES DAILY
COMMUNITY
Start: 2024-04-29

## 2024-08-27 NOTE — ASSESSMENT & PLAN NOTE
Previously study reviewed.  Would recommend referral to sleep medicine to reestablish care and obtain Pap therapy.

## 2024-08-27 NOTE — PROGRESS NOTES
Office Note     Name: Joyce Jones    : 1945     MRN: [id number]     Chief Complaint  Dizziness, Fatigue, Diarrhea (All her symptoms started 3 days ago), and Palpitations (Pt states she has been having some palpitations on and off for 3 days )    Subjective     History of Present Illness:  Joyce Jones is a 78 y.o. female who presents today for follow-up.    Patient notes for the past few months blood particularly past 3 days she has not felt herself.  She notes she has been experiencing dizziness, fatigue, multiple loose stools per day, and palpitations.  Patient notes that she recently lost her daughter after a henson with a medical illness and she has been struggling since that time.  She did see another provider in this practice at the beginning of the month who recommended a referral to psychotherapy as well as laboratory evaluation.  Patient has been scheduled for psychotherapy in October and she did not yet have the blood work done.  She denies any abdominal pain but notices that she will have a loose bowel movement shortly after eating.  She notes that she does have a Pavlic floor dysfunction and is scheduled to undergo a pelvic procedure at the beginning of next month.  She notes she is worried about this as she has been feeling significant fatigue and would like this evaluated before her procedure.  She notes she does have a history of anemia.  She also notes that she was originally diagnosed with obstructive sleep apnea a couple years ago but at the time it was mild and due to claustrophobia with the mask did not undergo further treatment with PAP therapy.  She does note that she has been waking up multiple times throughout the night and unable to go back to sleep and she wonders if her sleep apnea is worsening her symptoms.      Review of Systems:   Review of Systems   Constitutional:  Positive for fatigue.   Cardiovascular:  Positive for palpitations.   Gastrointestinal:   Negative for abdominal pain.   Neurological:  Positive for dizziness.   Psychiatric/Behavioral:  Positive for sleep disturbance. The patient is nervous/anxious.    All other systems reviewed and are negative.      Past Medical History:   Past Medical History:   Diagnosis Date    Allergic     Allergic rhinitis     Anemia     Arthritis     Cholelithiasis     Deep vein thrombosis Clot following left knee replacement    Diverticulosis     Heart murmur     History of bone density study 03/02/2017    [facility]    History of mammography, screening 2016    Hyperlipidemia     Hypertension     Inguinal hernia 1978    repair    Irritable bowel syndrome Self    Kidney stone Embedded    Low back pain Scoliosis/kyphosis    Neuromuscular disorder Shoulder pain , arthritic issues, more severe    Osteoarthritis (arthritis due to wear and tear of joints)     Osteopenia     Personal history of DVT (deep vein thrombosis) 2005    right leg- due to surgery    Pneumonia 5 times    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 Has been under control . Function stable    Scoliosis Joyce    Urinary tract infection        Past Surgical History:   Past Surgical History:   Procedure Laterality Date    BACK SURGERY  2002    Scoliosis bar. T11-sacrum    BREAST BIOPSY      over 15 years ago.     CERVICAL SPINE SURGERY  2000    cage    CHOLECYSTECTOMY  2014    COLONOSCOPY  2016    EYE SURGERY  Detached retina    FOOT SURGERY  2008    HERNIA REPAIR  3 both bowl areas    INGUINAL HERNIA REPAIR Bilateral 1978    2019 right side repair    INGUINAL HERNIA REPAIR  Both    JOINT REPLACEMENT  4 knee replacement surgeries    Right knee, 1980 approx. left knee , three replacements appears stable now.    KNEE SURGERY Bilateral 2017 2005 and x2 in 8/2017(left)    NASAL SEPTUM SURGERY  1999    SINUS SURGERY  1986    SPINE SURGERY  2004     TOTAL ABDOMINAL HYSTERECTOMY WITH SALPINGO OOPHORECTOMY  1986    Unilateral salpingo-oophorectomy/cervical dysplasia/AUB    TUBAL ABDOMINAL LIGATION  1982    URETHRAL DILATION      Multiple times       Family History:   Family History   Problem Relation Age of Onset    Uterine cancer Mother         Metastatic to ovaries and colon; possibly bone    Hypertension Mother     Kidney disease Mother     Tuberculosis Mother     Arthritis Mother     Thyroid disease Mother     Cancer Father         Bladder    Heart failure Father     Stroke Father     Hypertension Father     Heart attack Father     Colon polyps Father     Arthritis Father     Hypertension Brother     Kidney disease Brother     Colon polyps Brother     Cancer Brother     Heart disease Brother     Kidney disease Brother     Hypertension Brother     Cancer Brother         Cancer    No Known Problems Brother     No Known Problems Brother     Cancer Brother     Colon cancer Maternal Uncle     Colon cancer Maternal Uncle     Breast cancer Paternal Aunt     Other Daughter         CVID    Diabetes Daughter        Social History:   Social History     Socioeconomic History    Marital status:    Tobacco Use    Smoking status: Never     Passive exposure: Never    Smokeless tobacco: Never   Vaping Use    Vaping status: Never Used   Substance and Sexual Activity    Alcohol use: Not Currently     Alcohol/week: 1.0 standard drink of alcohol     Types: 1 Glasses of wine per week     Comment: rarely at holidays    Drug use: Never    Sexual activity: Defer     Partners: Male     Birth control/protection: Post-menopausal, Surgical     Comment: Hysterectomy       Immunizations:   Immunization History   Administered Date(s) Administered    Arexvy (RSV, Adults 60+ yrs) 11/18/2023    COVID-19 (PFIZER) BIVALENT 12+YRS 09/15/2022    COVID-19 (PFIZER) Purple Cap Monovalent 02/02/2021, 02/27/2021, 09/27/2021    COVID-19 F23 (PFIZER) 12YRS+ (COMIRNATY) 11/18/2023    Covid-19  "(Pfizer) Gray Cap Monovalent 04/11/2022    FLUAD TRI 65YR+ 08/30/2015    Fluad Quad 65+ 12/07/2021, 09/15/2022    Flublok 18+yrs 10/18/2018    Fluzone High-Dose 65+YRS 10/03/2016, 10/01/2017, 10/23/2017, 11/19/2019, 09/29/2020    Fluzone High-Dose 65+yrs 09/29/2020    Hepatitis A 09/06/2018    Influenza, Unspecified 10/18/2018, 01/27/2020    PEDS-Pneumococcal Conjugate (PCV7) 01/27/2020    Pneumococcal Conjugate 13-Valent (PCV13) 02/03/2016, 11/14/2017, 01/01/2018    Pneumococcal Conjugate Unspecified 01/27/2020    Pneumococcal Polysaccharide (PPSV23) 01/27/2020    Shingrix 04/09/2019, 02/07/2023, 09/02/2023    Tdap 01/01/2012    Zostavax 01/01/2010, 01/12/2010, 06/12/2010        Medications:     Current Outpatient Medications:     citalopram (CeleXA) 10 MG tablet, Take 1 tablet by mouth Every Morning., Disp: 30 tablet, Rfl: 2    hydrOXYzine pamoate (Vistaril) 25 MG capsule, Take 1 capsule by mouth 3 (Three) Times a Day As Needed for Anxiety., Disp: 30 capsule, Rfl: 2    predniSONE (DELTASONE) 2.5 MG tablet, Take 1 tablet by mouth 2 (Two) Times a Day., Disp: , Rfl:     traMADol-acetaminophen (ULTRACET) 37.5-325 MG per tablet, Take 1 tablet by mouth Every 6 (Six) Hours As Needed for Moderate Pain., Disp: , Rfl:     Allergies:   Allergies   Allergen Reactions    Overton-2 Inhibitors (Sulfonamide) Anaphylaxis    Nabumetone Anaphylaxis    Nsaids GI Intolerance and Hives    Sulfa Antibiotics Shortness Of Breath    Celecoxib Hives    Penicillins Nausea And Vomiting and Dizziness    Zithromax [Azithromycin] Dizziness       Objective     Vital Signs  /76   Pulse 67   Temp 98.1 °F (36.7 °C)   Ht 165.1 cm (65\")   Wt 66.5 kg (146 lb 9.6 oz)   SpO2 98%   BMI 24.40 kg/m²   Estimated body mass index is 24.4 kg/m² as calculated from the following:    Height as of this encounter: 165.1 cm (65\").    Weight as of this encounter: 66.5 kg (146 lb 9.6 oz).    BMI is within normal parameters. No other follow-up for BMI " required.       Physical Exam  Vitals and nursing note reviewed.   Constitutional:       Appearance: Normal appearance. She is normal weight.   HENT:      Head: Normocephalic and atraumatic.      Right Ear: Tympanic membrane, ear canal and external ear normal.      Left Ear: Tympanic membrane, ear canal and external ear normal.      Nose: Nose normal.      Mouth/Throat:      Mouth: Mucous membranes are moist.   Eyes:      Extraocular Movements: Extraocular movements intact.      Pupils: Pupils are equal, round, and reactive to light.   Cardiovascular:      Rate and Rhythm: Normal rate and regular rhythm.      Heart sounds: Normal heart sounds.   Pulmonary:      Effort: Pulmonary effort is normal.      Breath sounds: Normal breath sounds.   Abdominal:      General: Abdomen is flat.   Musculoskeletal:         General: Normal range of motion.      Cervical back: Normal range of motion.   Skin:     General: Skin is warm.   Neurological:      General: No focal deficit present.      Mental Status: She is alert and oriented to person, place, and time.   Psychiatric:         Mood and Affect: Mood normal.         Behavior: Behavior normal.         Thought Content: Thought content normal.         Judgment: Judgment normal.              ECG 12 Lead    Date/Time: 8/27/2024 11:20 AM  Performed by: Myla Palomo DO    Authorized by: Myla Palomo DO  Comparison: compared with previous ECG from 5/1/2024  Similar to previous ECG  Rhythm: sinus rhythm  Rate: normal  BPM: 66  Conduction: conduction normal  QRS axis: normal  Other: no other findings    Clinical impression: normal ECG           Results:  Recent Results (from the past 24 hour(s))   POCT SARS-CoV-2 Antigen SHIRA + Flu    Collection Time: 08/27/24 11:12 AM    Specimen: Swab   Result Value Ref Range    SARS Antigen Not Detected Not Detected, Presumptive Negative    Influenza A Antigen SHIRA Not Detected Not Detected    Influenza B Antigen SHIRA Not Detected Not  Detected    Internal Control Passed Passed    Lot Number [id number]     Expiration Date 05/18/2025         Assessment and Plan     Assessment/Plan:  Diagnoses and all orders for this visit:    1. Palpitations (Primary)  Assessment & Plan:  EKG performed today, normal sinus rhythm rate of 66.  At this time would recommend laboratory evaluation.  If symptoms persist or worsen will consider Holter monitor or referral to cardiology.    Orders:  -     POCT SARS-CoV-2 Antigen SHIRA + Flu  -     ECG 12 Lead    2. Obstructive sleep apnea  Assessment & Plan:  Previously study reviewed.  Would recommend referral to sleep medicine to reestablish care and obtain Pap therapy.    Orders:  -     Ambulatory Referral to Sleep Medicine    3. Anxiety and depression  Assessment & Plan:  Patient's depression is a recurrent episode that is moderate without psychosis. Depression is active and worsening.    Plan:   Continue current medication therapy   Referral to psychology  Referral to psychiatry    Followup in 4 weeks.           Follow Up  Return if symptoms worsen or fail to improve.    Myla Palomo, DO   [facility]

## 2024-08-27 NOTE — ASSESSMENT & PLAN NOTE
Patient's depression is a recurrent episode that is moderate without psychosis. Depression is active and worsening.    Plan:   Continue current medication therapy   Referral to psychology  Referral to psychiatry    Followup in 4 weeks.

## 2024-08-27 NOTE — ASSESSMENT & PLAN NOTE
EKG performed today, normal sinus rhythm rate of 66.  At this time would recommend laboratory evaluation.  If symptoms persist or worsen will consider Holter monitor or referral to cardiology.

## 2024-08-29 ENCOUNTER — TELEPHONE (OUTPATIENT)
Dept: FAMILY MEDICINE CLINIC | Facility: CLINIC | Age: 79
End: 2024-08-29
Payer: MEDICARE

## 2024-08-29 NOTE — PROGRESS NOTES
Sleep Clinic Follow Up Note    Chief Complaint  Sleeping Problem, Frequent Awakenings, Snoring, Fatigue, Daytime Sleepiness, Heartburn, Leg/body Jerks During Sleep, Unable To Fall Asleep, and Unable To Stay Asleep    Subjective     History of Present Illness (from previous encounter on 3/14/2022 with Dr. Lora):  Joyce Jones is a 76 y.o. female who is here today to establish care with Sleep Medicine.     76-year-old female coming here for initial evaluation.  Patient states that she is having problem with difficulty sleeping going on for about a year.  States that her daughter was diagnosed with ALS about 8 months ago and that has been quite stressful for her.  She does not feel that she is depressed but she has a lot of stress and anxiety about the diagnosis and how it is impacting her daughter's life.  States that her  has also told her that she is starts to snore and have stopping breathing episodes at night.  Patient sleeps for about 5 hours a night.  Does not take long to fall asleep but wakes up multiple times for unclear reasons and sometimes to use the restroom.  She does feel that dryness of the mouth and every time she wakes up she has to take a drink of water.  She has morning headaches occasionally as well.  Does not feel rested when she wakes up and ends up taking a nap for 30 to 40 minutes during the daytime.  Denies any significant restless leg symptoms.   does have arthritis and does not think that is impacting her ability to sleep as she has been on tramadol.  Previously she had stopped taking tramadol and went through withdrawal syndrome but now that is all improved.  Denies any REM behavior disorder.  Denies any other parasomnias or heartburn symptoms grinding teeth and nighttime sweating and occasional nightmares.  Patient denies any driving problems or sleep-related accidents.     Patient does not smoke.  Does not drink alcohol or have any illicit drug use history.  No history of  head injury or head trauma.     Further sleep history is as below.     Crabtree Scale: 7/24  (End copied text).    -A home sleep test was obtained on 4/12/2022 revealing mild obstructive sleep apnea with an AHI of 14.5/h.    Interval History:  Joyce Jones is a 78 y.o. female returns for follow up of sleep apnea. She was not able to tolerate pap therapy primarily due to dermatologic issues.  The patient broke out frequently despite changing masks.  She continues to have difficulty with exhaustion. On average the patient sleeps 4 hours per night. She usually sleeps 4-5 hours per night. She continues to snore and have apneic events. The patient wakes 5 times per night.     The patient reports the following changes to their medical and medication history since they were last seen:  None    Further details are as follows:      Crabtree Scale is (out of 24): Total score: 6     Weight:  Current Weight: 149 lb    Weight change in the last year:  gain: 4 lbs    The patient's relevant past medical, surgical, family, and social history reviewed and updated in Epic as appropriate.    PMH:    Past Medical History:   Diagnosis Date    Allergic     Allergic rhinitis     Anemia     Arthritis     Cholelithiasis     Deep vein thrombosis Clot following left knee replacement    Diverticulosis     Heart murmur     History of bone density study 03/02/2017    [facility]    History of mammography, screening 2016    Hyperlipidemia     Hypertension     Inguinal hernia 1978    repair    Irritable bowel syndrome Self    Kidney stone Embedded    Low back pain Scoliosis/kyphosis    Neuromuscular disorder Shoulder pain , arthritic issues, more severe    Osteoarthritis (arthritis due to wear and tear of joints)     Osteopenia     Personal history of DVT (deep vein thrombosis) 2005    right leg- due to surgery    Pneumonia 5 times    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 Has been under control . Function stable    Scoliosis Joyce    Urinary tract infection      Past Surgical History:   Procedure Laterality Date    BACK SURGERY  2002    Scoliosis bar. T11-sacrum    BREAST BIOPSY      over 15 years ago.     CERVICAL SPINE SURGERY      cage    CHOLECYSTECTOMY      COLONOSCOPY  2016    EYE SURGERY  Detached retina    FOOT SURGERY  2008    HERNIA REPAIR  3 both bowl areas    INGUINAL HERNIA REPAIR Bilateral     2019 right side repair    INGUINAL HERNIA REPAIR  Both    JOINT REPLACEMENT  4 knee replacement surgeries    Right knee,  approx. left knee , three replacements appears stable now.    KNEE SURGERY Bilateral 2017 and x2 in 2017(left)    NASAL SEPTUM SURGERY      SINUS SURGERY  1986    SPINE SURGERY      TOTAL ABDOMINAL HYSTERECTOMY WITH SALPINGO OOPHORECTOMY      Unilateral salpingo-oophorectomy/cervical dysplasia/AUB    TUBAL ABDOMINAL LIGATION      URETHRAL DILATION      Multiple times     OB History          4    Para   3    Term   3            AB   1    Living   2         SAB        IAB        Ectopic        Molar        Multiple        Live Births   3              Allergies   Allergen Reactions    Overton-2 Inhibitors (Sulfonamide) Anaphylaxis    Nabumetone Anaphylaxis    Nsaids GI Intolerance and Hives    Sulfa Antibiotics Shortness Of Breath    Celecoxib Hives    Penicillins Nausea And Vomiting and Dizziness    Zithromax [Azithromycin] Dizziness       MEDS:  Prior to Admission medications    Medication Sig Start Date End Date Taking? Authorizing Provider   citalopram (CeleXA) 10 MG tablet Take 1 tablet by mouth Every Morning. 24   Natalie Bright PA-C   hydrOXYzine pamoate (Vistaril) 25 MG capsule Take 1 capsule by mouth 3 (Three) Times a Day As Needed for Anxiety. 24   Natalie Bright PA-C   predniSONE (DELTASONE) 2.5 MG tablet Take 1 tablet by  "mouth 2 (Two) Times a Day. 4/29/24   Provider, MD Hannah   traMADol-acetaminophen (ULTRACET) 37.5-325 MG per tablet Take 1 tablet by mouth Every 6 (Six) Hours As Needed for Moderate Pain.    Provider, MD Hannah         FH:  Family History   Problem Relation Age of Onset    Uterine cancer Mother         Metastatic to ovaries and colon; possibly bone    Hypertension Mother     Kidney disease Mother     Tuberculosis Mother     Arthritis Mother     Thyroid disease Mother     Cancer Father         Bladder    Heart failure Father     Stroke Father     Hypertension Father     Heart attack Father     Colon polyps Father     Arthritis Father     Hypertension Brother     Kidney disease Brother     Colon polyps Brother     Cancer Brother     Heart disease Brother     Kidney disease Brother     Hypertension Brother     Cancer Brother         Cancer    No Known Problems Brother     No Known Problems Brother     Cancer Brother     Colon cancer Maternal Uncle     Colon cancer Maternal Uncle     Breast cancer Paternal Aunt     Other Daughter         CVID    Diabetes Daughter        Objective   Vital Signs:  /60 (BP Location: Left arm)   Pulse 72   Temp 96.8 °F (36 °C) (Temporal)   Ht 165.1 cm (65\")   Wt 67.9 kg (149 lb 11.2 oz)   SpO2 98%   BMI 24.91 kg/m²     BMI is within normal parameters. No other follow-up for BMI required.        Physical Exam  Vitals reviewed.   Constitutional:       Appearance: Normal appearance.   HENT:      Head: Normocephalic and atraumatic.      Nose: Nose normal.      Mouth/Throat:      Mouth: Mucous membranes are moist.   Cardiovascular:      Rate and Rhythm: Normal rate and regular rhythm.      Heart sounds: No murmur heard.     No friction rub. No gallop.   Pulmonary:      Effort: Pulmonary effort is normal. No respiratory distress.      Breath sounds: Normal breath sounds. No wheezing or rhonchi.   Neurological:      Mental Status: She is alert and oriented to person, place, " and time.   Psychiatric:         Behavior: Behavior normal.       Mallampati Score: III (soft and hard palate and base of uvula visible)      Result Review :              Assessment and Plan  Joyce Jones is a 78 y.o. female who returns for follow-up.  Patient has a history of mild sleep apnea with an initial AHI of 14.5/h.  Patient was started on PAP therapy but was not able to tolerate any of the masks due to skin breakouts.  She eventually gave up but continues to have significant difficulty with daytime sleepiness and fatigue, as well as witnessed episodes of snoring and apneic events.  She believes this has actually worsened since she was last seen.  Patient expresses her desire to discuss the inspire device with surgery.  I will place orders for referral today.  I have noted that occasionally insurance will require another sleep study    Diagnoses and all orders for this visit:    1. Obstructive sleep apnea, adult (Primary)  -     Ambulatory Referral to ENT (Otolaryngology)    2. Overweight with body mass index (BMI) 25.0-29.9                Follow Up  Return for Next scheduled follow-up after ENT..  Patient was given instructions and counseling regarding her condition or for health maintenance advice. Please see specific information pulled into the AVS if appropriate.       LOVE George, ACNP-BC  Pulmonology, Critical Care, and Sleep Medicine

## 2024-08-29 NOTE — TELEPHONE ENCOUNTER
Caller: Joyce Jones    Relationship: Self    Best call back number: [phone]    What medication are you requesting: GABAPENTIN    What are your current symptoms: PAINS IN ARMS AND LEGS    How long have you been experiencing symptoms: YEARS    Have you had these symptoms before:    [x] Yes  [] No    Have you been treated for these symptoms before:   [x] Yes  [] No    If a prescription is needed, what is your preferred pharmacy and phone number:    [facility] [phone]  Additional notes:  PATIENT WOULD LIKE A PRESCRIPTION FOR THIS MEDICATION. A DOCTOR FRIEND HAD MENTIONED TO ASK FOR THIS MEDICATION. PATIENT HAD TAKEN A 50MG PILL AND IT STOPPED HER PAIN SO SHE WOULD LIKE TO HAVE A PRESCRIPTION.  PLEASE ADVISE

## 2024-09-03 ENCOUNTER — OFFICE VISIT (OUTPATIENT)
Dept: SLEEP MEDICINE | Age: 79
End: 2024-09-03
Payer: MEDICARE

## 2024-09-03 VITALS
WEIGHT: 149.7 LBS | HEART RATE: 72 BPM | BODY MASS INDEX: 24.94 KG/M2 | TEMPERATURE: 96.8 F | OXYGEN SATURATION: 98 % | DIASTOLIC BLOOD PRESSURE: 60 MMHG | HEIGHT: 65 IN | SYSTOLIC BLOOD PRESSURE: 130 MMHG

## 2024-09-03 DIAGNOSIS — E66.3 OVERWEIGHT WITH BODY MASS INDEX (BMI) 25.0-29.9: ICD-10-CM

## 2024-09-03 DIAGNOSIS — G47.33 OBSTRUCTIVE SLEEP APNEA, ADULT: Primary | ICD-10-CM

## 2024-09-03 PROCEDURE — 3078F DIAST BP <80 MM HG: CPT | Performed by: NURSE PRACTITIONER

## 2024-09-03 PROCEDURE — 1159F MED LIST DOCD IN RCRD: CPT | Performed by: NURSE PRACTITIONER

## 2024-09-03 PROCEDURE — 99213 OFFICE O/P EST LOW 20 MIN: CPT | Performed by: NURSE PRACTITIONER

## 2024-09-03 PROCEDURE — 3075F SYST BP GE 130 - 139MM HG: CPT | Performed by: NURSE PRACTITIONER

## 2024-09-03 PROCEDURE — 1160F RVW MEDS BY RX/DR IN RCRD: CPT | Performed by: NURSE PRACTITIONER

## 2024-09-30 DIAGNOSIS — H65.191 ACUTE MIDDLE EAR EFFUSION, RIGHT: ICD-10-CM

## 2024-10-01 RX ORDER — FLUTICASONE PROPIONATE 50 UG/1
SPRAY, METERED NASAL
Qty: 16 G | OUTPATIENT
Start: 2024-10-01

## 2024-10-17 ENCOUNTER — TELEPHONE (OUTPATIENT)
Dept: FAMILY MEDICINE CLINIC | Facility: CLINIC | Age: 79
End: 2024-10-17
Payer: MEDICARE

## 2024-10-17 NOTE — TELEPHONE ENCOUNTER
Caller: Joyce Jones    Relationship to patient: Self    Best call back number: [phone]    Chief complaint: HEART PALPITATIONS    Patient directed to call 911 or go to their nearest emergency room.     Patient verbalized understanding: [x] Yes  [] No  If no, why?    Additional notes:PATIENT WANTED APPOINTMENT FOR HEART PALPITATIONS AND ADVISED TO GO TO EMERGENCY ROOM AND PATIENT AGREED.

## 2024-10-22 ENCOUNTER — READMISSION MANAGEMENT (OUTPATIENT)
Dept: CALL CENTER | Facility: HOSPITAL | Age: 79
End: 2024-10-22
Payer: MEDICARE

## 2024-10-22 NOTE — OUTREACH NOTE
Prep Survey      Flowsheet Row Responses   Religious facility patient discharged from? Non-BH   Is LACE score < 7 ? Non-BH Discharge   Eligibility [facility]   Date of Admission 10/21/24   Date of Discharge 10/22/24   Discharge Disposition Home or Self Care   Discharge diagnosis IN COMPLETE REMOVAL OF VAGINA WALL   IN POST COLPORRHAPHY,RECTUM/VAGINA   IN CYSTOURETHROSCOPY   VAGINECTOMY, COMPLETE, POSTERIOR REPAIR, ,  CYSTOURETHROSCOPY   Does the patient have one of the following disease processes/diagnoses(primary or secondary)? General Surgery   Does the patient have Home health ordered? No   Is there a DME ordered? No   Prep survey completed? Yes            ADAMS BOLIVAR - Registered Nurse

## 2024-10-23 ENCOUNTER — TRANSITIONAL CARE MANAGEMENT TELEPHONE ENCOUNTER (OUTPATIENT)
Dept: CALL CENTER | Facility: HOSPITAL | Age: 79
End: 2024-10-23
Payer: MEDICARE

## 2024-10-23 NOTE — OUTREACH NOTE
Call Center TCM Note      Flowsheet Row Responses   [facility] [place] patient discharged from? Non-  []   Does the patient have one of the following disease processes/diagnoses(primary or secondary)? General Surgery   TCM attempt successful? Yes   Call start time 0940   Call end time 0943   Discharge diagnosis MA COMPLETE REMOVAL OF VAGINA WALL   MA POST COLPORRHAPHY,RECTUM/VAGINA   MA CYSTOURETHROSCOPY   VAGINECTOMY, COMPLETE, POSTERIOR REPAIR, ,  CYSTOURETHROSCOPY   Is the patient taking all medications as directed (includes completed medication regime)? Yes   Does the patient have an appointment with their PCP within 7-14 days of discharge? No   Nursing Interventions Patient desires to follow up with specialty only   Has home health visited the patient within 72 hours of discharge? N/A   Psychosocial issues? No   What is the patient's perception of their health status since discharge? Improving   Is the patient/caregiver able to teach back signs and symptoms related to disease process for when to call PCP? Yes   Is the patient/caregiver able to teach back signs and symptoms related to disease process for when to call 911? Yes   Is the patient/caregiver able to teach back the hierarchy of who to call/visit for symptoms/problems? PCP, Specialist, Home health nurse, Urgent Care, ED, 911 Yes   If the patient is a current smoker, are they able to teach back resources for cessation? Not a smoker   TCM call completed? Yes   Wrap up additional comments Doing well but states she has been in a little pain, no questions, she will be following up with her surgeon.   Call end time 0943   Would this patient benefit from a Referral to Amb Social Work? No   Is the patient interested in additional calls from an ambulatory ? No            Kennedi Cruz RN    10/23/2024, 09:43 EDT

## 2024-10-30 ENCOUNTER — TELEPHONE (OUTPATIENT)
Dept: FAMILY MEDICINE CLINIC | Facility: CLINIC | Age: 79
End: 2024-10-30
Payer: MEDICARE

## 2024-10-30 NOTE — TELEPHONE ENCOUNTER
Caller: Joyce Jones    Relationship to patient: Self    Best call back number:      Chief complaint: PALPATATIONS    Patient directed to call 911 or go to their nearest emergency room.     Patient verbalized understanding: [] Yes  [] No  If no, why?    Additional notes: PATIENT CALLED TO MAKE AN APPT AND ADVISED SHE HAD BEEN  HAVING PALPATATIONS AND HUB ADVISED HER TO GO THE EMERGENCY ROOM, PATIENT SAID OK, AND DISCONNECTED THE CALL

## 2024-11-01 ENCOUNTER — OFFICE VISIT (OUTPATIENT)
Dept: FAMILY MEDICINE CLINIC | Facility: CLINIC | Age: 79
End: 2024-11-01
Payer: MEDICARE

## 2024-11-01 VITALS
SYSTOLIC BLOOD PRESSURE: 126 MMHG | HEIGHT: 65 IN | HEART RATE: 70 BPM | DIASTOLIC BLOOD PRESSURE: 86 MMHG | WEIGHT: 149.2 LBS | OXYGEN SATURATION: 95 % | BODY MASS INDEX: 24.86 KG/M2

## 2024-11-01 DIAGNOSIS — R00.2 PALPITATIONS: ICD-10-CM

## 2024-11-01 DIAGNOSIS — F41.9 ANXIETY: Primary | ICD-10-CM

## 2024-11-01 DIAGNOSIS — F41.8 DEPRESSION WITH ANXIETY: ICD-10-CM

## 2024-11-01 RX ORDER — OXYCODONE HYDROCHLORIDE 5 MG/1
5 CAPSULE ORAL EVERY 4 HOURS PRN
COMMUNITY

## 2024-11-01 RX ORDER — ACETAMINOPHEN 325 MG/1
650 TABLET ORAL EVERY 6 HOURS PRN
COMMUNITY

## 2024-11-01 RX ORDER — FLUTICASONE PROPIONATE AND SALMETEROL 100; 50 UG/1; UG/1
POWDER RESPIRATORY (INHALATION)
COMMUNITY

## 2024-11-01 RX ORDER — CITALOPRAM HYDROBROMIDE 10 MG/1
10 TABLET ORAL EVERY MORNING
Qty: 30 TABLET | Refills: 2 | Status: SHIPPED | OUTPATIENT
Start: 2024-11-01

## 2024-11-01 NOTE — PROGRESS NOTES
Established Office Visit      Patient Name: Joyce Jones  : 1945   MRN: [id number]   Care Team: Patient Care Team:  Gayle Low DO as PCP - General (Internal Medicine)  Jameel Quiroz MD (Rheumatology)    Chief Complaint:    Chief Complaint   Patient presents with    Palpitations       History of Present Illness: Joyce Jones is a 79 y.o. female with fibromyalgia, PMR (following with rheumatology), chronic low back pain with sciatica, HTN who is here today to follow up with palpitations.     She reports recent palpitations occurring multiple times per week, typically with stressful situations. Typically resolves within a few seconds without intervention. Sometimes feels a little dizzy with this. She drinks plenty water. No episodes of syncope.     She has had some significant life stressors - caring for her grandkids who lost their mother. Brother is very sick, she has several family stressors. She is inconsistently taking her celexa 10mg, about 3x per week. She reports lately she has not been exercising. Confined to the house.     She follows with cardiology and has appointment scheduled in about 2 weeks for routine follow up. She has had previous cardiac workup for dizziness revealing no significatn structural heart disease, obstructive CAD or arrhythmia.    She recently had elective vaginectomy due to vaginal prolapse. Reports recovery was quite painful but this has luckily improved. She has follow up with her surgeon in about 1 month.     Subjective      Review of Systems:   Review of Systems - See HPI    I have reviewed and the following portions of the patient's history were updated as appropriate: past family history, past medical history, past social history, past surgical history and problem list.    Medications:     Current Outpatient Medications:     acetaminophen (TYLENOL) 325 MG tablet, Take 2 tablets by mouth Every 6 (Six) Hours As Needed for Mild Pain., Disp: , Rfl:  "    Aspirin Low Dose 81 MG EC tablet, Take 1 tablet by mouth Daily., Disp: , Rfl:     citalopram (CeleXA) 10 MG tablet, Take 1 tablet by mouth Every Morning., Disp: 30 tablet, Rfl: 2    Fluticasone-Salmeterol (ADVAIR/WIXELA) 100-50 MCG/ACT DISKUS, Inhale 2 (Two) Times a Day., Disp: , Rfl:     folic acid (FOLVITE) 1 MG tablet, Take 1 tablet by mouth Daily., Disp: , Rfl:     methotrexate 2.5 MG tablet, Take 1 tablet by mouth., Disp: , Rfl:     oxyCODONE (OXY-IR) 5 MG capsule, Take 1 capsule by mouth Every 4 (Four) Hours As Needed for Moderate Pain., Disp: , Rfl:     predniSONE (DELTASONE) 2.5 MG tablet, Take 1 tablet by mouth 2 (Two) Times a Day., Disp: , Rfl:     Rhubarb (ESTROVEN COMPLETE PO), Take  by mouth., Disp: , Rfl:     traMADol-acetaminophen (ULTRACET) 37.5-325 MG per tablet, Take 1 tablet by mouth Every 6 (Six) Hours As Needed for Moderate Pain., Disp: , Rfl:     TURMERIC PO, Take  by mouth., Disp: , Rfl:     Allergies:   Allergies   Allergen Reactions    Overton-2 Inhibitors (Sulfonamide) Anaphylaxis    Nabumetone Anaphylaxis    Nsaids GI Intolerance and Hives    Sulfa Antibiotics Shortness Of Breath    Celecoxib Hives    Penicillins Nausea And Vomiting and Dizziness    Zithromax [Azithromycin] Dizziness       Objective     Physical Exam:  Vital Signs:   Vitals:    11/01/24 1041   BP: 126/86   Pulse: 70   SpO2: 95%   Weight: 67.7 kg (149 lb 3.2 oz)   Height: 165.1 cm (65\")     Body mass index is 24.83 kg/m².     Physical Exam  Vitals reviewed.   Constitutional:       Appearance: Normal appearance. She is not ill-appearing.   Cardiovascular:      Rate and Rhythm: Normal rate and regular rhythm.      Heart sounds: Normal heart sounds. No murmur heard.  Pulmonary:      Effort: Pulmonary effort is normal. No respiratory distress.      Breath sounds: Normal breath sounds. No wheezing.   Skin:     General: Skin is warm and dry.   Neurological:      Mental Status: She is alert.   Psychiatric:         Mood and Affect: " Mood normal.         Behavior: Behavior normal.         Judgment: Judgment normal.         Assessment / Plan      Assessment/Plan:   Problems Addressed This Visit  Diagnoses and all orders for this visit:    1. Anxiety (Primary)    2. Palpitations    3. Depression with anxiety  -     citalopram (CeleXA) 10 MG tablet; Take 1 tablet by mouth Every Morning.  Dispense: 30 tablet; Refill: 2        Discussed unrevealing EKG 08/2024 and holter monitor in 2023   She has had previous cardiac workup for dizziness/palpitations revealing no significant structural heart disease, obstructive CAD or arrhythmia.  She plans to cut back on caffeine - currently drinking 2 cups of coffee today and does feel this triggers symptoms.   We reviewed normal electrolytes, unrevealing CBC 10/22/24  Reassured patient that palpitations are likely related to anxiety/caffeine intake but if they do not improve she would benefit from repeat holter to evaluate for arrhythmia. Already has follow up with cardiology scheduled in 2-3 weeks.    Anxiety/depression - uncontrolled. Recommend daily compliance with celexa 10mg. Incorporate  exercise/meditation/stretching/breathing exercises regularly. If no improvement within 4 weeks please let me know.     Plan of care reviewed with patient at the conclusion of today's visit. Education was provided regarding diagnosis and management.  Patient verbalizes understanding of and agreement with management plan.    Follow Up:   Return in about 3 months (around 2/1/2025) for Recheck.        DO LANA Aguiar RD  [facility]  [address]  [facility] [phone]  Fax [phone]  Phone [phone]

## 2024-11-25 ENCOUNTER — APPOINTMENT (OUTPATIENT)
Dept: GENERAL RADIOLOGY | Facility: HOSPITAL | Age: 79
End: 2024-11-25
Payer: MEDICARE

## 2024-11-25 ENCOUNTER — HOSPITAL ENCOUNTER (EMERGENCY)
Facility: HOSPITAL | Age: 79
Discharge: HOME | End: 2024-11-25
Attending: EMERGENCY MEDICINE | Admitting: EMERGENCY MEDICINE
Payer: MEDICARE

## 2024-11-25 VITALS
SYSTOLIC BLOOD PRESSURE: 119 MMHG | TEMPERATURE: 98 F | HEART RATE: 65 BPM | BODY MASS INDEX: 23.46 KG/M2 | OXYGEN SATURATION: 96 % | DIASTOLIC BLOOD PRESSURE: 66 MMHG | HEIGHT: 66 IN | WEIGHT: 146 LBS | RESPIRATION RATE: 16 BRPM

## 2024-11-25 DIAGNOSIS — R00.2 PALPITATIONS: Primary | ICD-10-CM

## 2024-11-25 DIAGNOSIS — E87.6 HYPOKALEMIA: ICD-10-CM

## 2024-11-25 LAB
ALBUMIN SERPL-MCNC: 4.3 G/DL (ref 3.5–5.2)
ALBUMIN/GLOB SERPL: 1.7 G/DL
ALP SERPL-CCNC: 66 U/L (ref 39–117)
ALT SERPL W P-5'-P-CCNC: 19 U/L (ref 1–33)
ANION GAP SERPL CALCULATED.3IONS-SCNC: 15 MMOL/L (ref 5–15)
AST SERPL-CCNC: 29 U/L (ref 1–32)
BASOPHILS # BLD AUTO: 0.06 10*3/MM3 (ref 0–0.2)
BASOPHILS NFR BLD AUTO: 0.9 % (ref 0–1.5)
BILIRUB SERPL-MCNC: 0.4 MG/DL (ref 0–1.2)
BUN SERPL-MCNC: 17 MG/DL (ref 8–23)
BUN/CREAT SERPL: 18.1 (ref 7–25)
CALCIUM SPEC-SCNC: 9.6 MG/DL (ref 8.6–10.5)
CHLORIDE SERPL-SCNC: 100 MMOL/L (ref 98–107)
CO2 SERPL-SCNC: 24 MMOL/L (ref 22–29)
CREAT SERPL-MCNC: 0.94 MG/DL (ref 0.57–1)
D DIMER PPP FEU-MCNC: 0.36 MCGFEU/ML (ref 0–0.78)
DEPRECATED RDW RBC AUTO: 53.8 FL (ref 37–54)
EGFRCR SERPLBLD CKD-EPI 2021: 62.2 ML/MIN/1.73
EOSINOPHIL # BLD AUTO: 0.14 10*3/MM3 (ref 0–0.4)
EOSINOPHIL NFR BLD AUTO: 2 % (ref 0.3–6.2)
ERYTHROCYTE [DISTWIDTH] IN BLOOD BY AUTOMATED COUNT: 14.1 % (ref 12.3–15.4)
GLOBULIN UR ELPH-MCNC: 2.6 GM/DL
GLUCOSE SERPL-MCNC: 110 MG/DL (ref 65–99)
HCT VFR BLD AUTO: 37.6 % (ref 34–46.6)
HGB BLD-MCNC: 12.5 G/DL (ref 12–15.9)
HOLD SPECIMEN: NORMAL
IMM GRANULOCYTES # BLD AUTO: 0.03 10*3/MM3 (ref 0–0.05)
IMM GRANULOCYTES NFR BLD AUTO: 0.4 % (ref 0–0.5)
LYMPHOCYTES # BLD AUTO: 1.37 10*3/MM3 (ref 0.7–3.1)
LYMPHOCYTES NFR BLD AUTO: 19.5 % (ref 19.6–45.3)
MAGNESIUM SERPL-MCNC: 2 MG/DL (ref 1.6–2.4)
MCH RBC QN AUTO: 34.3 PG (ref 26.6–33)
MCHC RBC AUTO-ENTMCNC: 33.2 G/DL (ref 31.5–35.7)
MCV RBC AUTO: 103.3 FL (ref 79–97)
MONOCYTES # BLD AUTO: 0.73 10*3/MM3 (ref 0.1–0.9)
MONOCYTES NFR BLD AUTO: 10.4 % (ref 5–12)
NEUTROPHILS NFR BLD AUTO: 4.71 10*3/MM3 (ref 1.7–7)
NEUTROPHILS NFR BLD AUTO: 66.8 % (ref 42.7–76)
NRBC BLD AUTO-RTO: 0 /100 WBC (ref 0–0.2)
NT-PROBNP SERPL-MCNC: 157.2 PG/ML (ref 0–1800)
PLATELET # BLD AUTO: 223 10*3/MM3 (ref 140–450)
PMV BLD AUTO: 9.5 FL (ref 6–12)
POTASSIUM SERPL-SCNC: 3.4 MMOL/L (ref 3.5–5.2)
PROT SERPL-MCNC: 6.9 G/DL (ref 6–8.5)
QT INTERVAL: 392 MS
QT INTERVAL: 398 MS
QTC INTERVAL: 431 MS
QTC INTERVAL: 438 MS
RBC # BLD AUTO: 3.64 10*6/MM3 (ref 3.77–5.28)
SODIUM SERPL-SCNC: 139 MMOL/L (ref 136–145)
TROPONIN T SERPL HS-MCNC: 10 NG/L
TROPONIN T SERPL HS-MCNC: 8 NG/L
TSH SERPL DL<=0.05 MIU/L-ACNC: 1.01 UIU/ML (ref 0.27–4.2)
WBC NRBC COR # BLD AUTO: 7.04 10*3/MM3 (ref 3.4–10.8)
WHOLE BLOOD HOLD COAG: NORMAL
WHOLE BLOOD HOLD SPECIMEN: NORMAL

## 2024-11-25 PROCEDURE — 85379 FIBRIN DEGRADATION QUANT: CPT | Performed by: EMERGENCY MEDICINE

## 2024-11-25 PROCEDURE — 85025 COMPLETE CBC W/AUTO DIFF WBC: CPT | Performed by: EMERGENCY MEDICINE

## 2024-11-25 PROCEDURE — 93005 ELECTROCARDIOGRAM TRACING: CPT | Performed by: EMERGENCY MEDICINE

## 2024-11-25 PROCEDURE — 83735 ASSAY OF MAGNESIUM: CPT | Performed by: EMERGENCY MEDICINE

## 2024-11-25 PROCEDURE — 84484 ASSAY OF TROPONIN QUANT: CPT | Performed by: EMERGENCY MEDICINE

## 2024-11-25 PROCEDURE — 36415 COLL VENOUS BLD VENIPUNCTURE: CPT

## 2024-11-25 PROCEDURE — 99284 EMERGENCY DEPT VISIT MOD MDM: CPT

## 2024-11-25 PROCEDURE — 80053 COMPREHEN METABOLIC PANEL: CPT | Performed by: EMERGENCY MEDICINE

## 2024-11-25 PROCEDURE — 84443 ASSAY THYROID STIM HORMONE: CPT | Performed by: EMERGENCY MEDICINE

## 2024-11-25 PROCEDURE — 93005 ELECTROCARDIOGRAM TRACING: CPT

## 2024-11-25 PROCEDURE — 71045 X-RAY EXAM CHEST 1 VIEW: CPT

## 2024-11-25 PROCEDURE — 83880 ASSAY OF NATRIURETIC PEPTIDE: CPT | Performed by: EMERGENCY MEDICINE

## 2024-11-25 RX ORDER — POTASSIUM CHLORIDE 1.5 G/1.58G
40 POWDER, FOR SOLUTION ORAL ONCE
Status: COMPLETED | OUTPATIENT
Start: 2024-11-25 | End: 2024-11-25

## 2024-11-25 RX ORDER — SODIUM CHLORIDE 0.9 % (FLUSH) 0.9 %
10 SYRINGE (ML) INJECTION AS NEEDED
Status: DISCONTINUED | OUTPATIENT
Start: 2024-11-25 | End: 2024-11-25 | Stop reason: HOSPADM

## 2024-11-25 RX ORDER — LORAZEPAM 1 MG/1
1 TABLET ORAL ONCE
Status: COMPLETED | OUTPATIENT
Start: 2024-11-25 | End: 2024-11-25

## 2024-11-25 RX ADMIN — POTASSIUM CHLORIDE 40 MEQ: 1.5 POWDER, FOR SOLUTION ORAL at 10:17

## 2024-11-25 RX ADMIN — LORAZEPAM 1 MG: 1 TABLET ORAL at 08:52

## 2024-11-26 NOTE — ED PROVIDER NOTES
Subjective   History of Present Illness  79-year-old female presents for evaluation of palpitations.  Of note, the patient underwent a vaginectomy last month.  She notes that she did well postoperatively until yesterday when she began experiencing a sensation of intermittent palpitations that have bothered her since that time.  She endorses a sensation of generalized weakness and has had some dizziness over the same span that she attributes to her irregular/rapid heart rate.  She denies any recent changes to her diet or caffeine intake.  No recent changes to her medication regimen.  She is unsure as what might be causing her palpitations but tells me that she is currently under a great deal of stress as she has had 2 deaths within her family recently.  She was planning on going to a  in [place] today but ended up coming here to the emergency department instead for palpitations.  She wonders if there could potentially be an anxiety component to her symptoms.  Her  agrees that this is a possibility as well.  Currently, the patient denies any active sensation of palpitations and feels at baseline.      Review of Systems   Cardiovascular:  Positive for palpitations.   Neurological:  Positive for dizziness and weakness.   All other systems reviewed and are negative.      Past Medical History:   Diagnosis Date   • Allergic    • Allergic rhinitis    • Anemia    • Arthritis    • Cholelithiasis    • Deep vein thrombosis Clot following left knee replacement   • Diverticulosis    • Heart murmur    • History of bone density study 2017    [facility]   • History of mammography, screening    • Hyperlipidemia    • Hypertension    • Inguinal hernia 1978    repair   • Irritable bowel syndrome Self   • Kidney stone Embedded   • Low back pain Scoliosis/kyphosis   • Neuromuscular disorder Shoulder pain , arthritic issues, more severe   • Osteoarthritis (arthritis due to wear and tear of joints)    •  Osteopenia    • Personal history of DVT (deep vein thrombosis) 2005    right leg- due to surgery   • Pneumonia 5 times   • Positive TB test     Always comes back + but negative further testing   • Pulmonary embolism Right  thigh following knee replace   • Renal insufficiency 1999. Has been under control . Function stable   • Scoliosis Joyce   • Urinary tract infection        Allergies   Allergen Reactions   • Overton-2 Inhibitors (Sulfonamide) Anaphylaxis   • Nabumetone Anaphylaxis   • Nsaids GI Intolerance and Hives   • Sulfa Antibiotics Shortness Of Breath   • Celecoxib Hives   • Penicillins Nausea And Vomiting and Dizziness   • Zithromax [Azithromycin] Dizziness       Past Surgical History:   Procedure Laterality Date   • BACK SURGERY  2002    Scoliosis bar. T11-sacrum   • BREAST BIOPSY      over 15 years ago.    • CERVICAL SPINE SURGERY  2000    cage   • CHOLECYSTECTOMY  2014   • COLONOSCOPY  2016   • EYE SURGERY  Detached retina   • FOOT SURGERY  2008   • HERNIA REPAIR  3 both bowl areas   • INGUINAL HERNIA REPAIR Bilateral 1978 2019 right side repair   • INGUINAL HERNIA REPAIR  Both   • JOINT REPLACEMENT  4 knee replacement surgeries    Right knee, 1980 approx. left knee , three replacements appears stable now.   • KNEE SURGERY Bilateral 2017 2005 and x2 in 8/2017(left)   • NASAL SEPTUM SURGERY  1999   • SINUS SURGERY  1986   • SPINE SURGERY  2004   • TOTAL ABDOMINAL HYSTERECTOMY WITH SALPINGO OOPHORECTOMY  1986    Unilateral salpingo-oophorectomy/cervical dysplasia/AUB   • TUBAL ABDOMINAL LIGATION  1982   • URETHRAL DILATION      Multiple times       Family History   Problem Relation Age of Onset   • Uterine cancer Mother         Metastatic to ovaries and colon; possibly bone   • Hypertension Mother    • Kidney disease Mother    • Tuberculosis Mother    • Arthritis Mother    • Thyroid disease Mother    • Cancer Father         Bladder   • Heart failure Father    • Stroke Father    • Hypertension Father     • Heart attack Father    • Colon polyps Father    • Arthritis Father    • Hypertension Brother    • Kidney disease Brother    • Colon polyps Brother    • Cancer Brother    • Heart disease Brother    • Kidney disease Brother    • Hypertension Brother    • Cancer Brother         Cancer   • No Known Problems Brother    • No Known Problems Brother    • Cancer Brother    • Colon cancer Maternal Uncle    • Colon cancer Maternal Uncle    • Breast cancer Paternal Aunt    • Other Daughter         CVID   • Diabetes Daughter        Social History     Socioeconomic History   • Marital status:    Tobacco Use   • Smoking status: Never     Passive exposure: Never   • Smokeless tobacco: Never   Vaping Use   • Vaping status: Never Used   Substance and Sexual Activity   • Alcohol use: Not Currently     Alcohol/week: 1.0 standard drink of alcohol     Types: 1 Glasses of wine per week     Comment: rarely at holidays   • Drug use: Never   • Sexual activity: Defer     Partners: Male     Birth control/protection: Post-menopausal, Surgical     Comment: Hysterectomy           Objective   Physical Exam  Vitals and nursing note reviewed.   Constitutional:       General: She is not in acute distress.     Appearance: Normal appearance. She is well-developed. She is not diaphoretic.      Comments: Well-appearing female in no acute distress   HENT:      Head: Normocephalic and atraumatic.   Eyes:      Pupils: Pupils are equal, round, and reactive to light.   Cardiovascular:      Rate and Rhythm: Normal rate and regular rhythm.      Heart sounds: Normal heart sounds. No murmur heard.     No friction rub. No gallop.   Pulmonary:      Effort: Pulmonary effort is normal. No respiratory distress.      Breath sounds: Normal breath sounds. No wheezing or rales.   Abdominal:      General: Bowel sounds are normal. There is no distension.      Palpations: Abdomen is soft. There is no mass.      Tenderness: There is no abdominal tenderness. There  is no guarding or rebound.   Musculoskeletal:         General: Normal range of motion.      Cervical back: Neck supple.   Skin:     General: Skin is warm and dry.      Findings: No erythema or rash.   Neurological:      Mental Status: She is alert and oriented to person, place, and time.      Comments: Awake, alert, and oriented x3, clear and fluent speech, neurovascularly intact distally in all fours with bounding distal pulses and normal sensation, 5 out of 5 strength in all fours, no cranial nerve deficits noted with cranial nerves II through XII grossly intact   Psychiatric:         Mood and Affect: Mood normal.         Thought Content: Thought content normal.         Judgment: Judgment normal.         Procedures           ED Course  ED Course as of 11/25/24 1918 Mon Nov 25, 2024 1914 79-year-old female presents for evaluation of palpitations.  Of note, the patient underwent a vaginectomy last month.  She is currently under a great deal of stress and wonders if that could be a contributing factor to her symptoms today.  She is accompanied by her  who corroborates and aids in her history.  On arrival to the ED, the patient is nontoxic-appearing.  She is currently asymptomatic.  Vital signs are reassuring.  Differential diagnosis is quite broad.  We will obtain labs and imaging, and we will reassess following initial interventions. [DD]   1915 Initial EKG interpreted independently by me revealed normal sinus rhythm with a heart rate of 73 and no ST segments suggestive of or concerning for ischemia with normal NH and QT intervals and no EKG evidence of Brugada syndrome or hypertrophic cardiomyopathy.  The patient denies any personal history of recurrent syncope or any family history of sudden cardiac death. [DD]   1915 Labs remarkable only for mild hypokalemia.  Oral potassium replacement initiated.  Low risk Wells and D-dimer is negative.  High-sensitivity troponin is negative.  Labs are otherwise  unrevealing. [DD]   1915 I personally and independently viewed the patient's x-ray images myself, and I am in agreement with the radiologist's reading for final interpretation, particularly there is no pulmonary edema noted. [DD]   1916 Upon reevaluation, the patient looks and feels well.  She is asymptomatic.  She was watched on a cardiac monitor for several hours and had no evidence of arrhythmia throughout her stay here in the emergency department. [DD]   1916 Repeat troponin/EKG negative/unchanged.  Doubt ACS, PE, dissection, or emergent cardiothoracic process at this time based on exam, history, clinical presentation, gestalt, and objective findings in the ED.  The patient will follow up with the heart and valve clinic within the next 72 hours for further outpatient work-up and evaluation.  Agreeable with plan and given appropriate strict return precautions.     [DD]   1917 Counseled regarding dietary modifications for her mild hypokalemia. [DD]      ED Course User Index  [DD] Jewel Devries MD                                             Recent Results (from the past 24 hours)   ECG 12 Lead ED Triage Standing Order; Dysrhythmia    Collection Time: 11/25/24  8:03 AM   Result Value Ref Range    QT Interval 398 ms    QTC Interval 438 ms   Comprehensive Metabolic Panel    Collection Time: 11/25/24  8:20 AM    Specimen: Blood   Result Value Ref Range    Glucose 110 (H) 65 - 99 mg/dL    BUN 17 8 - 23 mg/dL    Creatinine 0.94 0.57 - 1.00 mg/dL    Sodium 139 136 - 145 mmol/L    Potassium 3.4 (L) 3.5 - 5.2 mmol/L    Chloride 100 98 - 107 mmol/L    CO2 24.0 22.0 - 29.0 mmol/L    Calcium 9.6 8.6 - 10.5 mg/dL    Total Protein 6.9 6.0 - 8.5 g/dL    Albumin 4.3 3.5 - 5.2 g/dL    ALT (SGPT) 19 1 - 33 U/L    AST (SGOT) 29 1 - 32 U/L    Alkaline Phosphatase 66 39 - 117 U/L    Total Bilirubin 0.4 0.0 - 1.2 mg/dL    Globulin 2.6 gm/dL    A/G Ratio 1.7 g/dL    BUN/Creatinine Ratio 18.1 7.0 - 25.0    Anion Gap 15.0 5.0 - 15.0  mmol/L    eGFR 62.2 >60.0 mL/min/1.73   Magnesium    Collection Time: 11/25/24  8:20 AM    Specimen: Blood   Result Value Ref Range    Magnesium 2.0 1.6 - 2.4 mg/dL   Single High Sensitivity Troponin T    Collection Time: 11/25/24  8:20 AM    Specimen: Blood   Result Value Ref Range    HS Troponin T 10 <14 ng/L   TSH    Collection Time: 11/25/24  8:20 AM    Specimen: Blood   Result Value Ref Range    TSH 1.010 0.270 - 4.200 uIU/mL   BNP    Collection Time: 11/25/24  8:20 AM    Specimen: Blood   Result Value Ref Range    proBNP 157.2 0.0 - 1,800.0 pg/mL   Green Top (Gel)    Collection Time: 11/25/24  8:20 AM   Result Value Ref Range    Extra Tube Hold for add-ons.    Lavender Top    Collection Time: 11/25/24  8:20 AM   Result Value Ref Range    Extra Tube hold for add-on    Gold Top - SST    Collection Time: 11/25/24  8:20 AM   Result Value Ref Range    Extra Tube Hold for add-ons.    Gray Top    Collection Time: 11/25/24  8:20 AM   Result Value Ref Range    Extra Tube Hold for add-ons.    Light Blue Top    Collection Time: 11/25/24  8:20 AM   Result Value Ref Range    Extra Tube Hold for add-ons.    CBC Auto Differential    Collection Time: 11/25/24  8:20 AM    Specimen: Blood   Result Value Ref Range    WBC 7.04 3.40 - 10.80 10*3/mm3    RBC 3.64 (L) 3.77 - 5.28 10*6/mm3    Hemoglobin 12.5 12.0 - 15.9 g/dL    Hematocrit 37.6 34.0 - 46.6 %    .3 (H) 79.0 - 97.0 fL    MCH 34.3 (H) 26.6 - 33.0 pg    MCHC 33.2 31.5 - 35.7 g/dL    RDW 14.1 12.3 - 15.4 %    RDW-SD 53.8 37.0 - 54.0 fl    MPV 9.5 6.0 - 12.0 fL    Platelets 223 140 - 450 10*3/mm3    Neutrophil % 66.8 42.7 - 76.0 %    Lymphocyte % 19.5 (L) 19.6 - 45.3 %    Monocyte % 10.4 5.0 - 12.0 %    Eosinophil % 2.0 0.3 - 6.2 %    Basophil % 0.9 0.0 - 1.5 %    Immature Grans % 0.4 0.0 - 0.5 %    Neutrophils, Absolute 4.71 1.70 - 7.00 10*3/mm3    Lymphocytes, Absolute 1.37 0.70 - 3.10 10*3/mm3    Monocytes, Absolute 0.73 0.10 - 0.90 10*3/mm3    Eosinophils,  Absolute 0.14 0.00 - 0.40 10*3/mm3    Basophils, Absolute 0.06 0.00 - 0.20 10*3/mm3    Immature Grans, Absolute 0.03 0.00 - 0.05 10*3/mm3    nRBC 0.0 0.0 - 0.2 /100 WBC   D-dimer, Quantitative    Collection Time: 11/25/24  8:20 AM    Specimen: Blood   Result Value Ref Range    D-Dimer, Quantitative 0.36 0.00 - 0.78 MCGFEU/mL   ECG 12 Lead Other; repeats    Collection Time: 11/25/24  9:49 AM   Result Value Ref Range    QT Interval 392 ms    QTC Interval 431 ms   Single High Sensitivity Troponin T    Collection Time: 11/25/24  9:55 AM    Specimen: Blood   Result Value Ref Range    HS Troponin T 8 <14 ng/L     Note: In addition to lab results from this visit, the labs listed above may include labs taken at another facility or during a different encounter within the last 24 hours. Please correlate lab times with ED admission and discharge times for further clarification of the services performed during this visit.    XR Chest 1 View   Final Result   Impression:      1. No acute cardiopulmonary process.         Electronically Signed: Petty Mercer MD     11/25/2024 8:25 AM EST     Workstation ID: [id number]        Vitals:    11/25/24 1000 11/25/24 1030 11/25/24 1100 11/25/24 1130   BP: 119/63 107/56 109/57 119/66   BP Location: Left arm      Patient Position: Sitting      Pulse: 68 69 65 65   Resp:       Temp:       TempSrc:       SpO2: 98% 98% 93% 96%   Weight:       Height:         Medications   LORazepam (ATIVAN) tablet 1 mg (1 mg Oral Given 11/25/24 0852)   potassium chloride (KLOR-CON) packet 40 mEq (40 mEq Oral Given 11/25/24 1017)     ECG/EMG Results (last 24 hours)       Procedure Component Value Units Date/Time    ECG 12 Lead ED Triage Standing Order; Dysrhythmia [[id number]] Collected: 11/25/24 0803     Updated: 11/25/24 0835     QT Interval 398 ms      QTC Interval 438 ms     Narrative:      Test Reason : ED Triage Standing Order~  Blood Pressure :   */*   mmHG  Vent. Rate :  73 BPM     Atrial Rate :  73 BPM      P-R Int : 192 ms          QRS Dur :  88 ms      QT Int : 398 ms       P-R-T Axes :  56  44  48 degrees    QTcB Int : 438 ms    Normal sinus rhythm  Normal ECG  When compared with ECG of 26-Apr-2024 09:28,  No significant change was found    Referred By: OMAR           Confirmed By:     ECG 12 Lead Other; repeats [[id number]] Collected: 11/25/24 0949     Updated: 11/25/24 1034     QT Interval 392 ms      QTC Interval 431 ms     Narrative:      Test Reason : Other~  Blood Pressure :   */*   mmHG  Vent. Rate :  73 BPM     Atrial Rate :  73 BPM     P-R Int : 194 ms          QRS Dur :  86 ms      QT Int : 392 ms       P-R-T Axes :  55  38  39 degrees    QTcB Int : 431 ms    Normal sinus rhythm  Normal ECG  When compared with ECG of 25-Nov-2024 08:03, (Unconfirmed)  No significant change was found    Referred By: ED           Confirmed By:           ECG 12 Lead Other; repeats   Final Result   Test Reason : Other~   Blood Pressure :   */*   mmHG   Vent. Rate :  73 BPM     Atrial Rate :  73 BPM      P-R Int : 194 ms          QRS Dur :  86 ms       QT Int : 392 ms       P-R-T Axes :  55  38  39 degrees     QTcB Int : 431 ms      Normal sinus rhythm   Normal ECG   When compared with ECG of 25-Nov-2024 08:03, (Unconfirmed)   No significant change was found   Confirmed by MD Devries Michael ([id number]) on 11/25/2024 7:17:57 PM      Referred By: ED           Confirmed By: Balaji Devries MD      ECG 12 Lead ED Triage Standing Order; Dysrhythmia   Final Result   Test Reason : ED Triage Standing Order~   Blood Pressure :   */*   mmHG   Vent. Rate :  73 BPM     Atrial Rate :  73 BPM      P-R Int : 192 ms          QRS Dur :  88 ms       QT Int : 398 ms       P-R-T Axes :  56  44  48 degrees     QTcB Int : 438 ms      Normal sinus rhythm   Normal ECG   When compared with ECG of 26-Apr-2024 09:28,   No significant change was found   Confirmed by MD Devries Michael ([id number]) on 11/25/2024 7:17:45 PM      Referred By: OMAR           Confirmed By:  Balaji Devries MD                  Recent Results (from the past 24 hours)   ECG 12 Lead ED Triage Standing Order; Dysrhythmia    Collection Time: 11/25/24  8:03 AM   Result Value Ref Range    QT Interval 398 ms    QTC Interval 438 ms   Comprehensive Metabolic Panel    Collection Time: 11/25/24  8:20 AM    Specimen: Blood   Result Value Ref Range    Glucose 110 (H) 65 - 99 mg/dL    BUN 17 8 - 23 mg/dL    Creatinine 0.94 0.57 - 1.00 mg/dL    Sodium 139 136 - 145 mmol/L    Potassium 3.4 (L) 3.5 - 5.2 mmol/L    Chloride 100 98 - 107 mmol/L    CO2 24.0 22.0 - 29.0 mmol/L    Calcium 9.6 8.6 - 10.5 mg/dL    Total Protein 6.9 6.0 - 8.5 g/dL    Albumin 4.3 3.5 - 5.2 g/dL    ALT (SGPT) 19 1 - 33 U/L    AST (SGOT) 29 1 - 32 U/L    Alkaline Phosphatase 66 39 - 117 U/L    Total Bilirubin 0.4 0.0 - 1.2 mg/dL    Globulin 2.6 gm/dL    A/G Ratio 1.7 g/dL    BUN/Creatinine Ratio 18.1 7.0 - 25.0    Anion Gap 15.0 5.0 - 15.0 mmol/L    eGFR 62.2 >60.0 mL/min/1.73   Magnesium    Collection Time: 11/25/24  8:20 AM    Specimen: Blood   Result Value Ref Range    Magnesium 2.0 1.6 - 2.4 mg/dL   Single High Sensitivity Troponin T    Collection Time: 11/25/24  8:20 AM    Specimen: Blood   Result Value Ref Range    HS Troponin T 10 <14 ng/L   TSH    Collection Time: 11/25/24  8:20 AM    Specimen: Blood   Result Value Ref Range    TSH 1.010 0.270 - 4.200 uIU/mL   BNP    Collection Time: 11/25/24  8:20 AM    Specimen: Blood   Result Value Ref Range    proBNP 157.2 0.0 - 1,800.0 pg/mL   Green Top (Gel)    Collection Time: 11/25/24  8:20 AM   Result Value Ref Range    Extra Tube Hold for add-ons.    Lavender Top    Collection Time: 11/25/24  8:20 AM   Result Value Ref Range    Extra Tube hold for add-on    Gold Top - SST    Collection Time: 11/25/24  8:20 AM   Result Value Ref Range    Extra Tube Hold for add-ons.    Gray Top    Collection Time: 11/25/24  8:20 AM   Result Value Ref Range    Extra Tube Hold for add-ons.    Light Blue Top     Collection Time: 11/25/24  8:20 AM   Result Value Ref Range    Extra Tube Hold for add-ons.    CBC Auto Differential    Collection Time: 11/25/24  8:20 AM    Specimen: Blood   Result Value Ref Range    WBC 7.04 3.40 - 10.80 10*3/mm3    RBC 3.64 (L) 3.77 - 5.28 10*6/mm3    Hemoglobin 12.5 12.0 - 15.9 g/dL    Hematocrit 37.6 34.0 - 46.6 %    .3 (H) 79.0 - 97.0 fL    MCH 34.3 (H) 26.6 - 33.0 pg    MCHC 33.2 31.5 - 35.7 g/dL    RDW 14.1 12.3 - 15.4 %    RDW-SD 53.8 37.0 - 54.0 fl    MPV 9.5 6.0 - 12.0 fL    Platelets 223 140 - 450 10*3/mm3    Neutrophil % 66.8 42.7 - 76.0 %    Lymphocyte % 19.5 (L) 19.6 - 45.3 %    Monocyte % 10.4 5.0 - 12.0 %    Eosinophil % 2.0 0.3 - 6.2 %    Basophil % 0.9 0.0 - 1.5 %    Immature Grans % 0.4 0.0 - 0.5 %    Neutrophils, Absolute 4.71 1.70 - 7.00 10*3/mm3    Lymphocytes, Absolute 1.37 0.70 - 3.10 10*3/mm3    Monocytes, Absolute 0.73 0.10 - 0.90 10*3/mm3    Eosinophils, Absolute 0.14 0.00 - 0.40 10*3/mm3    Basophils, Absolute 0.06 0.00 - 0.20 10*3/mm3    Immature Grans, Absolute 0.03 0.00 - 0.05 10*3/mm3    nRBC 0.0 0.0 - 0.2 /100 WBC   D-dimer, Quantitative    Collection Time: 11/25/24  8:20 AM    Specimen: Blood   Result Value Ref Range    D-Dimer, Quantitative 0.36 0.00 - 0.78 MCGFEU/mL   ECG 12 Lead Other; repeats    Collection Time: 11/25/24  9:49 AM   Result Value Ref Range    QT Interval 392 ms    QTC Interval 431 ms   Single High Sensitivity Troponin T    Collection Time: 11/25/24  9:55 AM    Specimen: Blood   Result Value Ref Range    HS Troponin T 8 <14 ng/L     Note: In addition to lab results from this visit, the labs listed above may include labs taken at another facility or during a different encounter within the last 24 hours. Please correlate lab times with ED admission and discharge times for further clarification of the services performed during this visit.    XR Chest 1 View   Final Result   Impression:      1. No acute cardiopulmonary process.          Electronically Signed: Petty Mercer MD     11/25/2024 8:25 AM EST     Workstation ID: [id number]        Vitals:    11/25/24 1000 11/25/24 1030 11/25/24 1100 11/25/24 1130   BP: 119/63 107/56 109/57 119/66   BP Location: Left arm      Patient Position: Sitting      Pulse: 68 69 65 65   Resp:       Temp:       TempSrc:       SpO2: 98% 98% 93% 96%   Weight:       Height:         Medications   LORazepam (ATIVAN) tablet 1 mg (1 mg Oral Given 11/25/24 0852)   potassium chloride (KLOR-CON) packet 40 mEq (40 mEq Oral Given 11/25/24 1017)     ECG/EMG Results (last 24 hours)       Procedure Component Value Units Date/Time    ECG 12 Lead Other; repeats [[id number]] Collected: 11/25/24 0949     Updated: 11/25/24 1034     QT Interval 392 ms      QTC Interval 431 ms     Narrative:      Test Reason : Other~  Blood Pressure :   */*   mmHG  Vent. Rate :  73 BPM     Atrial Rate :  73 BPM     P-R Int : 194 ms          QRS Dur :  86 ms      QT Int : 392 ms       P-R-T Axes :  55  38  39 degrees    QTcB Int : 431 ms    Normal sinus rhythm  Normal ECG  When compared with ECG of 25-Nov-2024 08:03, (Unconfirmed)  No significant change was found    Referred By: ED           Confirmed By:           ECG 12 Lead Other; repeats   Final Result   Test Reason : Other~   Blood Pressure :   */*   mmHG   Vent. Rate :  73 BPM     Atrial Rate :  73 BPM      P-R Int : 194 ms          QRS Dur :  86 ms       QT Int : 392 ms       P-R-T Axes :  55  38  39 degrees     QTcB Int : 431 ms      Normal sinus rhythm   Normal ECG   When compared with ECG of 25-Nov-2024 08:03, (Unconfirmed)   No significant change was found   Confirmed by MD Kayce, Balaji ([id number]) on 11/25/2024 7:17:57 PM      Referred By: ED           Confirmed By: Balaji Devries MD      ECG 12 Lead ED Triage Standing Order; Dysrhythmia   Final Result   Test Reason : ED Triage Standing Order~   Blood Pressure :   */*   mmHG   Vent. Rate :  73 BPM     Atrial Rate :  73 BPM      P-R Int : 192 ms           QRS Dur :  88 ms       QT Int : 398 ms       P-R-T Axes :  56  44  48 degrees     QTcB Int : 438 ms      Normal sinus rhythm   Normal ECG   When compared with ECG of 26-Apr-2024 09:28,   No significant change was found   Confirmed by MD Devries Michael ([id number]) on 11/25/2024 7:17:45 PM      Referred By: EDMD           Confirmed By: Balaji Devries MD              Medical Decision Making      Final diagnoses:   Palpitations   Hypokalemia       ED Disposition  ED Disposition       ED Disposition   Discharge    Condition   Stable    Comment   --               [facility] CARDIOLOGY  [address] [place] [id number]  [facility] [id number] [phone]  In 3 days           Medication List      No changes were made to your prescriptions during this visit.            Jewel Devries MD  11/25/24 3634

## 2024-12-12 ENCOUNTER — HOSPITAL ENCOUNTER (OUTPATIENT)
Dept: CARDIOLOGY | Facility: HOSPITAL | Age: 79
Discharge: HOME | End: 2024-12-12
Payer: MEDICARE

## 2024-12-12 ENCOUNTER — OFFICE VISIT (OUTPATIENT)
Dept: CARDIOLOGY | Facility: HOSPITAL | Age: 79
End: 2024-12-12
Payer: MEDICARE

## 2024-12-12 VITALS — SYSTOLIC BLOOD PRESSURE: 134 MMHG | DIASTOLIC BLOOD PRESSURE: 72 MMHG | HEART RATE: 80 BPM | OXYGEN SATURATION: 98 %

## 2024-12-12 DIAGNOSIS — R00.2 PALPITATIONS: Primary | ICD-10-CM

## 2024-12-12 DIAGNOSIS — R00.2 PALPITATIONS: ICD-10-CM

## 2024-12-12 DIAGNOSIS — I10 ESSENTIAL HYPERTENSION: ICD-10-CM

## 2024-12-12 DIAGNOSIS — I70.90 ATHEROSCLEROSIS: ICD-10-CM

## 2024-12-12 PROCEDURE — 93246 EXT ECG>7D<15D RECORDING: CPT

## 2024-12-12 NOTE — PROGRESS NOTES
Chief Complaint  Palpitations    Subjective      History of Present Illness {  Problem List  Visit  Diagnosis   Encounters  Notes  Medications  Labs  Result Review Imaging  Media :23}     Joyce Jones, 79 y.o. female with history of hypertension, history of provoked DVT, hyperlipidemia, nonobstructive plaque seen on carotid ultrasound  presents to [facility] for Palpitations.    Patient was recently evaluated at [facility] emergency department for complaints of palpitations.  Emergency department work-up revealed normal troponin/BNP/TSH/d-dimer/electrolytes, CXR with no acute cardiopulmonary findings, and ECGs with no evidence of  arrhythmia/acute ischemia. Patient was instructed to follow-up at [facility] heart and valve center for further evaluation. Primary cardiology is Dr. Zamudio.    Patient presents today reporting increased palpitations lately with stress of daughter/brother passing away recently.  Describes symptoms as rapid heartbeat for 1-3 seconds; no weakness associated with palpitations. No near syncope/syncope. Caffeine use: 1 coffee per day.  Stays well hydrated with water. SBP generally controlled on home monitoring.       Objective     Vital Signs:   Vitals:    12/12/24 1548   BP: 134/72   Pulse: 80   SpO2: 98%     There is no height or weight on file to calculate BMI.  Physical Exam  Vitals and nursing note reviewed.   Constitutional:       Appearance: Normal appearance.   HENT:      Head: Normocephalic.   Eyes:      Extraocular Movements: Extraocular movements intact.   Neck:      Vascular: No carotid bruit.   Cardiovascular:      Rate and Rhythm: Normal rate and regular rhythm.      Pulses: Normal pulses.      Heart sounds: Normal heart sounds, S1 normal and S2 normal. No murmur heard.  Pulmonary:      Effort: Pulmonary effort is normal. No respiratory distress.      Breath sounds: Normal breath sounds.   Musculoskeletal:      Cervical  back: Neck supple.      Right lower leg: No edema.      Left lower leg: No edema.   Skin:     General: Skin is warm and dry.   Neurological:      General: No focal deficit present.      Mental Status: She is alert.   Psychiatric:         Mood and Affect: Mood normal.         Behavior: Behavior normal.         Thought Content: Thought content normal.        Data Reviewed:{ Labs  Result Review  Imaging  Med Tab  Media :23}     Single High Sensitivity Troponin T (11/25/2024 09:55)  D-dimer, Quantitative (11/25/2024 08:20)  BNP (11/25/2024 08:20)  TSH (11/25/2024 08:20)  Single High Sensitivity Troponin T (11/25/2024 08:20)  Magnesium (11/25/2024 08:20)  Comprehensive Metabolic Panel (11/25/2024 08:20)  CBC & Differential (11/25/2024 08:20)  XR Chest 1 View (11/25/2024 08:21)   ECG 12 Lead Other; repeats (11/25/2024 09:49)  ECG 12 Lead ED Triage Standing Order; Dysrhythmia (11/25/2024 08:03)    Adult Transthoracic Echo Complete W/ Cont if Necessary Per Protocol (01/09/2022 08:54)   Duplex Carotid Ultrasound CAR (10/27/2023 10:38)       Assessment & Plan   Assessment and Plan {CC Problem List  Visit Diagnosis  ROS  Review (Popup)  Health Maintenance  Quality  BestPractice  Medications  SmartSets  SnapShot Encounters  Media :23}     1. Palpitations  -New onset palpitation in setting of stress  -ED workup overall unremarkable; ECG without arrhythmia, normal TSH/electrolytes  -TTE 2022 with preserved LVEF, no significant valvular abnormality  -Regular rate/rhythm at time of exam  - Holter Monitor - 14 Days; Future  -Will message cardiology scheduling to help arrange follow-up with Dr. Zamudio for monitor review    2. Essential hypertension  -Reasonable control today  -Continue ambulatory BP monitoring    3. Nonobstructive carotid atherosclerosis  - Continue aspirin as prescribed      Follow Up {Instructions Charge Capture  Follow-up Communications :23}     Return if symptoms worsen or fail to  improve.      Patient was given instructions and counseling regarding her condition or for health maintenance advice. Please see specific information pulled into the AVS if appropriate. Patient was instructed to call the Heart and Valve Center with any questions, concerns, or worsening symptoms.    Dictated Utilizing Dragon Dictation   Please note that portions of this note were completed with a voice recognition program. Part of this note may be an electronic transcription/translation of spoken language to printed text using the Dragon Dictation System.

## 2024-12-12 NOTE — PROGRESS NOTES
[facility] Heart Monitor Documentation    Joyce Jones  1945  [id number]  12/12/24      [x] ZIO XT Patch  Model [id number] Prescribed for 14 Days    Serial Number: (N + 9 Digits) [id number]   Apply-By Date on Box:   USPS Tracking Number:   USPS Tracking        [] [employer] MINI PLUS Mobile Cardiac Telemetry  Model BGMINIPLUS Prescribed for  Days    Serial Number: (BGM + 7 Digits) BGM  Shipped-By Date on Box:   UPS Tracking Number: 1Z  UPS Tracking      [] [employer] MINI Holter Monitor  Model BGMINIEL Prescribed for  Days    Serial Number: (7 Digits)   Shipped-By Date on Box:   UPS Tracking Number: 1Z  UPS Tracking        This monitor was applied to the patient's chest and checked for proper functioning.  Ms. Joyce Jones was instructed in the proper use of this monitor.  She was given the opportunity to ask questions and left the office with the device 's instruction manual.    Maye Gonzalez MA, 16:12 EST, 12/12/24                  [facility] 8[facility]

## 2024-12-25 ENCOUNTER — HOSPITAL ENCOUNTER (EMERGENCY)
Facility: HOSPITAL | Age: 79
Discharge: HOME | End: 2024-12-25
Attending: EMERGENCY MEDICINE
Payer: MEDICARE

## 2024-12-25 VITALS
HEART RATE: 89 BPM | WEIGHT: 145 LBS | TEMPERATURE: 98.1 F | RESPIRATION RATE: 18 BRPM | BODY MASS INDEX: 23.3 KG/M2 | OXYGEN SATURATION: 100 % | DIASTOLIC BLOOD PRESSURE: 88 MMHG | SYSTOLIC BLOOD PRESSURE: 149 MMHG | HEIGHT: 66 IN

## 2024-12-25 DIAGNOSIS — R04.0 EPISTAXIS: Primary | ICD-10-CM

## 2024-12-25 DIAGNOSIS — I10 HYPERTENSION, UNSPECIFIED TYPE: ICD-10-CM

## 2024-12-25 LAB
ALBUMIN SERPL-MCNC: 4.4 G/DL (ref 3.5–5.2)
ALBUMIN/GLOB SERPL: 1.5 G/DL
ALP SERPL-CCNC: 82 U/L (ref 39–117)
ALT SERPL W P-5'-P-CCNC: 25 U/L (ref 1–33)
ANION GAP SERPL CALCULATED.3IONS-SCNC: 13 MMOL/L (ref 5–15)
AST SERPL-CCNC: 32 U/L (ref 1–32)
BASOPHILS # BLD AUTO: 0.02 10*3/MM3 (ref 0–0.2)
BASOPHILS NFR BLD AUTO: 0.3 % (ref 0–1.5)
BILIRUB SERPL-MCNC: 0.2 MG/DL (ref 0–1.2)
BUN SERPL-MCNC: 20 MG/DL (ref 8–23)
BUN/CREAT SERPL: 20.4 (ref 7–25)
CALCIUM SPEC-SCNC: 9.8 MG/DL (ref 8.6–10.5)
CHLORIDE SERPL-SCNC: 105 MMOL/L (ref 98–107)
CO2 SERPL-SCNC: 25 MMOL/L (ref 22–29)
CREAT SERPL-MCNC: 0.98 MG/DL (ref 0.57–1)
DEPRECATED RDW RBC AUTO: 57.9 FL (ref 37–54)
EGFRCR SERPLBLD CKD-EPI 2021: 59.2 ML/MIN/1.73
EOSINOPHIL # BLD AUTO: 0.06 10*3/MM3 (ref 0–0.4)
EOSINOPHIL NFR BLD AUTO: 0.8 % (ref 0.3–6.2)
ERYTHROCYTE [DISTWIDTH] IN BLOOD BY AUTOMATED COUNT: 14.9 % (ref 12.3–15.4)
GLOBULIN UR ELPH-MCNC: 3 GM/DL
GLUCOSE SERPL-MCNC: 106 MG/DL (ref 65–99)
HCT VFR BLD AUTO: 37.9 % (ref 34–46.6)
HGB BLD-MCNC: 12.5 G/DL (ref 12–15.9)
HOLD SPECIMEN: NORMAL
IMM GRANULOCYTES # BLD AUTO: 0.03 10*3/MM3 (ref 0–0.05)
IMM GRANULOCYTES NFR BLD AUTO: 0.4 % (ref 0–0.5)
LYMPHOCYTES # BLD AUTO: 1.19 10*3/MM3 (ref 0.7–3.1)
LYMPHOCYTES NFR BLD AUTO: 16.3 % (ref 19.6–45.3)
MCH RBC QN AUTO: 35.1 PG (ref 26.6–33)
MCHC RBC AUTO-ENTMCNC: 33 G/DL (ref 31.5–35.7)
MCV RBC AUTO: 106.5 FL (ref 79–97)
MONOCYTES # BLD AUTO: 0.66 10*3/MM3 (ref 0.1–0.9)
MONOCYTES NFR BLD AUTO: 9 % (ref 5–12)
NEUTROPHILS NFR BLD AUTO: 5.34 10*3/MM3 (ref 1.7–7)
NEUTROPHILS NFR BLD AUTO: 73.2 % (ref 42.7–76)
NRBC BLD AUTO-RTO: 0 /100 WBC (ref 0–0.2)
PLATELET # BLD AUTO: 252 10*3/MM3 (ref 140–450)
PMV BLD AUTO: 9 FL (ref 6–12)
POTASSIUM SERPL-SCNC: 3.7 MMOL/L (ref 3.5–5.2)
PROT SERPL-MCNC: 7.4 G/DL (ref 6–8.5)
RBC # BLD AUTO: 3.56 10*6/MM3 (ref 3.77–5.28)
SODIUM SERPL-SCNC: 143 MMOL/L (ref 136–145)
WBC NRBC COR # BLD AUTO: 7.3 10*3/MM3 (ref 3.4–10.8)
WHOLE BLOOD HOLD COAG: NORMAL
WHOLE BLOOD HOLD SPECIMEN: NORMAL

## 2024-12-25 PROCEDURE — 85025 COMPLETE CBC W/AUTO DIFF WBC: CPT | Performed by: PHYSICIAN ASSISTANT

## 2024-12-25 PROCEDURE — 80053 COMPREHEN METABOLIC PANEL: CPT | Performed by: PHYSICIAN ASSISTANT

## 2024-12-25 PROCEDURE — 99283 EMERGENCY DEPT VISIT LOW MDM: CPT

## 2024-12-25 RX ORDER — SODIUM CHLORIDE 0.9 % (FLUSH) 0.9 %
10 SYRINGE (ML) INJECTION AS NEEDED
Status: DISCONTINUED | OUTPATIENT
Start: 2024-12-25 | End: 2024-12-25 | Stop reason: HOSPADM

## 2024-12-25 RX ORDER — TRANEXAMIC ACID 100 MG/ML
500 INJECTION, SOLUTION INTRAVENOUS ONCE
Status: COMPLETED | OUTPATIENT
Start: 2024-12-25 | End: 2024-12-25

## 2024-12-25 RX ORDER — OXYMETAZOLINE HYDROCHLORIDE 0.05 G/100ML
1 SPRAY NASAL ONCE
Status: COMPLETED | OUTPATIENT
Start: 2024-12-25 | End: 2024-12-25

## 2024-12-25 RX ADMIN — Medication 1 SPRAY: at 17:53

## 2024-12-25 RX ADMIN — AMOXICILLIN AND CLAVULANATE POTASSIUM 1 TABLET: 875; 125 TABLET, FILM COATED ORAL at 17:52

## 2024-12-25 RX ADMIN — TRANEXAMIC ACID 500 MG: 100 INJECTION, SOLUTION INTRAVENOUS at 17:53

## 2024-12-25 NOTE — ED PROVIDER NOTES
Subjective   History of Present Illness  Pt is a 80 yo female presenting to ED with complaints of nose bleed. PMHx significant for HTN< HLD, Anemia, DVT/PE after knee replacement, Kidney stones, IBS and Arthritis. Pt explains nose began bleeding just PTA. Initially out both nostrils but just right currently. Denies nose injury. She has had some congestion the past few days. She does not take blood thinners. She denies nose pain. She is spitting up blood from the drainage. Denies headache, dizziness, syncope, CP or SOB. No prior hx of nosebleeds. No tobacco or ETOH use.     History provided by:  Patient, spouse and medical records      Review of Systems   Constitutional:  Negative for fever.   HENT:  Positive for congestion and nosebleeds. Negative for trouble swallowing.    Eyes:  Negative for visual disturbance.   Respiratory:  Negative for shortness of breath.    Cardiovascular:  Negative for chest pain.   Gastrointestinal:  Negative for abdominal pain, nausea and vomiting.   Neurological:  Negative for dizziness, syncope, weakness and headaches.       Past Medical History:   Diagnosis Date    Allergic     Allergic rhinitis     Anemia     Arthritis     Cholelithiasis     Deep vein thrombosis Clot following left knee replacement    Diverticulosis     Heart murmur     History of bone density study 03/02/2017    [facility]    History of mammography, screening 2016    Hyperlipidemia     Hypertension     Inguinal hernia 1978    repair    Irritable bowel syndrome Self    Kidney stone Embedded    Low back pain Scoliosis/kyphosis    Neuromuscular disorder Shoulder pain , arthritic issues, more severe    Osteoarthritis (arthritis due to wear and tear of joints)     Osteopenia     Personal history of DVT (deep vein thrombosis) 2005    right leg- due to surgery    Pneumonia 5 times    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 Has been under control . Function stable    Scoliosis Joyce    Urinary tract infection        Allergies   Allergen Reactions    Overton-2 Inhibitors (Sulfonamide) Anaphylaxis    Nabumetone Anaphylaxis    Nsaids GI Intolerance and Hives    Sulfa Antibiotics Shortness Of Breath    Celecoxib Hives    Penicillins Nausea And Vomiting and Dizziness    Zithromax [Azithromycin] Dizziness       Past Surgical History:   Procedure Laterality Date    BACK SURGERY  2002    Scoliosis bar. T11-sacrum    BREAST BIOPSY      over 15 years ago.     CERVICAL SPINE SURGERY  2000    cage    CHOLECYSTECTOMY  2014    COLONOSCOPY  2016    EYE SURGERY  Detached retina    FOOT SURGERY  2008    HERNIA REPAIR  3 both bowl areas    INGUINAL HERNIA REPAIR Bilateral 1978    2019 right side repair    INGUINAL HERNIA REPAIR  Both    JOINT REPLACEMENT  4 knee replacement surgeries    Right knee, 1980 approx. left knee , three replacements appears stable now.    KNEE SURGERY Bilateral 2017 2005 and x2 in 8/2017(left)    NASAL SEPTUM SURGERY  1999    SINUS SURGERY  1986    SPINE SURGERY  2004    TOTAL ABDOMINAL HYSTERECTOMY WITH SALPINGO OOPHORECTOMY  1986    Unilateral salpingo-oophorectomy/cervical dysplasia/AUB    TUBAL ABDOMINAL LIGATION  1982    URETHRAL DILATION      Multiple times       Family History   Problem Relation Age of Onset    Uterine cancer Mother         Metastatic to ovaries and colon; possibly bone    Hypertension Mother     Kidney disease Mother     Tuberculosis Mother     Arthritis Mother     Thyroid disease Mother     Cancer Father         Bladder    Heart failure Father     Stroke Father     Hypertension Father     Heart attack Father     Colon polyps Father     Arthritis Father     Hypertension Brother     Kidney disease Brother     Colon polyps Brother     Cancer Brother     Heart disease Brother     Kidney disease Brother     Hypertension Brother     Cancer Brother         Cancer    No Known Problems  Brother     No Known Problems Brother     Cancer Brother     Colon cancer Maternal Uncle     Colon cancer Maternal Uncle     Breast cancer Paternal Aunt     Other Daughter         CVID    Diabetes Daughter        Social History     Socioeconomic History    Marital status:    Tobacco Use    Smoking status: Never     Passive exposure: Never    Smokeless tobacco: Never   Vaping Use    Vaping status: Never Used   Substance and Sexual Activity    Alcohol use: Not Currently     Alcohol/week: 1.0 standard drink of alcohol     Types: 1 Glasses of wine per week     Comment: rarely at holidays    Drug use: Never    Sexual activity: Defer     Partners: Male     Birth control/protection: Post-menopausal, Surgical     Comment: Hysterectomy           Objective   Physical Exam  Vitals and nursing note reviewed.   Constitutional:       General: She is not in acute distress.  HENT:      Head: Atraumatic.      Nose: No nasal tenderness.      Right Nostril: Epistaxis present. No foreign body or septal hematoma.      Left Nostril: No foreign body, epistaxis or septal hematoma.   Eyes:      Conjunctiva/sclera: Conjunctivae normal.   Cardiovascular:      Rate and Rhythm: Normal rate.   Pulmonary:      Effort: Pulmonary effort is normal. No respiratory distress.   Abdominal:      Palpations: Abdomen is soft.      Tenderness: There is no abdominal tenderness.   Musculoskeletal:      Cervical back: Normal range of motion and neck supple.   Skin:     General: Skin is warm.   Neurological:      General: No focal deficit present.      Mental Status: She is alert.   Psychiatric:         Mood and Affect: Mood normal.         Epistaxis Management    Date/Time: 12/25/2024 5:00 PM    Performed by: Maye Rose PA  Authorized by: Prateek Cooper MD    Consent:     Consent obtained:  Verbal    Consent given by:  Patient and spouse    Risks, benefits, and alternatives were discussed: yes      Risks discussed:  Bleeding, infection, nasal  injury and pain  Universal protocol:     Patient identity confirmed:  Verbally with patient  Procedure details:     Treatment site:  R anterior    Treatment method:  Nasal tampon (Afrin and TXA prior to tampon)    Treatment episode: initial    Post-procedure details:     Assessment:  Bleeding stopped    Procedure completion:  Tolerated well, no immediate complications             ED Course  ED Course as of 12/25/24 1939   Wed Dec 25, 2024   1530 I personally reviewed and interpreted labs results and went over with patient. I personally reviewed and interpreted vitals. [RT]   1613 BP: 149/88 [RT]   1613 Hemoglobin: 12.5 [RT]   1613 Hematocrit: 37.9 [RT]   1644 Placed rhino rocket. Will continue to monitor patient in ED.  [RT]   1735 No recurrent bleeding and tolerating the nasal tampon. Will give dose of Augmentin in ED, place ENT referral and dc home.     Went over labs and tx plan with patient / .  [RT]      ED Course User Index  [RT] Maye Rose, PA      Recent Results (from the past 24 hours)   Comprehensive Metabolic Panel    Collection Time: 12/25/24  4:00 PM    Specimen: Blood   Result Value Ref Range    Glucose 106 (H) 65 - 99 mg/dL    BUN 20 8 - 23 mg/dL    Creatinine 0.98 0.57 - 1.00 mg/dL    Sodium 143 136 - 145 mmol/L    Potassium 3.7 3.5 - 5.2 mmol/L    Chloride 105 98 - 107 mmol/L    CO2 25.0 22.0 - 29.0 mmol/L    Calcium 9.8 8.6 - 10.5 mg/dL    Total Protein 7.4 6.0 - 8.5 g/dL    Albumin 4.4 3.5 - 5.2 g/dL    ALT (SGPT) 25 1 - 33 U/L    AST (SGOT) 32 1 - 32 U/L    Alkaline Phosphatase 82 39 - 117 U/L    Total Bilirubin 0.2 0.0 - 1.2 mg/dL    Globulin 3.0 gm/dL    A/G Ratio 1.5 g/dL    BUN/Creatinine Ratio 20.4 7.0 - 25.0    Anion Gap 13.0 5.0 - 15.0 mmol/L    eGFR 59.2 (L) >60.0 mL/min/1.73   CBC Auto Differential    Collection Time: 12/25/24  4:00 PM    Specimen: Blood   Result Value Ref Range    WBC 7.30 3.40 - 10.80 10*3/mm3    RBC 3.56 (L) 3.77 - 5.28 10*6/mm3    Hemoglobin 12.5 12.0 -  "15.9 g/dL    Hematocrit 37.9 34.0 - 46.6 %    .5 (H) 79.0 - 97.0 fL    MCH 35.1 (H) 26.6 - 33.0 pg    MCHC 33.0 31.5 - 35.7 g/dL    RDW 14.9 12.3 - 15.4 %    RDW-SD 57.9 (H) 37.0 - 54.0 fl    MPV 9.0 6.0 - 12.0 fL    Platelets 252 140 - 450 10*3/mm3    Neutrophil % 73.2 42.7 - 76.0 %    Lymphocyte % 16.3 (L) 19.6 - 45.3 %    Monocyte % 9.0 5.0 - 12.0 %    Eosinophil % 0.8 0.3 - 6.2 %    Basophil % 0.3 0.0 - 1.5 %    Immature Grans % 0.4 0.0 - 0.5 %    Neutrophils, Absolute 5.34 1.70 - 7.00 10*3/mm3    Lymphocytes, Absolute 1.19 0.70 - 3.10 10*3/mm3    Monocytes, Absolute 0.66 0.10 - 0.90 10*3/mm3    Eosinophils, Absolute 0.06 0.00 - 0.40 10*3/mm3    Basophils, Absolute 0.02 0.00 - 0.20 10*3/mm3    Immature Grans, Absolute 0.03 0.00 - 0.05 10*3/mm3    nRBC 0.0 0.0 - 0.2 /100 WBC   Green Top (Gel)    Collection Time: 12/25/24  4:00 PM   Result Value Ref Range    Extra Tube Hold for add-ons.    Lavender Top    Collection Time: 12/25/24  4:00 PM   Result Value Ref Range    Extra Tube hold for add-on    Gold Top - SST    Collection Time: 12/25/24  4:00 PM   Result Value Ref Range    Extra Tube Hold for add-ons.    Gray Top    Collection Time: 12/25/24  4:00 PM   Result Value Ref Range    Extra Tube Hold for add-ons.    Light Blue Top    Collection Time: 12/25/24  4:00 PM   Result Value Ref Range    Extra Tube Hold for add-ons.      Note: In addition to lab results from this visit, the labs listed above may include labs taken at another facility or during a different encounter within the last 24 hours. Please correlate lab times with ED admission and discharge times for further clarification of the services performed during this visit.    No orders to display     Vitals:    12/25/24 1518   BP: 149/88   BP Location: Left arm   Patient Position: Sitting   Pulse: 89   Resp: 18   Temp: 98.1 °F (36.7 °C)   TempSrc: Oral   SpO2: 100%   Weight: 65.8 kg (145 lb)   Height: 167.6 cm (66\")     Medications   sodium chloride " 0.9 % flush 10 mL (has no administration in time range)   oxymetazoline (AFRIN) nasal spray 1 spray (1 spray Nasal Given 12/25/24 1753)   tranexamic acid 500 MG/5ML nasal (ED/Crit Care for epistaxis) - VIAL DISPENSE 500 mg (500 mg Nasal Given 12/25/24 1753)   amoxicillin-clavulanate (AUGMENTIN) 875-125 MG per tablet 1 tablet (1 tablet Oral Given 12/25/24 1752)     ECG/EMG Results (last 24 hours)       ** No results found for the last 24 hours. **          No orders to display       DISCHARGE    Patient discharged in stable condition.    Reviewed implications of results, diagnosis, meds, responsibility to follow up, warning signs and symptoms of possible worsening, potential complications and reasons to return to ER.    Patient/Family voiced understanding of above instructions.    Discussed plan for discharge, as there is no emergent indication for admission.  Pt/family is agreeable and understands need for follow up and possible repeat testing.  Pt/family is aware that discharge does not mean that nothing is wrong but that it indicates no emergency is currently present that requires admission and they must continue care with follow-up as given below or with a physician of their choice.     FOLLOW-UP  Benton Pro MD  [address]  [place] [address]  [phone]    Schedule an appointment as soon as possible for a visit       [facility] EMERGENCY DEPARTMENT  [address] [address] [phone]    If symptoms worsen         Medication List        New Prescriptions      amoxicillin-clavulanate 875-125 MG per tablet  Commonly known as: AUGMENTIN  Take 1 tablet by mouth 2 (Two) Times a Day for 10 days.               Where to Get Your Medications        These medications were sent to [id number] - [place], [place] - [address]  [address] [facility] - [phone] [facility] [address] [address] [phone]   [address], [facility] [address]      Phone: [phone]   amoxicillin-clavulanate 875-125 MG per tablet                                                        Medical Decision Making  Pt is a 80 yo female presenting to ED with complaints of nosebleed. I had a discussion with the patient / family regarding ED course, diagnosis, diagnostic results and treatment plan including medications and admission / discharge. Discussed if new or worse symptoms / concerns to return to ED for further evaluation. Discussed need for close follow up with PCP / specialists.    DDx  Epistaxis, Anemia, Nasal injury, Occlusion, Hemorrhage     Problems Addressed:  Epistaxis: complicated acute illness or injury  Hypertension, unspecified type: complicated acute illness or injury    Amount and/or Complexity of Data Reviewed  Independent Historian: spouse  External Data Reviewed: notes.     Details: Reviewed previous non ED visits including prior labs, imaging, available notes, medications, allergies and surgical hx.   12-12-24 - Cardiology for palpitations   Labs: ordered. Decision-making details documented in ED Course.    Risk  OTC drugs.  Prescription drug management.        Final diagnoses:   Epistaxis   Hypertension, unspecified type       ED Disposition  ED Disposition       ED Disposition   Discharge    Condition   Stable    Comment   --               Benton Pro MD  [address]  [facility] [address]  [place] [id number]  [phone]    Schedule an appointment as soon as possible for a visit       [facility] EMERGENCY DEPARTMENT  [address] [facility] [address] [phone]    If symptoms worsen         Medication List        New Prescriptions      amoxicillin-clavulanate 875-125 MG per tablet  Commonly known as: AUGMENTIN  Take 1 tablet by mouth 2 (Two) Times a Day for 10 days.               Where to Get Your Medications        These medications were sent to [facility]  #[id number] - [place], [place] - [address] [facility] - [phone]  - [phone] FX  [address] [facility], [facility] [place] [address]      Phone: [phone]   amoxicillin-clavulanate 875-125 MG per tablet            Maye Rose PA  12/25/24 1939

## 2025-01-02 LAB
CV ZIO BASELINE AVG BPM: 73 BPM
CV ZIO BASELINE BPM HIGH: 117 BPM
CV ZIO BASELINE BPM LOW: 50 BPM
CV ZIO DEVICE ANALYSIS TIME: NORMAL
CV ZIO ECT SVE COUNT: 1941 EPISODES
CV ZIO ECT SVE CPLT COUNT: 78 EPISODES
CV ZIO ECT SVE CPLT FREQ: NORMAL
CV ZIO ECT SVE FREQ: NORMAL
CV ZIO ECT SVE TPLT COUNT: 20 EPISODES
CV ZIO ECT SVE TPLT FREQ: NORMAL
CV ZIO ECT VE COUNT: 615 EPISODES
CV ZIO ECT VE CPLT COUNT: 1 EPISODES
CV ZIO ECT VE CPLT FREQ: NORMAL
CV ZIO ECT VE FREQ: NORMAL
CV ZIO ECT VE TPLT COUNT: 0 EPISODES
CV ZIO ECT VE TPLT FREQ: 0
CV ZIO ECTOPIC SVE COUPLET RAW PERCENT: 0.01 %
CV ZIO ECTOPIC SVE ISOLATED PERCENT: 0.15 %
CV ZIO ECTOPIC SVE TRIPLET RAW PERCENT: 0 %
CV ZIO ECTOPIC VE COUPLET RAW PERCENT: 0 %
CV ZIO ECTOPIC VE ISOLATED PERCENT: 0.05 %
CV ZIO ECTOPIC VE TRIPLET RAW PERCENT: 0 %
CV ZIO ENROLLMENT END: NORMAL
CV ZIO ENROLLMENT START: NORMAL
CV ZIO PATIENT EVENTS DIARIES: 0
CV ZIO PATIENT EVENTS TRIGGERS: 0
CV ZIO PAUSE COUNT: 0
CV ZIO PRESCRIPTION STATUS: NORMAL
CV ZIO SVT COUNT: 0
CV ZIO TOTAL  ENROLLMENT PERIOD: NORMAL
CV ZIO VT COUNT: 0

## 2025-02-11 ENCOUNTER — HOSPITAL ENCOUNTER (EMERGENCY)
Facility: HOSPITAL | Age: 80
Discharge: HOME | End: 2025-02-11
Attending: EMERGENCY MEDICINE | Admitting: EMERGENCY MEDICINE
Payer: MEDICARE

## 2025-02-11 VITALS
WEIGHT: 146 LBS | HEART RATE: 79 BPM | SYSTOLIC BLOOD PRESSURE: 152 MMHG | HEIGHT: 66 IN | OXYGEN SATURATION: 95 % | DIASTOLIC BLOOD PRESSURE: 90 MMHG | TEMPERATURE: 98.1 F | BODY MASS INDEX: 23.46 KG/M2 | RESPIRATION RATE: 18 BRPM

## 2025-02-11 DIAGNOSIS — N39.0 ACUTE UTI: Primary | ICD-10-CM

## 2025-02-11 DIAGNOSIS — R53.1 GENERALIZED WEAKNESS: ICD-10-CM

## 2025-02-11 LAB
ALBUMIN SERPL-MCNC: 4.5 G/DL (ref 3.5–5.2)
ALBUMIN/GLOB SERPL: 1.7 G/DL
ALP SERPL-CCNC: 81 U/L (ref 39–117)
ALT SERPL W P-5'-P-CCNC: 21 U/L (ref 1–33)
ANION GAP SERPL CALCULATED.3IONS-SCNC: 14 MMOL/L (ref 5–15)
AST SERPL-CCNC: 25 U/L (ref 1–32)
BACTERIA UR QL AUTO: ABNORMAL /HPF
BASOPHILS # BLD AUTO: 0.02 10*3/MM3 (ref 0–0.2)
BASOPHILS NFR BLD AUTO: 0.2 % (ref 0–1.5)
BILIRUB SERPL-MCNC: 0.3 MG/DL (ref 0–1.2)
BILIRUB UR QL STRIP: NEGATIVE
BUN SERPL-MCNC: 26 MG/DL (ref 8–23)
BUN/CREAT SERPL: 30.2 (ref 7–25)
CALCIUM SPEC-SCNC: 9.9 MG/DL (ref 8.6–10.5)
CHLORIDE SERPL-SCNC: 98 MMOL/L (ref 98–107)
CLARITY UR: CLEAR
CO2 SERPL-SCNC: 25 MMOL/L (ref 22–29)
COLOR UR: YELLOW
CREAT SERPL-MCNC: 0.86 MG/DL (ref 0.57–1)
D-LACTATE SERPL-SCNC: 1.7 MMOL/L (ref 0.5–2)
DEPRECATED RDW RBC AUTO: 54.4 FL (ref 37–54)
EGFRCR SERPLBLD CKD-EPI 2021: 69.2 ML/MIN/1.73
EOSINOPHIL # BLD AUTO: 0.05 10*3/MM3 (ref 0–0.4)
EOSINOPHIL NFR BLD AUTO: 0.6 % (ref 0.3–6.2)
ERYTHROCYTE [DISTWIDTH] IN BLOOD BY AUTOMATED COUNT: 14 % (ref 12.3–15.4)
GLOBULIN UR ELPH-MCNC: 2.6 GM/DL
GLUCOSE SERPL-MCNC: 126 MG/DL (ref 65–99)
GLUCOSE UR STRIP-MCNC: NEGATIVE MG/DL
HCT VFR BLD AUTO: 39.4 % (ref 34–46.6)
HGB BLD-MCNC: 12.7 G/DL (ref 12–15.9)
HGB UR QL STRIP.AUTO: NEGATIVE
HOLD SPECIMEN: NORMAL
HYALINE CASTS UR QL AUTO: ABNORMAL /LPF
IMM GRANULOCYTES # BLD AUTO: 0.04 10*3/MM3 (ref 0–0.05)
IMM GRANULOCYTES NFR BLD AUTO: 0.5 % (ref 0–0.5)
KETONES UR QL STRIP: NEGATIVE
LEUKOCYTE ESTERASE UR QL STRIP.AUTO: ABNORMAL
LIPASE SERPL-CCNC: 52 U/L (ref 13–60)
LYMPHOCYTES # BLD AUTO: 1.42 10*3/MM3 (ref 0.7–3.1)
LYMPHOCYTES NFR BLD AUTO: 16.2 % (ref 19.6–45.3)
MCH RBC QN AUTO: 33.9 PG (ref 26.6–33)
MCHC RBC AUTO-ENTMCNC: 32.2 G/DL (ref 31.5–35.7)
MCV RBC AUTO: 105.1 FL (ref 79–97)
MONOCYTES # BLD AUTO: 0.6 10*3/MM3 (ref 0.1–0.9)
MONOCYTES NFR BLD AUTO: 6.8 % (ref 5–12)
NEUTROPHILS NFR BLD AUTO: 6.64 10*3/MM3 (ref 1.7–7)
NEUTROPHILS NFR BLD AUTO: 75.7 % (ref 42.7–76)
NITRITE UR QL STRIP: NEGATIVE
NRBC BLD AUTO-RTO: 0 /100 WBC (ref 0–0.2)
PH UR STRIP.AUTO: 6 [PH] (ref 5–8)
PLATELET # BLD AUTO: 245 10*3/MM3 (ref 140–450)
PMV BLD AUTO: 9.4 FL (ref 6–12)
POTASSIUM SERPL-SCNC: 3.9 MMOL/L (ref 3.5–5.2)
PROT SERPL-MCNC: 7.1 G/DL (ref 6–8.5)
PROT UR QL STRIP: NEGATIVE
RBC # BLD AUTO: 3.75 10*6/MM3 (ref 3.77–5.28)
RBC # UR STRIP: ABNORMAL /HPF
REF LAB TEST METHOD: ABNORMAL
SODIUM SERPL-SCNC: 137 MMOL/L (ref 136–145)
SP GR UR STRIP: 1.01 (ref 1–1.03)
SQUAMOUS #/AREA URNS HPF: ABNORMAL /HPF
UROBILINOGEN UR QL STRIP: ABNORMAL
WBC # UR STRIP: ABNORMAL /HPF
WBC NRBC COR # BLD AUTO: 8.77 10*3/MM3 (ref 3.4–10.8)
WHOLE BLOOD HOLD COAG: NORMAL
WHOLE BLOOD HOLD SPECIMEN: NORMAL

## 2025-02-11 PROCEDURE — 83605 ASSAY OF LACTIC ACID: CPT | Performed by: EMERGENCY MEDICINE

## 2025-02-11 PROCEDURE — 85025 COMPLETE CBC W/AUTO DIFF WBC: CPT | Performed by: EMERGENCY MEDICINE

## 2025-02-11 PROCEDURE — 83690 ASSAY OF LIPASE: CPT | Performed by: EMERGENCY MEDICINE

## 2025-02-11 PROCEDURE — 81001 URINALYSIS AUTO W/SCOPE: CPT | Performed by: EMERGENCY MEDICINE

## 2025-02-11 PROCEDURE — 80053 COMPREHEN METABOLIC PANEL: CPT | Performed by: EMERGENCY MEDICINE

## 2025-02-11 PROCEDURE — 99283 EMERGENCY DEPT VISIT LOW MDM: CPT

## 2025-02-11 PROCEDURE — 36415 COLL VENOUS BLD VENIPUNCTURE: CPT

## 2025-02-11 PROCEDURE — 87086 URINE CULTURE/COLONY COUNT: CPT | Performed by: PHYSICIAN ASSISTANT

## 2025-02-11 RX ORDER — CEFUROXIME AXETIL 500 MG/1
500 TABLET ORAL 2 TIMES DAILY
Qty: 14 TABLET | Refills: 0 | Status: SHIPPED | OUTPATIENT
Start: 2025-02-11

## 2025-02-11 RX ORDER — SODIUM CHLORIDE 9 MG/ML
10 INJECTION, SOLUTION INTRAMUSCULAR; INTRAVENOUS; SUBCUTANEOUS AS NEEDED
Status: DISCONTINUED | OUTPATIENT
Start: 2025-02-11 | End: 2025-02-11 | Stop reason: HOSPADM

## 2025-02-11 NOTE — ED PROVIDER NOTES
Subjective   History of Present Illness  79-year-old female presents emergency department today with nausea and weakness.  Reports she was treated with Augmentin due to a nosebleed had of her nose packed.  States that that this was done around [place] time.  States that a couple weeks after that she just generally not felt well.  She has no chest pain no shortness of breath.  She has had no pedal edema.  She reports that she just generally does not feel well and feels weak.  She states she has had a little bit of frequency but no urgency no dysuria denies hematuria.  She has had no fevers no chills.  No upper respiratory symptoms.  No rash or lesions.    History provided by:  Patient   used: No    Weakness - Generalized  Severity:  Mild  Onset quality:  Gradual  Duration:  2 weeks  Timing:  Intermittent  Progression:  Waxing and waning  Chronicity:  Recurrent  Context: not alcohol use    Relieved by:  Nothing  Worsened by:  Nothing  Ineffective treatments:  None tried  Associated symptoms: arthralgias, nausea and urgency    Associated symptoms: no abdominal pain, no chest pain, no cough, no diarrhea, no difficulty walking, no drooling, no dysphagia, no dysuria, no numbness in extremities, no fever, no foul-smelling urine, no frequency, no headaches, no lethargy, no seizures, no sensory-motor deficit, no shortness of breath, no stroke symptoms, no syncope and no vision change    Associated symptoms comment:  Has arthritis and chronic  arthralgias worse in the winter  Risk factors: no congestive heart failure, no coronary artery disease, no family hx of stroke, no heart disease, no new medications and no recent stressors        Review of Systems   Constitutional:  Negative for fever.   HENT:  Negative for drooling.    Respiratory:  Negative for cough, chest tightness and shortness of breath.    Cardiovascular:  Negative for chest pain, palpitations and syncope.   Gastrointestinal:  Positive for  nausea. Negative for abdominal pain, diarrhea and dysphagia.   Genitourinary:  Positive for urgency. Negative for dysuria and frequency.   Musculoskeletal:  Positive for arthralgias.   Neurological:  Negative for seizures and headaches.       Past Medical History:   Diagnosis Date    Allergic     Allergic rhinitis     Anemia     Arthritis     Cholelithiasis     Deep vein thrombosis Clot following left knee replacement    Diverticulosis     Heart murmur     History of bone density study [facility]/02/2017    [facility]    History of mammography, screening 2016    Hyperlipidemia     Hypertension     Inguinal hernia 1978    repair    Irritable bowel syndrome Self    Kidney stone Embedded    Low back pain Scoliosis/kyphosis    Neuromuscular disorder Shoulder pain , arthritic issues, more severe    Osteoarthritis (arthritis due to wear and tear of joints)     Osteopenia     Personal history of DVT (deep vein thrombosis) 2005    right leg- due to surgery    Pneumonia 5 times    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 Has been under control . Function stable    Scoliosis Joyce    Urinary tract infection        Allergies   Allergen Reactions    Overton-2 Inhibitors (Sulfonamide) Anaphylaxis    Nabumetone Anaphylaxis    Nsaids GI Intolerance and Hives    Sulfa Antibiotics Shortness Of Breath    Celecoxib Hives    Penicillins Nausea And Vomiting and Dizziness    Zithromax [Azithromycin] Dizziness       Past Surgical History:   Procedure Laterality Date    BACK SURGERY  2002    Scoliosis bar. T11-sacrum    BREAST BIOPSY      over 15 years ago.     CERVICAL SPINE SURGERY  2000    cage    CHOLECYSTECTOMY  2014    COLONOSCOPY  2016    EYE SURGERY  Detached retina    FOOT SURGERY  2008    HERNIA REPAIR  3 both bowl areas    INGUINAL HERNIA REPAIR Bilateral 1978 2019 right side repair    INGUINAL HERNIA REPAIR  Both     JOINT REPLACEMENT  4 knee replacement surgeries    Right knee, 1980 approx. left knee , three replacements appears stable now.    KNEE SURGERY Bilateral 2017    2005 and x2 in 8/2017(left)    NASAL SEPTUM SURGERY  1999    SINUS SURGERY  1986    SPINE SURGERY  2004    TOTAL ABDOMINAL HYSTERECTOMY WITH SALPINGO OOPHORECTOMY  1986    Unilateral salpingo-oophorectomy/cervical dysplasia/AUB    TUBAL ABDOMINAL LIGATION  1982    URETHRAL DILATION      Multiple times       Family History   Problem Relation Age of Onset    Uterine cancer Mother         Metastatic to ovaries and colon; possibly bone    Hypertension Mother     Kidney disease Mother     Tuberculosis Mother     Arthritis Mother     Thyroid disease Mother     Cancer Father         Bladder    Heart failure Father     Stroke Father     Hypertension Father     Heart attack Father     Colon polyps Father     Arthritis Father     Hypertension Brother     Kidney disease Brother     Colon polyps Brother     Cancer Brother     Heart disease Brother     Kidney disease Brother     Hypertension Brother     Cancer Brother         Cancer    No Known Problems Brother     No Known Problems Brother     Cancer Brother     Colon cancer Maternal Uncle     Colon cancer Maternal Uncle     Breast cancer Paternal Aunt     Other Daughter         CVID    Diabetes Daughter        Social History     Socioeconomic History    Marital status:    Tobacco Use    Smoking status: Never     Passive exposure: Never    Smokeless tobacco: Never   Vaping Use    Vaping status: Never Used   Substance and Sexual Activity    Alcohol use: Not Currently     Alcohol/week: 1.0 standard drink of alcohol     Types: 1 Glasses of wine per week     Comment: rarely at holidays    Drug use: Never    Sexual activity: Defer     Partners: Male     Birth control/protection: Post-menopausal, Surgical     Comment: Hysterectomy           Objective   Physical Exam  Vitals and nursing note reviewed.    Constitutional:       General: She is not in acute distress.     Appearance: She is well-developed. She is not diaphoretic.   HENT:      Head: Normocephalic and atraumatic.      Nose: Nose normal.      Mouth/Throat:      Mouth: Mucous membranes are moist.   Eyes:      General: No scleral icterus.     Conjunctiva/sclera: Conjunctivae normal.   Cardiovascular:      Rate and Rhythm: Normal rate and regular rhythm.      Heart sounds: Normal heart sounds. No murmur heard.  Pulmonary:      Effort: Pulmonary effort is normal. No respiratory distress.      Breath sounds: Normal breath sounds.   Abdominal:      General: Bowel sounds are normal.      Palpations: Abdomen is soft.      Tenderness: There is no abdominal tenderness.   Musculoskeletal:         General: Normal range of motion.      Cervical back: Normal range of motion and neck supple.   Skin:     General: Skin is warm and dry.      Comments: No rash no lesions.   Neurological:      Mental Status: She is alert and oriented to person, place, and time.   Psychiatric:         Behavior: Behavior normal.         Procedures           ED Course  ED Course as of 02/11/25 1206 Tue Feb 11, 2025 1206 Laboratory data showed a white count of 8.7 with an H&H of 13 and 39.  Platelet count noted to be 245.  CMP had a glucose 126 BUN of 26 creatinine of 0.86.  Sodium 137 potassium 3.9.  LFTs unremarkable.  Urinalysis had trace leukocyte esterase had 21-50 WBCs +2 bacteria squamous epithelials.  Urine cultures pending. [RUPERTO]   1206 Discussed the UTIs probably the general weakness problem.  We do not see any other maladies.  She will follow-up with her primary care doctor and urine cultures been ordered. [RUPERTO]   1206 Started on Ceftin 500 mg twice daily. [RUPERTO]      ED Course User Index  [RUPERTO] Robert Monteiro PA      Recent Results (from the past 24 hours)   Comprehensive Metabolic Panel    Collection Time: 02/11/25 10:59 AM    Specimen: Blood   Result Value Ref Range     Glucose 126 (H) 65 - 99 mg/dL    BUN 26 (H) 8 - 23 mg/dL    Creatinine 0.86 0.57 - 1.00 mg/dL    Sodium 137 136 - 145 mmol/L    Potassium 3.9 3.5 - 5.2 mmol/L    Chloride 98 98 - 107 mmol/L    CO2 25.0 22.0 - 29.0 mmol/L    Calcium 9.9 8.6 - 10.5 mg/dL    Total Protein 7.1 6.0 - 8.5 g/dL    Albumin 4.5 3.5 - 5.2 g/dL    ALT (SGPT) 21 1 - 33 U/L    AST (SGOT) 25 1 - 32 U/L    Alkaline Phosphatase 81 39 - 117 U/L    Total Bilirubin 0.3 0.0 - 1.2 mg/dL    Globulin 2.6 gm/dL    A/G Ratio 1.7 g/dL    BUN/Creatinine Ratio 30.2 (H) 7.0 - 25.0    Anion Gap 14.0 5.0 - 15.0 mmol/L    eGFR 69.2 >60.0 mL/min/1.73   Lipase    Collection Time: 02/11/25 10:59 AM    Specimen: Blood   Result Value Ref Range    Lipase 52 13 - 60 U/L   Lactic Acid, Plasma    Collection Time: 02/11/25 10:59 AM    Specimen: Blood   Result Value Ref Range    Lactate 1.7 0.5 - 2.0 mmol/L   Green Top (Gel)    Collection Time: 02/11/25 10:59 AM   Result Value Ref Range    Extra Tube Hold for add-ons.    Lavender Top    Collection Time: 02/11/25 10:59 AM   Result Value Ref Range    Extra Tube hold for add-on    Gold Top - SST    Collection Time: 02/11/25 10:59 AM   Result Value Ref Range    Extra Tube Hold for add-ons.    Gray Top    Collection Time: 02/11/25 10:59 AM   Result Value Ref Range    Extra Tube Hold for add-ons.    Light Blue Top    Collection Time: 02/11/25 10:59 AM   Result Value Ref Range    Extra Tube Hold for add-ons.    CBC Auto Differential    Collection Time: 02/11/25 10:59 AM    Specimen: Blood   Result Value Ref Range    WBC 8.77 3.40 - 10.80 10*3/mm3    RBC 3.75 (L) 3.77 - 5.28 10*6/mm3    Hemoglobin 12.7 12.0 - 15.9 g/dL    Hematocrit 39.4 34.0 - 46.6 %    .1 (H) 79.0 - 97.0 fL    MCH 33.9 (H) 26.6 - 33.0 pg    MCHC 32.2 31.5 - 35.7 g/dL    RDW 14.0 12.3 - 15.4 %    RDW-SD 54.4 (H) 37.0 - 54.0 fl    MPV 9.4 6.0 - 12.0 fL    Platelets 245 140 - 450 10*3/mm3    Neutrophil % 75.7 42.7 - 76.0 %    Lymphocyte % 16.2 (L) 19.6 - 45.3  %    Monocyte % 6.8 5.0 - 12.0 %    Eosinophil % 0.6 0.3 - 6.2 %    Basophil % 0.2 0.0 - 1.5 %    Immature Grans % 0.5 0.0 - 0.5 %    Neutrophils, Absolute 6.64 1.70 - 7.00 10*3/mm3    Lymphocytes, Absolute 1.42 0.70 - 3.10 10*3/mm3    Monocytes, Absolute 0.60 0.10 - 0.90 10*3/mm3    Eosinophils, Absolute 0.05 0.00 - 0.40 10*3/mm3    Basophils, Absolute 0.02 0.00 - 0.20 10*3/mm3    Immature Grans, Absolute 0.04 0.00 - 0.05 10*3/mm3    nRBC 0.0 0.0 - 0.2 /100 WBC   Urinalysis With Microscopic If Indicated (No Culture) - Urine, Clean Catch    Collection Time: 02/11/25 11:03 AM    Specimen: Urine, Clean Catch   Result Value Ref Range    Color, UA Yellow Yellow, Straw    Appearance, UA Clear Clear    pH, UA 6.0 5.0 - 8.0    Specific Gravity, UA 1.006 1.001 - 1.030    Glucose, UA Negative Negative    Ketones, UA Negative Negative    Bilirubin, UA Negative Negative    Blood, UA Negative Negative    Protein, UA Negative Negative    Leuk Esterase, UA Trace (A) Negative    Nitrite, UA Negative Negative    Urobilinogen, UA 0.2 E.U./dL 0.2 - 1.0 E.U./dL   Urinalysis, Microscopic Only - Urine, Clean Catch    Collection Time: 02/11/25 11:03 AM    Specimen: Urine, Clean Catch   Result Value Ref Range    RBC, UA 0-2 None Seen, 0-2 /HPF    WBC, UA 21-50 (A) None Seen, 0-2 /HPF    Bacteria, UA 2+ (A) None Seen, Trace /HPF    Squamous Epithelial Cells, UA None Seen None Seen, 0-2 /HPF    Hyaline Casts, UA None Seen 0 - 6 /LPF    Methodology Automated Microscopy      Note: In addition to lab results from this visit, the labs listed above may include labs taken at another facility or during a different encounter within the last 24 hours. Please correlate lab times with ED admission and discharge times for further clarification of the services performed during this visit.    No orders to display     Vitals:    02/11/25 1051   BP: 152/90   BP Location: Left arm   Patient Position: Sitting   Pulse: 79   Resp: 18   Temp: 98.1 °F (36.7 °C)  "  TempSrc: Oral   SpO2: 95%   Weight: 66.2 kg (146 lb)   Height: 167.6 cm (66\")     Medications   Sodium Chloride (PF) 0.9 % 10 mL (has no administration in time range)     ECG/EMG Results (last 24 hours)       ** No results found for the last 24 hours. **          No orders to display                                                        Medical Decision Making  Problems Addressed:  Acute UTI: complicated acute illness or injury  Generalized weakness: complicated acute illness or injury    Amount and/or Complexity of Data Reviewed  Labs: ordered.    Risk  Prescription drug management.        Final diagnoses:   Acute UTI   Generalized weakness       ED Disposition  ED Disposition       ED Disposition   Discharge    Condition   Stable    Comment   --               Gayle Low DO  [address] [id number] [phone]      Call for appointment         Medication List        New Prescriptions      cefuroxime 500 MG tablet  Commonly known as: CEFTIN  Take 1 tablet by mouth 2 (Two) Times a Day.               Where to Get Your Medications        These medications were sent to [id number] - [place], [place] - [address] [facility] [place] [address] [facility] - [phone] [facility] [phone] FX  [address] [facility] [address]      Phone: [phone]   cefuroxime 500 MG tablet            Robert Monteiro PA  02/14/25 1952    "

## 2025-02-12 ENCOUNTER — PATIENT OUTREACH (OUTPATIENT)
Dept: CASE MANAGEMENT | Facility: OTHER | Age: 80
End: 2025-02-12
Payer: MEDICARE

## 2025-02-12 LAB — BACTERIA SPEC AEROBE CULT: NO GROWTH

## 2025-02-12 NOTE — OUTREACH NOTE
AMBULATORY CASE MANAGEMENT NOTE    Names and Relationships of Patient/Support Persons: Contact: Joyce Jones; Relationship: Self -     Patient Outreach    Called pt seen in ED yesterday for UTI. Pt has not started her abx yet, she is allergic to PCN and wants to be sure it is ok to take. Suggested she consult with her pcp or pharmacy. Encd she start abx as soon as possible and increase fluids. Pt needs a new pcp and  Hub number provided. Pt denied further needs and voiced appreciation for the call.    Kajal BARRETO  Ambulatory Case Management    2/12/2025, 12:40 EST

## 2025-02-24 ENCOUNTER — OFFICE VISIT (OUTPATIENT)
Dept: FAMILY MEDICINE CLINIC | Facility: CLINIC | Age: 80
End: 2025-02-24
Payer: MEDICARE

## 2025-02-24 VITALS
HEART RATE: 72 BPM | DIASTOLIC BLOOD PRESSURE: 92 MMHG | HEIGHT: 66 IN | SYSTOLIC BLOOD PRESSURE: 130 MMHG | BODY MASS INDEX: 23.24 KG/M2 | WEIGHT: 144.6 LBS | OXYGEN SATURATION: 98 %

## 2025-02-24 DIAGNOSIS — F41.9 ANXIETY: Primary | ICD-10-CM

## 2025-02-24 DIAGNOSIS — E55.9 VITAMIN D DEFICIENCY: ICD-10-CM

## 2025-02-24 DIAGNOSIS — Z13.220 SCREENING FOR CHOLESTEROL LEVEL: ICD-10-CM

## 2025-02-24 DIAGNOSIS — R73.09 ABNORMAL GLUCOSE: ICD-10-CM

## 2025-02-24 DIAGNOSIS — D75.89 MACROCYTOSIS: ICD-10-CM

## 2025-02-24 PROCEDURE — 1160F RVW MEDS BY RX/DR IN RCRD: CPT | Performed by: STUDENT IN AN ORGANIZED HEALTH CARE EDUCATION/TRAINING PROGRAM

## 2025-02-24 PROCEDURE — 3075F SYST BP GE 130 - 139MM HG: CPT | Performed by: STUDENT IN AN ORGANIZED HEALTH CARE EDUCATION/TRAINING PROGRAM

## 2025-02-24 PROCEDURE — 3080F DIAST BP >= 90 MM HG: CPT | Performed by: STUDENT IN AN ORGANIZED HEALTH CARE EDUCATION/TRAINING PROGRAM

## 2025-02-24 PROCEDURE — 1159F MED LIST DOCD IN RCRD: CPT | Performed by: STUDENT IN AN ORGANIZED HEALTH CARE EDUCATION/TRAINING PROGRAM

## 2025-02-24 PROCEDURE — 99214 OFFICE O/P EST MOD 30 MIN: CPT | Performed by: STUDENT IN AN ORGANIZED HEALTH CARE EDUCATION/TRAINING PROGRAM

## 2025-02-24 PROCEDURE — 1126F AMNT PAIN NOTED NONE PRSNT: CPT | Performed by: STUDENT IN AN ORGANIZED HEALTH CARE EDUCATION/TRAINING PROGRAM

## 2025-02-24 RX ORDER — CITALOPRAM HYDROBROMIDE 10 MG/1
10 TABLET ORAL EVERY MORNING
Qty: 90 TABLET | Refills: 3 | Status: SHIPPED | OUTPATIENT
Start: 2025-02-24

## 2025-02-24 NOTE — PROGRESS NOTES
Established Office Visit      Patient Name: Joyce Jones  : 1945   MRN: [id number]   Care Team: Patient Care Team:  Gayle Low DO as PCP - General (Internal Medicine)  Jameel Quiroz MD (Rheumatology)  Robert Zamudio MD as Cardiologist (Cardiology)  Kajal Gusman, RN as Ambulatory  ([facility] [place]note 2[place]    Chief Complaint:    Chief Complaint   Patient presents with    Fatigue       History of Present Illness: Joyce Jones is a 79 y.o. female with fibromyalgia, PMR (following with rheumatology), chronic low back pain with sciatica, HTN who is here today to follow up with concerns as detailed below.     She reports intermittent fatigue, generalized weakness and sometimes with diaphoresis coming and going. She feels very overwhelmed during episodes and describes racing thoughts, worry. This has been ongoing for several months but seems to be more prominent lately. Symptoms occur abruptly and cannot identify trigger or pattern other than it is more often on days where she has a lot on her plate. Denies true muscle weakness or changes in motor function. Typically occurring later on in the day and lasting 20 minutes or so. Symptoms improve with resting, eating.     She denies chest pain, short of breath, nausea, diarrhea, constipation, fever, illness, rashes, numbness, or tingling. Occasional palpitations.     She reports having a lot going on within her life right now. He brother passed away recently. Her  is hospitalized. She is taking care of her grandkids. Financial struggles. She has not taken her celexa in several weeks-months and would like to restart this. She reports several different family stressors and some stress related to work (retired but picks up jobs intermittently)  She feels these symptoms only started after she stopped the celexa.     Subjective      Review of Systems:   Review of Systems - See HPI    I have reviewed and the following portions of  "the patient's history were updated as appropriate: past family history, past medical history, past social history, past surgical history and problem list.    Medications:     Current Outpatient Medications:     acetaminophen (TYLENOL) 325 MG tablet, Take 2 tablets by mouth Every 6 (Six) Hours As Needed for Mild Pain., Disp: , Rfl:     Aspirin Low Dose 81 MG EC tablet, Take 1 tablet by mouth Daily., Disp: , Rfl:     citalopram (CeleXA) 10 MG tablet, Take 1 tablet by mouth Every Morning., Disp: 90 tablet, Rfl: 3    traMADol-acetaminophen (ULTRACET) 37.5-325 MG per tablet, Take 1 tablet by mouth Every 6 (Six) Hours As Needed for Moderate Pain., Disp: , Rfl:     Allergies:   Allergies   Allergen Reactions    Overton-2 Inhibitors (Sulfonamide) Anaphylaxis    Nabumetone Anaphylaxis    Nsaids GI Intolerance and Hives    Sulfa Antibiotics Shortness Of Breath    Celecoxib Hives    Penicillins Nausea And Vomiting and Dizziness    Zithromax [Azithromycin] Dizziness       Objective     Physical Exam:  Vital Signs:   Vitals:    02/24/25 1330   BP: 130/92   Pulse: 72   SpO2: 98%   Weight: 65.6 kg (144 lb 9.6 oz)   Height: 167.6 cm (66\")     Body mass index is 23.34 kg/m².     Physical Exam  Vitals reviewed.   Constitutional:       Appearance: Normal appearance. She is not ill-appearing.   Cardiovascular:      Rate and Rhythm: Normal rate and regular rhythm.      Heart sounds: Normal heart sounds. No murmur heard.  Pulmonary:      Effort: Pulmonary effort is normal. No respiratory distress.      Breath sounds: Normal breath sounds. No wheezing.   Abdominal:      General: Abdomen is flat. There is no distension.      Palpations: Abdomen is soft.      Tenderness: There is no abdominal tenderness.   Skin:     General: Skin is warm and dry.   Neurological:      Mental Status: She is alert.   Psychiatric:         Mood and Affect: Mood normal.         Behavior: Behavior normal.         Judgment: Judgment normal.         Assessment / Plan  "     Assessment/Plan:   Problems Addressed This Visit  Diagnoses and all orders for this visit:    1. Anxiety (Primary)  -     citalopram (CeleXA) 10 MG tablet; Take 1 tablet by mouth Every Morning.  Dispense: 90 tablet; Refill: 3  -     Ambulatory Referral to Behavioral Health    2. Macrocytosis  -     Vitamin B12; Future  -     Folate; Future    3. Abnormal glucose  -     Hemoglobin A1c; Future    4. Vitamin D deficiency  -     Vitamin D 25 hydroxy; Future    5. Screening for cholesterol level        When reviewing her symptoms, we discussed anxiety as possible etiology for her intermittent fatigue, feeling overwhelmed and experiencing subjective weakness which after clarification she states she is not truly weak, just feels she needs to sit down. Her symptoms are self limited and last about 20 minutes before spontaneously improving. They are often times associated with racing thoughts/worry and have only been prominent while being out of her celexa. We will resume celexa 10mg daily and check in in 6-8 weeks, sooner if needed.     She recently completed holter monitor study which was relatively benign other than first degree block. EKG 11/2024 NSR, no ischemic changes. Following with cardiology.     CBC reveals macrocytosis without anemia- b12 and folate today    Patient has been erroneously marked as diabetic. Based on the available clinical information, she does not have diabetes and should therefore be excluded from diabetic health maintenance and quality measures for the remainder of the reporting period.      Plan of care reviewed with patient at the conclusion of today's visit. Education was provided regarding diagnosis and management.  Patient verbalizes understanding of and agreement with management plan.    Follow Up:   Return in about 2 months (around 4/24/2025) for Medicare Wellness.        DO LANA Aguiar RD  [facility]  [facility]  ED  [facility] [address]  Fax [phone]  Phone [phone]

## 2025-04-04 ENCOUNTER — LAB (OUTPATIENT)
Dept: LAB | Facility: HOSPITAL | Age: 80
End: 2025-04-04
Payer: MEDICARE

## 2025-04-04 ENCOUNTER — OFFICE VISIT (OUTPATIENT)
Dept: FAMILY MEDICINE CLINIC | Facility: CLINIC | Age: 80
End: 2025-04-04
Payer: MEDICARE

## 2025-04-04 VITALS
DIASTOLIC BLOOD PRESSURE: 70 MMHG | HEIGHT: 66 IN | BODY MASS INDEX: 23.63 KG/M2 | SYSTOLIC BLOOD PRESSURE: 134 MMHG | HEART RATE: 83 BPM | OXYGEN SATURATION: 99 % | WEIGHT: 147 LBS

## 2025-04-04 DIAGNOSIS — F41.9 ANXIETY: Primary | ICD-10-CM

## 2025-04-04 DIAGNOSIS — E55.9 VITAMIN D DEFICIENCY: ICD-10-CM

## 2025-04-04 DIAGNOSIS — D75.89 MACROCYTOSIS: ICD-10-CM

## 2025-04-04 DIAGNOSIS — R73.09 ABNORMAL GLUCOSE: ICD-10-CM

## 2025-04-04 DIAGNOSIS — Z13.220 SCREENING FOR CHOLESTEROL LEVEL: ICD-10-CM

## 2025-04-04 DIAGNOSIS — E61.1 IRON DEFICIENCY: ICD-10-CM

## 2025-04-04 DIAGNOSIS — R53.1 SUBJECTIVE WEAKNESS: ICD-10-CM

## 2025-04-04 LAB
25(OH)D3 SERPL-MCNC: 40.4 NG/ML (ref 30–100)
CHOLEST SERPL-MCNC: 229 MG/DL (ref 0–200)
DEPRECATED RDW RBC AUTO: 51 FL (ref 37–54)
ERYTHROCYTE [DISTWIDTH] IN BLOOD BY AUTOMATED COUNT: 13.3 % (ref 12.3–15.4)
FOLATE SERPL-MCNC: >20 NG/ML (ref 4.78–24.2)
HBA1C MFR BLD: 4.8 % (ref 4.8–5.6)
HCT VFR BLD AUTO: 39.7 % (ref 34–46.6)
HDLC SERPL QL: 2.41
HDLC SERPL-MCNC: 95 MG/DL (ref 40–60)
HGB BLD-MCNC: 13.1 G/DL (ref 12–15.9)
IRON 24H UR-MRATE: 105 MCG/DL (ref 37–145)
IRON SATN MFR SERPL: 29 % (ref 20–50)
LDLC SERPL CALC-MCNC: 113 MG/DL (ref 0–100)
MCH RBC QN AUTO: 34.3 PG (ref 26.6–33)
MCHC RBC AUTO-ENTMCNC: 33 G/DL (ref 31.5–35.7)
MCV RBC AUTO: 103.9 FL (ref 79–97)
PLATELET # BLD AUTO: 229 10*3/MM3 (ref 140–450)
PMV BLD AUTO: 9.7 FL (ref 6–12)
RBC # BLD AUTO: 3.82 10*6/MM3 (ref 3.77–5.28)
TIBC SERPL-MCNC: 365 MCG/DL (ref 298–536)
TRANSFERRIN SERPL-MCNC: 245 MG/DL (ref 200–360)
TRIGL SERPL-MCNC: 123 MG/DL (ref 0–150)
VIT B12 BLD-MCNC: 1140 PG/ML (ref 211–946)
VLDLC SERPL-MCNC: 21 MG/DL (ref 5–40)
WBC NRBC COR # BLD AUTO: 8.2 10*3/MM3 (ref 3.4–10.8)

## 2025-04-04 PROCEDURE — 84466 ASSAY OF TRANSFERRIN: CPT

## 2025-04-04 PROCEDURE — 1159F MED LIST DOCD IN RCRD: CPT | Performed by: STUDENT IN AN ORGANIZED HEALTH CARE EDUCATION/TRAINING PROGRAM

## 2025-04-04 PROCEDURE — 3075F SYST BP GE 130 - 139MM HG: CPT | Performed by: STUDENT IN AN ORGANIZED HEALTH CARE EDUCATION/TRAINING PROGRAM

## 2025-04-04 PROCEDURE — 83540 ASSAY OF IRON: CPT

## 2025-04-04 PROCEDURE — 85027 COMPLETE CBC AUTOMATED: CPT

## 2025-04-04 PROCEDURE — 83036 HEMOGLOBIN GLYCOSYLATED A1C: CPT

## 2025-04-04 PROCEDURE — 3078F DIAST BP <80 MM HG: CPT | Performed by: STUDENT IN AN ORGANIZED HEALTH CARE EDUCATION/TRAINING PROGRAM

## 2025-04-04 PROCEDURE — 80061 LIPID PANEL: CPT

## 2025-04-04 PROCEDURE — 82607 VITAMIN B-12: CPT

## 2025-04-04 PROCEDURE — 1160F RVW MEDS BY RX/DR IN RCRD: CPT | Performed by: STUDENT IN AN ORGANIZED HEALTH CARE EDUCATION/TRAINING PROGRAM

## 2025-04-04 PROCEDURE — 82746 ASSAY OF FOLIC ACID SERUM: CPT

## 2025-04-04 PROCEDURE — 99214 OFFICE O/P EST MOD 30 MIN: CPT | Performed by: STUDENT IN AN ORGANIZED HEALTH CARE EDUCATION/TRAINING PROGRAM

## 2025-04-04 PROCEDURE — 82306 VITAMIN D 25 HYDROXY: CPT

## 2025-04-04 PROCEDURE — 1126F AMNT PAIN NOTED NONE PRSNT: CPT | Performed by: STUDENT IN AN ORGANIZED HEALTH CARE EDUCATION/TRAINING PROGRAM

## 2025-04-04 RX ORDER — CITALOPRAM HYDROBROMIDE 10 MG/1
10 TABLET ORAL EVERY MORNING
Qty: 90 TABLET | Refills: 3 | Status: SHIPPED | OUTPATIENT
Start: 2025-04-04

## 2025-04-04 NOTE — PROGRESS NOTES
Established Office Visit      Patient Name: Joyce Jones  : 1945   MRN: [id number]   Care Team: Patient Care Team:  Gayle Low DO as PCP - General (Internal Medicine)  Jameel Quiroz MD (Rheumatology)  Robert Zamudio MD as Cardiologist (Cardiology)    Chief Complaint:    Chief Complaint   Patient presents with    Extremity Weakness    Diarrhea       History of Present Illness: Joyce Jones is a 79 y.o. female  with fibromyalgia, PMR (following with rheumatology), chronic low back pain with sciatica, HTN who is here today to follow up with generalized weakness and diarrhea.     She reports over the past several weeks she has developed sensation of generalized weakness in the morning. Improved with eating. She reports history of iron deficiency and feels like she is anemic.   She reports experiencing diarrhea in the morning and the generalized fatigue/weakness typically follows this. No blood in her stool. Diarrhea is only limited to morning time. No abdominal pain or rectal pain.   Once she eats, she feels normal throughout the day.   She reports eating well rounded diet.     No numbness, tingling. Has not noticed focal loss of strength or dropping items. No syncope or dizziness. No SOA.   Denies urinary symptoms    She reports feeling quite anxious lately. She wakes up with anxiety. Has several life stressors, previously detailed in last note about 6 weeks ago. She was advised to restart her citalopram but reports the pharmacy did not have it in stock when she went to pick it up. She would like to pick it up now.  She reports anxiety is keeping her from sleeping well at night time. Restless tossing and turning throuhgout the night.     Subjective      Review of Systems:   Review of Systems - See HPI    I have reviewed and the following portions of the patient's history were updated as appropriate: past family history, past medical history, past social history, past surgical  "history and problem list.    Medications:     Current Outpatient Medications:     acetaminophen (TYLENOL) 325 MG tablet, Take 2 tablets by mouth Every 6 (Six) Hours As Needed for Mild Pain., Disp: , Rfl:     Aspirin Low Dose 81 MG EC tablet, Take 1 tablet by mouth Daily., Disp: , Rfl:     citalopram (CeleXA) 10 MG tablet, Take 1 tablet by mouth Every Morning., Disp: 90 tablet, Rfl: 3    traMADol-acetaminophen (ULTRACET) 37.5-325 MG per tablet, Take 1 tablet by mouth Every 6 (Six) Hours As Needed for Moderate Pain., Disp: , Rfl:     Allergies:   Allergies   Allergen Reactions    Overton-2 Inhibitors (Sulfonamide) Anaphylaxis    Nabumetone Anaphylaxis    Nsaids GI Intolerance and Hives    Sulfa Antibiotics Shortness Of Breath    Celecoxib Hives    Penicillins Nausea And Vomiting and Dizziness    Zithromax [Azithromycin] Dizziness       Objective     Physical Exam:  Vital Signs:   Vitals:    04/04/25 1325   BP: 134/70   Pulse: 83   SpO2: 99%   Weight: 66.7 kg (147 lb)   Height: 167.6 cm (66\")     Body mass index is 23.73 kg/m².     Physical Exam  Vitals reviewed.   Constitutional:       Appearance: Normal appearance. She is not ill-appearing.   Cardiovascular:      Rate and Rhythm: Normal rate.   Pulmonary:      Effort: Pulmonary effort is normal. No respiratory distress.   Musculoskeletal:         General: Normal range of motion.   Skin:     General: Skin is warm and dry.   Neurological:      Mental Status: She is alert.      Motor: No weakness.   Psychiatric:         Mood and Affect: Mood normal.         Behavior: Behavior normal.         Judgment: Judgment normal.         Assessment / Plan      Assessment/Plan:   Problems Addressed This Visit  Diagnoses and all orders for this visit:    1. Anxiety (Primary)  -     citalopram (CeleXA) 10 MG tablet; Take 1 tablet by mouth Every Morning.  Dispense: 90 tablet; Refill: 3    2. Iron deficiency  -     Iron Profile; Future  -     CBC No Differential; Future    3. Abnormal " glucose    4. Subjective weakness    5. Macrocytosis        When reviewing her symptoms, we discussed anxiety as possible etiology for her intermittent fatigue, feeling overwhelmed and experiencing subjective weakness which after clarification she states she is not truly weak, just feels she needs to sit down/eat. Her symptoms are self limited and last about 20 minutes before spontaneously improving. They are often times associated with racing thoughts/worry. Again encouraged her to resume her celexa 10mg daily. She has upcoming appointment to establish with behavioral health. We will obtain labs today, reviewed recent CBC, CMP, TSH which are largely unrevealing. Since she typically feels better with food, I encourage her to keep a small snack ie nuts, granola, crackers, bar next to her bed to have  in the morning as she begins her day. She feels this will be very helpful.        Plan of care reviewed with patient at the conclusion of today's visit. Education was provided regarding diagnosis and management.  Patient verbalizes understanding of and agreement with management plan.    Follow Up:   Return in about 6 weeks (around 5/16/2025) for Recheck - reschedule upcoming appointment .        DO LANA Aguiar PC [address]  [facility]  [id number]  [facility] [address]  Fax [phone]  Phone [phone]

## 2025-04-07 ENCOUNTER — RESULTS FOLLOW-UP (OUTPATIENT)
Dept: FAMILY MEDICINE CLINIC | Facility: CLINIC | Age: 80
End: 2025-04-07
Payer: MEDICARE

## 2025-05-19 ENCOUNTER — OFFICE VISIT (OUTPATIENT)
Dept: FAMILY MEDICINE CLINIC | Facility: CLINIC | Age: 80
End: 2025-05-19
Payer: MEDICARE

## 2025-05-19 VITALS
WEIGHT: 146 LBS | DIASTOLIC BLOOD PRESSURE: 88 MMHG | HEART RATE: 76 BPM | OXYGEN SATURATION: 99 % | HEIGHT: 66 IN | BODY MASS INDEX: 23.46 KG/M2 | SYSTOLIC BLOOD PRESSURE: 128 MMHG

## 2025-05-19 DIAGNOSIS — Z00.00 MEDICARE ANNUAL WELLNESS VISIT, SUBSEQUENT: Primary | ICD-10-CM

## 2025-05-19 DIAGNOSIS — Z78.0 MENOPAUSE: ICD-10-CM

## 2025-05-19 DIAGNOSIS — F41.9 ANXIETY: ICD-10-CM

## 2025-05-19 DIAGNOSIS — F51.04 PSYCHOPHYSIOLOGICAL INSOMNIA: ICD-10-CM

## 2025-05-19 DIAGNOSIS — Z13.820 ENCOUNTER FOR SCREENING FOR OSTEOPOROSIS: ICD-10-CM

## 2025-05-19 PROCEDURE — 3074F SYST BP LT 130 MM HG: CPT | Performed by: STUDENT IN AN ORGANIZED HEALTH CARE EDUCATION/TRAINING PROGRAM

## 2025-05-19 PROCEDURE — 1170F FXNL STATUS ASSESSED: CPT | Performed by: STUDENT IN AN ORGANIZED HEALTH CARE EDUCATION/TRAINING PROGRAM

## 2025-05-19 PROCEDURE — 1159F MED LIST DOCD IN RCRD: CPT | Performed by: STUDENT IN AN ORGANIZED HEALTH CARE EDUCATION/TRAINING PROGRAM

## 2025-05-19 PROCEDURE — 1160F RVW MEDS BY RX/DR IN RCRD: CPT | Performed by: STUDENT IN AN ORGANIZED HEALTH CARE EDUCATION/TRAINING PROGRAM

## 2025-05-19 PROCEDURE — G0439 PPPS, SUBSEQ VISIT: HCPCS | Performed by: STUDENT IN AN ORGANIZED HEALTH CARE EDUCATION/TRAINING PROGRAM

## 2025-05-19 PROCEDURE — 3079F DIAST BP 80-89 MM HG: CPT | Performed by: STUDENT IN AN ORGANIZED HEALTH CARE EDUCATION/TRAINING PROGRAM

## 2025-05-19 PROCEDURE — 1126F AMNT PAIN NOTED NONE PRSNT: CPT | Performed by: STUDENT IN AN ORGANIZED HEALTH CARE EDUCATION/TRAINING PROGRAM

## 2025-05-19 RX ORDER — MELATONIN 3 MG
2 CAPSULE ORAL NIGHTLY
Qty: 60 CAPSULE | Refills: 11 | Status: SHIPPED | OUTPATIENT
Start: 2025-05-19

## 2025-05-19 RX ORDER — CITALOPRAM HYDROBROMIDE 20 MG/1
20 TABLET ORAL EVERY MORNING
Qty: 30 TABLET | Refills: 5 | Status: SHIPPED | OUTPATIENT
Start: 2025-05-19

## 2025-05-19 NOTE — PROGRESS NOTES
Subjective   The ABCs of the Annual Wellness Visit  Medicare Wellness Visit      oJyce Jones is a 79 y.o. patient   with fibromyalgia, PMR (following with rheumatology), chronic low back pain with sciatica, HTN  who presents for a Medicare Wellness Visit.    The following portions of the patient's history were reviewed and   updated as appropriate: allergies, current medications, past family history, past medical history, past social history, past surgical history, and problem list.    She reports not sleeping well at night time. Reports over the past 3-4 days she has slept 2-3 hours per night. She admits to racing thoughts, anxiety. Has a friend whose health is declining which has been taking an emotional toll on her.    She reports feeling forgetful over the past few months, feels her cognitive function has been slipping. Notices this most often after not sleeping well. She feels cognitive function improves after a few nights of sleep.     She reports she has been compliant with her celexa and it has been somewhat helpful.     Compared to one year ago, the patient's physical   health is the same.  Compared to one year ago, the patient's mental   health is the same.    Recent Hospitalizations:  She was not admitted to the hospital during the last year.     Current Medical Providers:  Patient Care Team:  Gayle Low DO as PCP - General (Internal Medicine)  Jameel Quiroz MD (Rheumatology)  Robert Zamudio MD as Cardiologist (Cardiology)    Outpatient Medications Prior to Visit   Medication Sig Dispense Refill    acetaminophen (TYLENOL) 325 MG tablet Take 2 tablets by mouth Every 6 (Six) Hours As Needed for Mild Pain.      Aspirin Low Dose 81 MG EC tablet Take 1 tablet by mouth Daily.      traMADol-acetaminophen (ULTRACET) 37.5-325 MG per tablet Take 1 tablet by mouth Every 6 (Six) Hours As Needed for Moderate Pain.      citalopram (CeleXA) 10 MG tablet Take 1 tablet by mouth Every Morning. 90  tablet 3     No facility-administered medications prior to visit.     Opioid medication/s are on active medication list.  and I have evaluated her active treatment plan and pain score trends (see table).  Vitals:    05/19/25 1310   PainSc: 0-No pain     I have reviewed the chart for potential of high risk medication and harmful drug interactions in the elderly.        Aspirin is on active medication list. Aspirin use is indicated based on review of current medical condition/s. Pros and cons of this therapy have been discussed today. Benefits of this medication outweigh potential harm.  Patient has been encouraged to continue taking this medication.  .      Patient Active Problem List   Diagnosis    Hyperlipidemia    Primary osteoarthritis involving multiple joints    Gastroesophageal reflux disease without esophagitis    Heart valve disorder    Allergic rhinitis    Degenerative disc disease, cervical    Bulging of cervical intervertebral disc    History of fusion of cervical spine    Tendonitis of left rotator cuff    Subacromial bursitis of left shoulder joint    Essential hypertension    Asthenia    Chronic low back pain    Connective tissue disease overlap syndrome    Diverticulosis    History of deep vein thrombosis    Hypercalcemia    Loose total knee arthroplasty    Megaloblastic anemia due to vitamin B12 deficiency    Osteopenia    Scoliosis    Supraventricular tachycardia    Tricuspid regurgitation    Postural dizziness with presyncope    Obstructive sleep apnea    Fibromyalgia    Anxiety and depression    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  ACP discussion was held with the patient during this visit. Patient has an advance directive (not in EMR), copy requested.            Objective   Vitals:    05/19/25 1310   BP: 128/88   Pulse: 76   SpO2: 99%  "  Weight: 66.2 kg (146 lb)   Height: 167.6 cm (66\")   PainSc: 0-No pain       Estimated body mass index is 23.57 kg/m² as calculated from the following:    Height as of this encounter: 167.6 cm (66\").    Weight as of this encounter: 66.2 kg (146 lb).    BMI is within normal parameters. No other follow-up for BMI required.           Does the patient have evidence of cognitive impairment? Yes MRI thanh ordered, discussed in A/P  Lab Results   Component Value Date    TRIG 123 2025    HDL 95 (H) 2025     (H) 2025    VLDL 21 2025    HGBA1C 4.80 2025                                                                                               Health  Risk Assessment    Smoking Status:  Social History     Tobacco Use   Smoking Status Never    Passive exposure: Never   Smokeless Tobacco Never     Alcohol Consumption:  Social History     Substance and Sexual Activity   Alcohol Use Not Currently    Alcohol/week: 1.0 standard drink of alcohol    Types: 1 Glasses of wine per week    Comment: rarely at holidays       Fall Risk Screen  STEADI Fall Risk Assessment was completed, and patient is at LOW risk for falls.Assessment completed on:2025    Depression Screening   Little interest or pleasure in doing things? Not at all   Feeling down, depressed, or hopeless? Not at all   PHQ-2 Total Score 0      Health Habits and Functional and Cognitive Screenin/19/2025     1:20 PM   Functional & Cognitive Status   Do you have difficulty preparing food and eating? No   Do you have difficulty bathing yourself, getting dressed or grooming yourself? No   Do you have difficulty using the toilet? No   Do you have difficulty moving around from place to place? No   Do you have trouble with steps or getting out of a bed or a chair? No   Current Diet Well Balanced Diet   Dental Exam Not up to date   Eye Exam Not up to date   Exercise (times per week) 2 times per week   Current Exercises Include " Walking   Do you need help using the phone?  No   Are you deaf or do you have serious difficulty hearing?  No   Do you need help to go to places out of walking distance? No   Do you need help shopping? No   Do you need help preparing meals?  No   Do you need help with housework?  No   Do you need help with laundry? No   Do you need help taking your medications? No   Do you need help managing money? No   Do you ever drive or ride in a car without wearing a seat belt? No   Have you felt unusual stress, anger or loneliness in the last month? No   Who do you live with? Spouse   If you need help, do you have trouble finding someone available to you? No   Have you been bothered in the last four weeks by sexual problems? No   Do you have difficulty concentrating, remembering or making decisions? No           Age-appropriate Screening Schedule:  Refer to the list below for future screening recommendations based on patient's age, sex and/or medical conditions. Orders for these recommended tests are listed in the plan section. The patient has been provided with a written plan.    Health Maintenance List  Health Maintenance   Topic Date Due    TDAP/TD VACCINES (2 - Td or Tdap) 01/01/2022    DXA SCAN  03/16/2024    COVID-19 Vaccine (8 - 2024-25 season) 05/18/2025    INFLUENZA VACCINE  07/01/2025    COLORECTAL CANCER SCREENING  01/25/2026    LIPID PANEL  04/04/2026    ANNUAL WELLNESS VISIT  05/19/2026    HEPATITIS C SCREENING  Completed    RSV Vaccine - Adults  Completed    Pneumococcal Vaccine 50+  Discontinued    MAMMOGRAM  Discontinued    HEMOGLOBIN A1C  Discontinued    ZOSTER VACCINE  Discontinued                                                                                                                                                CMS Preventative Services Quick Reference  Risk Factors Identified During Encounter  Immunizations Discussed/Encouraged: Tdap and COVID19  Dental Screening Recommended  Vision Screening  Recommended    The above risks/problems have been discussed with the patient.  Pertinent information has been shared with the patient in the After Visit Summary.  An After Visit Summary and PPPS were made available to the patient.    Follow Up:   Next Medicare Wellness visit to be scheduled in 1 year.     Assessment & Plan  Medicare annual wellness visit, subsequent         Encounter for screening for osteoporosis         Menopause         Anxiety    Orders:    citalopram (CeleXA) 20 MG tablet; Take 1 tablet by mouth Every Morning.    Psychophysiological insomnia      Orders:    Melatonin 3 MG capsule; Take 2 capsules by mouth Every Night.     Discussed importance of age appropriate vaccinations, cancer screening, routine labs. Discussed importance of up to date ACP. Discussed importance of fall precautions. Avoid clutter in the home and loose rugs. Always use handrail when available. Discussed the importance of medication compliance and follow up with medical providers. Discussed importance of healthy diet and active lifestyle as able. Smoking cessation not indicated. Discussed importance of up to date eye exams and dental exams.    PMR, inflammatory polyarthropathy - following with rheumatology, Dr. Quiroz    Anxiety - continue celexa     Anxiety - uncontrolled, increase celexa from 10mg to 20mg daily. She reports establishing with a friend who is a talk therapist recently and this has been helpful.     Insomnia, related to anxiety/depression - treatment for anxiety as detailed above. Will also start melatonin 6mg qhs to help aide with sleep onset.     Memory impairment - labs unrevealing. MRI Brain previously ordered but not yet performed. Scheduled for this week. Stressed importance of healthy sleeping habits and addressing uncontrolled anxiety/depression as detailed above. Declines neuro referral for now. Mini cog score 0/5 but she attributes this to lack of sleep.     Follow Up:   Return in about 6 weeks  (around 6/30/2025) for Recheck sleep and mood .

## 2025-05-19 NOTE — PATIENT INSTRUCTIONS
Advance Care Planning and Advance Directives     You make decisions on a daily basis - decisions about where you want to live, your career, your home, your life. Perhaps one of the most important decisions you face is your choice for future medical care. Take time to talk with your family and your healthcare team and start planning today.  Advance Care Planning is a process that can help you:  Understand possible future healthcare decisions in light of your own experiences  Reflect on those decision in light of your goals and values  Discuss your decisions with those closest to you and the healthcare professionals that care for you  Make a plan by creating a document that reflects your wishes    Surrogate Decision Maker  In the event of a medical emergency, which has left you unable to communicate or to make your own decisions, you would need someone to make decisions for you.  It is important to discuss your preferences for medical treatment with this person while you are in good health.     Qualities of a surrogate decision maker:  Willing to take on this role and responsibility  Knows what you want for future medical care  Willing to follow your wishes even if they don't agree with them  Able to make difficult medical decisions under stressful circumstances    Advance Directives  These are legal documents you can create that will guide your healthcare team and decision maker(s) when needed. These documents can be stored in the electronic medical record.    Living Will - a legal document to guide your care if you have a terminal condition or a serious illness and are unable to communicate. States vary by statute in document names/types, but most forms may include one or more of the following:        -  Directions regarding life-prolonging treatments        -  Directions regarding artificially provided nutrition/hydration        -  Choosing a healthcare decision maker        -  Direction regarding organ/tissue  donation    Durable Power of  for Healthcare - this document names an -in-fact to make medical decisions for you, but it may also allow this person to make personal and financial decisions for you. Please seek the advice of an  if you need this type of document.    **Advance Directives are not required and no one may discriminate against you if you do not sign one.    Medical Orders  Many states allow specific forms/orders signed by your physician to record your wishes for medical treatment in your current state of health. This form, signed in personal communication with your physician, addresses resuscitation and other medical interventions that you may or may not want.      For more information or to schedule a time with a [facility] Advance Care Planning Facilitator contact: [facility] or call [phone] and someone will contact you directly.  You are due for adacel Tdap vaccination. (provides protection against tetanus, diptheria and whooping cough) Please  get the immunization at your local pharmacy at your earliest convenience.  Please click on the link for more information about this vaccine.    https://www.cdc.gov/vaccines/vpd/dtap-tdap-td/public/index.html    You are due for a Covid 19 vaccination. (provides protection against Covid 19 Viral Infection) Please  talk to your pharmacist and get the immunization at your local pharmacy at your earliest convenience. Please click on the link for more information about this vaccine.   https://www.cdc.gov/coronavirus/2019-ncov/vaccines/stay-up-to-date.html        Medicare Wellness  Personal Prevention Plan of Service     Date of Office Visit:    Encounter Provider:  Gayle Low DO  Place of Service:  [facility]  Patient Name: Joyce Jones  :  1945    As part of the Medicare Wellness portion of your visit today, we are providing you with this personalized preventive plan of  services (PPPS). This plan is based upon recommendations of the [place] Preventive Services Task Force (USPSTF) and the Advisory Committee on Immunization Practices (ACIP).    This lists the preventive care services that should be considered, and provides dates of when you are due. Items listed as completed are up-to-date and do not require any further intervention.    Health Maintenance   Topic Date Due   • TDAP/TD VACCINES (2 - Td or Tdap) 01/01/2022   • DXA SCAN  03/16/2024   • COVID-19 Vaccine (8 - 2024-25 season) 05/18/2025   • INFLUENZA VACCINE  07/01/2025   • COLORECTAL CANCER SCREENING  01/25/2026   • LIPID PANEL  04/04/2026   • ANNUAL WELLNESS VISIT  05/19/2026   • HEPATITIS C SCREENING  Completed   • RSV Vaccine - Adults  Completed   • Pneumococcal Vaccine 50+  Discontinued   • MAMMOGRAM  Discontinued   • HEMOGLOBIN A1C  Discontinued   • ZOSTER VACCINE  Discontinued       No orders of the defined types were placed in this encounter.      No follow-ups on file.

## 2025-05-22 ENCOUNTER — HOSPITAL ENCOUNTER (OUTPATIENT)
Dept: MRI IMAGING | Facility: HOSPITAL | Age: 80
Discharge: HOME | End: 2025-05-22
Admitting: STUDENT IN AN ORGANIZED HEALTH CARE EDUCATION/TRAINING PROGRAM
Payer: MEDICARE

## 2025-05-22 DIAGNOSIS — R41.3 MEMORY IMPAIRMENT OF GRADUAL ONSET: ICD-10-CM

## 2025-05-22 PROCEDURE — 25510000002 GADOBENATE DIMEGLUMINE 529 MG/ML SOLUTION: Performed by: STUDENT IN AN ORGANIZED HEALTH CARE EDUCATION/TRAINING PROGRAM

## 2025-05-22 PROCEDURE — A9577 INJ MULTIHANCE: HCPCS | Performed by: STUDENT IN AN ORGANIZED HEALTH CARE EDUCATION/TRAINING PROGRAM

## 2025-05-22 PROCEDURE — 70553 MRI BRAIN STEM W/O & W/DYE: CPT

## 2025-05-22 RX ADMIN — GADOBENATE DIMEGLUMINE 14 ML: 529 INJECTION, SOLUTION INTRAVENOUS at 13:58

## 2025-05-28 ENCOUNTER — HOSPITAL ENCOUNTER (EMERGENCY)
Facility: HOSPITAL | Age: 80
Discharge: HOME | End: 2025-05-28
Payer: MEDICARE

## 2025-05-28 ENCOUNTER — TELEPHONE (OUTPATIENT)
Dept: FAMILY MEDICINE CLINIC | Facility: CLINIC | Age: 80
End: 2025-05-28

## 2025-05-28 ENCOUNTER — OFFICE VISIT (OUTPATIENT)
Dept: FAMILY MEDICINE CLINIC | Facility: CLINIC | Age: 80
End: 2025-05-28
Payer: MEDICARE

## 2025-05-28 ENCOUNTER — TELEPHONE (OUTPATIENT)
Dept: FAMILY MEDICINE CLINIC | Facility: CLINIC | Age: 80
End: 2025-05-28
Payer: MEDICARE

## 2025-05-28 VITALS
HEART RATE: 81 BPM | TEMPERATURE: 98.1 F | OXYGEN SATURATION: 100 % | HEIGHT: 66 IN | SYSTOLIC BLOOD PRESSURE: 130 MMHG | DIASTOLIC BLOOD PRESSURE: 84 MMHG | BODY MASS INDEX: 23.46 KG/M2 | WEIGHT: 146 LBS

## 2025-05-28 VITALS
TEMPERATURE: 98.3 F | HEIGHT: 69 IN | WEIGHT: 141.43 LBS | RESPIRATION RATE: 18 BRPM | BODY MASS INDEX: 20.95 KG/M2 | SYSTOLIC BLOOD PRESSURE: 175 MMHG | DIASTOLIC BLOOD PRESSURE: 92 MMHG | HEART RATE: 91 BPM | OXYGEN SATURATION: 96 %

## 2025-05-28 DIAGNOSIS — R41.3 IMPAIRED MEMORY: ICD-10-CM

## 2025-05-28 DIAGNOSIS — R53.1 ACUTE RIGHT-SIDED WEAKNESS: Primary | ICD-10-CM

## 2025-05-28 DIAGNOSIS — R25.1 TREMOR: ICD-10-CM

## 2025-05-28 PROCEDURE — 3079F DIAST BP 80-89 MM HG: CPT

## 2025-05-28 PROCEDURE — 99214 OFFICE O/P EST MOD 30 MIN: CPT

## 2025-05-28 PROCEDURE — 3075F SYST BP GE 130 - 139MM HG: CPT

## 2025-05-28 PROCEDURE — 99211 OFF/OP EST MAY X REQ PHY/QHP: CPT

## 2025-05-28 PROCEDURE — 1160F RVW MEDS BY RX/DR IN RCRD: CPT

## 2025-05-28 PROCEDURE — 1159F MED LIST DOCD IN RCRD: CPT

## 2025-05-28 PROCEDURE — 1126F AMNT PAIN NOTED NONE PRSNT: CPT

## 2025-05-28 NOTE — TELEPHONE ENCOUNTER
Spoke to patient.  Her  drove her to the diagnostic center because her  thought that was the ER.   She states they are going to [facility] now.   She has no further questions.

## 2025-05-28 NOTE — TELEPHONE ENCOUNTER
Called to inform patients spouse that the patient is supposed to go to the ER per the providers request. She mistakenly went to [facility] on [address] instead of the ER.

## 2025-05-28 NOTE — TELEPHONE ENCOUNTER
HUB CAN RELAY     PT CALLED REGARDING HER RECENT VISIT AND WAS UNSURE ON WHAT THE PCP- JESSENIA DENIS PA-C HAD RECOMMENDED FOR HER TODAY AT HER APPOINTMENT!    SHE STATED PT NEEDED TO GO THE THE EMERGENCY ROOM ASAP AS DIRECTED IN APPOINTMENT! SHE STATED SHE WAS GOING TO GO DUE TO PCP CONCERNS OF WEAKNESS AND ACUTE PAIN.. PT WILL NEED A HOSPITAL FOLLOW UP APPOINTMENT AFTER HER EMERGENCY ROOM VISIT!       *IF PT CALLS BACK PLEASE ADVISE HER AND HER  IT IS DIRECTED FOR HER TO GO THE EMERGENCY ROOM ASAP DUE TO JESSENIA DENIS PA-C CONCERNS DURING APPOINTMENT!*

## 2025-05-28 NOTE — PROGRESS NOTES
"    Office Note     Name: Joyce Jones    : 1945     MRN: [id number]     Chief Complaint  Extremity Weakness, Gait Problem (Feels wobbly about 3-4 days. Seems to be getting worse. First time . Wondering if it is related to her arthritis), and Memory Loss (Cognitive issue lately, says she has been kind of forgetful and she knows it comes with age but its been prominent last few days. She may sit something down and has to really sit and remember )    Subjective     History of Present Illness:  Joyce Jones is a 79 y.o. female who presents today for right sided weakness and tremor that started yesterday. Past medical history include DDD of cervical spine, diverticulosis, HTN, GERD, DVT history, hyperlipidemia, B12 deficiency, INGRIS, osteopenia, cystocele.     Patient states in the last 24 hours she has felt \"off.\" She states that she recently underwent MRI imaging of her brain for progressive memory impairment. She reports her MRI results were normal for her age.  This was completed on .  Patient reports that yesterday she noticed that she began having a tremor in her right arm and right leg that she is not able to stop.  She states she felt weak as well on this side.  She states she was dropping some objects.  She states this is not typical for her.  She does have some pain in her neck as well, but this is not new and she has had arthritis in her neck for a long time.  She denies any recent injuries or falls.  She denies any back pain.  Denies any visual changes, headache or dizziness.  She states she just feels that her memory has worsened in the past couple of days.  Denies any slurring of her words, numbness, tingling or difficulty swallowing.  Denies chest pain or shortness of breath.  She denies any recent falls or head injuries.  She is oriented to person place and time in the appointment and reports that her  is sitting in the lobby and he drove her today.  Per last note with her " "PCP her forgetfulness has been chronic over the last several months.  She denies urinary symptoms, fevers or chills. She denies any back pain. Feels like she is \"walking wobbly\" but has not had any falls. No chest pain or shortness of breath.       Review of Systems:   Review of Systems   Constitutional:  Negative for chills and fever.   Eyes:  Negative for blurred vision.   Respiratory:  Negative for chest tightness and shortness of breath.    Cardiovascular:  Negative for chest pain and palpitations.   Gastrointestinal:  Negative for abdominal pain.   Neurological:  Positive for weakness and confusion. Negative for dizziness, facial asymmetry, light-headedness, numbness and headache.       Past Medical History:   Past Medical History:   Diagnosis Date    Allergic     Allergic rhinitis     Anemia     Arthritis     Cholelithiasis     Deep vein thrombosis Clot following left knee replacement    Diverticulosis     Heart murmur     History of bone density study 03/02/2017    [facility]    History of mammography, screening 2016    Hyperlipidemia     Hypertension     Inguinal hernia 1978    repair    Irritable bowel syndrome Self    Kidney stone Embedded    Low back pain Scoliosis/kyphosis    Neuromuscular disorder Shoulder pain , arthritic issues, more severe    Osteoarthritis (arthritis due to wear and tear of joints)     Osteopenia     Personal history of DVT (deep vein thrombosis) 2005    right leg- due to surgery    Pneumonia 5 times    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 Has been under control . Function stable    Scoliosis Joyce    Sleep apnea 6 months    Urinary tract infection        Past Surgical History:   Past Surgical History:   Procedure Laterality Date    BACK SURGERY  2002    Scoliosis bar. T11-sacrum    BREAST BIOPSY      over 15 years ago.     CERVICAL SPINE SURGERY  2000    " cage    CHOLECYSTECTOMY  2014    COLONOSCOPY  2016    EYE SURGERY  Detached retina    FOOT SURGERY  2008    HERNIA REPAIR  3 both bowl areas    INGUINAL HERNIA REPAIR Bilateral 1978    2019 right side repair    INGUINAL HERNIA REPAIR  Both    JOINT REPLACEMENT  4 knee replacement surgeries    Right knee, 1980 approx. left knee , three replacements appears stable now.    KNEE SURGERY Bilateral 2017 2005 and x2 in 8/2017(left)    NASAL SEPTUM SURGERY  1999    SINUS SURGERY  1986    SPINE SURGERY  2004    TOTAL ABDOMINAL HYSTERECTOMY WITH SALPINGO OOPHORECTOMY  1986    Unilateral salpingo-oophorectomy/cervical dysplasia/AUB    TUBAL ABDOMINAL LIGATION  1982    URETHRAL DILATION      Multiple times       Family History:   Family History   Problem Relation Age of Onset    Uterine cancer Mother         Metastatic to ovaries and colon; possibly bone    Hypertension Mother     Kidney disease Mother     Tuberculosis Mother     Arthritis Mother     Thyroid disease Mother     Cancer Father         Bladder    Heart failure Father     Stroke Father     Hypertension Father     Heart attack Father     Colon polyps Father     Arthritis Father     Hypertension Brother     Kidney disease Brother     Colon polyps Brother     Cancer Brother     Heart disease Brother     Kidney disease Brother     Hypertension Brother     Cancer Brother         Cancer    No Known Problems Brother     No Known Problems Brother     Cancer Brother     Colon cancer Maternal Uncle     Colon cancer Maternal Uncle     Breast cancer Paternal Aunt     Diabetes Daughter     Cancer Brother        Social History:   Social History     Socioeconomic History    Marital status:    Tobacco Use    Smoking status: Never     Passive exposure: Never    Smokeless tobacco: Never   Vaping Use    Vaping status: Never Used   Substance and Sexual Activity    Alcohol use: Not Currently     Alcohol/week: 1.0 standard drink of alcohol     Types: 1 Glasses of wine per week      Comment: rarely at holidays    Drug use: Never    Sexual activity: Defer     Partners: Male     Birth control/protection: Post-menopausal, Surgical     Comment: Hysterectomy       Immunizations:   Immunization History   Administered Date(s) Administered    Arexvy (RSV, Adults 60+ yrs) 11/18/2023    COVID-19 (PFIZER) 12YRS+ (COMIRNATY) 11/18/2023, 11/18/2024    COVID-19 (PFIZER) BIVALENT 12+YRS 09/15/2022    COVID-19 (PFIZER) Purple Cap Monovalent 02/02/2021, 02/27/2021, 09/27/2021    Covid-19 (Pfizer) Gray Cap Monovalent 04/11/2022    FLUAD TRI 65YR+ 08/30/2015, 11/18/2024    Fluad Quad 65+ 12/07/2021, 09/15/2022    Flublok 18+yrs 10/18/2018    Fluzone High-Dose 65+YRS 10/03/2016, 10/01/2017, 10/23/2017, 11/19/2019, 09/29/2020    Fluzone High-Dose 65+yrs 09/29/2020    Hepatitis A 09/06/2018    Influenza Seasonal Injectable 08/30/2015    Influenza, Unspecified 10/18/2018, 01/27/2020    PEDS-Pneumococcal Conjugate (PCV7) 01/27/2020    Pneumococcal Conjugate 13-Valent (PCV13) 02/03/2016, 11/14/2017, 01/01/2018    Pneumococcal Conjugate Unspecified 01/27/2020    Pneumococcal Polysaccharide (PPSV23) 01/27/2020    Shingrix 04/09/2019, 02/07/2023, 09/02/2023    Tdap 01/01/2012    Zostavax 01/01/2010, 01/12/2010, 06/12/2010        Medications:     Current Outpatient Medications:     acetaminophen (TYLENOL) 325 MG tablet, Take 2 tablets by mouth Every 6 (Six) Hours As Needed for Mild Pain., Disp: , Rfl:     Aspirin Low Dose 81 MG EC tablet, Take 1 tablet by mouth Daily., Disp: , Rfl:     citalopram (CeleXA) 20 MG tablet, Take 1 tablet by mouth Every Morning., Disp: 30 tablet, Rfl: 5    Melatonin 3 MG capsule, Take 2 capsules by mouth Every Night., Disp: 60 capsule, Rfl: 11    traMADol-acetaminophen (ULTRACET) 37.5-325 MG per tablet, Take 1 tablet by mouth Every 6 (Six) Hours As Needed for Moderate Pain., Disp: , Rfl:     Allergies:   Allergies   Allergen Reactions    Overton-2 Inhibitors (Sulfonamide) Anaphylaxis     "Nabumetone Anaphylaxis    Nsaids GI Intolerance and Hives    Sulfa Antibiotics Shortness Of Breath    Celecoxib Hives    Penicillins Nausea And Vomiting and Dizziness    Zithromax [Azithromycin] Dizziness       Objective     Vital Signs  /84 (BP Location: Right arm, Patient Position: Sitting, Cuff Size: Adult)   Pulse 81   Temp 98.1 °F (36.7 °C) (Temporal)   Ht 167.6 cm (66\")   Wt 66.2 kg (146 lb)   SpO2 100%   BMI 23.57 kg/m²   Estimated body mass index is 23.57 kg/m² as calculated from the following:    Height as of this encounter: 167.6 cm (66\").    Weight as of this encounter: 66.2 kg (146 lb).    BMI is within normal parameters. No other follow-up for BMI required.       Physical Exam  Vitals reviewed.   Constitutional:       General: She is not in acute distress.     Appearance: Normal appearance. She is not ill-appearing or toxic-appearing.   HENT:      Head: Normocephalic and atraumatic.      Mouth/Throat:      Mouth: Mucous membranes are moist.   Eyes:      General: No visual field deficit.     Extraocular Movements: Extraocular movements intact.      Pupils: Pupils are equal, round, and reactive to light.   Cardiovascular:      Rate and Rhythm: Normal rate and regular rhythm.      Pulses: Normal pulses.      Heart sounds: Normal heart sounds.   Pulmonary:      Effort: Pulmonary effort is normal. No respiratory distress.      Breath sounds: Normal breath sounds. No wheezing or rales.   Abdominal:      General: Abdomen is flat. Bowel sounds are normal.   Musculoskeletal:      Right shoulder: Bony tenderness present. Decreased strength.      Cervical back: Normal range of motion. No tenderness. No spinous process tenderness or muscular tenderness.      Comments: Weakness in  on right side.2/5.  Left side stronger, 4/5.    Lymphadenopathy:      Cervical: No cervical adenopathy.   Skin:     General: Skin is warm.   Neurological:      Mental Status: She is alert and oriented to person, place, and " time.      Cranial Nerves: No dysarthria or facial asymmetry.      Sensory: No sensory deficit.      Motor: Weakness and tremor present.      Coordination: Coordination normal.      Gait: Gait normal.      Deep Tendon Reflexes: Reflexes normal.      Comments: Tremor at rest and with movement. Intermittent throughout the appointment.   Good sensation in right and left upper and lower extremities.    Psychiatric:         Mood and Affect: Mood normal.          Results:  No results found for this or any previous visit (from the past 24 hours).     Assessment and Plan     Assessment/Plan:  Diagnoses and all orders for this visit:    1. Acute right-sided weakness (Primary)    2. Impaired memory    3. Tremor    Vital signs stable in encounter. She was oriented x3 for me in our visit.  Reviewed recent brain MRI which was within normal limits for age no acute findings.  This imaging was done prior to her onset of right-sided body weakness and tremor as of yesterday.  She does have clear weakness in her right upper extremity and lower extremity today and tremor that does not alleviate with rest, but does seem to be intermittent in nature on physical exam.  During the appointment patient is oriented x 3.  She does not appear acutely confused and has no dysphagia or dysarthria on exam. Her gait is normal.     I explained to the patient that though she is having some chronic right shoulder pain I am concerned with the weakness of her right upper extremity and in her leg as well.  Explained that strokes and other acute neurological events can present this way with weakness and tremor. Arthritis does not typically have a tremor.  She has never had a tremor before or weakness, but has had arthritis for many years per her report.  I explained to patient that I would recommend going to the emergency room now to rule out stroke.  In the appointment she told me she would go to the ER.  I offered ambulance transportation, but patient  told me that her  will drive her as he was waiting for her in the lobby.  Patient was given instructions on how to get to the emergency room.    I also called and gave her  the instructions as well and why I had recommended this and the risks of death or further impairment if they did not go for further evaluation.  They stated that they will go to the ER now.    Plan of care reviewed with the patient at the conclusion of today's visit.  Education was provided regarding diagnosis and management.  Patient verbalizes understanding of and agreement with plan.       Follow Up  No follow-ups on file.    Luanne Lee PA-C   [facility]

## 2025-05-29 ENCOUNTER — TELEPHONE (OUTPATIENT)
Dept: FAMILY MEDICINE CLINIC | Facility: CLINIC | Age: 80
End: 2025-05-29
Payer: MEDICARE

## 2025-05-29 NOTE — TELEPHONE ENCOUNTER
Caller: Williams Jones    Relationship to patient: Emergency Contact    Best call back number: [phone]     Patient is needing: WILLIAMS CALLED FOR THE PATIENT TO ASK WHAT THE STATUS OF THE REFERRAL FOR HIS WIFE WAS    WILLIAMS SENT A MESSAGE A FEW DAYS AGO (FROM HIS OWN MYCHART, NOT THE PATIENT'S) ASKING DR SANDY IF SHE COULD REFER THE PATIENT SOMEWHERE FOR HER ARTHRITIS    PLEASE ADVISE

## 2025-05-30 NOTE — TELEPHONE ENCOUNTER
Patients  called back again wanting an update for the arthritis referral, I have informed him that we will notify them once we get a response from provider. He requested it be sent to [facility] arthritis center.

## 2025-05-30 NOTE — TELEPHONE ENCOUNTER
Williams Zapatae [address] Clinical Pool (supporting Gayle Low DO) (Selected Message)        5/30/25  2:15 PM  Arms, legs, knees, back……really all over..        She has been suffering for many years..

## 2025-06-04 ENCOUNTER — OFFICE VISIT (OUTPATIENT)
Dept: FAMILY MEDICINE CLINIC | Facility: CLINIC | Age: 80
End: 2025-06-04
Payer: MEDICARE

## 2025-06-04 VITALS
BODY MASS INDEX: 21.56 KG/M2 | OXYGEN SATURATION: 99 % | WEIGHT: 145.6 LBS | DIASTOLIC BLOOD PRESSURE: 90 MMHG | HEIGHT: 69 IN | SYSTOLIC BLOOD PRESSURE: 138 MMHG | HEART RATE: 70 BPM

## 2025-06-04 DIAGNOSIS — M06.00 SERONEGATIVE RHEUMATOID ARTHRITIS: ICD-10-CM

## 2025-06-04 DIAGNOSIS — M35.3 POLYMYALGIA RHEUMATICA: ICD-10-CM

## 2025-06-04 DIAGNOSIS — M06.4 INFLAMMATORY POLYARTHRITIS: ICD-10-CM

## 2025-06-04 DIAGNOSIS — R41.3 MEMORY IMPAIRMENT: Primary | ICD-10-CM

## 2025-06-04 PROCEDURE — 3075F SYST BP GE 130 - 139MM HG: CPT | Performed by: STUDENT IN AN ORGANIZED HEALTH CARE EDUCATION/TRAINING PROGRAM

## 2025-06-04 PROCEDURE — 99214 OFFICE O/P EST MOD 30 MIN: CPT | Performed by: STUDENT IN AN ORGANIZED HEALTH CARE EDUCATION/TRAINING PROGRAM

## 2025-06-04 PROCEDURE — 1126F AMNT PAIN NOTED NONE PRSNT: CPT | Performed by: STUDENT IN AN ORGANIZED HEALTH CARE EDUCATION/TRAINING PROGRAM

## 2025-06-04 PROCEDURE — 3080F DIAST BP >= 90 MM HG: CPT | Performed by: STUDENT IN AN ORGANIZED HEALTH CARE EDUCATION/TRAINING PROGRAM

## 2025-06-04 RX ORDER — DONEPEZIL HYDROCHLORIDE 5 MG/1
5 TABLET, FILM COATED ORAL NIGHTLY
Qty: 30 TABLET | Refills: 11 | Status: SHIPPED | OUTPATIENT
Start: 2025-06-04

## 2025-06-04 NOTE — PROGRESS NOTES
Established Office Visit      Patient Name: Joyce Jones  : 1945   MRN: [id number]   Care Team: Patient Care Team:  Gayle Low DO as PCP - General (Internal Medicine)  Jameel Quiroz MD (Rheumatology)  Robert Zamudio MD as Cardiologist (Cardiology)    Chief Complaint:    Chief Complaint   Patient presents with    Arthritis     Referral to clinic, painful, all over joints        History of Present Illness: Joyce Jones is a 79 y.o. female with fibromyalgia, PMR (following with rheumatology), chronic low back pain with sciatica, HTN who is here today to follow up with concerns as detailed below.    She reports polyarthralgia - reports diffuse joint pains throughout her entire body.   She notes particular pains throughout her neck, back, shoulders, knees, hands. Feels these pains began to worsen all at once and cannot recall particular trigger or localize pain. She also has muscle pains throughout her shoulders and posterior neck. Her joints throughout her hands are swollen. Reports intermittent swelling of small and large joints. She has been prescribed methotrexate in the past but does not currently take this and is unsure why it was stopped.She takes tramadol, prescribed by her rheumatologist for polymyalgia rheumatica and seronegative RA. She was prescribed prednisone last visit (3/2025) but did not pick this up. States pharmacy never received prescription     She has concerns that her cognifitve function has started to decline. Feels this has gradually been changing. She has had word finding difficulties and brain fog lately. She sometimes forgets appointments and this causes her to feel overwhelmed. Forgets to  meds from the pharmacy. Denies financial irresponsibility or safety concerns. No numbness, tingling, dysphagia, or balance issues     She had MRI brain 2025 - essentially normal for age and lab eval has  been unrevealing 2025             Subjective     "  Review of Systems:   Review of Systems - See HPI    I have reviewed and the following portions of the patient's history were updated as appropriate: past family history, past medical history, past social history, past surgical history and problem list.    Medications:     Current Outpatient Medications:     acetaminophen (TYLENOL) 325 MG tablet, Take 2 tablets by mouth Every 6 (Six) Hours As Needed for Mild Pain., Disp: , Rfl:     Aspirin Low Dose 81 MG EC tablet, Take 1 tablet by mouth Daily., Disp: , Rfl:     citalopram (CeleXA) 20 MG tablet, Take 1 tablet by mouth Every Morning., Disp: 30 tablet, Rfl: 5    Melatonin 3 MG capsule, Take 2 capsules by mouth Every Night., Disp: 60 capsule, Rfl: 11    traMADol-acetaminophen (ULTRACET) 37.5-325 MG per tablet, Take 1 tablet by mouth Every 6 (Six) Hours As Needed for Moderate Pain., Disp: , Rfl:     donepezil (Aricept) 5 MG tablet, Take 1 tablet by mouth Every Night., Disp: 30 tablet, Rfl: 11    Allergies:   Allergies   Allergen Reactions    Overton-2 Inhibitors (Sulfonamide) Anaphylaxis    Nabumetone Anaphylaxis    Nsaids GI Intolerance and Hives    Sulfa Antibiotics Shortness Of Breath    Celecoxib Hives    Penicillins Nausea And Vomiting and Dizziness    Zithromax [Azithromycin] Dizziness       Objective     Physical Exam:  Vital Signs:   Vitals:    06/04/25 1054   BP: 138/90   Pulse: 70   SpO2: 99%   Weight: 66 kg (145 lb 9.6 oz)   Height: 175.3 cm (69\")     Body mass index is 21.5 kg/m².     Physical Exam  Vitals reviewed.   Constitutional:       Appearance: Normal appearance. She is not ill-appearing.   Cardiovascular:      Rate and Rhythm: Normal rate.   Pulmonary:      Effort: Pulmonary effort is normal. No respiratory distress.   Skin:     General: Skin is warm and dry.   Neurological:      General: No focal deficit present.      Mental Status: She is alert and oriented to person, place, and time.   Psychiatric:         Mood and Affect: Mood normal.         " Behavior: Behavior normal.         Judgment: Judgment normal.                   Assessment / Plan      Assessment/Plan:   Problems Addressed This Visit  Diagnoses and all orders for this visit:    1. Memory impairment (Primary)  -     Ambulatory Referral to Neurology  -     donepezil (Aricept) 5 MG tablet; Take 1 tablet by mouth Every Night.  Dispense: 30 tablet; Refill: 11    2. Seronegative rheumatoid arthritis    3. Inflammatory polyarthritis    4. Polymyalgia rheumatica        PMR, inflammatory polyarthropathy - uncontrolled, we discussed her diffuse pains likely related to her inflammatory arthritis rather than localized osteoarthritis. She has been prescribed low dose prednisone, methotrexate in the past but unsure why she is not currently taking these and states prednisone was never received by her pharamcy. I encourage her to follow up with her rheumatologist, Dr. Quiroz for clarification on treatment plan other than tramadol which she is currently taking.   At this time, I am not sure referral to ortho would be of much benefit to her seeing as though her joint pain is diffuse and more inflammatory in nature. Likely would benefit from resuming prednisone but she will defer to her rheumatologist.      Anxiety - Celexa  20mg daily, talk therapy      Insomnia, related to anxiety/depression - treatment for anxiety as detailed above. Melatonin 6mg qhs to help aide with sleep onset.      Memory impairment - labs unrevealing. MRI Brain unrevealing. Referred to neurology and start trial of Donepezil 5mg daily. Stressed importance of healthy sleeping habits and addressing anxiety/depression as detailed above. Previous Mini cog score 0/5 but she attributes this to lack of sleep.                Results         Plan of care reviewed with patient at the conclusion of today's visit. Education was provided regarding diagnosis and management.  Patient verbalizes understanding of and agreement with management plan.    Follow  Up: as currently scheduled   No follow-ups on file.        DO LANA Aguiar RD  [facility]  [address]  [facility] [address]  Fax [phone]  Phone [phone]

## 2025-06-30 ENCOUNTER — OFFICE VISIT (OUTPATIENT)
Dept: FAMILY MEDICINE CLINIC | Facility: CLINIC | Age: 80
End: 2025-06-30
Payer: MEDICARE

## 2025-06-30 VITALS
HEIGHT: 69 IN | OXYGEN SATURATION: 98 % | DIASTOLIC BLOOD PRESSURE: 70 MMHG | HEART RATE: 77 BPM | SYSTOLIC BLOOD PRESSURE: 128 MMHG | BODY MASS INDEX: 22.04 KG/M2 | WEIGHT: 148.8 LBS

## 2025-06-30 DIAGNOSIS — M06.4 INFLAMMATORY POLYARTHRITIS: ICD-10-CM

## 2025-06-30 DIAGNOSIS — F41.9 ANXIETY: ICD-10-CM

## 2025-06-30 DIAGNOSIS — M06.00 SERONEGATIVE RHEUMATOID ARTHRITIS: ICD-10-CM

## 2025-06-30 DIAGNOSIS — R41.3 MEMORY IMPAIRMENT: Primary | ICD-10-CM

## 2025-06-30 DIAGNOSIS — M35.3 POLYMYALGIA RHEUMATICA: ICD-10-CM

## 2025-06-30 PROCEDURE — 3074F SYST BP LT 130 MM HG: CPT | Performed by: STUDENT IN AN ORGANIZED HEALTH CARE EDUCATION/TRAINING PROGRAM

## 2025-06-30 PROCEDURE — 3078F DIAST BP <80 MM HG: CPT | Performed by: STUDENT IN AN ORGANIZED HEALTH CARE EDUCATION/TRAINING PROGRAM

## 2025-06-30 PROCEDURE — 1126F AMNT PAIN NOTED NONE PRSNT: CPT | Performed by: STUDENT IN AN ORGANIZED HEALTH CARE EDUCATION/TRAINING PROGRAM

## 2025-06-30 PROCEDURE — 99214 OFFICE O/P EST MOD 30 MIN: CPT | Performed by: STUDENT IN AN ORGANIZED HEALTH CARE EDUCATION/TRAINING PROGRAM

## 2025-06-30 RX ORDER — PREDNISONE 2.5 MG/1
2.5 TABLET ORAL 2 TIMES DAILY
COMMUNITY

## 2025-06-30 NOTE — PROGRESS NOTES
Established Office Visit      Patient Name: Joyce Jones  : 1945   MRN: [id number]   Care Team: Patient Care Team:  Gayle Low DO as PCP - General (Internal Medicine)  Jameel Quiroz MD (Rheumatology)  Robert Zamudio MD as Cardiologist (Cardiology)    Chief Complaint:    Chief Complaint   Patient presents with    Psychophysiological insomnia    Anxiety     Takes  citalopram    Arthritis       History of Present Illness: Joyce Jones is a 79 y.o. female with fibromyalgia, PMR (following with rheumatology), chronic low back pain with sciatica, HTN who is here today to follow up with insomnia, anxiety, memory impairment.    She was seen about 4 weeks ago. At that time, she was referred to neurology and we started trial of donepezil 5mg daily.   She reports she didn't start this because she was unaware it was sent the the pharmacy.   She reports she recalls discussing this at our last visit but that her  is in charge of picking up meds from the pharmacy and she does not think he was aware to pick this up.     She reports anxiety is well controlled with celexa, feeling much less overwhelmed.   Sleeping better with melatonin    She continues to struggle with polyarthralgia - following with rheumatology. Repots compliance with her prednisone 2.5mg BID and tramadol prn but would like to discuss med changes with her provider. Feels her steroid needs to be increased/adjusted for an acute flare.             Subjective      Review of Systems:   Review of Systems - See HPI    I have reviewed and the following portions of the patient's history were updated as appropriate: past family history, past medical history, past social history, past surgical history and problem list.    Medications:     Current Outpatient Medications:     acetaminophen (TYLENOL) 325 MG tablet, Take 2 tablets by mouth Every 6 (Six) Hours As Needed for Mild Pain., Disp: , Rfl:     Aspirin Low Dose 81 MG EC tablet,  "Take 1 tablet by mouth Daily., Disp: , Rfl:     citalopram (CeleXA) 20 MG tablet, Take 1 tablet by mouth Every Morning., Disp: 30 tablet, Rfl: 5    donepezil (Aricept) 5 MG tablet, Take 1 tablet by mouth Every Night., Disp: 30 tablet, Rfl: 11    Melatonin 3 MG capsule, Take 2 capsules by mouth Every Night., Disp: 60 capsule, Rfl: 11    predniSONE (DELTASONE) 2.5 MG tablet, Take 1 tablet by mouth 2 (Two) Times a Day., Disp: , Rfl:     traMADol-acetaminophen (ULTRACET) 37.5-325 MG per tablet, Take 1 tablet by mouth Every 6 (Six) Hours As Needed for Moderate Pain., Disp: , Rfl:     Allergies:   Allergies   Allergen Reactions    Overton-2 Inhibitors (Sulfonamide) Anaphylaxis    Nabumetone Anaphylaxis    Nsaids GI Intolerance and Hives    Sulfa Antibiotics Shortness Of Breath    Celecoxib Hives    Penicillins Nausea And Vomiting and Dizziness    Zithromax [Azithromycin] Dizziness       Objective     Physical Exam:  Vital Signs:   Vitals:    06/30/25 1249   BP: 128/70   Pulse: 77   SpO2: 98%   Weight: 67.5 kg (148 lb 12.8 oz)   Height: 175.3 cm (69\")     Body mass index is 21.97 kg/m².     Physical Exam  Vitals reviewed.   Constitutional:       Appearance: Normal appearance.   Cardiovascular:      Rate and Rhythm: Normal rate.   Pulmonary:      Effort: Pulmonary effort is normal. No respiratory distress.   Neurological:      Mental Status: She is alert.   Psychiatric:         Mood and Affect: Mood normal.         Behavior: Behavior normal.         Judgment: Judgment normal.         Assessment / Plan      Assessment/Plan:   Problems Addressed This Visit  Diagnoses and all orders for this visit:    1. Memory impairment (Primary)    2. Seronegative rheumatoid arthritis    3. Inflammatory polyarthritis    4. Polymyalgia rheumatica    5. Anxiety        PMR, inflammatory polyarthropathy - uncontrolled, following with rheumatologist, Dr. Quiroz for management of tramadol, low dose prednisone. Planning to call his office to schedule " follow up.     Anxiety - improved with Celexa  20mg daily, talk therapy      Insomnia, related to anxiety/depression - continue celexa and melatonin      Memory impairment - labs unrevealing. MRI Brain unrevealing. Referred to neurology last visit and provided her with appointment reminder for September. Again, will start trial of Donepezil 5mg daily. We went through her current medications, printed out list and wrote down what each is for so that she can confirm she is taking everything she is supposed to be taking at home.   Stressed importance of healthy sleeping habits and addressing anxiety/depression as detailed above.       Plan of care reviewed with patient at the conclusion of today's visit. Education was provided regarding diagnosis and management.  Patient verbalizes understanding of and agreement with management plan.    Follow Up:   Return in about 6 weeks (around 8/11/2025) for Recheck.        DO LANA Aguiar RD  [facility]  [id number]  [facility] [phone]  Fax [phone]  Phone [phone]

## 2025-08-27 ENCOUNTER — OFFICE VISIT (OUTPATIENT)
Dept: FAMILY MEDICINE CLINIC | Facility: CLINIC | Age: 80
End: 2025-08-27
Payer: MEDICARE

## 2025-08-27 VITALS
HEIGHT: 69 IN | HEART RATE: 79 BPM | BODY MASS INDEX: 21.71 KG/M2 | DIASTOLIC BLOOD PRESSURE: 74 MMHG | WEIGHT: 146.6 LBS | OXYGEN SATURATION: 99 % | SYSTOLIC BLOOD PRESSURE: 134 MMHG

## 2025-08-27 DIAGNOSIS — R41.3 MEMORY IMPAIRMENT: ICD-10-CM

## 2025-08-27 DIAGNOSIS — R79.89 OTHER SPECIFIED ABNORMAL FINDINGS OF BLOOD CHEMISTRY: ICD-10-CM

## 2025-08-27 DIAGNOSIS — R53.1 WEAKNESS: Primary | ICD-10-CM

## 2025-08-27 DIAGNOSIS — M35.3 POLYMYALGIA RHEUMATICA: ICD-10-CM

## 2025-08-27 PROCEDURE — 1159F MED LIST DOCD IN RCRD: CPT | Performed by: INTERNAL MEDICINE

## 2025-08-27 PROCEDURE — 3075F SYST BP GE 130 - 139MM HG: CPT | Performed by: INTERNAL MEDICINE

## 2025-08-27 PROCEDURE — 3078F DIAST BP <80 MM HG: CPT | Performed by: INTERNAL MEDICINE

## 2025-08-27 PROCEDURE — 1160F RVW MEDS BY RX/DR IN RCRD: CPT | Performed by: INTERNAL MEDICINE

## 2025-08-27 PROCEDURE — 1126F AMNT PAIN NOTED NONE PRSNT: CPT | Performed by: INTERNAL MEDICINE

## 2025-08-27 PROCEDURE — 99214 OFFICE O/P EST MOD 30 MIN: CPT | Performed by: INTERNAL MEDICINE
